# Patient Record
Sex: FEMALE | Race: WHITE | NOT HISPANIC OR LATINO | Employment: OTHER | URBAN - METROPOLITAN AREA
[De-identification: names, ages, dates, MRNs, and addresses within clinical notes are randomized per-mention and may not be internally consistent; named-entity substitution may affect disease eponyms.]

---

## 2017-02-22 DIAGNOSIS — G25.81 RLS (RESTLESS LEGS SYNDROME): Chronic | ICD-10-CM

## 2017-02-22 RX ORDER — CLONAZEPAM 0.5 MG/1
TABLET ORAL
Qty: 30 TAB | Refills: 3 | Status: SHIPPED
Start: 2017-02-22 | End: 2017-04-14 | Stop reason: SDUPTHER

## 2017-03-01 DIAGNOSIS — G25.81 RLS (RESTLESS LEGS SYNDROME): Chronic | ICD-10-CM

## 2017-03-01 RX ORDER — PRAMIPEXOLE DIHYDROCHLORIDE 1 MG/1
1 TABLET ORAL 2 TIMES DAILY
Qty: 60 TAB | Refills: 6 | Status: SHIPPED | OUTPATIENT
Start: 2017-03-01 | End: 2017-04-14 | Stop reason: SDUPTHER

## 2017-03-20 DIAGNOSIS — N32.81 OVERACTIVE BLADDER: ICD-10-CM

## 2017-03-20 RX ORDER — OXYBUTYNIN CHLORIDE 5 MG/1
5 TABLET ORAL 2 TIMES DAILY
Qty: 60 TAB | Refills: 6 | Status: SHIPPED | OUTPATIENT
Start: 2017-03-20 | End: 2017-04-14 | Stop reason: SDUPTHER

## 2017-04-14 ENCOUNTER — OFFICE VISIT (OUTPATIENT)
Dept: MEDICAL GROUP | Facility: MEDICAL CENTER | Age: 67
End: 2017-04-14
Payer: COMMERCIAL

## 2017-04-14 VITALS
HEIGHT: 62 IN | OXYGEN SATURATION: 97 % | SYSTOLIC BLOOD PRESSURE: 124 MMHG | WEIGHT: 228 LBS | HEART RATE: 66 BPM | DIASTOLIC BLOOD PRESSURE: 78 MMHG | RESPIRATION RATE: 16 BRPM | TEMPERATURE: 97.8 F | BODY MASS INDEX: 41.96 KG/M2

## 2017-04-14 DIAGNOSIS — E66.01 OBESITY, CLASS III, BMI 40-49.9 (MORBID OBESITY) (HCC): ICD-10-CM

## 2017-04-14 DIAGNOSIS — N32.81 OVERACTIVE BLADDER: ICD-10-CM

## 2017-04-14 DIAGNOSIS — G25.81 RLS (RESTLESS LEGS SYNDROME): Chronic | ICD-10-CM

## 2017-04-14 PROCEDURE — 99214 OFFICE O/P EST MOD 30 MIN: CPT | Performed by: INTERNAL MEDICINE

## 2017-04-14 RX ORDER — OXYBUTYNIN CHLORIDE 5 MG/1
5 TABLET ORAL 3 TIMES DAILY
Qty: 90 TAB | Refills: 6 | Status: SHIPPED | OUTPATIENT
Start: 2017-04-14 | End: 2017-11-02 | Stop reason: SDUPTHER

## 2017-04-14 RX ORDER — CLONAZEPAM 0.5 MG/1
TABLET ORAL
Qty: 30 TAB | Refills: 0 | Status: SHIPPED | OUTPATIENT
Start: 2017-04-14 | End: 2017-05-30 | Stop reason: SDUPTHER

## 2017-04-14 RX ORDER — PRAMIPEXOLE DIHYDROCHLORIDE 1 MG/1
1 TABLET ORAL 2 TIMES DAILY
Qty: 60 TAB | Refills: 6 | Status: SHIPPED | OUTPATIENT
Start: 2017-04-14 | End: 2017-09-08 | Stop reason: SDUPTHER
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
## 2017-04-14 NOTE — MR AVS SNAPSHOT
"        Ariadna Navarroromaine   2017 3:00 PM   Office Visit   MRN: [id number]    Department:  [address] [place] [address]   Dept Phone:  [phone]    Description:  Female : 1950   Provider:  Martin Lindsey M.D.           Reason for Visit     Medication Problem stollen      Allergies as of 2017     No Known Allergies      You were diagnosed with     Obesity, Class III, BMI 40-49.9 (morbid obesity) (CMS-[facility])   [933426]       Overactive bladder   [263473]       RLS (restless legs syndrome)   [481575]         Vital Signs     Blood Pressure Pulse Temperature Respirations Height Weight    124/78 mmHg 66 36.6 °C (97.8 °F) 16 1.562 m (5' 1.5\") 103.42 kg (228 lb)    Body Mass Index Oxygen Saturation Smoking Status             42.39 kg/m2 97% Never Smoker          Basic Information     Date Of Birth Sex Race Ethnicity Preferred Language    1950 Female White Unknown English      Problem List              ICD-10-CM Priority Class Noted - Resolved    RLS (restless legs syndrome) (Chronic) G25.81   10/5/2011 - Present    Overactive bladder N32.81   2013 - Present    Obesity, Class III, BMI 40-49.9 (morbid obesity) (CMS-HCC) E66.01   2015 - Present      Health Maintenance        Date Due Completion Dates    IMM DTaP/Tdap/Td Vaccine (1 - Tdap) 1971 ---    IMM ZOSTER VACCINE 2012 ---    MAMMOGRAM 2016, 10/18/2011    COLONOSCOPY 2016 (Done), 2011 (Done)    Override on 2011: Done    Override on 2011: Done    PAP SMEAR 2016            Current Immunizations     No immunizations on file.      Below and/or attached are the medications your provider expects you to take. Review all of your home medications and newly ordered medications with your provider and/or pharmacist. Follow medication instructions as directed by your provider and/or pharmacist. Please keep your medication list with you and share with your provider. Update the information when " medications are discontinued, doses are changed, or new medications (including over-the-counter products) are added; and carry medication information at all times in the event of emergency situations     Allergies:  No Known Allergies          Medications  Valid as of: April 14, 2017 -  3:27 PM    Generic Name Brand Name Tablet Size Instructions for use    ClonazePAM (Tab) KLONOPIN 0.5 MG TAKE ONE TABLET BY MOUTH AT BEDTIME AS NEEDED        Ferrous Sulfate (Tab) ferrous sulfate 325 (65 FE) MG Take 1 Tab by mouth every day.        Oxybutynin Chloride (Tab) DITROPAN 5 MG Take 1 Tab by mouth 3 times a day. TAKE 1 TABLET BY MOUTH TWICE A DAY        Pramipexole Dihydrochloride (Tab) MIRAPEX 1 MG Take 1 Tab by mouth 2 Times a Day. TAKE 1 TABLET BY MOUTH TWICE A DAY        .                 Medicines prescribed today were sent to:     [id number] - [place], [place] - [address]    [address] [place] [address]    Phone: [phone] Fax: [phone]    Open 24 Hours?: No      Medication refill instructions:       If your prescription bottle indicates you have medication refills left, it is not necessary to call your provider’s office. Please contact your pharmacy and they will refill your medication.    If your prescription bottle indicates you do not have any refills left, you may request refills at any time through one of the following ways: The online [employer] system (except Urgent Care), by calling your provider’s office, or by asking your pharmacy to contact your provider’s office with a refill request. Medication refills are processed only during regular business hours and may not be available until the next business day. Your provider may request additional information or to have a follow-up visit with you prior to refilling your medication.   *Please Note: Medication refills are assigned a new Rx number when refilled electronically. Your pharmacy may indicate that no refills were authorized even  though a new prescription for the same medication is available at the pharmacy. Please request the medicine by name with the pharmacy before contacting your provider for a refill.        Your To Do List     Future Labs/Procedures Complete By [email or web address] [facility]IN [facility]NAG[facility] SCREEN  As directed 4/14/2018    Comments:    Current Meds (name, sig, last dose):   Current outpatient prescriptions:   •  oxybutynin, 5 mg, Oral, TID  •  pramipexole, 1 mg, Oral, BID  •  clonazepam, TAKE ONE TABLET BY MOUTH AT BEDTIME AS NEEDED  •  ferrous sulfate, 325 mg, Oral, DAILY         MyChart Access Code: Activation code not generated  Current [facility] Status: Active

## 2017-04-19 ENCOUNTER — APPOINTMENT (OUTPATIENT)
Dept: MEDICAL GROUP | Facility: MEDICAL CENTER | Age: 67
End: 2017-04-19
Payer: COMMERCIAL

## 2017-05-30 DIAGNOSIS — G25.81 RLS (RESTLESS LEGS SYNDROME): Chronic | ICD-10-CM

## 2017-05-30 RX ORDER — CLONAZEPAM 0.5 MG/1
TABLET ORAL
Qty: 30 TAB | Refills: 3 | Status: SHIPPED
Start: 2017-05-30 | End: 2017-09-19 | Stop reason: SDUPTHER

## 2017-09-08 DIAGNOSIS — G25.81 RLS (RESTLESS LEGS SYNDROME): Chronic | ICD-10-CM

## 2017-09-08 RX ORDER — PRAMIPEXOLE DIHYDROCHLORIDE 1 MG/1
TABLET ORAL
Qty: 60 TAB | Refills: 5 | Status: SHIPPED | OUTPATIENT
Start: 2017-09-08 | End: 2017-09-29 | Stop reason: SDUPTHER

## 2017-09-19 DIAGNOSIS — G25.81 RLS (RESTLESS LEGS SYNDROME): Chronic | ICD-10-CM

## 2017-09-19 RX ORDER — CLONAZEPAM 0.5 MG/1
TABLET ORAL
Qty: 30 TAB | Refills: 1 | Status: SHIPPED
Start: 2017-09-19 | End: 2017-11-09 | Stop reason: SDUPTHER

## 2017-09-29 DIAGNOSIS — G25.81 RLS (RESTLESS LEGS SYNDROME): Chronic | ICD-10-CM

## 2017-09-29 RX ORDER — PRAMIPEXOLE DIHYDROCHLORIDE 1 MG/1
1 TABLET ORAL 2 TIMES DAILY
Qty: 180 TAB | Refills: 3 | Status: SHIPPED | OUTPATIENT
Start: 2017-09-29 | End: 2018-01-18 | Stop reason: SDUPTHER

## 2017-09-29 NOTE — TELEPHONE ENCOUNTER
Was the patient seen in the last year in this department? Yes  4/14/17  Request is for a 90 day supply quantity 180    Does patient have an active prescription for medications requested? No     Received Request Via: Pharmacy

## 2017-11-02 DIAGNOSIS — N32.81 OVERACTIVE BLADDER: ICD-10-CM

## 2017-11-02 RX ORDER — OXYBUTYNIN CHLORIDE 5 MG/1
5 TABLET ORAL 3 TIMES DAILY
Qty: 90 TAB | Refills: 6 | Status: SHIPPED | OUTPATIENT
Start: 2017-11-02 | End: 2018-05-29 | Stop reason: SDUPTHER

## 2017-11-09 DIAGNOSIS — G25.81 RLS (RESTLESS LEGS SYNDROME): Chronic | ICD-10-CM

## 2017-11-10 RX ORDER — CLONAZEPAM 0.5 MG/1
TABLET ORAL
Qty: 30 TAB | Refills: 0 | Status: SHIPPED
Start: 2017-11-10 | End: 2017-12-13 | Stop reason: SDUPTHER

## 2017-12-13 DIAGNOSIS — G25.81 RLS (RESTLESS LEGS SYNDROME): Chronic | ICD-10-CM

## 2017-12-13 RX ORDER — CLONAZEPAM 0.5 MG/1
0.5 TABLET ORAL
Qty: 30 TAB | Refills: 0 | Status: SHIPPED
Start: 2017-12-13 | End: 2018-01-12

## 2018-01-18 ENCOUNTER — OFFICE VISIT (OUTPATIENT)
Dept: MEDICAL GROUP | Facility: MEDICAL CENTER | Age: 68
End: 2018-01-18
Payer: COMMERCIAL

## 2018-01-18 VITALS
DIASTOLIC BLOOD PRESSURE: 66 MMHG | WEIGHT: 225.6 LBS | HEIGHT: 62 IN | BODY MASS INDEX: 41.51 KG/M2 | RESPIRATION RATE: 16 BRPM | HEART RATE: 64 BPM | TEMPERATURE: 97.9 F | OXYGEN SATURATION: 98 % | SYSTOLIC BLOOD PRESSURE: 118 MMHG

## 2018-01-18 DIAGNOSIS — E66.01 MORBID OBESITY (HCC): ICD-10-CM

## 2018-01-18 DIAGNOSIS — R73.02 IGT (IMPAIRED GLUCOSE TOLERANCE): ICD-10-CM

## 2018-01-18 DIAGNOSIS — Z12.31 VISIT FOR SCREENING MAMMOGRAM: ICD-10-CM

## 2018-01-18 DIAGNOSIS — Z12.11 SCREEN FOR COLON CANCER: ICD-10-CM

## 2018-01-18 DIAGNOSIS — G25.81 RLS (RESTLESS LEGS SYNDROME): Chronic | ICD-10-CM

## 2018-01-18 DIAGNOSIS — Z91.81 AT RISK FOR FALLS: ICD-10-CM

## 2018-01-18 DIAGNOSIS — Z13.6 SCREENING FOR CARDIOVASCULAR CONDITION: ICD-10-CM

## 2018-01-18 PROCEDURE — 99214 OFFICE O/P EST MOD 30 MIN: CPT | Performed by: INTERNAL MEDICINE

## 2018-01-18 RX ORDER — CLONAZEPAM 0.5 MG/1
0.5 TABLET ORAL NIGHTLY PRN
Qty: 30 TAB | Refills: 2 | Status: SHIPPED | OUTPATIENT
Start: 2018-01-18 | End: 2018-02-17

## 2018-01-18 RX ORDER — PRAMIPEXOLE DIHYDROCHLORIDE 1 MG/1
1 TABLET ORAL 2 TIMES DAILY
Qty: 180 TAB | Refills: 3 | Status: SHIPPED | OUTPATIENT
Start: 2018-01-18 | End: 2018-04-12 | Stop reason: SDUPTHER

## 2018-01-18 RX ORDER — CLONAZEPAM 0.5 MG/1
0.25 TABLET ORAL NIGHTLY PRN
COMMUNITY
End: 2018-01-18 | Stop reason: SDUPTHER

## 2018-01-18 ASSESSMENT — PATIENT HEALTH QUESTIONNAIRE - PHQ9: CLINICAL INTERPRETATION OF PHQ2 SCORE: 0

## 2018-01-18 NOTE — PROGRESS NOTES
"CC: Restless leg syndrome.    HPI:   Ariadna presents today with the following.    1. Morbid obesity (CMS-HCC)  Weight persistently elevated has gone down slightly in the last 6 months. She denies any specific focus to diet.    2. RLS (restless legs syndrome)  Maintained on dual therapy including clonazepam. She reports she takes one pill at night. Denies any major anxiety symptoms and is taking Mirapex as well. She reports would be difficult for her to be seen every 3 months she would like one more prescription and is willing to wean off the medication.    3. IGT (impaired glucose tolerance)  Well overdue for recheck of blood work blood sugars have been elevated in the past but nondiabetic range.    4. At risk for falls  She reports a fall without significant injury refuses to use walking device.    ROS: Denies Chest pain, SOB, LE edema.    Patient Active Problem List    Diagnosis Date Noted   • Morbid obesity (CMS-[facility]) 06/01/2015   • Overactive bladder 11/25/2013   • RLS (restless legs syndrome) 10/05/2011       Current Outpatient Prescriptions   Medication Sig Dispense Refill   • clonazepam (KLONOPIN) 0.5 MG Tab Take 1 Tab by mouth at bedtime as needed for up to 30 days. 30 Tab 2   • pramipexole (MIRAPEX) 1 MG Tab Take 1 Tab by mouth 2 Times a Day. TAKE 1 TABLET BY MOUTH TWICE A  Tab 3   • oxybutynin (DITROPAN) 5 MG Tab Take 1 Tab by mouth 3 times a day. TAKE 1 TABLET BY MOUTH TWICE A DAY 90 Tab 6   • ferrous sulfate 325 (65 FE) MG tablet Take 1 Tab by mouth every day. (Patient not taking: Reported on 1/18/2018) 30 Each 6     No current facility-administered medications for this visit.          Allergies as of 01/18/2018   • (No Known Allergies)        ROS: As per HPI.    /66   Pulse 64   Temp 36.6 °C (97.9 °F)   Resp 16   Ht 1.562 m (5' 1.5\")   Wt 102.3 kg (225 lb 9.6 oz)   SpO2 98%   BMI 41.94 kg/m²      Physical Exam:  Gen:         Alert and oriented, No apparent distress.  Neck:        No " Lymphadenopathy or Bruits.  Lungs:     Clear to auscultation bilaterally  CV:          Regular rate and rhythm. No murmurs, rubs or gallops.               Ext:          No clubbing, cyanosis, edema.      Assessment and Plan.   67 y.o. female with the following issues.    1. Morbid obesity (CMS-[facility])  Patient's body mass index is 41.94 kg/m². Exercise and nutrition counseling were performed at this visit.  Set goal of 15lbs in next 3 months.    - Patient identified as having weight management issue.  Appropriate orders and counseling given.    2. RLS (restless legs syndrome)  Discussion to stand Klonopin will need to be seen every 3 months she agrees his last prescription and will trying to stick to Mirapex. She will continue iron as well.  - clonazepam (KLONOPIN) 0.5 MG Tab; Take 1 Tab by mouth at bedtime as needed for up to 30 days.  Dispense: 30 Tab; Refill: 2  - pramipexole (MIRAPEX) 1 MG Tab; Take 1 Tab by mouth 2 Times a Day. TAKE 1 TABLET BY MOUTH TWICE A DAY  Dispense: 180 Tab; Refill: 3    3. IGT (impaired glucose tolerance)  Discussion about diet recheck blood work  - HEMOGLOBIN A1C; Future    4. At risk for falls  Precautions given  - Patient identified as fall risk.  Appropriate orders and counseling given.    5. Screen for colon cancer    - REFERRAL TO GASTROENTEROLOGY

## 2018-04-12 DIAGNOSIS — G25.81 RLS (RESTLESS LEGS SYNDROME): Chronic | ICD-10-CM

## 2018-04-12 RX ORDER — PRAMIPEXOLE DIHYDROCHLORIDE 1 MG/1
1 TABLET ORAL 2 TIMES DAILY
Qty: 180 TAB | Refills: 3 | Status: SHIPPED | OUTPATIENT
Start: 2018-04-12 | End: 2018-04-16

## 2018-04-12 NOTE — TELEPHONE ENCOUNTER
Pt has run out of Medication. Pharmacy will not refill due to requesting them too early. Pt is stating that the Pharmacy did not give her enough to last.     Was the patient seen in the last year in this department? Yes     Does patient have an active prescription for medications requested? No     Received Request Via: Pharmacy

## 2018-04-16 ENCOUNTER — TELEPHONE (OUTPATIENT)
Dept: MEDICAL GROUP | Facility: MEDICAL CENTER | Age: 68
End: 2018-04-16

## 2018-04-16 RX ORDER — PRAMIPEXOLE DIHYDROCHLORIDE 1 MG/1
1 TABLET ORAL 3 TIMES DAILY
Qty: 90 TAB | Refills: 6 | Status: SHIPPED | OUTPATIENT
Start: 2018-04-16 | End: 2018-10-13 | Stop reason: SDUPTHER

## 2018-05-29 DIAGNOSIS — N32.81 OVERACTIVE BLADDER: ICD-10-CM

## 2018-05-29 RX ORDER — OXYBUTYNIN CHLORIDE 5 MG/1
5 TABLET ORAL 3 TIMES DAILY
Qty: 90 TAB | Refills: 6 | Status: SHIPPED | OUTPATIENT
Start: 2018-05-29 | End: 2019-01-11 | Stop reason: SDUPTHER

## 2018-10-08 ENCOUNTER — APPOINTMENT (OUTPATIENT)
Dept: RADIOLOGY | Facility: MEDICAL CENTER | Age: 68
End: 2018-10-08
Attending: EMERGENCY MEDICINE
Payer: COMMERCIAL

## 2018-10-08 ENCOUNTER — HOSPITAL ENCOUNTER (EMERGENCY)
Facility: MEDICAL CENTER | Age: 68
End: 2018-10-08
Attending: EMERGENCY MEDICINE
Payer: COMMERCIAL

## 2018-10-08 VITALS
TEMPERATURE: 98 F | SYSTOLIC BLOOD PRESSURE: 172 MMHG | HEART RATE: 55 BPM | DIASTOLIC BLOOD PRESSURE: 72 MMHG | BODY MASS INDEX: 44.63 KG/M2 | RESPIRATION RATE: 18 BRPM | WEIGHT: 242.51 LBS | HEIGHT: 62 IN

## 2018-10-08 DIAGNOSIS — S43.401A SPRAIN OF RIGHT SHOULDER, UNSPECIFIED SHOULDER SPRAIN TYPE, INITIAL ENCOUNTER: ICD-10-CM

## 2018-10-08 PROCEDURE — 99284 EMERGENCY DEPT VISIT MOD MDM: CPT

## 2018-10-08 PROCEDURE — 73030 X-RAY EXAM OF SHOULDER: CPT | Mod: RT

## 2018-10-08 RX ORDER — MELOXICAM 7.5 MG/1
7.5 TABLET ORAL DAILY
Qty: 30 TAB | Refills: 0 | Status: SHIPPED | OUTPATIENT
Start: 2018-10-08 | End: 2019-08-29

## 2018-10-08 ASSESSMENT — PAIN SCALES - GENERAL: PAINLEVEL_OUTOF10: 7
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
## 2018-10-08 NOTE — ED NOTES
Security dispatch called to get a shirt from [facility]'s closet for patient to go home in. States they will bring a shirt down.

## 2018-10-14 RX ORDER — PRAMIPEXOLE DIHYDROCHLORIDE 1 MG/1
TABLET ORAL
Qty: 90 TAB | Refills: 6 | Status: SHIPPED | OUTPATIENT
Start: 2018-10-14 | End: 2019-05-21 | Stop reason: SDUPTHER

## 2019-01-11 DIAGNOSIS — N32.81 OVERACTIVE BLADDER: ICD-10-CM

## 2019-01-11 RX ORDER — OXYBUTYNIN CHLORIDE 5 MG/1
5 TABLET ORAL 3 TIMES DAILY
Qty: 90 TAB | Refills: 0 | Status: SHIPPED | OUTPATIENT
Start: 2019-01-11 | End: 2019-02-07 | Stop reason: SDUPTHER

## 2019-05-28 DIAGNOSIS — N32.81 OVERACTIVE BLADDER: ICD-10-CM

## 2019-05-28 RX ORDER — OXYBUTYNIN CHLORIDE 5 MG/1
5 TABLET ORAL 3 TIMES DAILY
Qty: 90 TAB | Refills: 0 | Status: SHIPPED | OUTPATIENT
Start: 2019-05-28 | End: 2019-07-10 | Stop reason: SDUPTHER

## 2019-06-10 ENCOUNTER — PATIENT MESSAGE (OUTPATIENT)
Dept: MEDICAL GROUP | Facility: MEDICAL CENTER | Age: 69
End: 2019-06-10

## 2019-06-29 ENCOUNTER — TELEPHONE (OUTPATIENT)
Dept: MEDICAL GROUP | Facility: MEDICAL CENTER | Age: 69
End: 2019-06-29

## 2019-07-01 ENCOUNTER — TELEPHONE (OUTPATIENT)
Dept: RADIOLOGY | Facility: MEDICAL CENTER | Age: 69
End: 2019-07-01

## 2019-07-01 ENCOUNTER — TELEPHONE (OUTPATIENT)
Dept: MEDICAL GROUP | Facility: MEDICAL CENTER | Age: 69
End: 2019-07-01

## 2019-07-01 DIAGNOSIS — N95.9 POST MENOPAUSAL PROBLEMS: ICD-10-CM

## 2019-07-01 NOTE — TELEPHONE ENCOUNTER
VOICEMAIL  1. Caller Name: [facility]                      Call Back Number: [phone]    2. Message: Patient has an appt. 07/11 needs an order for a bone density scan.     3. Patient approves office to leave a detailed voicemail/MyChart message: N\A

## 2019-07-01 NOTE — TELEPHONE ENCOUNTER
LM REQUESTING DEXA ORDER BE PLACED AS PT WAS SCHEDULED FOR A DEXA W/O ORDER IN [facility]

## 2019-07-10 DIAGNOSIS — N32.81 OVERACTIVE BLADDER: ICD-10-CM

## 2019-07-10 RX ORDER — OXYBUTYNIN CHLORIDE 5 MG/1
5 TABLET ORAL 3 TIMES DAILY
Qty: 90 TAB | Refills: 0 | Status: SHIPPED | OUTPATIENT
Start: 2019-07-10 | End: 2019-12-10 | Stop reason: SDUPTHER

## 2019-07-11 ENCOUNTER — HOSPITAL ENCOUNTER (OUTPATIENT)
Dept: RADIOLOGY | Facility: MEDICAL CENTER | Age: 69
End: 2019-07-11
Attending: INTERNAL MEDICINE
Payer: COMMERCIAL

## 2019-07-11 DIAGNOSIS — N95.9 POST MENOPAUSAL PROBLEMS: ICD-10-CM

## 2019-07-11 DIAGNOSIS — N32.81 OVERACTIVE BLADDER: ICD-10-CM

## 2019-07-11 DIAGNOSIS — Z12.31 SCREENING MAMMOGRAM, ENCOUNTER FOR: ICD-10-CM

## 2019-07-11 PROCEDURE — 77080 DXA BONE DENSITY AXIAL: CPT

## 2019-07-11 PROCEDURE — 77067 SCR MAMMO BI INCL CAD: CPT

## 2019-07-30 ENCOUNTER — APPOINTMENT (OUTPATIENT)
Dept: MEDICAL GROUP | Facility: MEDICAL CENTER | Age: 69
End: 2019-07-30
Payer: COMMERCIAL

## 2019-10-15 ENCOUNTER — APPOINTMENT (OUTPATIENT)
Dept: RADIOLOGY | Facility: MEDICAL CENTER | Age: 69
End: 2019-10-15
Attending: EMERGENCY MEDICINE
Payer: COMMERCIAL

## 2019-10-15 ENCOUNTER — HOSPITAL ENCOUNTER (EMERGENCY)
Facility: MEDICAL CENTER | Age: 69
End: 2019-10-15
Attending: EMERGENCY MEDICINE
Payer: COMMERCIAL

## 2019-10-15 VITALS
HEART RATE: 74 BPM | SYSTOLIC BLOOD PRESSURE: 115 MMHG | HEIGHT: 62 IN | TEMPERATURE: 98.6 F | BODY MASS INDEX: 46.17 KG/M2 | DIASTOLIC BLOOD PRESSURE: 75 MMHG | WEIGHT: 250.88 LBS | OXYGEN SATURATION: 92 % | RESPIRATION RATE: 18 BRPM

## 2019-10-15 DIAGNOSIS — L03.115 BILATERAL CELLULITIS OF LOWER LEG: ICD-10-CM

## 2019-10-15 DIAGNOSIS — R60.9 PERIPHERAL EDEMA: ICD-10-CM

## 2019-10-15 DIAGNOSIS — L03.116 BILATERAL CELLULITIS OF LOWER LEG: ICD-10-CM

## 2019-10-15 LAB
ALBUMIN SERPL BCP-MCNC: 3.9 G/DL (ref 3.2–4.9)
ALBUMIN/GLOB SERPL: 1.3 G/DL
ALP SERPL-CCNC: 71 U/L (ref 30–99)
ALT SERPL-CCNC: 17 U/L (ref 2–50)
ANION GAP SERPL CALC-SCNC: 9 MMOL/L (ref 0–11.9)
AST SERPL-CCNC: 22 U/L (ref 12–45)
BASOPHILS # BLD AUTO: 0.3 % (ref 0–1.8)
BASOPHILS # BLD: 0.03 K/UL (ref 0–0.12)
BILIRUB SERPL-MCNC: 0.5 MG/DL (ref 0.1–1.5)
BUN SERPL-MCNC: 16 MG/DL (ref 8–22)
CALCIUM SERPL-MCNC: 8.7 MG/DL (ref 8.5–10.5)
CHLORIDE SERPL-SCNC: 107 MMOL/L (ref 96–112)
CO2 SERPL-SCNC: 23 MMOL/L (ref 20–33)
CREAT SERPL-MCNC: 0.64 MG/DL (ref 0.5–1.4)
EKG IMPRESSION: NORMAL
EOSINOPHIL # BLD AUTO: 0.19 K/UL (ref 0–0.51)
EOSINOPHIL NFR BLD: 2.1 % (ref 0–6.9)
ERYTHROCYTE [DISTWIDTH] IN BLOOD BY AUTOMATED COUNT: 44.9 FL (ref 35.9–50)
GLOBULIN SER CALC-MCNC: 3 G/DL (ref 1.9–3.5)
GLUCOSE SERPL-MCNC: 100 MG/DL (ref 65–99)
HCT VFR BLD AUTO: 37.5 % (ref 37–47)
HGB BLD-MCNC: 12.2 G/DL (ref 12–16)
IMM GRANULOCYTES # BLD AUTO: 0.03 K/UL (ref 0–0.11)
IMM GRANULOCYTES NFR BLD AUTO: 0.3 % (ref 0–0.9)
LYMPHOCYTES # BLD AUTO: 1.21 K/UL (ref 1–4.8)
LYMPHOCYTES NFR BLD: 13.5 % (ref 22–41)
MCH RBC QN AUTO: 28.6 PG (ref 27–33)
MCHC RBC AUTO-ENTMCNC: 32.5 G/DL (ref 33.6–35)
MCV RBC AUTO: 87.8 FL (ref 81.4–97.8)
MONOCYTES # BLD AUTO: 0.6 K/UL (ref 0–0.85)
MONOCYTES NFR BLD AUTO: 6.7 % (ref 0–13.4)
NEUTROPHILS # BLD AUTO: 6.93 K/UL (ref 2–7.15)
NEUTROPHILS NFR BLD: 77.1 % (ref 44–72)
NRBC # BLD AUTO: 0 K/UL
NRBC BLD-RTO: 0 /100 WBC
NT-PROBNP SERPL IA-MCNC: 99 PG/ML (ref 0–125)
PLATELET # BLD AUTO: 276 K/UL (ref 164–446)
PMV BLD AUTO: 9.8 FL (ref 9–12.9)
POTASSIUM SERPL-SCNC: 3.4 MMOL/L (ref 3.6–5.5)
PROT SERPL-MCNC: 6.9 G/DL (ref 6–8.2)
RBC # BLD AUTO: 4.27 M/UL (ref 4.2–5.4)
SODIUM SERPL-SCNC: 139 MMOL/L (ref 135–145)
WBC # BLD AUTO: 9 K/UL (ref 4.8–10.8)

## 2019-10-15 PROCEDURE — 83880 ASSAY OF NATRIURETIC PEPTIDE: CPT

## 2019-10-15 PROCEDURE — 71045 X-RAY EXAM CHEST 1 VIEW: CPT

## 2019-10-15 PROCEDURE — 80053 COMPREHEN METABOLIC PANEL: CPT

## 2019-10-15 PROCEDURE — 85025 COMPLETE CBC W/AUTO DIFF WBC: CPT

## 2019-10-15 PROCEDURE — 700102 HCHG RX REV CODE 250 W/ 637 OVERRIDE(OP): Performed by: EMERGENCY MEDICINE

## 2019-10-15 PROCEDURE — 93005 ELECTROCARDIOGRAM TRACING: CPT | Performed by: EMERGENCY MEDICINE

## 2019-10-15 PROCEDURE — 99285 EMERGENCY DEPT VISIT HI MDM: CPT

## 2019-10-15 PROCEDURE — 93005 ELECTROCARDIOGRAM TRACING: CPT

## 2019-10-15 PROCEDURE — 93970 EXTREMITY STUDY: CPT

## 2019-10-15 PROCEDURE — A9270 NON-COVERED ITEM OR SERVICE: HCPCS | Performed by: EMERGENCY MEDICINE

## 2019-10-15 RX ORDER — CEPHALEXIN 250 MG/1
250 CAPSULE ORAL 4 TIMES DAILY
Qty: 28 CAP | Refills: 0 | Status: SHIPPED | OUTPATIENT
Start: 2019-10-15 | End: 2019-10-22

## 2019-10-15 RX ORDER — PRAMIPEXOLE DIHYDROCHLORIDE 0.12 MG/1
0.12 TABLET ORAL 3 TIMES DAILY PRN
COMMUNITY
End: 2020-04-21

## 2019-10-15 RX ORDER — CYCLOBENZAPRINE HCL 10 MG
10 TABLET ORAL ONCE
Status: COMPLETED | OUTPATIENT
Start: 2019-10-15 | End: 2019-10-15

## 2019-10-15 RX ADMIN — CYCLOBENZAPRINE HYDROCHLORIDE 10 MG: 10 TABLET, FILM COATED ORAL at 17:15

## 2019-10-15 SDOH — HEALTH STABILITY: MENTAL HEALTH: HOW MANY STANDARD DRINKS CONTAINING ALCOHOL DO YOU HAVE ON A TYPICAL DAY?: 3 OR 4

## 2019-10-15 NOTE — ED TRIAGE NOTES
Ambulatory to triage from EKG with   Chief Complaint   Patient presents with   • Peripheral Edema   • Shortness of Breath   Above symptoms worsening of 2 months. Bilateral LE edema 3+. RR 22. SOB protocol ordered. Denies fever, chills, or chest pain. Educated regarding triage process. Will notify staff of worsening symptoms.

## 2019-10-15 NOTE — ED NOTES
Patient in room with monitors attached and functioning. No acute distress noted. Patient states no needs. Will continue to monitor patient.

## 2019-10-16 NOTE — ED NOTES
Patient received discharged instructions. Given instructions to get eprescriptions. Verbalizes understanding of discharge instructions. Vitals stable. Steady gait to lobby.

## 2019-10-16 NOTE — ED PROVIDER NOTES
"ED Provider Note    CHIEF COMPLAINT  Chief Complaint   Patient presents with   • Peripheral Edema   • Shortness of Breath       HPI  Ariadna Sepulveda is a 68 y.o. female who presents to the emergency department chief complaint of bilateral lower extremity pain and peripheral edema.  Patient is an obese female with a history of restless leg syndrome.  She states she has had off-and-on lower extremity swelling over the last several years.  She states over the last few days however she is had worsening bilateral lower extremity swelling with pain and redness and warmth to the touch.  She endorses chills but no fevers.  She denies chest pain or nausea or vomiting.  She states she gets short of breath but that is always been going on for the last several years because \"I am overweight\".  She currently denies any active chest pain has not had any chest pain with exertion.  She denies any unilateral leg swelling tobacco use other drug use recent surgeries or estrogen use.    REVIEW OF SYSTEMS  Positives as above. Pertinent negatives include nausea vomiting fevers trauma chest pain unilateral leg swelling weight loss night sweats                                                  All other review of systems are negative    PAST MEDICAL HISTORY   has a past medical history of RLS (restless legs syndrome) (10/5/2011).    SOCIAL HISTORY  Social History     Tobacco Use   • Smoking status: Never Smoker   • Smokeless tobacco: Never Used   Substance and Sexual Activity   • Alcohol use: Yes     Drinks per session: 3 or 4     Comment: 2 bottles of wine a week   • Drug use: No   • Sexual activity: Not Currently       SURGICAL HISTORY   has a past surgical history that includes tonsillectomy.    CURRENT MEDICATIONS  Home Medications     Reviewed by Lesia Navarro (Pharmacy Tech) on 10/15/19 at 1509  Med List Status: Complete   Medication Last Dose Status   oxybutynin (DITROPAN) 5 MG Tab 10/15/2019 Active   pramipexole (MIRAPEX) " "0.125 MG Tab 10/15/2019 Active                ALLERGIES  No Known Allergies    PHYSICAL EXAM  VITAL SIGNS: /61   Pulse 67   Temp 37 °C (98.6 °F) (Temporal)   Resp 18   Ht 1.575 m (5' 2\")   Wt 113.8 kg (250 lb 14.1 oz)   SpO2 95%   BMI 45.89 kg/m²    Pulse ox interpretation: I interpret this pulse ox as normal.  Constitutional: Alert in no apparent distress.  Obese female  HENT: Normocephalic atraumatic, MMM  Eyes: PER, Conjunctiva normal, Non-icteric.   Neck: Normal range of motion, No tenderness, Supple, No stridor.   Cardiovascular: Regular rate and rhythm, no murmurs.   Thorax & Lungs: Normal breath sounds, No respiratory distress, No wheezing, No chest tenderness.   Abdomen: Bowel sounds normal, Soft, No tenderness, No pulsatile masses. No peritoneal signs.  Skin: Warm, Dry  Extremities: Intact distal pulses, bilateral lower extremities with 2+ pitting edema up to the knees, bilateral lower extremities with erythema warmth and induration extending up the anterior shins and into the medial thighs, no blistering no crepitus no bullae no fluctuance  Neurologic: Alert and oriented x3, No focal deficits noted.       DIFFERENTIAL DIAGNOSIS AND WORK UP PLAN    This is a 68 y.o. female who presents with signs and symptoms likely consistent with bilateral cellulitis of lower extremities, will evaluate laboratory analysis for signs and symptoms of renal failure liver failure or heart failure although the patient does not get chest pain with exertion she does get short of breath with all activity which is going on for quite some time.  She denies any chest pain with exertion.  She is low risk beyond her age.  She states she has had lower extremity swelling on and off for a long time.  We will also evaluate for bilateral DVTs though I doubt it at this time as she is also low risk for that beyond her age.    DIAGNOSTIC STUDIES / PROCEDURES    EKG  Results for orders placed or performed during the hospital " encounter of 10/15/19   EKG (NOW)   Result Value Ref Range    Report       [facility] Emergency Dept.    Test Date:  2019-10-15  Pt Name:    KARON STEVENS             Department: ER  MRN:        [id number]                      Room:  Gender:     Female                       Technician: [id number]  :        1950                   Requested By:ER TRIAGE PROTOCOL  Order #:    [id number]                    Reading MD: Jane Neal MD    Measurements  Intervals                                Axis  Rate:       80                           P:          5  IN:         164                          QRS:        -2  QRSD:       104                          T:          21  QT:         404  QTc:        466    Interpretive Statements  Sinus rhythm at a rate 80 no ST elevations or ST depressions no abnormal T  wave  inversions normal intervals normal axis no pathognomonic Q waves nonspecific  normal  No previous ECG available for comparison    Electronically Signed On 10- 18:19:41 PDT by Jane Neal MD         LABS  Pertinent Lab Findings  CBC within normal limits panel left shift, CMP within normal limits normal proBNP      RADIOLOGY  US-EXTREMITY VENOUS LOWER BILAT   Final Result      DX-CHEST-PORTABLE (1 VIEW)   Final Result      No acute cardiopulmonary abnormality.        DVT US   FINDINGS:   Right lower extremity -.    Common femoral, profunda, popliteal, and proximal femoral vein are    compressible.    Patient could not tolerate compressions at femoral vein mid-dist, but color    filling was demonstrated.   Posterior tibial and peroneal veins not visualized.      Left lower extremity -.    Patient refused compressions.    Complete color filling and spontaneous flow seen in common femoral,    profunda, and prox-mid femoral vein.    Distal femoral, popliteal, posterior tibial, and peroneal veins not seen    due to patient refused rest of exam.    The radiologist's interpretation of all  "radiological studies have been reviewed by me.      COURSE & MEDICAL DECISION MAKING  Pertinent Labs & Imaging studies reviewed. (See chart for details)    4:30 PM  Ultrasound has been completed but has not been radical radiology they are going to get it read by the vascular radiologist    5:37 PM  Got in touch with the vascular radiologist he has not seen the images cannot find them they are working on locating the machine they were taken on    6:00 PM  Discussed case with radiology although the patient did not allow compressibility in the distal aspects there was flow in all vessels and no evidence of DVT    6:03 PM  I reassessed patient at the bedside she is resting comfortably, and was complaining of some leg discomfort earlier so she was given some Flexeril which helps.  At this time she will be treated for cellulitis of the lower extremities without evidence of DVT although the exam was not 100% conclusive as she did not allow some compressibility of the distal aspects.  Low concern for ACS and she does not have any chest pain or proBNP was done due to the shortness of breath and lower extremity swelling which is within normal limits no sign of fluid overload in the chest no evidence of renal failure liver failure with a normal albumin.  The swelling is likely secondary to her obesity as well as worsening in the setting of cellulitis.  She will be started on antibiotics and given strict return precautions in the next few days for any new or worsening issues despite elevation of her lower extremities and antibiotics at home.  She understands feels comfortable with the plan    /75   Pulse 74   Temp 37 °C (98.6 °F) (Temporal)   Resp 18   Ht 1.575 m (5' 2\")   Wt 113.8 kg (250 lb 14.1 oz)   SpO2 92%   BMI 45.89 kg/m²     The patient will return for new or worsening symptoms and is stable at the time of discharge.    The patient is referred to a primary physician for blood pressure management, diabetic " screening, and for all other preventative health concerns.    DISPOSITION:  Patient will be discharged home in stable condition.    FOLLOW UP:  Martin Lindsey M.D.  [address]  [place] [address]  [phone]    Schedule an appointment as soon as possible for a visit       [facility], Emergency Dept  [address]  [place] [id number] [phone]  In 3 days  If symptoms worsen      OUTPATIENT MEDICATIONS:  Discharge Medication List as of 10/15/2019  6:39 PM      START taking these medications    Details   cephALEXin (KEFLEX) 250 MG Cap Take 1 Cap by mouth 4 times a day for 7 days., Disp-28 Cap, R-0, Normal               FINAL IMPRESSION  1. Peripheral edema     2. Bilateral cellulitis of lower leg             Electronically signed by: Jane Neal, 10/15/2019 5:37 PM    This dictation has been created using voice recognition software and/or scribes. The accuracy of the dictation is limited by the abilities of the software and the expertise of the scribes. I expect there may be some errors of grammar and possibly content. I made every attempt to manually correct the errors within my dictation. However, errors related to voice recognition software and/or scribes may still exist and should be interpreted within the appropriate context.

## 2019-11-14 RX ORDER — PRAMIPEXOLE DIHYDROCHLORIDE 1 MG/1
TABLET ORAL
Qty: 90 TAB | Refills: 3 | Status: SHIPPED | OUTPATIENT
Start: 2019-11-14 | End: 2020-03-11

## 2019-12-10 DIAGNOSIS — N32.81 OVERACTIVE BLADDER: ICD-10-CM

## 2019-12-10 RX ORDER — OXYBUTYNIN CHLORIDE 5 MG/1
TABLET ORAL
Qty: 90 TAB | Refills: 6 | Status: SHIPPED | OUTPATIENT
Start: 2019-12-10 | End: 2020-04-30

## 2020-01-13 ENCOUNTER — HOSPITAL ENCOUNTER (EMERGENCY)
Facility: MEDICAL CENTER | Age: 70
End: 2020-01-13
Attending: EMERGENCY MEDICINE
Payer: COMMERCIAL

## 2020-01-13 ENCOUNTER — APPOINTMENT (OUTPATIENT)
Dept: RADIOLOGY | Facility: MEDICAL CENTER | Age: 70
End: 2020-01-13
Attending: EMERGENCY MEDICINE
Payer: COMMERCIAL

## 2020-01-13 VITALS
RESPIRATION RATE: 16 BRPM | HEIGHT: 62 IN | SYSTOLIC BLOOD PRESSURE: 166 MMHG | BODY MASS INDEX: 45.76 KG/M2 | DIASTOLIC BLOOD PRESSURE: 84 MMHG | TEMPERATURE: 99 F | WEIGHT: 248.68 LBS | OXYGEN SATURATION: 95 % | HEART RATE: 76 BPM

## 2020-01-13 DIAGNOSIS — L03.119 CELLULITIS OF LOWER EXTREMITY, UNSPECIFIED LATERALITY: ICD-10-CM

## 2020-01-13 LAB
ALBUMIN SERPL BCP-MCNC: 4 G/DL (ref 3.2–4.9)
ALBUMIN/GLOB SERPL: 1.3 G/DL
ALP SERPL-CCNC: 71 U/L (ref 30–99)
ALT SERPL-CCNC: 13 U/L (ref 2–50)
ANION GAP SERPL CALC-SCNC: 11 MMOL/L (ref 0–11.9)
AST SERPL-CCNC: 14 U/L (ref 12–45)
BASOPHILS # BLD AUTO: 0.5 % (ref 0–1.8)
BASOPHILS # BLD: 0.03 K/UL (ref 0–0.12)
BILIRUB SERPL-MCNC: 0.5 MG/DL (ref 0.1–1.5)
BUN SERPL-MCNC: 15 MG/DL (ref 8–22)
CALCIUM SERPL-MCNC: 9 MG/DL (ref 8.5–10.5)
CHLORIDE SERPL-SCNC: 106 MMOL/L (ref 96–112)
CO2 SERPL-SCNC: 22 MMOL/L (ref 20–33)
CREAT SERPL-MCNC: 0.65 MG/DL (ref 0.5–1.4)
EKG IMPRESSION: NORMAL
EOSINOPHIL # BLD AUTO: 0.17 K/UL (ref 0–0.51)
EOSINOPHIL NFR BLD: 2.6 % (ref 0–6.9)
ERYTHROCYTE [DISTWIDTH] IN BLOOD BY AUTOMATED COUNT: 47.2 FL (ref 35.9–50)
GLOBULIN SER CALC-MCNC: 3.2 G/DL (ref 1.9–3.5)
GLUCOSE SERPL-MCNC: 92 MG/DL (ref 65–99)
HCT VFR BLD AUTO: 40.3 % (ref 37–47)
HGB BLD-MCNC: 12.8 G/DL (ref 12–16)
IMM GRANULOCYTES # BLD AUTO: 0.02 K/UL (ref 0–0.11)
IMM GRANULOCYTES NFR BLD AUTO: 0.3 % (ref 0–0.9)
LYMPHOCYTES # BLD AUTO: 1.15 K/UL (ref 1–4.8)
LYMPHOCYTES NFR BLD: 17.7 % (ref 22–41)
MCH RBC QN AUTO: 28.4 PG (ref 27–33)
MCHC RBC AUTO-ENTMCNC: 31.8 G/DL (ref 33.6–35)
MCV RBC AUTO: 89.6 FL (ref 81.4–97.8)
MONOCYTES # BLD AUTO: 0.45 K/UL (ref 0–0.85)
MONOCYTES NFR BLD AUTO: 6.9 % (ref 0–13.4)
NEUTROPHILS # BLD AUTO: 4.69 K/UL (ref 2–7.15)
NEUTROPHILS NFR BLD: 72 % (ref 44–72)
NRBC # BLD AUTO: 0 K/UL
NRBC BLD-RTO: 0 /100 WBC
NT-PROBNP SERPL IA-MCNC: 100 PG/ML (ref 0–125)
PLATELET # BLD AUTO: 321 K/UL (ref 164–446)
PMV BLD AUTO: 9.6 FL (ref 9–12.9)
POTASSIUM SERPL-SCNC: 3.6 MMOL/L (ref 3.6–5.5)
PROT SERPL-MCNC: 7.2 G/DL (ref 6–8.2)
RBC # BLD AUTO: 4.5 M/UL (ref 4.2–5.4)
SODIUM SERPL-SCNC: 139 MMOL/L (ref 135–145)
WBC # BLD AUTO: 6.5 K/UL (ref 4.8–10.8)

## 2020-01-13 PROCEDURE — 36415 COLL VENOUS BLD VENIPUNCTURE: CPT

## 2020-01-13 PROCEDURE — 85025 COMPLETE CBC W/AUTO DIFF WBC: CPT

## 2020-01-13 PROCEDURE — 700105 HCHG RX REV CODE 258: Performed by: EMERGENCY MEDICINE

## 2020-01-13 PROCEDURE — 99284 EMERGENCY DEPT VISIT MOD MDM: CPT

## 2020-01-13 PROCEDURE — 96365 THER/PROPH/DIAG IV INF INIT: CPT

## 2020-01-13 PROCEDURE — 83880 ASSAY OF NATRIURETIC PEPTIDE: CPT

## 2020-01-13 PROCEDURE — 80053 COMPREHEN METABOLIC PANEL: CPT

## 2020-01-13 PROCEDURE — 93005 ELECTROCARDIOGRAM TRACING: CPT | Performed by: EMERGENCY MEDICINE

## 2020-01-13 PROCEDURE — 71045 X-RAY EXAM CHEST 1 VIEW: CPT

## 2020-01-13 PROCEDURE — 700111 HCHG RX REV CODE 636 W/ 250 OVERRIDE (IP): Performed by: EMERGENCY MEDICINE

## 2020-01-13 RX ORDER — CEPHALEXIN 500 MG/1
500 CAPSULE ORAL 4 TIMES DAILY
Qty: 20 CAP | Refills: 0 | Status: SHIPPED | OUTPATIENT
Start: 2020-01-13 | End: 2020-01-18

## 2020-01-13 RX ADMIN — AMPICILLIN SODIUM AND SULBACTAM SODIUM 3 G: 2; 1 INJECTION, POWDER, FOR SOLUTION INTRAMUSCULAR; INTRAVENOUS at 15:21

## 2020-01-13 SDOH — HEALTH STABILITY: MENTAL HEALTH: HOW OFTEN DO YOU HAVE A DRINK CONTAINING ALCOHOL?: 4 OR MORE TIMES A WEEK

## 2020-01-13 SDOH — HEALTH STABILITY: MENTAL HEALTH: HOW OFTEN DO YOU HAVE 6 OR MORE DRINKS ON ONE OCCASION?: NEVER

## 2020-01-13 ASSESSMENT — PAIN DESCRIPTION - DESCRIPTORS: DESCRIPTORS: ACHING

## 2020-01-13 NOTE — ED PROVIDER NOTES
ED Provider Note    Scribed for Buzz Pickard M.D. by Ben Prieto. 1/13/2020  2:35 PM    Primary care provider: Martin Lindsey M.D.  Means of arrival: Walk-in  History obtained from: Patient  History limited by: None    CHIEF COMPLAINT  Chief Complaint   Patient presents with   • Leg Swelling   • Leg Pain       HPI  Ariadna Sepulveda is a 69 y.o. obese female who presents to the Emergency Department complaining of bilateral lower extremity pain, redness, and edema from the knees distally. Patient was seen here for similar symptoms in October and had a negative DVT study. Her symptoms improved after finishing a course of Keflex. She was not able to follow up with her primary care following treatment. She denies fever or chills. She is incontinent of urine at baseline and wears Depends undergarments. She denies a history of CHF, DVT or PE.    REVIEW OF SYSTEMS  Pertinent positives include bilateral lower extremity pain, redness, and edema from the knees distally. Pertinent negatives include no fever or chills.  All other systems reviewed and negative.    PAST MEDICAL HISTORY   has a past medical history of Hypertension and RLS (restless legs syndrome) (10/5/2011).    SURGICAL HISTORY   has a past surgical history that includes tonsillectomy and knee replacement, total (left).    SOCIAL HISTORY  Social History     Tobacco Use   • Smoking status: Never Smoker   • Smokeless tobacco: Never Used   Substance Use Topics   • Alcohol use: Yes     Frequency: 4 or more times a week     Drinks per session: 3 or 4     Binge frequency: Never     Comment: 2 bottles of wine a week   • Drug use: No      Social History     Substance and Sexual Activity   Drug Use No       FAMILY HISTORY  Family History   Problem Relation Age of Onset   • Stroke Mother    • Heart Disease Neg Hx    • Cancer Neg Hx    • Lung Disease Neg Hx    • Diabetes Neg Hx    • Hypertension Neg Hx        CURRENT MEDICATIONS  No current  "facility-administered medications on file prior to encounter.      Current Outpatient Medications on File Prior to Encounter   Medication Sig Dispense Refill   • oxybutynin (DITROPAN) 5 MG Tab TAKE 1 TABLET BY MOUTH THREE TIMES A DAY 90 Tab 6   • pramipexole (MIRAPEX) 1 MG Tab TAKE 1 TABLET BY MOUTH THREE TIMES A DAY 90 Tab 3   • pramipexole (MIRAPEX) 0.125 MG Tab Take 0.125 mg by mouth 3 times a day as needed. Indications: Restless Leg Syndrome         ALLERGIES  No Known Allergies    PHYSICAL EXAM  VITAL SIGNS: BP (!) 94/73   Pulse 74   Temp 37.2 °C (99 °F) (Temporal)   Resp 16   Ht 1.575 m (5' 2\")   Wt 112.8 kg (248 lb 10.9 oz)   SpO2 95%   BMI 45.48 kg/m²     Constitutional: Well developed, Obese, Mild distress, Non-toxic appearance.   HENT: Normocephalic, Atraumatic, Bilateral external ears normal, Oropharynx moist, No oral exudates.   Eyes: PERRLA, EOMI, Conjunctiva normal, No discharge.   Neck: No tenderness, Supple, No stridor.   Lymphatic: No lymphadenopathy noted.   Cardiovascular: Normal heart rate, Normal rhythm.   Thorax & Lungs: No respiratory distress, No wheezing, Crackles bilateral bases  Skin: Warm, Dry, Erythematous excoriation on the medial aspects of her thighs extending into the groin  Extremities: Circumferential edema and erythema from the knees distallyNo cyanosis.   Musculoskeletal: No tenderness to palpation or major deformities noted.  Intact distal pulses  Neurologic: Awake, alert. Moves all extremities spontaneously.  Psychiatric: Affect normal, Judgment normal, Mood normal.    LABS  Results for orders placed or performed during the hospital encounter of 01/13/20   CBC w/ Differential   Result Value Ref Range    WBC 6.5 4.8 - 10.8 K/uL    RBC 4.50 4.20 - 5.40 M/uL    Hemoglobin 12.8 12.0 - 16.0 g/dL    Hematocrit 40.3 37.0 - 47.0 %    MCV 89.6 81.4 - 97.8 fL    MCH 28.4 27.0 - 33.0 pg    MCHC 31.8 (L) 33.6 - 35.0 g/dL    RDW 47.2 35.9 - 50.0 fL    Platelet Count 321 164 - 446 " K/uL    MPV 9.6 9.0 - 12.9 fL    Neutrophils-Polys 72.00 44.00 - 72.00 %    Lymphocytes 17.70 (L) 22.00 - 41.00 %    Monocytes 6.90 0.00 - 13.40 %    Eosinophils 2.60 0.00 - 6.90 %    Basophils 0.50 0.00 - 1.80 %    Immature Granulocytes 0.30 0.00 - 0.90 %    Nucleated RBC 0.00 /100 WBC    Neutrophils (Absolute) 4.69 2.00 - 7.15 K/uL    Lymphs (Absolute) 1.15 1.00 - 4.80 K/uL    Monos (Absolute) 0.45 0.00 - 0.85 K/uL    Eos (Absolute) 0.17 0.00 - 0.51 K/uL    Baso (Absolute) 0.03 0.00 - 0.12 K/uL    Immature Granulocytes (abs) 0.02 0.00 - 0.11 K/uL    NRBC (Absolute) 0.00 K/uL   Complete Metabolic Panel (CMP)   Result Value Ref Range    Sodium 139 135 - 145 mmol/L    Potassium 3.6 3.6 - 5.5 mmol/L    Chloride 106 96 - 112 mmol/L    Co2 22 20 - 33 mmol/L    Anion Gap 11.0 0.0 - 11.9    Glucose 92 65 - 99 mg/dL    Bun 15 8 - 22 mg/dL    Creatinine 0.65 0.50 - 1.40 mg/dL    Calcium 9.0 8.5 - 10.5 mg/dL    AST(SGOT) 14 12 - 45 U/L    ALT(SGPT) 13 2 - 50 U/L    Alkaline Phosphatase 71 30 - 99 U/L    Total Bilirubin 0.5 0.1 - 1.5 mg/dL    Albumin 4.0 3.2 - 4.9 g/dL    Total Protein 7.2 6.0 - 8.2 g/dL    Globulin 3.2 1.9 - 3.5 g/dL    A-G Ratio 1.3 g/dL   proBrain Natriuretic Peptide, NT   Result Value Ref Range    NT-proBNP 100 0 - 125 pg/mL   ESTIMATED GFR   Result Value Ref Range    GFR If African American >60 >60 mL/min/1.73 m 2    GFR If Non African American >60 >60 mL/min/1.73 m 2   EKG   Result Value Ref Range    Report       [facility] Emergency Dept.    Test Date:  2020  Pt Name:    KARON STEVENS             Department:   MRN:        [id number]                      Room:       [facility]  Gender:     Female                       Technician: [id number]  :        1950                   Requested By:DEI MAYFIELD  Order #:    [id number]                    Reading MD: EDI MAYFIEDL MD    Measurements  Intervals                                Axis  Rate:       61                            P:          43  UT:         192                          QRS:        -8  QRSD:       104                          T:          18  QT:         460  QTc:        464    Interpretive Statements  SINUS RHYTHM  PROBABLE LEFT VENTRICULAR HYPERTROPHY  Compared to ECG 10/15/2019 12:46:04  ST (T wave) deviation no longer present  T-wave abnormality no longer present  Q waves no longer present  Electronically Signed On 1- 16:48:51 PST by EDI MAYFIELD MD          RADIOLOGY  DX-CHEST-PORTABLE (1 VIEW)   Final Result      1.  Mildly enlarged cardiac silhouette without evidence of acute cardiopulmonary process.        The radiologist's interpretation of all radiological studies have been reviewed by me.      COURSE & MEDICAL DECISION MAKING  Pertinent Labs & Imaging studies reviewed. (See chart for details)    I reviewed the patient's medical records which showed she was seen here for similar symptoms in October and had a negative DVT study. Patient was treated with Keflex.    2:35 PM - Patient seen and examined at bedside. Patient will be treated with Unasyn 3 g in  ml IVPB. Ordered DX chest portable 1 view, CBC with differential, CMP, proBNP, and EKG to evaluate her symptoms. The differential diagnoses include but are not limited to: cellulitis, pneumonia, CHF, pulmonary edema    5:02 PM - Re-examined; The patient is resting in bed comfortably. I discussed her above findings and plans for discharge with a prescription for Keflex. She was given a referral to Dr. Lindsey, Orthopedics, and instructed to return to the ED if her symptoms worsen. Patient understands and agrees.    Decision Making:  Patient with lower extremity swelling and cellulitis, work-up here was unremarkable, no evidence of sepsis.  Give the patient Unasyn, will put the patient on Keflex, have the patient follow-up with Dr. Lindsey as an outpatient, have the patient return with worsening symptoms.     The patient will return for new or  worsening symptoms and is stable at the time of discharge.    The patient is referred to a primary physician for blood pressure management, diabetic screening, and for all other preventative health concerns.    DISPOSITION:  Patient will be discharged home in stable condition.    FOLLOW UP:  [facility], Emergency Dept  [address]  [place] [id number] [phone]    If symptoms worsen    Martin Lindsey M.D.  [address]  [facility] [address]  [facility] [id number] [phone]            OUTPATIENT MEDICATIONS:  Discharge Medication List as of 1/13/2020  4:56 PM      START taking these medications    Details   cephALEXin (KEFLEX) 500 MG Cap Take 1 Cap by mouth 4 times a day for 5 days., Disp-20 Cap, R-0, Normal             FINAL IMPRESSION  1. Cellulitis of lower extremity, unspecified laterality          Ben LEE (Scribe), am scribing for, and in the presence of, Buzz Pickard M.D..    Electronically signed by: Ben Prieto (Scribe), 1/13/2020    IBuzz M.D. personally performed the services described in this documentation, as scribed by Ben Prieto in my presence, and it is both accurate and complete.    C    The note accurately reflects work and decisions made by me.  Buzz Pickard M.D.  1/13/2020  6:23 PM

## 2020-01-13 NOTE — ED TRIAGE NOTES
Patient present to ED with complaints of bilateral LE swelling and redness. Denies any open wounds. She does have hx of cellulitis - treated by home antibiotics. No fevers or chills.     Pt educated on ED process and asked to wait in lobby. Patient educated on importance of alerting staff to new or worsening symptoms or concerns.

## 2020-04-17 ENCOUNTER — TELEPHONE (OUTPATIENT)
Dept: MEDICAL GROUP | Facility: MEDICAL CENTER | Age: 70
End: 2020-04-17

## 2020-04-17 NOTE — TELEPHONE ENCOUNTER
VOICEMAIL  1. Caller Name: Ariadna                      Call Back Number: [phone]    2. Message: Patient is requesting an rx for depends for #90 for 30 days.     3. Patient approves office to leave a detailed voicemail/JustSpottedhart message: N\A

## 2020-04-21 ENCOUNTER — OFFICE VISIT (OUTPATIENT)
Dept: MEDICAL GROUP | Facility: MEDICAL CENTER | Age: 70
End: 2020-04-21
Payer: COMMERCIAL

## 2020-04-21 DIAGNOSIS — Z12.11 SCREEN FOR COLON CANCER: ICD-10-CM

## 2020-04-21 DIAGNOSIS — N39.46 MIXED STRESS AND URGE URINARY INCONTINENCE: ICD-10-CM

## 2020-04-21 PROCEDURE — 99442 PR PHYSICIAN TELEPHONE EVALUATION 11-20 MIN: CPT | Mod: CR | Performed by: INTERNAL MEDICINE

## 2020-04-21 RX ORDER — PRAMIPEXOLE DIHYDROCHLORIDE 1 MG/1
1 TABLET ORAL 3 TIMES DAILY
Qty: 90 TAB | Refills: 11 | Status: SHIPPED | OUTPATIENT
Start: 2020-04-21 | End: 2021-03-31 | Stop reason: SDUPTHER

## 2020-04-21 NOTE — PROGRESS NOTES
As a means of avoiding spread of COVID-19, this visit is being conducted by telephone. This telephone visit was initiated by the patient and they verbally consented.    Time at start of call: 11:58    Reason for Call: Incontinence.    1. Mixed stress and urge urinary incontinence  Resents complaining of persistent both urge and stress incontinence.  She is on medications but reporting less effect.  She is asking for depends undergarments.  She is also looking for more definitive treatments if available    2. Screen for colon cancer         Assessment and plan.     1. Mixed stress and urge urinary incontinence  Refilled medication getting depends undergarments placing referral to urology.  - REFERRAL TO UROLOGY    2. Screen for colon cancer    - COLOGUARD COLON CANCER SCREENING             Time at end of call: 12:11  Total Time Spent: 11-20 minutes    Martin Lindsey M.D.

## 2020-04-24 ENCOUNTER — TELEPHONE (OUTPATIENT)
Dept: MEDICAL GROUP | Facility: MEDICAL CENTER | Age: 70
End: 2020-04-24

## 2020-04-24 DIAGNOSIS — N39.46 MIXED URINARY INCONTINENCE DUE TO FEMALE GENITAL PROLAPSE: ICD-10-CM

## 2020-04-24 DIAGNOSIS — N32.81 DETRUSOR INSTABILITY: ICD-10-CM

## 2020-04-24 DIAGNOSIS — N81.9 MIXED URINARY INCONTINENCE DUE TO FEMALE GENITAL PROLAPSE: ICD-10-CM

## 2020-04-24 NOTE — TELEPHONE ENCOUNTER
Received a fax from the pharmacy that the rx for undergarments must have 2-3 ICD 10 codes and the size per insurance.

## 2020-04-29 DIAGNOSIS — N32.81 OVERACTIVE BLADDER: ICD-10-CM

## 2020-04-30 RX ORDER — OXYBUTYNIN CHLORIDE 5 MG/1
TABLET ORAL
Qty: 270 TAB | Refills: 1 | Status: SHIPPED | OUTPATIENT
Start: 2020-04-30 | End: 2020-10-20

## 2020-06-16 ENCOUNTER — HOSPITAL ENCOUNTER (EMERGENCY)
Facility: MEDICAL CENTER | Age: 70
End: 2020-06-17
Attending: EMERGENCY MEDICINE
Payer: COMMERCIAL

## 2020-06-16 ENCOUNTER — APPOINTMENT (OUTPATIENT)
Dept: RADIOLOGY | Facility: MEDICAL CENTER | Age: 70
End: 2020-06-16
Attending: EMERGENCY MEDICINE
Payer: COMMERCIAL

## 2020-06-16 DIAGNOSIS — R60.0 BILATERAL LOWER EXTREMITY EDEMA: ICD-10-CM

## 2020-06-16 LAB
ALBUMIN SERPL BCP-MCNC: 3.9 G/DL (ref 3.2–4.9)
ALBUMIN/GLOB SERPL: 1.2 G/DL
ALP SERPL-CCNC: 81 U/L (ref 30–99)
ALT SERPL-CCNC: 14 U/L (ref 2–50)
ANION GAP SERPL CALC-SCNC: 17 MMOL/L (ref 7–16)
APPEARANCE UR: CLEAR
AST SERPL-CCNC: 20 U/L (ref 12–45)
BASOPHILS # BLD AUTO: 0.3 % (ref 0–1.8)
BASOPHILS # BLD: 0.02 K/UL (ref 0–0.12)
BILIRUB SERPL-MCNC: 0.4 MG/DL (ref 0.1–1.5)
BILIRUB UR QL STRIP.AUTO: NEGATIVE
BUN SERPL-MCNC: 20 MG/DL (ref 8–22)
CALCIUM SERPL-MCNC: 8.8 MG/DL (ref 8.5–10.5)
CHLORIDE SERPL-SCNC: 105 MMOL/L (ref 96–112)
CO2 SERPL-SCNC: 18 MMOL/L (ref 20–33)
COLOR UR: YELLOW
CREAT SERPL-MCNC: 0.62 MG/DL (ref 0.5–1.4)
CRP SERPL HS-MCNC: 1.01 MG/DL (ref 0–0.75)
EOSINOPHIL # BLD AUTO: 0.15 K/UL (ref 0–0.51)
EOSINOPHIL NFR BLD: 2 % (ref 0–6.9)
ERYTHROCYTE [DISTWIDTH] IN BLOOD BY AUTOMATED COUNT: 46.6 FL (ref 35.9–50)
ERYTHROCYTE [SEDIMENTATION RATE] IN BLOOD BY WESTERGREN METHOD: 25 MM/HOUR (ref 0–30)
GLOBULIN SER CALC-MCNC: 3.2 G/DL (ref 1.9–3.5)
GLUCOSE SERPL-MCNC: 120 MG/DL (ref 65–99)
GLUCOSE UR STRIP.AUTO-MCNC: NEGATIVE MG/DL
HCT VFR BLD AUTO: 40.5 % (ref 37–47)
HGB BLD-MCNC: 13 G/DL (ref 12–16)
IMM GRANULOCYTES # BLD AUTO: 0.02 K/UL (ref 0–0.11)
IMM GRANULOCYTES NFR BLD AUTO: 0.3 % (ref 0–0.9)
KETONES UR STRIP.AUTO-MCNC: NEGATIVE MG/DL
LACTATE BLD-SCNC: 0.9 MMOL/L (ref 0.5–2)
LEUKOCYTE ESTERASE UR QL STRIP.AUTO: NEGATIVE
LYMPHOCYTES # BLD AUTO: 1.17 K/UL (ref 1–4.8)
LYMPHOCYTES NFR BLD: 15.4 % (ref 22–41)
MCH RBC QN AUTO: 28.8 PG (ref 27–33)
MCHC RBC AUTO-ENTMCNC: 32.1 G/DL (ref 33.6–35)
MCV RBC AUTO: 89.6 FL (ref 81.4–97.8)
MICRO URNS: NORMAL
MONOCYTES # BLD AUTO: 0.64 K/UL (ref 0–0.85)
MONOCYTES NFR BLD AUTO: 8.4 % (ref 0–13.4)
NEUTROPHILS # BLD AUTO: 5.62 K/UL (ref 2–7.15)
NEUTROPHILS NFR BLD: 73.6 % (ref 44–72)
NITRITE UR QL STRIP.AUTO: NEGATIVE
NRBC # BLD AUTO: 0 K/UL
NRBC BLD-RTO: 0 /100 WBC
NT-PROBNP SERPL IA-MCNC: 83 PG/ML (ref 0–125)
PH UR STRIP.AUTO: 6 [PH] (ref 5–8)
PLATELET # BLD AUTO: 290 K/UL (ref 164–446)
PMV BLD AUTO: 9.6 FL (ref 9–12.9)
POTASSIUM SERPL-SCNC: 3.7 MMOL/L (ref 3.6–5.5)
PROCALCITONIN SERPL-MCNC: <0.05 NG/ML
PROT SERPL-MCNC: 7.1 G/DL (ref 6–8.2)
PROT UR QL STRIP: NEGATIVE MG/DL
RBC # BLD AUTO: 4.52 M/UL (ref 4.2–5.4)
RBC UR QL AUTO: NEGATIVE
SODIUM SERPL-SCNC: 140 MMOL/L (ref 135–145)
SP GR UR REFRACTOMETRY: 1.02
UROBILINOGEN UR STRIP.AUTO-MCNC: 0.2 MG/DL
WBC # BLD AUTO: 7.6 K/UL (ref 4.8–10.8)

## 2020-06-16 PROCEDURE — 87040 BLOOD CULTURE FOR BACTERIA: CPT

## 2020-06-16 PROCEDURE — 83880 ASSAY OF NATRIURETIC PEPTIDE: CPT

## 2020-06-16 PROCEDURE — 81003 URINALYSIS AUTO W/O SCOPE: CPT

## 2020-06-16 PROCEDURE — 80053 COMPREHEN METABOLIC PANEL: CPT

## 2020-06-16 PROCEDURE — 83605 ASSAY OF LACTIC ACID: CPT

## 2020-06-16 PROCEDURE — 85652 RBC SED RATE AUTOMATED: CPT

## 2020-06-16 PROCEDURE — 93970 EXTREMITY STUDY: CPT

## 2020-06-16 PROCEDURE — 85025 COMPLETE CBC W/AUTO DIFF WBC: CPT

## 2020-06-16 PROCEDURE — 84145 PROCALCITONIN (PCT): CPT

## 2020-06-16 PROCEDURE — 700102 HCHG RX REV CODE 250 W/ 637 OVERRIDE(OP): Performed by: EMERGENCY MEDICINE

## 2020-06-16 PROCEDURE — 86140 C-REACTIVE PROTEIN: CPT

## 2020-06-16 PROCEDURE — A9270 NON-COVERED ITEM OR SERVICE: HCPCS | Performed by: EMERGENCY MEDICINE

## 2020-06-16 RX ORDER — CEPHALEXIN 500 MG/1
500 CAPSULE ORAL ONCE
Status: COMPLETED | OUTPATIENT
Start: 2020-06-17 | End: 2020-06-16

## 2020-06-16 RX ORDER — CEPHALEXIN 500 MG/1
500 CAPSULE ORAL 4 TIMES DAILY
Qty: 40 CAP | Refills: 0 | Status: SHIPPED | OUTPATIENT
Start: 2020-06-16 | End: 2020-06-26

## 2020-06-16 RX ADMIN — CEPHALEXIN 500 MG: 500 CAPSULE ORAL at 23:58

## 2020-06-16 ASSESSMENT — FIBROSIS 4 INDEX: FIB4 SCORE: 0.81

## 2020-06-17 VITALS
HEART RATE: 70 BPM | RESPIRATION RATE: 13 BRPM | OXYGEN SATURATION: 94 % | WEIGHT: 266.76 LBS | TEMPERATURE: 97.4 F | SYSTOLIC BLOOD PRESSURE: 147 MMHG | HEIGHT: 62 IN | DIASTOLIC BLOOD PRESSURE: 69 MMHG | BODY MASS INDEX: 49.09 KG/M2

## 2020-06-17 PROCEDURE — 99284 EMERGENCY DEPT VISIT MOD MDM: CPT

## 2020-06-17 PROCEDURE — 36415 COLL VENOUS BLD VENIPUNCTURE: CPT

## 2020-06-17 NOTE — ED NOTES
"Assist Rn    Pt ambulatory with steady gait. Pt denies pain, but reports her legs feel tight and uncomfortable. She reports feeling short of breath \"because I have gained too much weight\". Agree w/ rest of triage note. Erp to see.  "

## 2020-06-17 NOTE — ED PROVIDER NOTES
ED Provider Note    CHIEF COMPLAINT  Chief Complaint   Patient presents with   • Leg Swelling     Bilateral, for two weeks, occured before, denies pain, history of left knee replacement 15 years ago        HPI    Primary care provider: Martin Lindsey M.D.   History obtained from: Patient  History limited by: None     Ariadna Sepulveda is a 69 y.o. female who presents to the ED complaining of bilateral lower leg swelling for about 2 weeks.  Patient reports 2 previous episodes due to infection.  She denies injury/trauma or any particular inciting event.  She denies any pain but reports that her legs feel tight from the swelling.  She reports history of left knee replacement 15 years ago but no recent surgeries.  She denies any past history of blood clots.  She denies fever/nausea/vomiting or change with bowel or urinary symptoms.  She does suffer from chronic urinary incontinence.  She denies any known COVID exposures or COVID symptoms.  She denies any significant shortness of breath or difficulty breathing.    REVIEW OF SYSTEMS  Please see HPI for pertinent positives/negatives.  All other systems reviewed and are negative.     PAST MEDICAL HISTORY  Past Medical History:   Diagnosis Date   • RLS (restless legs syndrome) 10/5/2011   • Hypertension         SURGICAL HISTORY  Past Surgical History:   Procedure Laterality Date   • KNEE REPLACEMENT, TOTAL  left   • TONSILLECTOMY          SOCIAL HISTORY  Social History     Tobacco Use   • Smoking status: Never Smoker   • Smokeless tobacco: Never Used   Substance and Sexual Activity   • Alcohol use: Yes     Frequency: 4 or more times a week     Drinks per session: 3 or 4     Binge frequency: Never     Comment: 2 bottles of wine a week   • Drug use: No   • Sexual activity: Not Currently        FAMILY HISTORY  Family History   Problem Relation Age of Onset   • Stroke Mother    • Heart Disease Neg Hx    • Cancer Neg Hx    • Lung Disease Neg Hx    • Diabetes Neg Hx    •  "Hypertension Neg Hx         CURRENT MEDICATIONS  Home Medications     Reviewed by Ian Johnson R.N. (Registered Nurse) on 06/16/20 at 1951  Med List Status: Partial   Medication Last Dose Status   NON SPECIFIED  Active   oxybutynin (DITROPAN) 5 MG Tab  Active   pramipexole (MIRAPEX) 1 MG Tab  Active                 ALLERGIES  No Known Allergies     PHYSICAL EXAM  VITAL SIGNS: /69   Pulse 70   Temp 36.3 °C (97.4 °F) (Oral)   Resp 13   Ht 1.575 m (5' 2\")   Wt 121 kg (266 lb 12.1 oz)   SpO2 94%   BMI 48.79 kg/m²  @NEWTON[[id number]::@     Pulse ox interpretation: 95% I interpret this pulse ox as normal     Constitutional: Well developed, well nourished, alert in no apparent distress, nontoxic appearance    HENT: No external signs of trauma, normocephalic  Eyes: PERRL, conjunctiva without erythema, no discharge, no icterus    Neck: Soft and supple, trachea midline, no stridor, no tenderness, no LAD, no JVD, good ROM    Cardiovascular: Regular rate and rhythm, no murmurs/rubs/gallops, strong distal pulses and good perfusion    Thorax & Lungs: No respiratory distress, CTAB   Abdomen: Soft, nontender, nondistended, no guarding, no rebound, normal BS    Extremities: No cyanosis, mild bilateral lower extremity edema with trace non-discrete erythema of bilateral lower legs without apparent tenderness to palpation/warmth/crepitus/fluctuance/tenseness/palpable cords, no gross deformity, good ROM, no tenderness, intact distal pulses with brisk cap refill    Skin: Warm, dry, no pallor/cyanosis, no rash noted except as above  Lymphatic: No lymphadenopathy noted    Neuro: A/O times 3, no focal deficits noted    Psychiatric: Cooperative, normal mood and affect, normal judgement, appropriate for clinical situation        DIAGNOSTIC STUDIES / PROCEDURES        LABS  All labs reviewed by me.     Results for orders placed or performed during the hospital encounter of 06/16/20   CBC WITH DIFFERENTIAL   Result Value Ref Range    " WBC 7.6 4.8 - 10.8 K/uL    RBC 4.52 4.20 - 5.40 M/uL    Hemoglobin 13.0 12.0 - 16.0 g/dL    Hematocrit 40.5 37.0 - 47.0 %    MCV 89.6 81.4 - 97.8 fL    MCH 28.8 27.0 - 33.0 pg    MCHC 32.1 (L) 33.6 - 35.0 g/dL    RDW 46.6 35.9 - 50.0 fL    Platelet Count 290 164 - 446 K/uL    MPV 9.6 9.0 - 12.9 fL    Neutrophils-Polys 73.60 (H) 44.00 - 72.00 %    Lymphocytes 15.40 (L) 22.00 - 41.00 %    Monocytes 8.40 0.00 - 13.40 %    Eosinophils 2.00 0.00 - 6.90 %    Basophils 0.30 0.00 - 1.80 %    Immature Granulocytes 0.30 0.00 - 0.90 %    Nucleated RBC 0.00 /100 WBC    Neutrophils (Absolute) 5.62 2.00 - 7.15 K/uL    Lymphs (Absolute) 1.17 1.00 - 4.80 K/uL    Monos (Absolute) 0.64 0.00 - 0.85 K/uL    Eos (Absolute) 0.15 0.00 - 0.51 K/uL    Baso (Absolute) 0.02 0.00 - 0.12 K/uL    Immature Granulocytes (abs) 0.02 0.00 - 0.11 K/uL    NRBC (Absolute) 0.00 K/uL   COMP METABOLIC PANEL   Result Value Ref Range    Sodium 140 135 - 145 mmol/L    Potassium 3.7 3.6 - 5.5 mmol/L    Chloride 105 96 - 112 mmol/L    Co2 18 (L) 20 - 33 mmol/L    Anion Gap 17.0 (H) 7.0 - 16.0    Glucose 120 (H) 65 - 99 mg/dL    Bun 20 8 - 22 mg/dL    Creatinine 0.62 0.50 - 1.40 mg/dL    Calcium 8.8 8.5 - 10.5 mg/dL    AST(SGOT) 20 12 - 45 U/L    ALT(SGPT) 14 2 - 50 U/L    Alkaline Phosphatase 81 30 - 99 U/L    Total Bilirubin 0.4 0.1 - 1.5 mg/dL    Albumin 3.9 3.2 - 4.9 g/dL    Total Protein 7.1 6.0 - 8.2 g/dL    Globulin 3.2 1.9 - 3.5 g/dL    A-G Ratio 1.2 g/dL   proBrain Natriuretic Peptide, NT   Result Value Ref Range    NT-proBNP 83 0 - 125 pg/mL   LACTIC ACID   Result Value Ref Range    Lactic Acid 0.9 0.5 - 2.0 mmol/L   PROCALCITONIN   Result Value Ref Range    Procalcitonin <0.05 <0.25 ng/mL   CRP QUANTITIVE (NON-CARDIAC)   Result Value Ref Range    Stat C-Reactive Protein 1.01 (H) 0.00 - 0.75 mg/dL   Sed Rate   Result Value Ref Range    Sed Rate Westergren 25 0 - 30 mm/hour   URINALYSIS CULTURE, IF INDICATED    Specimen: Urine, Clean Catch   Result  Value Ref Range    Color Yellow     Character Clear     Ph 6.0 5.0 - 8.0    Glucose Negative Negative mg/dL    Ketones Negative Negative mg/dL    Protein Negative Negative mg/dL    Bilirubin Negative Negative    Urobilinogen, Urine 0.2 Negative    Nitrite Negative Negative    Leukocyte Esterase Negative Negative    Occult Blood Negative Negative    Micro Urine Req see below    ESTIMATED GFR   Result Value Ref Range    GFR If African American >60 >60 mL/min/1.73 m 2    GFR If Non African American >60 >60 mL/min/1.73 m 2   REFRACTOMETER SG   Result Value Ref Range    Specific Gravity 1.023         RADIOLOGY  The radiologist's interpretation of all radiological studies have been reviewed by me.     US-EXTREMITY VENOUS LOWER BILAT                COURSE & MEDICAL DECISION MAKING  Nursing notes, VS, PMSFHx reviewed in chart.     Review of past medical records shows the patient was last seen in this ED January 13, 2020 bilateral lower extremity pain and swelling and discharged with Keflex.      Differential diagnoses considered include but are not limited to: Arthritis, cellulitis, tendonitis, DVT/vascular occlusion       History and physical exam as above.  Bilateral lower extremity ultrasound without evidence for DVT.  Labs are fairly unremarkable except for slight anion gap acidosis and mildly elevated CRP.  No evidence for decompensated CHF, DVT/vascular occlusion and low clinical suspicion at this time for sepsis, compartment syndrome, necrotizing fasciitis, abscess, osteomyelitis given the history/exam/findings.  I discussed the findings with the patient.  This is a very pleasant well-appearing patient in no acute distress and nontoxic in appearance.  She has been hemodynamically stable except for elevated blood pressure without evidence for hypertensive emergency for which she will need outpatient follow-up for further management.  Patient is concerned about infection although given the bilateral nature and her test  results, I believe this is less likely.  She was given Keflex with her last episode and patient would like to be started on Keflex again.  She will be given prescription for Keflex.  She was advised on elevation and compression stockings to help minimize lower extremity swelling.  I discussed with her worrisome signs and symptoms and return to ED precautions and she was advised on outpatient follow-up.  Patient also noted to have slightly elevated blood pressure and will need outpatient follow-up for further management.  Patient verbalized understanding and agreed with plan of care with no further questions or concerns.      The patient is referred to a primary physician for blood pressure management, diabetic screening, and for all other preventative health concerns.       FINAL IMPRESSION  1. Bilateral lower extremity edema Active          DISPOSITION  Patient will be discharged home in stable condition.       FOLLOW UP  Martin Lindsey M.D.  [address] [place] [id number] [phone]    Call in 1 day      [facility], Emergency Dept  [address] [facility] [address] [phone]    If symptoms worsen         OUTPATIENT MEDICATIONS  Discharge Medication List as of 6/17/2020 12:05 AM      START taking these medications    Details   cephALEXin (KEFLEX) 500 MG Cap Take 1 Cap by mouth 4 times a day for 10 days., Disp-40 Cap,R-0, Print Rx Paper                Electronically signed by: Barry Harvey D.O., 6/16/2020 8:32 PM      Portions of this record were made with voice recognition software.  Despite my review, spelling/grammar/context errors may still remain.  Interpretation of this chart should be taken in this context.

## 2020-06-17 NOTE — ED TRIAGE NOTES
Chief Complaint   Patient presents with   • Leg Swelling     Bilateral, for two weeks, occured before, denies pain, history of left knee replacement 15 years ago

## 2020-06-17 NOTE — ED NOTES
Discharge instructions and follow up care discussed with patient. Patient given time to ask questions and verbalized understanding. Patient given 1 new prescription. Patient AOx4 at discharge and ambulatory to lobby with steady gait.

## 2020-06-22 LAB
BACTERIA BLD CULT: NORMAL
BACTERIA BLD CULT: NORMAL
SIGNIFICANT IND 70042: NORMAL
SIGNIFICANT IND 70042: NORMAL
SITE SITE: NORMAL
SITE SITE: NORMAL
SOURCE SOURCE: NORMAL
SOURCE SOURCE: NORMAL

## 2020-10-20 DIAGNOSIS — N32.81 OVERACTIVE BLADDER: ICD-10-CM

## 2020-10-20 RX ORDER — OXYBUTYNIN CHLORIDE 5 MG/1
TABLET ORAL
Qty: 270 TAB | Refills: 1 | Status: SHIPPED | OUTPATIENT
Start: 2020-10-20 | End: 2021-03-31 | Stop reason: SDUPTHER

## 2020-10-23 ENCOUNTER — APPOINTMENT (OUTPATIENT)
Dept: RADIOLOGY | Facility: MEDICAL CENTER | Age: 70
DRG: 445 | End: 2020-10-23
Attending: EMERGENCY MEDICINE
Payer: MEDICARE

## 2020-10-23 ENCOUNTER — APPOINTMENT (OUTPATIENT)
Dept: RADIOLOGY | Facility: MEDICAL CENTER | Age: 70
DRG: 445 | End: 2020-10-23
Attending: STUDENT IN AN ORGANIZED HEALTH CARE EDUCATION/TRAINING PROGRAM
Payer: MEDICARE

## 2020-10-23 ENCOUNTER — APPOINTMENT (OUTPATIENT)
Dept: RADIOLOGY | Facility: MEDICAL CENTER | Age: 70
DRG: 445 | End: 2020-10-23
Payer: MEDICARE

## 2020-10-23 ENCOUNTER — HOSPITAL ENCOUNTER (INPATIENT)
Facility: MEDICAL CENTER | Age: 70
LOS: 1 days | DRG: 445 | End: 2020-10-26
Attending: EMERGENCY MEDICINE | Admitting: STUDENT IN AN ORGANIZED HEALTH CARE EDUCATION/TRAINING PROGRAM
Payer: MEDICARE

## 2020-10-23 DIAGNOSIS — R51.9 ACUTE NONINTRACTABLE HEADACHE, UNSPECIFIED HEADACHE TYPE: ICD-10-CM

## 2020-10-23 DIAGNOSIS — R10.84 GENERALIZED ABDOMINAL PAIN: ICD-10-CM

## 2020-10-23 DIAGNOSIS — I16.0 HYPERTENSIVE URGENCY: ICD-10-CM

## 2020-10-23 DIAGNOSIS — K80.20 CALCULUS OF GALLBLADDER WITHOUT CHOLECYSTITIS WITHOUT OBSTRUCTION: ICD-10-CM

## 2020-10-23 DIAGNOSIS — R79.89 ABNORMAL LIVER FUNCTION TESTS: ICD-10-CM

## 2020-10-23 PROBLEM — R60.0 BILATERAL LOWER EXTREMITY EDEMA: Status: ACTIVE | Noted: 2020-10-23

## 2020-10-23 PROBLEM — I16.1 HYPERTENSIVE EMERGENCY: Status: ACTIVE | Noted: 2020-10-23

## 2020-10-23 LAB
ALBUMIN SERPL BCP-MCNC: 4.5 G/DL (ref 3.2–4.9)
ALBUMIN/GLOB SERPL: 1.3 G/DL
ALP SERPL-CCNC: 199 U/L (ref 30–99)
ALT SERPL-CCNC: 126 U/L (ref 2–50)
ANION GAP SERPL CALC-SCNC: 14 MMOL/L (ref 7–16)
APPEARANCE UR: CLEAR
AST SERPL-CCNC: 216 U/L (ref 12–45)
BASOPHILS # BLD AUTO: 0.2 % (ref 0–1.8)
BASOPHILS # BLD: 0.02 K/UL (ref 0–0.12)
BILIRUB SERPL-MCNC: 1.7 MG/DL (ref 0.1–1.5)
BILIRUB UR QL STRIP.AUTO: NEGATIVE
BUN SERPL-MCNC: 12 MG/DL (ref 8–22)
CALCIUM SERPL-MCNC: 9.5 MG/DL (ref 8.5–10.5)
CHLORIDE SERPL-SCNC: 102 MMOL/L (ref 96–112)
CO2 SERPL-SCNC: 23 MMOL/L (ref 20–33)
COLOR UR: YELLOW
COVID ORDER STATUS COVID19: NORMAL
CREAT SERPL-MCNC: 0.53 MG/DL (ref 0.5–1.4)
EKG IMPRESSION: NORMAL
EOSINOPHIL # BLD AUTO: 0.05 K/UL (ref 0–0.51)
EOSINOPHIL NFR BLD: 0.4 % (ref 0–6.9)
ERYTHROCYTE [DISTWIDTH] IN BLOOD BY AUTOMATED COUNT: 46 FL (ref 35.9–50)
GLOBULIN SER CALC-MCNC: 3.6 G/DL (ref 1.9–3.5)
GLUCOSE SERPL-MCNC: 132 MG/DL (ref 65–99)
GLUCOSE UR STRIP.AUTO-MCNC: NEGATIVE MG/DL
HCT VFR BLD AUTO: 42.6 % (ref 37–47)
HGB BLD-MCNC: 14 G/DL (ref 12–16)
IMM GRANULOCYTES # BLD AUTO: 0.05 K/UL (ref 0–0.11)
IMM GRANULOCYTES NFR BLD AUTO: 0.4 % (ref 0–0.9)
KETONES UR STRIP.AUTO-MCNC: NEGATIVE MG/DL
LEUKOCYTE ESTERASE UR QL STRIP.AUTO: NEGATIVE
LIPASE SERPL-CCNC: 46 U/L (ref 11–82)
LYMPHOCYTES # BLD AUTO: 0.77 K/UL (ref 1–4.8)
LYMPHOCYTES NFR BLD: 6 % (ref 22–41)
MCH RBC QN AUTO: 28.7 PG (ref 27–33)
MCHC RBC AUTO-ENTMCNC: 32.9 G/DL (ref 33.6–35)
MCV RBC AUTO: 87.3 FL (ref 81.4–97.8)
MICRO URNS: NORMAL
MONOCYTES # BLD AUTO: 0.7 K/UL (ref 0–0.85)
MONOCYTES NFR BLD AUTO: 5.4 % (ref 0–13.4)
NEUTROPHILS # BLD AUTO: 11.31 K/UL (ref 2–7.15)
NEUTROPHILS NFR BLD: 87.6 % (ref 44–72)
NITRITE UR QL STRIP.AUTO: NEGATIVE
NRBC # BLD AUTO: 0 K/UL
NRBC BLD-RTO: 0 /100 WBC
PH UR STRIP.AUTO: 7 [PH] (ref 5–8)
PLATELET # BLD AUTO: 269 K/UL (ref 164–446)
PMV BLD AUTO: 10.1 FL (ref 9–12.9)
POTASSIUM SERPL-SCNC: 3.6 MMOL/L (ref 3.6–5.5)
PROT SERPL-MCNC: 8.1 G/DL (ref 6–8.2)
PROT UR QL STRIP: NEGATIVE MG/DL
RBC # BLD AUTO: 4.88 M/UL (ref 4.2–5.4)
RBC UR QL AUTO: NEGATIVE
SODIUM SERPL-SCNC: 139 MMOL/L (ref 135–145)
SP GR UR STRIP.AUTO: 1.04
TROPONIN T SERPL-MCNC: 15 NG/L (ref 6–19)
UROBILINOGEN UR STRIP.AUTO-MCNC: 1 MG/DL
WBC # BLD AUTO: 12.9 K/UL (ref 4.8–10.8)

## 2020-10-23 PROCEDURE — A9270 NON-COVERED ITEM OR SERVICE: HCPCS | Performed by: STUDENT IN AN ORGANIZED HEALTH CARE EDUCATION/TRAINING PROGRAM

## 2020-10-23 PROCEDURE — 93005 ELECTROCARDIOGRAM TRACING: CPT

## 2020-10-23 PROCEDURE — 700102 HCHG RX REV CODE 250 W/ 637 OVERRIDE(OP): Performed by: STUDENT IN AN ORGANIZED HEALTH CARE EDUCATION/TRAINING PROGRAM

## 2020-10-23 PROCEDURE — 96376 TX/PRO/DX INJ SAME DRUG ADON: CPT

## 2020-10-23 PROCEDURE — 93005 ELECTROCARDIOGRAM TRACING: CPT | Performed by: EMERGENCY MEDICINE

## 2020-10-23 PROCEDURE — G0378 HOSPITAL OBSERVATION PER HR: HCPCS

## 2020-10-23 PROCEDURE — A9270 NON-COVERED ITEM OR SERVICE: HCPCS | Performed by: EMERGENCY MEDICINE

## 2020-10-23 PROCEDURE — 96375 TX/PRO/DX INJ NEW DRUG ADDON: CPT

## 2020-10-23 PROCEDURE — 99285 EMERGENCY DEPT VISIT HI MDM: CPT

## 2020-10-23 PROCEDURE — 700117 HCHG RX CONTRAST REV CODE 255: Performed by: EMERGENCY MEDICINE

## 2020-10-23 PROCEDURE — 96374 THER/PROPH/DIAG INJ IV PUSH: CPT

## 2020-10-23 PROCEDURE — 80074 ACUTE HEPATITIS PANEL: CPT

## 2020-10-23 PROCEDURE — A9270 NON-COVERED ITEM OR SERVICE: HCPCS

## 2020-10-23 PROCEDURE — 700111 HCHG RX REV CODE 636 W/ 250 OVERRIDE (IP): Performed by: EMERGENCY MEDICINE

## 2020-10-23 PROCEDURE — 84484 ASSAY OF TROPONIN QUANT: CPT

## 2020-10-23 PROCEDURE — 81003 URINALYSIS AUTO W/O SCOPE: CPT

## 2020-10-23 PROCEDURE — 85025 COMPLETE CBC W/AUTO DIFF WBC: CPT

## 2020-10-23 PROCEDURE — 80053 COMPREHEN METABOLIC PANEL: CPT

## 2020-10-23 PROCEDURE — 99219 PR INITIAL OBSERVATION CARE,LEVL II: CPT | Performed by: STUDENT IN AN ORGANIZED HEALTH CARE EDUCATION/TRAINING PROGRAM

## 2020-10-23 PROCEDURE — 700102 HCHG RX REV CODE 250 W/ 637 OVERRIDE(OP): Performed by: EMERGENCY MEDICINE

## 2020-10-23 PROCEDURE — C9803 HOPD COVID-19 SPEC COLLECT: HCPCS | Performed by: INTERNAL MEDICINE

## 2020-10-23 PROCEDURE — 71045 X-RAY EXAM CHEST 1 VIEW: CPT

## 2020-10-23 PROCEDURE — 74177 CT ABD & PELVIS W/CONTRAST: CPT

## 2020-10-23 PROCEDURE — 700101 HCHG RX REV CODE 250: Performed by: EMERGENCY MEDICINE

## 2020-10-23 PROCEDURE — U0003 INFECTIOUS AGENT DETECTION BY NUCLEIC ACID (DNA OR RNA); SEVERE ACUTE RESPIRATORY SYNDROME CORONAVIRUS 2 (SARS-COV-2) (CORONAVIRUS DISEASE [COVID-19]), AMPLIFIED PROBE TECHNIQUE, MAKING USE OF HIGH THROUGHPUT TECHNOLOGIES AS DESCRIBED BY CMS-2020-01-R: HCPCS

## 2020-10-23 PROCEDURE — 83690 ASSAY OF LIPASE: CPT

## 2020-10-23 PROCEDURE — 700102 HCHG RX REV CODE 250 W/ 637 OVERRIDE(OP)

## 2020-10-23 PROCEDURE — 76705 ECHO EXAM OF ABDOMEN: CPT

## 2020-10-23 PROCEDURE — 36415 COLL VENOUS BLD VENIPUNCTURE: CPT

## 2020-10-23 PROCEDURE — 70450 CT HEAD/BRAIN W/O DYE: CPT

## 2020-10-23 RX ORDER — BISACODYL 10 MG
10 SUPPOSITORY, RECTAL RECTAL
Status: DISCONTINUED | OUTPATIENT
Start: 2020-10-23 | End: 2020-10-26 | Stop reason: HOSPADM

## 2020-10-23 RX ORDER — OXYCODONE HYDROCHLORIDE 5 MG/1
10 TABLET ORAL EVERY 6 HOURS PRN
Status: DISCONTINUED | OUTPATIENT
Start: 2020-10-23 | End: 2020-10-23

## 2020-10-23 RX ORDER — LABETALOL HYDROCHLORIDE 5 MG/ML
20 INJECTION, SOLUTION INTRAVENOUS ONCE
Status: COMPLETED | OUTPATIENT
Start: 2020-10-23 | End: 2020-10-23

## 2020-10-23 RX ORDER — MORPHINE SULFATE 4 MG/ML
2 INJECTION, SOLUTION INTRAMUSCULAR; INTRAVENOUS EVERY 4 HOURS PRN
Status: DISCONTINUED | OUTPATIENT
Start: 2020-10-23 | End: 2020-10-24

## 2020-10-23 RX ORDER — PRAMIPEXOLE DIHYDROCHLORIDE 0.5 MG/1
1 TABLET ORAL 3 TIMES DAILY
Status: DISCONTINUED | OUTPATIENT
Start: 2020-10-24 | End: 2020-10-26 | Stop reason: HOSPADM

## 2020-10-23 RX ORDER — CLONIDINE HYDROCHLORIDE 0.1 MG/1
0.1 TABLET ORAL ONCE
Status: COMPLETED | OUTPATIENT
Start: 2020-10-23 | End: 2020-10-23

## 2020-10-23 RX ORDER — MORPHINE SULFATE 4 MG/ML
4 INJECTION, SOLUTION INTRAMUSCULAR; INTRAVENOUS ONCE
Status: COMPLETED | OUTPATIENT
Start: 2020-10-23 | End: 2020-10-23

## 2020-10-23 RX ORDER — OXYBUTYNIN CHLORIDE 5 MG/1
5 TABLET ORAL 3 TIMES DAILY
Status: DISCONTINUED | OUTPATIENT
Start: 2020-10-24 | End: 2020-10-26 | Stop reason: HOSPADM

## 2020-10-23 RX ORDER — AMOXICILLIN 250 MG
2 CAPSULE ORAL 2 TIMES DAILY
Status: DISCONTINUED | OUTPATIENT
Start: 2020-10-23 | End: 2020-10-26 | Stop reason: HOSPADM

## 2020-10-23 RX ORDER — PRAMIPEXOLE DIHYDROCHLORIDE 0.5 MG/1
1 TABLET ORAL ONCE
Status: COMPLETED | OUTPATIENT
Start: 2020-10-23 | End: 2020-10-23

## 2020-10-23 RX ORDER — ONDANSETRON 2 MG/ML
4 INJECTION INTRAMUSCULAR; INTRAVENOUS ONCE
Status: COMPLETED | OUTPATIENT
Start: 2020-10-23 | End: 2020-10-23

## 2020-10-23 RX ORDER — ENALAPRILAT 1.25 MG/ML
1.25 INJECTION INTRAVENOUS EVERY 6 HOURS PRN
Status: DISCONTINUED | OUTPATIENT
Start: 2020-10-23 | End: 2020-10-26 | Stop reason: HOSPADM

## 2020-10-23 RX ORDER — LABETALOL HYDROCHLORIDE 5 MG/ML
10 INJECTION, SOLUTION INTRAVENOUS EVERY 4 HOURS PRN
Status: DISCONTINUED | OUTPATIENT
Start: 2020-10-23 | End: 2020-10-26 | Stop reason: HOSPADM

## 2020-10-23 RX ORDER — OXYCODONE HYDROCHLORIDE 5 MG/1
5 TABLET ORAL EVERY 4 HOURS PRN
Status: DISCONTINUED | OUTPATIENT
Start: 2020-10-23 | End: 2020-10-24

## 2020-10-23 RX ORDER — POLYETHYLENE GLYCOL 3350 17 G/17G
1 POWDER, FOR SOLUTION ORAL
Status: DISCONTINUED | OUTPATIENT
Start: 2020-10-23 | End: 2020-10-26 | Stop reason: HOSPADM

## 2020-10-23 RX ORDER — FUROSEMIDE 40 MG/1
40 TABLET ORAL
Status: DISCONTINUED | OUTPATIENT
Start: 2020-10-23 | End: 2020-10-26 | Stop reason: HOSPADM

## 2020-10-23 RX ADMIN — CLONIDINE HYDROCHLORIDE 0.1 MG: 0.1 TABLET ORAL at 20:02

## 2020-10-23 RX ADMIN — ONDANSETRON 4 MG: 2 INJECTION INTRAMUSCULAR; INTRAVENOUS at 17:42

## 2020-10-23 RX ADMIN — MORPHINE SULFATE 4 MG: 4 INJECTION INTRAVENOUS at 17:43

## 2020-10-23 RX ADMIN — PRAMIPEXOLE DIHYDROCHLORIDE 1 MG: 0.5 TABLET ORAL at 20:18

## 2020-10-23 RX ADMIN — LABETALOL HYDROCHLORIDE 20 MG: 5 INJECTION, SOLUTION INTRAVENOUS at 18:12

## 2020-10-23 RX ADMIN — IOHEXOL 100 ML: 350 INJECTION, SOLUTION INTRAVENOUS at 18:11

## 2020-10-23 RX ADMIN — OXYCODONE 5 MG: 5 TABLET ORAL at 22:27

## 2020-10-23 RX ADMIN — PRAMIPEXOLE DIHYDROCHLORIDE 1 MG: 0.5 TABLET ORAL at 22:27

## 2020-10-23 RX ADMIN — LABETALOL HYDROCHLORIDE 20 MG: 5 INJECTION, SOLUTION INTRAVENOUS at 21:40

## 2020-10-23 ASSESSMENT — ENCOUNTER SYMPTOMS
WEAKNESS: 0
BRUISES/BLEEDS EASILY: 0
DIZZINESS: 0
EYE PAIN: 0
FEVER: 0
PALPITATIONS: 0
SHORTNESS OF BREATH: 0
WHEEZING: 0
COUGH: 0
ABDOMINAL PAIN: 1
SORE THROAT: 0
BACK PAIN: 0
BLOOD IN STOOL: 0
VOMITING: 0
TINGLING: 0
HEADACHES: 1
POLYDIPSIA: 0
CHILLS: 0
DIARRHEA: 0
BLURRED VISION: 0
WEIGHT LOSS: 0
NAUSEA: 1
DEPRESSION: 0
NERVOUS/ANXIOUS: 0
MYALGIAS: 0

## 2020-10-23 ASSESSMENT — FIBROSIS 4 INDEX: FIB4 SCORE: 1.23

## 2020-10-23 NOTE — ED TRIAGE NOTES
"Chief Complaint   Patient presents with   • Abdominal Pain     x 2 hours described as \"stabbing\". Had a BM before coming in and +nausea with no emesis.    • Headache     EKG performed prior to triage. Pt is hypertensive in triage and in obvious discomfort. Protocol initiated and sent to draw room.  BP (!) 221/115   Pulse 81   Temp 36.8 °C (98.2 °F) (Temporal)   Resp (!) 21   Ht 1.575 m (5' 2\")   Wt 123 kg (271 lb 2.7 oz)   SpO2 96%   BMI 49.60 kg/m²        "

## 2020-10-24 ENCOUNTER — APPOINTMENT (OUTPATIENT)
Dept: RADIOLOGY | Facility: MEDICAL CENTER | Age: 70
DRG: 445 | End: 2020-10-24
Attending: STUDENT IN AN ORGANIZED HEALTH CARE EDUCATION/TRAINING PROGRAM
Payer: MEDICARE

## 2020-10-24 ENCOUNTER — APPOINTMENT (OUTPATIENT)
Dept: CARDIOLOGY | Facility: MEDICAL CENTER | Age: 70
DRG: 445 | End: 2020-10-24
Attending: STUDENT IN AN ORGANIZED HEALTH CARE EDUCATION/TRAINING PROGRAM
Payer: MEDICARE

## 2020-10-24 PROBLEM — R10.10 PAIN OF UPPER ABDOMEN: Status: ACTIVE | Noted: 2020-10-23

## 2020-10-24 LAB
ALBUMIN SERPL BCP-MCNC: 3.5 G/DL (ref 3.2–4.9)
ALBUMIN/GLOB SERPL: 1.3 G/DL
ALP SERPL-CCNC: 210 U/L (ref 30–99)
ALT SERPL-CCNC: 361 U/L (ref 2–50)
ANION GAP SERPL CALC-SCNC: 7 MMOL/L (ref 7–16)
AST SERPL-CCNC: 403 U/L (ref 12–45)
BASOPHILS # BLD AUTO: 0.2 % (ref 0–1.8)
BASOPHILS # BLD: 0.01 K/UL (ref 0–0.12)
BILIRUB SERPL-MCNC: 3.7 MG/DL (ref 0.1–1.5)
BUN SERPL-MCNC: 8 MG/DL (ref 8–22)
CALCIUM SERPL-MCNC: 8.1 MG/DL (ref 8.5–10.5)
CHLORIDE SERPL-SCNC: 103 MMOL/L (ref 96–112)
CO2 SERPL-SCNC: 26 MMOL/L (ref 20–33)
CREAT SERPL-MCNC: 0.42 MG/DL (ref 0.5–1.4)
EOSINOPHIL # BLD AUTO: 0.09 K/UL (ref 0–0.51)
EOSINOPHIL NFR BLD: 1.4 % (ref 0–6.9)
ERYTHROCYTE [DISTWIDTH] IN BLOOD BY AUTOMATED COUNT: 47.2 FL (ref 35.9–50)
GLOBULIN SER CALC-MCNC: 2.7 G/DL (ref 1.9–3.5)
GLUCOSE SERPL-MCNC: 115 MG/DL (ref 65–99)
HCT VFR BLD AUTO: 36.7 % (ref 37–47)
HGB BLD-MCNC: 11.9 G/DL (ref 12–16)
IMM GRANULOCYTES # BLD AUTO: 0.02 K/UL (ref 0–0.11)
IMM GRANULOCYTES NFR BLD AUTO: 0.3 % (ref 0–0.9)
LV EJECT FRACT  99904: 60
LV EJECT FRACT MOD 2C 99903: 63.93
LV EJECT FRACT MOD 4C 99902: 57.15
LV EJECT FRACT MOD BP 99901: 60.2
LYMPHOCYTES # BLD AUTO: 1.03 K/UL (ref 1–4.8)
LYMPHOCYTES NFR BLD: 16 % (ref 22–41)
MCH RBC QN AUTO: 29 PG (ref 27–33)
MCHC RBC AUTO-ENTMCNC: 32.4 G/DL (ref 33.6–35)
MCV RBC AUTO: 89.3 FL (ref 81.4–97.8)
MONOCYTES # BLD AUTO: 0.5 K/UL (ref 0–0.85)
MONOCYTES NFR BLD AUTO: 7.8 % (ref 0–13.4)
NEUTROPHILS # BLD AUTO: 4.78 K/UL (ref 2–7.15)
NEUTROPHILS NFR BLD: 74.3 % (ref 44–72)
NRBC # BLD AUTO: 0 K/UL
NRBC BLD-RTO: 0 /100 WBC
PLATELET # BLD AUTO: 201 K/UL (ref 164–446)
PMV BLD AUTO: 10.3 FL (ref 9–12.9)
POTASSIUM SERPL-SCNC: 3.2 MMOL/L (ref 3.6–5.5)
PROT SERPL-MCNC: 6.2 G/DL (ref 6–8.2)
RBC # BLD AUTO: 4.11 M/UL (ref 4.2–5.4)
SARS-COV-2 RNA RESP QL NAA+PROBE: NOTDETECTED
SODIUM SERPL-SCNC: 136 MMOL/L (ref 135–145)
SPECIMEN SOURCE: NORMAL
WBC # BLD AUTO: 6.4 K/UL (ref 4.8–10.8)

## 2020-10-24 PROCEDURE — 93306 TTE W/DOPPLER COMPLETE: CPT

## 2020-10-24 PROCEDURE — G0378 HOSPITAL OBSERVATION PER HR: HCPCS

## 2020-10-24 PROCEDURE — 80053 COMPREHEN METABOLIC PANEL: CPT

## 2020-10-24 PROCEDURE — A9270 NON-COVERED ITEM OR SERVICE: HCPCS | Performed by: STUDENT IN AN ORGANIZED HEALTH CARE EDUCATION/TRAINING PROGRAM

## 2020-10-24 PROCEDURE — 700102 HCHG RX REV CODE 250 W/ 637 OVERRIDE(OP): Performed by: STUDENT IN AN ORGANIZED HEALTH CARE EDUCATION/TRAINING PROGRAM

## 2020-10-24 PROCEDURE — 96367 TX/PROPH/DG ADDL SEQ IV INF: CPT

## 2020-10-24 PROCEDURE — 96365 THER/PROPH/DIAG IV INF INIT: CPT

## 2020-10-24 PROCEDURE — 93306 TTE W/DOPPLER COMPLETE: CPT | Mod: 26 | Performed by: INTERNAL MEDICINE

## 2020-10-24 PROCEDURE — 96366 THER/PROPH/DIAG IV INF ADDON: CPT

## 2020-10-24 PROCEDURE — 700101 HCHG RX REV CODE 250: Performed by: STUDENT IN AN ORGANIZED HEALTH CARE EDUCATION/TRAINING PROGRAM

## 2020-10-24 PROCEDURE — 99225 PR SUBSEQUENT OBSERVATION CARE,LEVEL II: CPT | Performed by: STUDENT IN AN ORGANIZED HEALTH CARE EDUCATION/TRAINING PROGRAM

## 2020-10-24 PROCEDURE — 700111 HCHG RX REV CODE 636 W/ 250 OVERRIDE (IP): Performed by: STUDENT IN AN ORGANIZED HEALTH CARE EDUCATION/TRAINING PROGRAM

## 2020-10-24 PROCEDURE — 700105 HCHG RX REV CODE 258

## 2020-10-24 PROCEDURE — 700105 HCHG RX REV CODE 258: Performed by: STUDENT IN AN ORGANIZED HEALTH CARE EDUCATION/TRAINING PROGRAM

## 2020-10-24 PROCEDURE — 36415 COLL VENOUS BLD VENIPUNCTURE: CPT

## 2020-10-24 PROCEDURE — 85025 COMPLETE CBC W/AUTO DIFF WBC: CPT

## 2020-10-24 PROCEDURE — A9537 TC99M MEBROFENIN: HCPCS

## 2020-10-24 RX ORDER — ONDANSETRON 2 MG/ML
4 INJECTION INTRAMUSCULAR; INTRAVENOUS EVERY 4 HOURS PRN
Status: DISCONTINUED | OUTPATIENT
Start: 2020-10-24 | End: 2020-10-26 | Stop reason: HOSPADM

## 2020-10-24 RX ORDER — SODIUM CHLORIDE 9 MG/ML
INJECTION, SOLUTION INTRAVENOUS
Status: COMPLETED
Start: 2020-10-24 | End: 2020-10-24

## 2020-10-24 RX ORDER — POTASSIUM CHLORIDE 20 MEQ/1
40 TABLET, EXTENDED RELEASE ORAL ONCE
Status: COMPLETED | OUTPATIENT
Start: 2020-10-24 | End: 2020-10-24

## 2020-10-24 RX ORDER — ONDANSETRON 2 MG/ML
4 INJECTION INTRAMUSCULAR; INTRAVENOUS ONCE
Status: COMPLETED | OUTPATIENT
Start: 2020-10-25 | End: 2020-10-25

## 2020-10-24 RX ADMIN — CEFTRIAXONE SODIUM 2 G: 2 INJECTION, POWDER, FOR SOLUTION INTRAMUSCULAR; INTRAVENOUS at 00:13

## 2020-10-24 RX ADMIN — PRAMIPEXOLE DIHYDROCHLORIDE 1 MG: 0.5 TABLET ORAL at 13:01

## 2020-10-24 RX ADMIN — SODIUM CHLORIDE 500 ML: 9 INJECTION, SOLUTION INTRAVENOUS at 00:45

## 2020-10-24 RX ADMIN — CEFTRIAXONE SODIUM 2 G: 2 INJECTION, POWDER, FOR SOLUTION INTRAMUSCULAR; INTRAVENOUS at 23:52

## 2020-10-24 RX ADMIN — METRONIDAZOLE 500 MG: 500 INJECTION, SOLUTION INTRAVENOUS at 09:00

## 2020-10-24 RX ADMIN — FUROSEMIDE 40 MG: 20 TABLET ORAL at 00:15

## 2020-10-24 RX ADMIN — FUROSEMIDE 40 MG: 20 TABLET ORAL at 06:26

## 2020-10-24 RX ADMIN — OXYBUTYNIN CHLORIDE 5 MG: 5 TABLET ORAL at 13:01

## 2020-10-24 RX ADMIN — OXYBUTYNIN CHLORIDE 5 MG: 5 TABLET ORAL at 17:45

## 2020-10-24 RX ADMIN — METRONIDAZOLE 500 MG: 500 INJECTION, SOLUTION INTRAVENOUS at 17:45

## 2020-10-24 RX ADMIN — POTASSIUM CHLORIDE 40 MEQ: 1500 TABLET, EXTENDED RELEASE ORAL at 21:24

## 2020-10-24 RX ADMIN — METRONIDAZOLE 500 MG: 500 INJECTION, SOLUTION INTRAVENOUS at 01:20

## 2020-10-24 RX ADMIN — PRAMIPEXOLE DIHYDROCHLORIDE 1 MG: 0.5 TABLET ORAL at 17:45

## 2020-10-24 RX ADMIN — PRAMIPEXOLE DIHYDROCHLORIDE 1 MG: 0.5 TABLET ORAL at 06:27

## 2020-10-24 RX ADMIN — FUROSEMIDE 40 MG: 20 TABLET ORAL at 17:45

## 2020-10-24 RX ADMIN — OXYBUTYNIN CHLORIDE 5 MG: 5 TABLET ORAL at 06:27

## 2020-10-24 SDOH — HEALTH STABILITY: MENTAL HEALTH: HOW OFTEN DO YOU HAVE 6 OR MORE DRINKS ON ONE OCCASION?: MONTHLY

## 2020-10-24 SDOH — ECONOMIC STABILITY: FOOD INSECURITY: WITHIN THE PAST 12 MONTHS, YOU WORRIED THAT YOUR FOOD WOULD RUN OUT BEFORE YOU GOT MONEY TO BUY MORE.: NEVER TRUE

## 2020-10-24 SDOH — ECONOMIC STABILITY: FOOD INSECURITY: WITHIN THE PAST 12 MONTHS, THE FOOD YOU BOUGHT JUST DIDN'T LAST AND YOU DIDN'T HAVE MONEY TO GET MORE.: NEVER TRUE

## 2020-10-24 SDOH — HEALTH STABILITY: MENTAL HEALTH: HOW MANY STANDARD DRINKS CONTAINING ALCOHOL DO YOU HAVE ON A TYPICAL DAY?: 1 OR 2

## 2020-10-24 SDOH — ECONOMIC STABILITY: TRANSPORTATION INSECURITY
IN THE PAST 12 MONTHS, HAS LACK OF TRANSPORTATION KEPT YOU FROM MEETINGS, WORK, OR FROM GETTING THINGS NEEDED FOR DAILY LIVING?: NO

## 2020-10-24 SDOH — HEALTH STABILITY: MENTAL HEALTH: HOW OFTEN DO YOU HAVE A DRINK CONTAINING ALCOHOL?: 2-3 TIMES A WEEK

## 2020-10-24 SDOH — ECONOMIC STABILITY: TRANSPORTATION INSECURITY
IN THE PAST 12 MONTHS, HAS THE LACK OF TRANSPORTATION KEPT YOU FROM MEDICAL APPOINTMENTS OR FROM GETTING MEDICATIONS?: NO

## 2020-10-24 ASSESSMENT — ENCOUNTER SYMPTOMS
PALPITATIONS: 0
HEADACHES: 0
EYE REDNESS: 0
CHILLS: 0
DEPRESSION: 0
SINUS PAIN: 0
NAUSEA: 0
ABDOMINAL PAIN: 0
FEVER: 0
VOMITING: 0
WHEEZING: 0
SORE THROAT: 0
HEARTBURN: 0
MYALGIAS: 0
SPUTUM PRODUCTION: 0
NERVOUS/ANXIOUS: 0
DIZZINESS: 0
DIAPHORESIS: 0
COUGH: 0
ORTHOPNEA: 0
SHORTNESS OF BREATH: 0
EYE DISCHARGE: 0
NECK PAIN: 0
TINGLING: 0

## 2020-10-24 ASSESSMENT — LIFESTYLE VARIABLES
ON A TYPICAL DAY WHEN YOU DRINK ALCOHOL HOW MANY DRINKS DO YOU HAVE: 2
ALCOHOL_USE: YES
TOTAL SCORE: 0
TOTAL SCORE: 0
CONSUMPTION TOTAL: NEGATIVE
HAVE YOU EVER FELT YOU SHOULD CUT DOWN ON YOUR DRINKING: NO
TOTAL SCORE: 0
HAVE PEOPLE ANNOYED YOU BY CRITICIZING YOUR DRINKING: NO
EVER FELT BAD OR GUILTY ABOUT YOUR DRINKING: NO
EVER HAD A DRINK FIRST THING IN THE MORNING TO STEADY YOUR NERVES TO GET RID OF A HANGOVER: NO
AVERAGE NUMBER OF DAYS PER WEEK YOU HAVE A DRINK CONTAINING ALCOHOL: 2
HOW MANY TIMES IN THE PAST YEAR HAVE YOU HAD 5 OR MORE DRINKS IN A DAY: 0
DOES PATIENT WANT TO STOP DRINKING: NO

## 2020-10-24 ASSESSMENT — PAIN DESCRIPTION - PAIN TYPE
TYPE: ACUTE PAIN

## 2020-10-24 ASSESSMENT — COGNITIVE AND FUNCTIONAL STATUS - GENERAL
MOVING TO AND FROM BED TO CHAIR: A LITTLE
MOBILITY SCORE: 22
MOVING FROM LYING ON BACK TO SITTING ON SIDE OF FLAT BED: A LITTLE
SUGGESTED CMS G CODE MODIFIER MOBILITY: CJ
SUGGESTED CMS G CODE MODIFIER DAILY ACTIVITY: CH
DAILY ACTIVITIY SCORE: 24

## 2020-10-24 ASSESSMENT — PATIENT HEALTH QUESTIONNAIRE - PHQ9
2. FEELING DOWN, DEPRESSED, IRRITABLE, OR HOPELESS: NOT AT ALL
1. LITTLE INTEREST OR PLEASURE IN DOING THINGS: NOT AT ALL
SUM OF ALL RESPONSES TO PHQ9 QUESTIONS 1 AND 2: 0

## 2020-10-24 ASSESSMENT — FIBROSIS 4 INDEX: FIB4 SCORE: 4.79

## 2020-10-24 NOTE — ASSESSMENT & PLAN NOTE
- Initially with headache.  CT head without acute findings.  HA has completely resolved.    - BP has stabilized.    - PRN labetalol.    - Monitor BP.   - Resolved.

## 2020-10-24 NOTE — ED NOTES
Telephone report given to receiving RNJennifer. Patient to floor via transport. Patient in stable condition, all belongings with patient.

## 2020-10-24 NOTE — ED NOTES
Patient to and from CT with RN    Patient back on monitor, medicated per MAR    In NAD, pain is gone, speaking in full sentences.

## 2020-10-24 NOTE — CARE PLAN
Problem: Safety  Goal: Will remain free from injury  Outcome: PROGRESSING AS EXPECTED  Goal: Will remain free from falls  Outcome: PROGRESSING AS EXPECTED  Fall precautions in place. Educated pt to call for assistance.      Problem: Infection  Goal: Will remain free from infection  Outcome: PROGRESSING AS EXPECTED  IV abx ordered

## 2020-10-24 NOTE — ED NOTES
Med rec complete via interview with pt at bedside. Allergies reviewed. Pt denies antibiotic use in past 14 days.

## 2020-10-24 NOTE — H&P
"Hospital Medicine History & Physical Note    Date of Service  10/23/2020    Primary Care Physician  Martin Lindsey M.D.    Consultants  None    Code Status  Full Code    Chief Complaint  Chief Complaint   Patient presents with   • Abdominal Pain     x 2 hours described as \"stabbing\". Had a BM before coming in and +nausea with no emesis.    • Headache       History of Presenting Illness  69 y.o. female who presented 10/23/2020 with history of hypertension, restless leg syndrome and morbid obesity who was admitted for abdominal pain and hypertensive emergency.  Patient reports that she had a sudden onset of abdominal pain this morning.  She says that it is located on the right upper quadrant and left upper quadrant.  This is her first episode.  The pain is nonradiating it is dull achy and colicky.  Patient has elevated LFTs a CT of the abdomen was performed which showed no acute processes.  A right upper quadrant ultrasound was ordered which showed questionable cholecystitis.  Patient is pending a HIDA scan.  Patient is also endorsing a headache that is associated with nausea.  She has never had a headache like this before.  She denies any history of brain bleed.  A CT of the brain is pending.  Patient is also endorsing lower extremity edema, which she says has been ongoing for quite some time.  She denies any history of heart failure.    Review of Systems  Review of Systems   Constitutional: Negative for chills, fever and weight loss.   HENT: Negative for hearing loss, sore throat and tinnitus.    Eyes: Negative for blurred vision and pain.   Respiratory: Negative for cough, shortness of breath and wheezing.    Cardiovascular: Positive for leg swelling. Negative for chest pain and palpitations.   Gastrointestinal: Positive for abdominal pain and nausea. Negative for blood in stool, diarrhea and vomiting.   Genitourinary: Negative for dysuria, frequency and hematuria.   Musculoskeletal: Negative for back pain, joint " pain and myalgias.   Skin: Negative for itching and rash.   Neurological: Positive for headaches. Negative for dizziness, tingling and weakness.   Endo/Heme/Allergies: Negative for polydipsia. Does not bruise/bleed easily.   Psychiatric/Behavioral: Negative for depression. The patient is not nervous/anxious.    All other systems reviewed and are negative.      Past Medical History  History of hypertension, restless leg syndrome, obesity.      Surgical History   has a past surgical history that includes tonsillectomy and knee replacement, total (left).     Family History  Mother has a history of a stroke.  Father had oral cancer.  Sister has cancer that she thinks was either colon cancer or uterine cancer.     Social History  Patient denies tobacco or drug use.  She reports occasional alcohol use.    Allergies  No Known Allergies    Medications  Prior to Admission Medications   Prescriptions Last Dose Informant Patient Reported? Taking?   NON SPECIFIED Not Taking at Unknown time Patient No No   Sig: Adult undergarments 1 tid prn Size:X-Large   Patient not taking: Reported on 10/23/2020   oxybutynin (DITROPAN) 5 MG Tab 10/23/2020 at 1200 Patient No No   Sig: TAKE 1 TABLET BY MOUTH THREE TIMES A DAY   pramipexole (MIRAPEX) 1 MG Tab 10/22/2020 at PM Patient No No   Sig: Take 1 Tab by mouth 3 times a day.   Patient taking differently: Take 3 mg by mouth every bedtime.      Facility-Administered Medications: None       Physical Exam  Temp:  [36.8 °C (98.2 °F)] 36.8 °C (98.2 °F)  Pulse:  [69-89] 87  Resp:  [16-25] 16  BP: (169-263)/() 203/85  SpO2:  [94 %-99 %] 95 %    Physical Exam  Vitals signs and nursing note reviewed.   Constitutional:       General: She is not in acute distress.     Appearance: Normal appearance. She is obese.   HENT:      Head: Normocephalic and atraumatic.      Nose: Nose normal.      Mouth/Throat:      Mouth: Mucous membranes are dry.      Pharynx: Oropharynx is clear.   Eyes:       Extraocular Movements: Extraocular movements intact.      Conjunctiva/sclera: Conjunctivae normal.      Pupils: Pupils are equal, round, and reactive to light.   Neck:      Musculoskeletal: Normal range of motion and neck supple.   Cardiovascular:      Rate and Rhythm: Normal rate and regular rhythm.      Heart sounds: No murmur.   Pulmonary:      Effort: Pulmonary effort is normal.      Breath sounds: Normal breath sounds.   Abdominal:      General: Abdomen is flat. Bowel sounds are normal.      Palpations: Abdomen is soft.      Tenderness: There is no abdominal tenderness. There is no guarding or rebound.   Musculoskeletal: Normal range of motion.         General: Swelling present. No tenderness or deformity.      Right lower leg: Edema (2+) present.      Left lower leg: Edema (2+) present.   Skin:     General: Skin is warm and dry.   Neurological:      General: No focal deficit present.      Mental Status: She is alert and oriented to person, place, and time.   Psychiatric:         Mood and Affect: Mood normal.         Thought Content: Thought content normal.         Judgment: Judgment normal.         Laboratory:  Recent Labs     10/23/20  1733   WBC 12.9*   RBC 4.88   HEMOGLOBIN 14.0   HEMATOCRIT 42.6   MCV 87.3   MCH 28.7   MCHC 32.9*   RDW 46.0   PLATELETCT 269   MPV 10.1     Recent Labs     10/23/20  1733   SODIUM 139   POTASSIUM 3.6   CHLORIDE 102   CO2 23   GLUCOSE 132*   BUN 12   CREATININE 0.53   CALCIUM 9.5     Recent Labs     10/23/20  1733   ALTSGPT 126*   ASTSGOT 216*   ALKPHOSPHAT 199*   TBILIRUBIN 1.7*   LIPASE 46   GLUCOSE 132*         No results for input(s): NTPROBNP in the last 72 hours.      Recent Labs     10/23/20  1733   TROPONINT 15       Imaging:  US-RUQ   Final Result         1.  Echogenic shadowing structure of the gallbladder neck, compatible with calcified stone. There is borderline gallbladder wall thickening, findings concerning for cholecystitis.   2.  Hepatomegaly       CT-ABDOMEN-PELVIS WITH   Final Result      1.  No acute intra-abdominal findings.      2.  Atherosclerotic disease.      3.  Calcified uterine leiomyoma.      DX-CHEST-PORTABLE (1 VIEW)   Final Result      Enlarged cardiac silhouette and interstitial prominence could indicate pulmonary edema or hypoventilatory change.      CT-HEAD W/O    (Results Pending)   EC-ECHOCARDIOGRAM COMPLETE W/O CONT    (Results Pending)   NM-BILIARY (HIDA) SCAN WITH CCK    (Results Pending)         Assessment/Plan:  I anticipate this patient is appropriate for observation status at this time.    * Headache  Assessment & Plan  Headache with nausea reported.  Patient blood pressure in the 200s.  CT head without contrast ordered to evaluate for bleed.    Elevated LFTs  Assessment & Plan  , .  Alk phos 190s.  WBC 12.9. concerning for cholecystitis ultrasound shows questionable cholecystitis.  Will order HIDA scan.  Rocephin and Flagyl ordered.    Hypertensive emergency  Assessment & Plan  Patient with underlying hypertension.  Patient also having intense pain likely from cholecystitis.  Pain meds ordered.  As needed labetalol ordered.  Patient complaining of headaches with nausea CT has been ordered to rule out bleed.  Elevated LFTs may be due to the hypertension if it is not cholecystitis.    Bilateral lower extremity edema  Assessment & Plan  Patient noted to have 2+ lower extremity bilateral lower extremity edema.  With her history of hypertension suspect diastolic dysfunction will order an echocardiogram.    Morbid obesity (HCC)- (present on admission)  Assessment & Plan  Diet exercise and weight loss.    Overactive bladder- (present on admission)  Assessment & Plan  Continue home patient med oxybutynin    RLS (restless legs syndrome)- (present on admission)  Assessment & Plan  Continue patient medication pramipexole    DVT prophylaxis: SCDs

## 2020-10-24 NOTE — ED PROVIDER NOTES
"ED Provider Note    CHIEF COMPLAINT  Chief Complaint   Patient presents with   • Abdominal Pain     x 2 hours described as \"stabbing\". Had a BM before coming in and +nausea with no emesis.    • Headache       HPI  Ariadna Sepulveda is a 69 y.o. female who presents with stabbing abdominal pain described as \"all over my abdomen\".  She has associated nausea without emesis.  She had normal bowel movement today prior to arrival.  Patient also complains of frontal headache onset approximately the same time.  She presents hypertensive.  No chest pain, she denies shortness of breath.  Patient does have increased abdominal discomfort with deep breath.  No leg numbness or weakness.  She has history of restless leg syndrome and is requesting her usual medication Mirapex.  No associated flank pain.  Symptoms have been bilateral.  No acute numbness or weakness.  No vision change.  Patient has history of hypertension, states she has been taking her medication as prescribed    REVIEW OF SYSTEMS    Constitutional: No dizziness, no fever  Respiratory: Increased abdominal pain with deep breath.  No pleuritic chest pain, mild shortness of breath  Cardiac: No chest pain, no syncope  Gastrointestinal: Abdominal pain, nausea  Musculoskeletal: No back pain, no acute leg pain  Neurologic: Headache.  No acute numbness or weakness.  Restless leg syndrome       All other systems are negative.       PAST MEDICAL HISTORY  Past Medical History:   Diagnosis Date   • Hypertension    • RLS (restless legs syndrome) 10/5/2011       FAMILY HISTORY  Family History   Problem Relation Age of Onset   • Stroke Mother    • Heart Disease Neg Hx    • Cancer Neg Hx    • Lung Disease Neg Hx    • Diabetes Neg Hx    • Hypertension Neg Hx        SOCIAL HISTORY  Social History     Socioeconomic History   • Marital status: Single     Spouse name: Not on file   • Number of children: Not on file   • Years of education: Not on file   • Highest education level: Not on " "file   Occupational History   • Not on file   Social Needs   • Financial resource strain: Not on file   • Food insecurity     Worry: Not on file     Inability: Not on file   • Transportation needs     Medical: Not on file     Non-medical: Not on file   Tobacco Use   • Smoking status: Never Smoker   • Smokeless tobacco: Never Used   Substance and Sexual Activity   • Alcohol use: Yes     Frequency: 4 or more times a week     Drinks per session: 3 or 4     Binge frequency: Never     Comment: 2 bottles of wine a week   • Drug use: No   • Sexual activity: Not Currently   Lifestyle   • Physical activity     Days per week: Not on file     Minutes per session: Not on file   • Stress: Not on file   Relationships   • Social connections     Talks on phone: Not on file     Gets together: Not on file     Attends Jain service: Not on file     Active member of club or organization: Not on file     Attends meetings of clubs or organizations: Not on file     Relationship status: Not on file   • Intimate partner violence     Fear of current or ex partner: Not on file     Emotionally abused: Not on file     Physically abused: Not on file     Forced sexual activity: Not on file   Other Topics Concern   • Not on file   Social History Narrative   • Not on file       SURGICAL HISTORY  Past Surgical History:   Procedure Laterality Date   • KNEE REPLACEMENT, TOTAL  left   • TONSILLECTOMY         CURRENT MEDICATIONS  Home Medications     Reviewed by Lexy Carolina R.N. (Registered Nurse) on 10/23/20 at 1600  Med List Status: Complete   Medication Last Dose Status   NON SPECIFIED  Active   oxybutynin (DITROPAN) 5 MG Tab  Active   pramipexole (MIRAPEX) 1 MG Tab  Active                ALLERGIES  No Known Allergies    PHYSICAL EXAM  VITAL SIGNS: BP (!) 221/115   Pulse 81   Temp 36.8 °C (98.2 °F) (Temporal)   Resp (!) 21   Ht 1.575 m (5' 2\")   Wt 123 kg (271 lb 2.7 oz)   SpO2 96%   BMI 49.60 kg/m²   Constitutional:  Non-toxic " appearance.   HENT: No facial swelling  Eyes: Anicteric, no conjunctivitis.     Cardiovascular: Normal heart rate, Normal rhythm  Pulmonary:  No wheezing, No rales.   Gastrointestinal: Soft, mild distention no tenderness, No pulsatile mass  Skin: Warm, Dry, No cyanosis.  No asymmetric edema  Neurologic: Speech is clear, follows commands, facial expression is symmetrical.  Strength is intact.  Psychiatric: Mildly anxious, cooperative   musculoskeletal: No flank tenderness    EKG/Labs  Results for orders placed or performed during the hospital encounter of 10/23/20   Troponin   Result Value Ref Range    Troponin T 15 6 - 19 ng/L   LIPASE   Result Value Ref Range    Lipase 46 11 - 82 U/L   URINALYSIS,CULTURE IF INDICATED    Specimen: Urine   Result Value Ref Range    Color Yellow     Character Clear     Specific Gravity 1.037 <1.035    Ph 7.0 5.0 - 8.0    Glucose Negative Negative mg/dL    Ketones Negative Negative mg/dL    Protein Negative Negative mg/dL    Bilirubin Negative Negative    Urobilinogen, Urine 1.0 Negative    Nitrite Negative Negative    Leukocyte Esterase Negative Negative    Occult Blood Negative Negative    Micro Urine Req see below    CBC with Differential   Result Value Ref Range    WBC 12.9 (H) 4.8 - 10.8 K/uL    RBC 4.88 4.20 - 5.40 M/uL    Hemoglobin 14.0 12.0 - 16.0 g/dL    Hematocrit 42.6 37.0 - 47.0 %    MCV 87.3 81.4 - 97.8 fL    MCH 28.7 27.0 - 33.0 pg    MCHC 32.9 (L) 33.6 - 35.0 g/dL    RDW 46.0 35.9 - 50.0 fL    Platelet Count 269 164 - 446 K/uL    MPV 10.1 9.0 - 12.9 fL    Neutrophils-Polys 87.60 (H) 44.00 - 72.00 %    Lymphocytes 6.00 (L) 22.00 - 41.00 %    Monocytes 5.40 0.00 - 13.40 %    Eosinophils 0.40 0.00 - 6.90 %    Basophils 0.20 0.00 - 1.80 %    Immature Granulocytes 0.40 0.00 - 0.90 %    Nucleated RBC 0.00 /100 WBC    Neutrophils (Absolute) 11.31 (H) 2.00 - 7.15 K/uL    Lymphs (Absolute) 0.77 (L) 1.00 - 4.80 K/uL    Monos (Absolute) 0.70 0.00 - 0.85 K/uL    Eos (Absolute) 0.05  0.00 - 0.51 K/uL    Baso (Absolute) 0.02 0.00 - 0.12 K/uL    Immature Granulocytes (abs) 0.05 0.00 - 0.11 K/uL    NRBC (Absolute) 0.00 K/uL   Complete Metabolic Panel (CMP)   Result Value Ref Range    Sodium 139 135 - 145 mmol/L    Potassium 3.6 3.6 - 5.5 mmol/L    Chloride 102 96 - 112 mmol/L    Co2 23 20 - 33 mmol/L    Anion Gap 14.0 7.0 - 16.0    Glucose 132 (H) 65 - 99 mg/dL    Bun 12 8 - 22 mg/dL    Creatinine 0.53 0.50 - 1.40 mg/dL    Calcium 9.5 8.5 - 10.5 mg/dL    AST(SGOT) 216 (H) 12 - 45 U/L    ALT(SGPT) 126 (H) 2 - 50 U/L    Alkaline Phosphatase 199 (H) 30 - 99 U/L    Total Bilirubin 1.7 (H) 0.1 - 1.5 mg/dL    Albumin 4.5 3.2 - 4.9 g/dL    Total Protein 8.1 6.0 - 8.2 g/dL    Globulin 3.6 (H) 1.9 - 3.5 g/dL    A-G Ratio 1.3 g/dL   ESTIMATED GFR   Result Value Ref Range    GFR If African American >60 >60 mL/min/1.73 m 2    GFR If Non African American >60 >60 mL/min/1.73 m 2   EKG (NOW)   Result Value Ref Range    Report       [facility] Emergency Dept.    Test Date:  2020-10-23  Pt Name:    KARON STEVENS             Department: ER  MRN:        [id number]                      Room:  Gender:     Female                       Technician: [id number]  :        [id number]                   Requested By:ER TRIAGE PROTOCOL  Order #:    [id number]                    Reading MD: MICHAEL ANTONIO MD    Measurements  Intervals                                Axis  Rate:       87                           P:          57  MN:         184                          QRS:        33  QRSD:       96                           T:          31  QT:         412  QTc:        496    Interpretive Statements  SINUS RHYTHM  BORDERLINE PROLONGED QT INTERVAL  ARTIFACT IN LEAD(S) V6  Compared to ECG 2020 14:50:32  No significant changes  Electronically Signed On 10- 21:26:04 PDT by MICHAEL ANTONIO MD           RADIOLOGY/PROCEDURES  US-RUQ   Final Result         1.  Echogenic shadowing structure of the  gallbladder neck, compatible with calcified stone. There is borderline gallbladder wall thickening, findings concerning for cholecystitis.   2.  Hepatomegaly      CT-ABDOMEN-PELVIS WITH   Final Result      1.  No acute intra-abdominal findings.      2.  Atherosclerotic disease.      3.  Calcified uterine leiomyoma.      DX-CHEST-PORTABLE (1 VIEW)   Final Result      Enlarged cardiac silhouette and interstitial prominence could indicate pulmonary edema or hypoventilatory change.            COURSE & MEDICAL DECISION MAKING  Pertinent Labs & Imaging studies reviewed. (See chart for details)  Patient with a headache in setting of hypertension.  She states in the past she has been told she has borderline hypertensive.  Initially treated with labetalol, blood pressure improved.  As it started to rise again, she was given a dose of clonidine in an attempt to switch her to oral medication for potential discharge home.  While sleeping, her blood pressure tashi to 220/90.  She is again given a dose of labetalol for control blood pressure.  A dose of morphine has resolved her abdominal pain and headache.  Currently no headache.  With pain out of proportion to the initial exam, hypertension, CT scan was obtained rule out bowel ischemia or aneurysm this was negative.  He she has no abnormal liver function test therefore ultrasound obtained showing a calcified stone in the gallbladder neck.  There is mention of borderline thickened gallbladder wall however no pericholecystic fluid, no fever and currently no tenderness over her gallbladder, cholecystitis is unlikely.  Patient is not an alcoholic she states, having a 1 year every other day she states.  Plan for hospitalization, blood pressure control, further work-up of gallbladder as needed or outpatient follow-up with surgery.    FINAL IMPRESSION     1. Acute nonintractable headache, unspecified headache type     2. Generalized abdominal pain     3. Calculus of gallbladder without  cholecystitis without obstruction     4. Abnormal liver function tests     5. Hypertensive urgency                     Electronically signed by: Doni Reno M.D., 10/23/2020 5:20 PM

## 2020-10-24 NOTE — ASSESSMENT & PLAN NOTE
- 2+ lower extremity bilateral lower extremity edema.    - TTE showed normal LV systolic function, LVEF 60%, no significant valve abnormalities, abnormal diastolic function but could not be graded.  - Will recommend outpatient follow up with PCP for sleep study/concern for DANIEL.

## 2020-10-24 NOTE — ASSESSMENT & PLAN NOTE
Headache with nausea reported.  Patient blood pressure in the 200s.  CT head without contrast ordered to evaluate for bleed.

## 2020-10-24 NOTE — PROGRESS NOTES
"[facility] TRI[facility]ICER ER REPORT    Consult/Admission requested by: Dr Reno    Chief complaint: Headache, abdominal pain x2 weeks  Pertinent history/ER Course: 69-year-old female with \"borderline hypertension\", not on any antihypertensives, who presented with headache, along with 2 weeks of abdominal pain.  She was significantly hypertensive on ED evaluation.  Lab work-up showed WBC of 12,900, with AST of 216, ALT of 126, and alkaline phosphatase of 199, with total bilirubin of 1.7, with previous LFTs normal in June.  CT of the abdomen showed no acute intra-abdominal findings.  Right upper quadrant ultrasound was obtained which showed echogenic shadowing structure of the gallbladder neck, compatible with calcified stone, with borderline gallbladder wall thickening, with findings concerning for cholecystitis.  Patient was given several doses of labetalol, and clonidine, but despite of that her blood pressure keeps going up.  She will need optimal antihypertensive regimen.  She will also need work-up for her transaminitis, and may need MRCP and subsequently surgical evaluation.  Hospitalist service is being asked to admit for further work-up and management.    Patient meets admission criterion: Yes..  Recommendations given or work up & consultations requested per triage officer: None  Consultants involved and pertinent input from consultants: None    Admission status: Observation.   Admission order placed: Yes.   Floor requested: Medical  Assigned hospitalist: Dr. Villegas           "

## 2020-10-24 NOTE — ED NOTES
Medicated per MAR for persistent HTN. Requests additional medication for RLS. ERP updated, new order received.

## 2020-10-24 NOTE — PROGRESS NOTES
Back to floor from HIDA scan   Per U/S tech, they were unable to visualize gallbladder.   Pt to have delayed scan at 1330. Will remain npo

## 2020-10-24 NOTE — ASSESSMENT & PLAN NOTE
- Counseled re: exercise and weight loss.    - Possibility of bariatric surgery should be discussed with PCP.

## 2020-10-24 NOTE — PROGRESS NOTES
2 RN skin check complete.   Devices in place: None.  Skin assessed under devices: None.   Confirmed pressure ulcers found on: NA.     Obese, round abdomen. Old surgical scar to left knee. BLE edema. Hyperkeratinized toenails. Dry heels. Sacrum pink, blanching. No other skin demarcations/issues noted/reported.     New potential pressure ulcers noted on: NA.     The following interventions in place Pillows for limb elevation.      Wound consult placed: NA.

## 2020-10-24 NOTE — ASSESSMENT & PLAN NOTE
"- LFTs remain elevated, but are downtrending.  Tbili peaked at 3.7, now 2.8 (10/25).    - HIDA scan (10/24) showed delayed excretion of activity from liver suggesting hepatocellular dysfunction, \"common bile duct obstruction not excluded,\" possible chronic cholecystitis.    - MRCP pending.  Must also consider cholangitis, though patient not ill-appearing.   - Continue empiric ceftriaxone and metronidazole.    - Can continue full liquid diet for now.  If abd pain worsens, will make NPO pending imaging and further evaluation.  May need GI consult for CBD obstruction.   "

## 2020-10-24 NOTE — ED NOTES
BP now improved. Medicated per MAR for returning abd pain and RLS. COVID swab collected and sent. Pt to CT.

## 2020-10-24 NOTE — CARE PLAN
Problem: Communication  Goal: The ability to communicate needs accurately and effectively will improve  Outcome: PROGRESSING AS EXPECTED  Patient able to articulate needs. Utilizes call light appropriately.     Problem: Safety  Goal: Will remain free from injury  Outcome: PROGRESSING AS EXPECTED  Educated about fall risk and not getting OOB unassisted. Verbalized understanding.     Problem: Bowel/Gastric:  Goal: Normal bowel function is maintained or improved  Outcome: PROGRESSING AS EXPECTED  Hypoactive BS x 4 noted. Patient has BM every other day regularly. Patient eats raisin bran when constipated.     Problem: Knowledge Deficit  Goal: Knowledge of disease process/condition, treatment plan, diagnostic tests, and medications will improve  Outcome: PROGRESSING AS EXPECTED  HIDA scan discussed. Questions answered.

## 2020-10-24 NOTE — PROGRESS NOTES
Received report from ED RN; assumed care once patient arrived to the floor via gurney with transport. Patient ambulated to/from the bathroom; steady, staggering, wide based gait noted. Hand held to/from bathroom d/t left knee replacement. Patient requesting cane, keeps loosing hers at home. A&O x 4. Elevated BP. 94% on RA. Patient denied pain, SOB, numbness, tingling, nausea, vomiting at time of arrival. 2 RN skin check performed; see previous PN. Admission profile completed. Patient oriented to room, call light, routine, and diet. Snack provided, prior to NPO status for HIDA scan in AM. Education provided. No swallowing issues noted.  + void, franki urine noted. + flatus. LBM 10/23/2020. Patient states has BM every other day. Education provided regarding IV antibiotics. Verbalized understanding. POC/HIDA scan discussed. Questions answered. Bed locked/lowest position. Call light/personal belongings within reach. All needs met at present time.

## 2020-10-24 NOTE — PROGRESS NOTES
[facility] Medicine Daily Progress Note    Date of Service  10/24/2020    Chief Complaint  69 y.o. female admitted 10/23/2020 admitted with abdominal pain with concern for cholecystitis.    Interval Problem Update  No acute events overnight.  Abdominal pain has completely resolved.  Denies nausea/vomiting.  Initial attempt at HIDA scan was unsuccessful as gallbladder could not be visualized.  Patient to have repeat HIDA scan later this afternoon/evening.  She remains n.p.o. for that.  Remains afebrile.      Consultants/Specialty  None.    Code Status   Full Code    Disposition  Awaiting HIDA scan; likely discharge tomorrow, 10/25.    Review of Systems  Review of Systems   Constitutional: Negative for chills, diaphoresis and fever.   HENT: Negative for congestion, sinus pain and sore throat.    Eyes: Negative for discharge and redness.   Respiratory: Negative for cough, sputum production, shortness of breath and wheezing.    Cardiovascular: Positive for leg swelling (b/l LE). Negative for chest pain, palpitations and orthopnea.   Gastrointestinal: Negative for abdominal pain (resolved shortly after admission), heartburn, nausea and vomiting.   Genitourinary: Negative for dysuria, frequency and urgency.   Musculoskeletal: Negative for joint pain, myalgias and neck pain.   Skin: Negative for itching and rash.   Neurological: Negative for dizziness, tingling and headaches.   Endo/Heme/Allergies: Negative for environmental allergies.   Psychiatric/Behavioral: Negative for depression. The patient is not nervous/anxious.         Physical Exam  Temp:  [36.5 °C (97.7 °F)-37.5 °C (99.5 °F)] 36.5 °C (97.7 °F)  Pulse:  [64-89] 64  Resp:  [16-25] 18  BP: (117-263)/() 123/56  SpO2:  [85 %-99 %] 95 %    Physical Exam  Constitutional:       General: She is not in acute distress.     Appearance: She is obese. She is not ill-appearing or diaphoretic.   HENT:      Head: Normocephalic and atraumatic.      Nose: No congestion.       Mouth/Throat:      Mouth: Mucous membranes are moist.      Pharynx: Oropharynx is clear.   Eyes:      General: No scleral icterus.     Pupils: Pupils are equal, round, and reactive to light.   Cardiovascular:      Rate and Rhythm: Normal rate and regular rhythm.      Pulses: Normal pulses.      Heart sounds: Normal heart sounds. No murmur.   Pulmonary:      Effort: Pulmonary effort is normal. No respiratory distress.      Breath sounds: Normal breath sounds. No stridor. No wheezing.   Abdominal:      General: Bowel sounds are normal. There is no distension.      Palpations: Abdomen is soft.      Tenderness: There is no abdominal tenderness.      Comments: Obese.  No tenderness to light or deep palpation.    Musculoskeletal:         General: No tenderness.      Right lower leg: Edema (2+ pitting edema) present.      Left lower leg: Edema (2+ pitting edema) present.   Skin:     Capillary Refill: Capillary refill takes less than 2 seconds.      Coloration: Skin is not jaundiced.   Neurological:      Mental Status: She is alert and oriented to person, place, and time. Mental status is at baseline.   Psychiatric:         Mood and Affect: Mood normal.         Behavior: Behavior normal.         Fluids    Intake/Output Summary (Last 24 hours) at 10/24/2020 1452  Last data filed at 10/24/2020 1400  Gross per 24 hour   Intake 200 ml   Output 3100 ml   Net -2900 ml       Laboratory  Recent Labs     10/23/20  1733 10/24/20  0739   WBC 12.9* 6.4   RBC 4.88 4.11*   HEMOGLOBIN 14.0 11.9*   HEMATOCRIT 42.6 36.7*   MCV 87.3 89.3   MCH 28.7 29.0   MCHC 32.9* 32.4*   RDW 46.0 47.2   PLATELETCT 269 201   MPV 10.1 10.3     Recent Labs     10/23/20  1733 10/24/20  0739   SODIUM 139 136   POTASSIUM 3.6 3.2*   CHLORIDE 102 103   CO2 23 26   GLUCOSE 132* 115*   BUN 12 8   CREATININE 0.53 0.42*   CALCIUM 9.5 8.1*                   Imaging  EC-ECHOCARDIOGRAM COMPLETE W/O CONT   Final Result      CT-HEAD W/O   Final Result         1.  No acute  intracranial abnormality.   2.  Atherosclerosis.      US-RUQ   Final Result         1.  Echogenic shadowing structure of the gallbladder neck, compatible with calcified stone. There is borderline gallbladder wall thickening, findings concerning for cholecystitis.   2.  Hepatomegaly      CT-ABDOMEN-PELVIS WITH   Final Result      1.  No acute intra-abdominal findings.      2.  Atherosclerotic disease.      3.  Calcified uterine leiomyoma.      DX-CHEST-PORTABLE (1 VIEW)   Final Result      Enlarged cardiac silhouette and interstitial prominence could indicate pulmonary edema or hypoventilatory change.      NM-HEPATOBILIARY SCAN    (Results Pending)        Assessment/Plan  * Pain of upper abdomen with concern for acute cholecystitis- (present on admission)  Assessment & Plan  - Admission: , .  Alk phos 190s.  WBC 12.9.  Ultrasound shows questionable cholecystitis.    - Abdominal pain completely resolved shortly after admission.    - Initial attempt at HIDA scan today, 10/24, was unsuccessful as gallbladder could not be visualized.  Repeat HIDA scan planned for later this afternoon/evening.  Remain NPO pending repeat HIDA.   - Continue empiric ceftriaxone and metronidazole.      Hypertensive emergency- (present on admission)  Assessment & Plan  - Initially with headache.  CT head without acute findings.  HA has completely resolved.    - BP has stabilized.    - PRN labetalol.    - Monitor BP.   - Resolved.     Bilateral lower extremity edema- (present on admission)  Assessment & Plan  - 2+ lower extremity bilateral lower extremity edema.    - TTE showed normal LV systolic function, LVEF 60%, no significant valve abnormalities, abnormal diastolic function but could not be graded.  - Will recommend outpatient follow up with PCP for sleep study/concern for DANIEL.      Morbid obesity (HCC)- (present on admission)  Assessment & Plan  - Counseled re: exercise and weight loss.    - Possibility of bariatric surgery  should be discussed with PCP.     Overactive bladder- (present on admission)  Assessment & Plan  - Continue home oxybutynin.     RLS (restless legs syndrome)- (present on admission)  Assessment & Plan  - Continue home pramipexole.        VTE prophylaxis: DVT ppx held for possible procedure; risks/benefits discussed with patient, who agrees.  SCDs are ordered.     Lesley Stahl M.D.

## 2020-10-24 NOTE — ED NOTES
Assumed pt care. Report received. UA collected via straight cath and sent to lab. Medicated per MAR for HTN. Pt denies HA, abd pain at this time. Awaiting US.

## 2020-10-25 LAB
ALBUMIN SERPL BCP-MCNC: 3.7 G/DL (ref 3.2–4.9)
ALBUMIN/GLOB SERPL: 1.2 G/DL
ALP SERPL-CCNC: 219 U/L (ref 30–99)
ALT SERPL-CCNC: 287 U/L (ref 2–50)
ANION GAP SERPL CALC-SCNC: 10 MMOL/L (ref 7–16)
ANION GAP SERPL CALC-SCNC: 13 MMOL/L (ref 7–16)
AST SERPL-CCNC: 176 U/L (ref 12–45)
BILIRUB SERPL-MCNC: 2.8 MG/DL (ref 0.1–1.5)
BUN SERPL-MCNC: 8 MG/DL (ref 8–22)
BUN SERPL-MCNC: 8 MG/DL (ref 8–22)
CALCIUM SERPL-MCNC: 8.5 MG/DL (ref 8.5–10.5)
CALCIUM SERPL-MCNC: 9 MG/DL (ref 8.5–10.5)
CHLORIDE SERPL-SCNC: 95 MMOL/L (ref 96–112)
CHLORIDE SERPL-SCNC: 96 MMOL/L (ref 96–112)
CO2 SERPL-SCNC: 25 MMOL/L (ref 20–33)
CO2 SERPL-SCNC: 28 MMOL/L (ref 20–33)
CREAT SERPL-MCNC: 0.49 MG/DL (ref 0.5–1.4)
CREAT SERPL-MCNC: 0.5 MG/DL (ref 0.5–1.4)
ERYTHROCYTE [DISTWIDTH] IN BLOOD BY AUTOMATED COUNT: 46.4 FL (ref 35.9–50)
GLOBULIN SER CALC-MCNC: 3.2 G/DL (ref 1.9–3.5)
GLUCOSE SERPL-MCNC: 114 MG/DL (ref 65–99)
GLUCOSE SERPL-MCNC: 116 MG/DL (ref 65–99)
HAV IGM SERPL QL IA: NORMAL
HBV CORE IGM SER QL: NORMAL
HBV SURFACE AG SER QL: NORMAL
HCT VFR BLD AUTO: 39.4 % (ref 37–47)
HCV AB SER QL: NORMAL
HGB BLD-MCNC: 12.9 G/DL (ref 12–16)
MAGNESIUM SERPL-MCNC: 1.7 MG/DL (ref 1.5–2.5)
MCH RBC QN AUTO: 29 PG (ref 27–33)
MCHC RBC AUTO-ENTMCNC: 32.7 G/DL (ref 33.6–35)
MCV RBC AUTO: 88.5 FL (ref 81.4–97.8)
PLATELET # BLD AUTO: 219 K/UL (ref 164–446)
PMV BLD AUTO: 10.1 FL (ref 9–12.9)
POTASSIUM SERPL-SCNC: 3 MMOL/L (ref 3.6–5.5)
POTASSIUM SERPL-SCNC: 3.1 MMOL/L (ref 3.6–5.5)
PROT SERPL-MCNC: 6.9 G/DL (ref 6–8.2)
RBC # BLD AUTO: 4.45 M/UL (ref 4.2–5.4)
SODIUM SERPL-SCNC: 133 MMOL/L (ref 135–145)
SODIUM SERPL-SCNC: 134 MMOL/L (ref 135–145)
WBC # BLD AUTO: 5.7 K/UL (ref 4.8–10.8)

## 2020-10-25 PROCEDURE — G0378 HOSPITAL OBSERVATION PER HR: HCPCS

## 2020-10-25 PROCEDURE — 700111 HCHG RX REV CODE 636 W/ 250 OVERRIDE (IP): Performed by: STUDENT IN AN ORGANIZED HEALTH CARE EDUCATION/TRAINING PROGRAM

## 2020-10-25 PROCEDURE — 700105 HCHG RX REV CODE 258: Performed by: STUDENT IN AN ORGANIZED HEALTH CARE EDUCATION/TRAINING PROGRAM

## 2020-10-25 PROCEDURE — 96367 TX/PROPH/DG ADDL SEQ IV INF: CPT

## 2020-10-25 PROCEDURE — 96366 THER/PROPH/DIAG IV INF ADDON: CPT

## 2020-10-25 PROCEDURE — 770006 HCHG ROOM/CARE - MED/SURG/GYN SEMI*

## 2020-10-25 PROCEDURE — 700102 HCHG RX REV CODE 250 W/ 637 OVERRIDE(OP): Performed by: STUDENT IN AN ORGANIZED HEALTH CARE EDUCATION/TRAINING PROGRAM

## 2020-10-25 PROCEDURE — 96375 TX/PRO/DX INJ NEW DRUG ADDON: CPT

## 2020-10-25 PROCEDURE — 80053 COMPREHEN METABOLIC PANEL: CPT

## 2020-10-25 PROCEDURE — 83735 ASSAY OF MAGNESIUM: CPT

## 2020-10-25 PROCEDURE — 99232 SBSQ HOSP IP/OBS MODERATE 35: CPT | Performed by: STUDENT IN AN ORGANIZED HEALTH CARE EDUCATION/TRAINING PROGRAM

## 2020-10-25 PROCEDURE — 700101 HCHG RX REV CODE 250: Performed by: STUDENT IN AN ORGANIZED HEALTH CARE EDUCATION/TRAINING PROGRAM

## 2020-10-25 PROCEDURE — 700111 HCHG RX REV CODE 636 W/ 250 OVERRIDE (IP): Performed by: INTERNAL MEDICINE

## 2020-10-25 PROCEDURE — A9270 NON-COVERED ITEM OR SERVICE: HCPCS | Performed by: STUDENT IN AN ORGANIZED HEALTH CARE EDUCATION/TRAINING PROGRAM

## 2020-10-25 PROCEDURE — 85027 COMPLETE CBC AUTOMATED: CPT

## 2020-10-25 PROCEDURE — 80048 BASIC METABOLIC PNL TOTAL CA: CPT

## 2020-10-25 RX ORDER — POTASSIUM CHLORIDE 20 MEQ/1
40 TABLET, EXTENDED RELEASE ORAL ONCE
Status: COMPLETED | OUTPATIENT
Start: 2020-10-25 | End: 2020-10-25

## 2020-10-25 RX ORDER — MAGNESIUM SULFATE HEPTAHYDRATE 40 MG/ML
2 INJECTION, SOLUTION INTRAVENOUS ONCE
Status: COMPLETED | OUTPATIENT
Start: 2020-10-25 | End: 2020-10-25

## 2020-10-25 RX ORDER — SIMETHICONE 80 MG
40 TABLET,CHEWABLE ORAL 3 TIMES DAILY PRN
Status: DISCONTINUED | OUTPATIENT
Start: 2020-10-25 | End: 2020-10-26 | Stop reason: HOSPADM

## 2020-10-25 RX ORDER — ACETAMINOPHEN 325 MG/1
650 TABLET ORAL EVERY 8 HOURS PRN
Status: DISCONTINUED | OUTPATIENT
Start: 2020-10-25 | End: 2020-10-26 | Stop reason: HOSPADM

## 2020-10-25 RX ORDER — ACETAMINOPHEN 325 MG/1
650 TABLET ORAL EVERY 6 HOURS PRN
Status: DISCONTINUED | OUTPATIENT
Start: 2020-10-25 | End: 2020-10-25

## 2020-10-25 RX ADMIN — FUROSEMIDE 40 MG: 20 TABLET ORAL at 15:45

## 2020-10-25 RX ADMIN — OXYBUTYNIN CHLORIDE 5 MG: 5 TABLET ORAL at 17:59

## 2020-10-25 RX ADMIN — MAGNESIUM SULFATE 2 G: 2 INJECTION INTRAVENOUS at 09:27

## 2020-10-25 RX ADMIN — POTASSIUM CHLORIDE 40 MEQ: 1500 TABLET, EXTENDED RELEASE ORAL at 09:27

## 2020-10-25 RX ADMIN — FUROSEMIDE 40 MG: 20 TABLET ORAL at 04:44

## 2020-10-25 RX ADMIN — PRAMIPEXOLE DIHYDROCHLORIDE 1 MG: 0.5 TABLET ORAL at 11:12

## 2020-10-25 RX ADMIN — PRAMIPEXOLE DIHYDROCHLORIDE 1 MG: 0.5 TABLET ORAL at 04:44

## 2020-10-25 RX ADMIN — OXYBUTYNIN CHLORIDE 5 MG: 5 TABLET ORAL at 04:45

## 2020-10-25 RX ADMIN — ONDANSETRON 4 MG: 2 INJECTION INTRAMUSCULAR; INTRAVENOUS at 00:03

## 2020-10-25 RX ADMIN — DOCUSATE SODIUM 50 MG AND SENNOSIDES 8.6 MG 2 TABLET: 8.6; 5 TABLET, FILM COATED ORAL at 17:59

## 2020-10-25 RX ADMIN — POTASSIUM CHLORIDE 40 MEQ: 1500 TABLET, EXTENDED RELEASE ORAL at 15:45

## 2020-10-25 RX ADMIN — METRONIDAZOLE 500 MG: 500 INJECTION, SOLUTION INTRAVENOUS at 15:47

## 2020-10-25 RX ADMIN — ONDANSETRON 4 MG: 2 INJECTION INTRAMUSCULAR; INTRAVENOUS at 11:12

## 2020-10-25 RX ADMIN — METRONIDAZOLE 500 MG: 500 INJECTION, SOLUTION INTRAVENOUS at 00:58

## 2020-10-25 RX ADMIN — OXYBUTYNIN CHLORIDE 5 MG: 5 TABLET ORAL at 11:12

## 2020-10-25 RX ADMIN — PRAMIPEXOLE DIHYDROCHLORIDE 1 MG: 0.5 TABLET ORAL at 17:59

## 2020-10-25 RX ADMIN — DOCUSATE SODIUM 50 MG AND SENNOSIDES 8.6 MG 2 TABLET: 8.6; 5 TABLET, FILM COATED ORAL at 04:44

## 2020-10-25 RX ADMIN — CEFTRIAXONE SODIUM 2 G: 2 INJECTION, POWDER, FOR SOLUTION INTRAMUSCULAR; INTRAVENOUS at 23:38

## 2020-10-25 RX ADMIN — SIMETHICONE CHEW TAB 80 MG 40 MG: 80 TABLET ORAL at 15:46

## 2020-10-25 RX ADMIN — METRONIDAZOLE 500 MG: 500 INJECTION, SOLUTION INTRAVENOUS at 09:27

## 2020-10-25 ASSESSMENT — ENCOUNTER SYMPTOMS
SORE THROAT: 0
SPUTUM PRODUCTION: 0
NERVOUS/ANXIOUS: 0
HEADACHES: 0
DIZZINESS: 0
SINUS PAIN: 0
NAUSEA: 0
SHORTNESS OF BREATH: 0
DIAPHORESIS: 0
EYE DISCHARGE: 0
HEARTBURN: 0
WHEEZING: 0
CHILLS: 0
MYALGIAS: 0
FEVER: 0
EYE REDNESS: 0
DEPRESSION: 0
NECK PAIN: 0
ORTHOPNEA: 0
VOMITING: 0
TINGLING: 0
PALPITATIONS: 0
COUGH: 0
ABDOMINAL PAIN: 1

## 2020-10-25 ASSESSMENT — PAIN DESCRIPTION - PAIN TYPE
TYPE: ACUTE PAIN

## 2020-10-25 ASSESSMENT — FIBROSIS 4 INDEX: FIB4 SCORE: 3.18

## 2020-10-25 NOTE — PROGRESS NOTES
"[facility] Medicine Daily Progress Note    Date of Service  10/25/2020    Chief Complaint  69 y.o. female admitted 10/23/2020 admitted with abdominal pain with concern for cholecystitis.    Interval Problem Update  HIDA scan yesterday showed delayed excretion of activity from liver suggesting hepatocellular dysfunction, \"common bile duct obstruction not excluded,\" possible chronic cholecystitis.  Patient's abdominal pain was  not present overnight/this morning, but she experienced mild pain this afternoon.  Remains afebrile.  Liver enzymes are downtrending.      1534 update: patient's abdominal pain was \"gas pain\" per patient.     Consultants/Specialty  None.    Code Status   Full Code    Disposition  Inpatient pending MRCP.     Review of Systems  Review of Systems   Constitutional: Negative for chills, diaphoresis and fever.   HENT: Negative for congestion, sinus pain and sore throat.    Eyes: Negative for discharge and redness.   Respiratory: Negative for cough, sputum production, shortness of breath and wheezing.    Cardiovascular: Positive for leg swelling (b/l LE). Negative for chest pain, palpitations and orthopnea.   Gastrointestinal: Positive for abdominal pain (mild achy pain in the afternoon). Negative for heartburn, nausea and vomiting.   Genitourinary: Negative for dysuria, frequency and urgency.   Musculoskeletal: Negative for joint pain, myalgias and neck pain.   Skin: Negative for itching and rash.   Neurological: Negative for dizziness, tingling and headaches.   Endo/Heme/Allergies: Negative for environmental allergies.   Psychiatric/Behavioral: Negative for depression. The patient is not nervous/anxious.         Physical Exam  Temp:  [36.1 °C (96.9 °F)-37 °C (98.6 °F)] 36.1 °C (96.9 °F)  Pulse:  [56-74] 61  Resp:  [17-18] 17  BP: (126-152)/(41-72) 127/72  SpO2:  [92 %-96 %] 96 %    Physical Exam  Constitutional:       General: She is not in acute distress.     Appearance: She is obese. She is not " ill-appearing or diaphoretic.   HENT:      Head: Normocephalic and atraumatic.      Nose: No congestion.      Mouth/Throat:      Mouth: Mucous membranes are moist.      Pharynx: Oropharynx is clear.   Eyes:      General: No scleral icterus.     Pupils: Pupils are equal, round, and reactive to light.   Cardiovascular:      Rate and Rhythm: Normal rate and regular rhythm.      Pulses: Normal pulses.      Heart sounds: Normal heart sounds. No murmur.   Pulmonary:      Effort: Pulmonary effort is normal. No respiratory distress.      Breath sounds: Normal breath sounds. No stridor. No wheezing.   Abdominal:      General: Bowel sounds are normal. There is no distension.      Palpations: Abdomen is soft.      Tenderness: There is no abdominal tenderness.      Comments: Obese.  No tenderness to light or deep palpation this afternoon despite patient's subjective pain.    Musculoskeletal:         General: No tenderness.      Right lower leg: Edema (2+ pitting edema) present.      Left lower leg: Edema (2+ pitting edema) present.   Skin:     Capillary Refill: Capillary refill takes less than 2 seconds.      Coloration: Skin is not jaundiced.   Neurological:      Mental Status: She is alert and oriented to person, place, and time. Mental status is at baseline.   Psychiatric:         Mood and Affect: Mood normal.         Behavior: Behavior normal.         Fluids    Intake/Output Summary (Last 24 hours) at 10/25/2020 1505  Last data filed at 10/25/2020 0943  Gross per 24 hour   Intake 1020 ml   Output 1350 ml   Net -330 ml       Laboratory  Recent Labs     10/23/20  1733 10/24/20  0739 10/25/20  0536   WBC 12.9* 6.4 5.7   RBC 4.88 4.11* 4.45   HEMOGLOBIN 14.0 11.9* 12.9   HEMATOCRIT 42.6 36.7* 39.4   MCV 87.3 89.3 88.5   MCH 28.7 29.0 29.0   MCHC 32.9* 32.4* 32.7*   RDW 46.0 47.2 46.4   PLATELETCT 269 201 219   MPV 10.1 10.3 10.1     Recent Labs     10/23/20  1733 10/24/20  0739 10/25/20  0536   SODIUM 139 136 134*  133*  "  POTASSIUM 3.6 3.2* 3.1*  3.0*   CHLORIDE 102 103 96  95*   CO2 23 26 25  28   GLUCOSE 132* 115* 116*  114*   BUN 12 8 8  8   CREATININE 0.53 0.42* 0.50  0.49*   CALCIUM 9.5 8.1* 8.5  9.0                   Imaging  NM-HEPATOBILIARY SCAN   Final Result      1.  Delayed excretion of activity from the liver suggesting hepatocellular dysfunction.  Common bile duct obstruction is not excluded.   2.  Apparent faint activity in the gallbladder at 4 hours suggest delayed filling, possibly indicating chronic cholecystitis.      EC-ECHOCARDIOGRAM COMPLETE W/O CONT   Final Result      CT-HEAD W/O   Final Result         1.  No acute intracranial abnormality.   2.  Atherosclerosis.      US-RUQ   Final Result         1.  Echogenic shadowing structure of the gallbladder neck, compatible with calcified stone. There is borderline gallbladder wall thickening, findings concerning for cholecystitis.   2.  Hepatomegaly      CT-ABDOMEN-PELVIS WITH   Final Result      1.  No acute intra-abdominal findings.      2.  Atherosclerotic disease.      3.  Calcified uterine leiomyoma.      DX-CHEST-PORTABLE (1 VIEW)   Final Result      Enlarged cardiac silhouette and interstitial prominence could indicate pulmonary edema or hypoventilatory change.      CK-CXDVRBO-F/O    (Results Pending)        Assessment/Plan  * Pain of upper abdomen with concern for acute cholecystitis vs. choledocholithiasis- (present on admission)  Assessment & Plan  - LFTs remain elevated, but are downtrending.  Tbili peaked at 3.7, now 2.8 (10/25).    - HIDA scan (10/24) showed delayed excretion of activity from liver suggesting hepatocellular dysfunction, \"common bile duct obstruction not excluded,\" possible chronic cholecystitis.    - MRCP pending.  Must also consider cholangitis, though patient not ill-appearing.   - Continue empiric ceftriaxone and metronidazole.    - Can continue full liquid diet for now.  If abd pain worsens, will make NPO pending imaging and " further evaluation.  May need GI consult for CBD obstruction.     Hypertensive emergency- (present on admission)  Assessment & Plan  - Initially with headache.  CT head without acute findings.  HA has completely resolved.    - BP has stabilized.    - PRN labetalol.    - Monitor BP.   - Resolved.     Bilateral lower extremity edema- (present on admission)  Assessment & Plan  - 2+ lower extremity bilateral lower extremity edema.    - TTE showed normal LV systolic function, LVEF 60%, no significant valve abnormalities, abnormal diastolic function but could not be graded.  - Will recommend outpatient follow up with PCP for sleep study/concern for DANIEL.      Morbid obesity (HCC)- (present on admission)  Assessment & Plan  - Counseled re: exercise and weight loss.    - Possibility of bariatric surgery should be discussed with PCP.     Overactive bladder- (present on admission)  Assessment & Plan  - Continue home oxybutynin.     RLS (restless legs syndrome)- (present on admission)  Assessment & Plan  - Continue home pramipexole.        VTE prophylaxis: DVT ppx held for possible procedure; risks/benefits discussed with patient, who agrees.  SCDs are ordered.     Lesley Stahl M.D.

## 2020-10-25 NOTE — CARE PLAN
Problem: Infection  Goal: Will remain free from infection  Outcome: PROGRESSING AS EXPECTED  Monitoring labs. Patient tolerating IV antibiotics.     Problem: Bowel/Gastric:  Goal: Normal bowel function is maintained or improved  Outcome: PROGRESSING AS EXPECTED  - flatus reported this shift. Patient stated eats raisin bran if constipated.     Problem: Venous Thromboembolism (VTW)/Deep Vein Thrombosis (DVT) Prevention:  Goal: Patient will participate in Venous Thrombosis (VTE)/Deep Vein Thrombosis (DVT)Prevention Measures  Outcome: PROGRESSING AS EXPECTED  SCDs on/in place.

## 2020-10-25 NOTE — PROGRESS NOTES
Report received from AM RN; assumed care. A&O x 4. VSS, elevated BP at beginning of shift d/t new PIV placement. 93% on RA, dips to 83% while sleeping Oxygen applied. Patient has CPAP at home. Noted dyspnea with exertion. 1 episode of nausea, on call hospitalist contacted for Zofran order; administered; effective. Patient denies numbness/tingling/emesis. Patient denied pain. Reduction noted BLE edema. Abdomen obese, round, mildly tender. Hypoactive BS x 4 noted. + void, franki urine noted; voiding frequently d/t diuretic. .  - flatus this shift. Patient denying constipation, states needs raisin bran. Patient up SBA/1 person to C, steady/unsteady/wide based gait noted, utilizing rails.  Patient tolerating IV antibiotics. Patient tolerating clear liquid diet. Nauseous after KDur administration. POC discussed. Questions answered. Bed locked/lowest position. Call light/personal belongings within reach. All need met/patient sleeping at present time.

## 2020-10-25 NOTE — CARE PLAN
Problem: Safety  Goal: Will remain free from injury  Outcome: PROGRESSING AS EXPECTED  Goal: Will remain free from falls  Outcome: PROGRESSING AS EXPECTED     Problem: Infection  Goal: Will remain free from infection  Outcome: PROGRESSING AS EXPECTED  IV abx in place      Problem: Venous Thromboembolism (VTW)/Deep Vein Thrombosis (DVT) Prevention:  Goal: Patient will participate in Venous Thrombosis (VTE)/Deep Vein Thrombosis (DVT)Prevention Measures  Outcome: PROGRESSING AS EXPECTED  SCDs in place

## 2020-10-25 NOTE — PROGRESS NOTES
Chart reviewed. Assessment completed.   Pt is A&Ox4. Neuro intact.   Denies pain.   HERNANDEZ, CMS intact, denies numbness and tingling.   Mobilizes out of bed with x 1 assist. Pt calls appropriately.   On RA. Pt wears CPAP at night. Denies SOB or chest pain; achieves 1000 on IS.   Hypoactive BS x 4. Tolerating clear liquids. Denies N&V.   + flatus, - BM.   + void. Reports urinary urgency.   Skin intact   PIV running KVO for IV abx   Updated on POC. Belongings and call light within reach. All needs met at this time.         Plan for MRCP today. MRI screening completed.

## 2020-10-26 ENCOUNTER — PHARMACY VISIT (OUTPATIENT)
Dept: PHARMACY | Facility: MEDICAL CENTER | Age: 70
End: 2020-10-26
Payer: COMMERCIAL

## 2020-10-26 VITALS
HEART RATE: 61 BPM | DIASTOLIC BLOOD PRESSURE: 54 MMHG | RESPIRATION RATE: 16 BRPM | OXYGEN SATURATION: 93 % | BODY MASS INDEX: 47.79 KG/M2 | WEIGHT: 259.7 LBS | HEIGHT: 62 IN | TEMPERATURE: 97.9 F | SYSTOLIC BLOOD PRESSURE: 107 MMHG

## 2020-10-26 LAB
ALBUMIN SERPL BCP-MCNC: 3.6 G/DL (ref 3.2–4.9)
ALBUMIN/GLOB SERPL: 1.1 G/DL
ALP SERPL-CCNC: 195 U/L (ref 30–99)
ALT SERPL-CCNC: 190 U/L (ref 2–50)
ANION GAP SERPL CALC-SCNC: 10 MMOL/L (ref 7–16)
AST SERPL-CCNC: 64 U/L (ref 12–45)
BASOPHILS # BLD AUTO: 0.6 % (ref 0–1.8)
BASOPHILS # BLD: 0.04 K/UL (ref 0–0.12)
BILIRUB SERPL-MCNC: 1 MG/DL (ref 0.1–1.5)
BUN SERPL-MCNC: 11 MG/DL (ref 8–22)
CALCIUM SERPL-MCNC: 8.9 MG/DL (ref 8.5–10.5)
CHLORIDE SERPL-SCNC: 96 MMOL/L (ref 96–112)
CO2 SERPL-SCNC: 25 MMOL/L (ref 20–33)
CREAT SERPL-MCNC: 0.67 MG/DL (ref 0.5–1.4)
EOSINOPHIL # BLD AUTO: 0.28 K/UL (ref 0–0.51)
EOSINOPHIL NFR BLD: 4 % (ref 0–6.9)
ERYTHROCYTE [DISTWIDTH] IN BLOOD BY AUTOMATED COUNT: 47.7 FL (ref 35.9–50)
GLOBULIN SER CALC-MCNC: 3.2 G/DL (ref 1.9–3.5)
GLUCOSE SERPL-MCNC: 111 MG/DL (ref 65–99)
HCT VFR BLD AUTO: 42.5 % (ref 37–47)
HGB BLD-MCNC: 13.5 G/DL (ref 12–16)
IMM GRANULOCYTES # BLD AUTO: 0.03 K/UL (ref 0–0.11)
IMM GRANULOCYTES NFR BLD AUTO: 0.4 % (ref 0–0.9)
LYMPHOCYTES # BLD AUTO: 1.23 K/UL (ref 1–4.8)
LYMPHOCYTES NFR BLD: 17.6 % (ref 22–41)
MAGNESIUM SERPL-MCNC: 1.9 MG/DL (ref 1.5–2.5)
MCH RBC QN AUTO: 28.5 PG (ref 27–33)
MCHC RBC AUTO-ENTMCNC: 31.8 G/DL (ref 33.6–35)
MCV RBC AUTO: 89.9 FL (ref 81.4–97.8)
MONOCYTES # BLD AUTO: 0.68 K/UL (ref 0–0.85)
MONOCYTES NFR BLD AUTO: 9.7 % (ref 0–13.4)
NEUTROPHILS # BLD AUTO: 4.74 K/UL (ref 2–7.15)
NEUTROPHILS NFR BLD: 67.7 % (ref 44–72)
NRBC # BLD AUTO: 0 K/UL
NRBC BLD-RTO: 0 /100 WBC
PLATELET # BLD AUTO: 242 K/UL (ref 164–446)
PMV BLD AUTO: 10 FL (ref 9–12.9)
POTASSIUM SERPL-SCNC: 3.4 MMOL/L (ref 3.6–5.5)
PROT SERPL-MCNC: 6.8 G/DL (ref 6–8.2)
RBC # BLD AUTO: 4.73 M/UL (ref 4.2–5.4)
SODIUM SERPL-SCNC: 131 MMOL/L (ref 135–145)
WBC # BLD AUTO: 7 K/UL (ref 4.8–10.8)

## 2020-10-26 PROCEDURE — 700111 HCHG RX REV CODE 636 W/ 250 OVERRIDE (IP): Performed by: INTERNAL MEDICINE

## 2020-10-26 PROCEDURE — 700102 HCHG RX REV CODE 250 W/ 637 OVERRIDE(OP): Performed by: STUDENT IN AN ORGANIZED HEALTH CARE EDUCATION/TRAINING PROGRAM

## 2020-10-26 PROCEDURE — A9270 NON-COVERED ITEM OR SERVICE: HCPCS | Performed by: STUDENT IN AN ORGANIZED HEALTH CARE EDUCATION/TRAINING PROGRAM

## 2020-10-26 PROCEDURE — 85025 COMPLETE CBC W/AUTO DIFF WBC: CPT

## 2020-10-26 PROCEDURE — 99239 HOSP IP/OBS DSCHRG MGMT >30: CPT | Performed by: STUDENT IN AN ORGANIZED HEALTH CARE EDUCATION/TRAINING PROGRAM

## 2020-10-26 PROCEDURE — RXMED WILLOW AMBULATORY MEDICATION CHARGE: Performed by: STUDENT IN AN ORGANIZED HEALTH CARE EDUCATION/TRAINING PROGRAM

## 2020-10-26 PROCEDURE — A9270 NON-COVERED ITEM OR SERVICE: HCPCS | Performed by: INTERNAL MEDICINE

## 2020-10-26 PROCEDURE — 700102 HCHG RX REV CODE 250 W/ 637 OVERRIDE(OP): Performed by: INTERNAL MEDICINE

## 2020-10-26 PROCEDURE — 80053 COMPREHEN METABOLIC PANEL: CPT

## 2020-10-26 PROCEDURE — 83735 ASSAY OF MAGNESIUM: CPT

## 2020-10-26 PROCEDURE — 700101 HCHG RX REV CODE 250: Performed by: STUDENT IN AN ORGANIZED HEALTH CARE EDUCATION/TRAINING PROGRAM

## 2020-10-26 RX ORDER — AMOXICILLIN AND CLAVULANATE POTASSIUM 875; 125 MG/1; MG/1
1 TABLET, FILM COATED ORAL 2 TIMES DAILY
Qty: 8 TAB | Refills: 0 | Status: SHIPPED | OUTPATIENT
Start: 2020-10-26 | End: 2020-10-30

## 2020-10-26 RX ORDER — AMOXICILLIN AND CLAVULANATE POTASSIUM 875; 125 MG/1; MG/1
1 TABLET, FILM COATED ORAL 2 TIMES DAILY
Qty: 8 TAB | Refills: 0 | Status: SHIPPED | OUTPATIENT
Start: 2020-10-26 | End: 2020-10-26 | Stop reason: SDUPTHER

## 2020-10-26 RX ORDER — METRONIDAZOLE 500 MG/1
500 TABLET ORAL EVERY 8 HOURS
Qty: 12 TAB | Refills: 0 | Status: SHIPPED | OUTPATIENT
Start: 2020-10-26 | End: 2020-10-26 | Stop reason: SDUPTHER

## 2020-10-26 RX ORDER — POTASSIUM CHLORIDE 20 MEQ/1
40 TABLET, EXTENDED RELEASE ORAL ONCE
Status: COMPLETED | OUTPATIENT
Start: 2020-10-26 | End: 2020-10-26

## 2020-10-26 RX ORDER — METRONIDAZOLE 500 MG/1
500 TABLET ORAL EVERY 8 HOURS
Qty: 12 TAB | Refills: 0 | Status: SHIPPED | OUTPATIENT
Start: 2020-10-26 | End: 2020-10-30

## 2020-10-26 RX ADMIN — DOCUSATE SODIUM 50 MG AND SENNOSIDES 8.6 MG 2 TABLET: 8.6; 5 TABLET, FILM COATED ORAL at 06:07

## 2020-10-26 RX ADMIN — PRAMIPEXOLE DIHYDROCHLORIDE 1 MG: 0.5 TABLET ORAL at 06:07

## 2020-10-26 RX ADMIN — FUROSEMIDE 40 MG: 20 TABLET ORAL at 06:06

## 2020-10-26 RX ADMIN — ONDANSETRON 4 MG: 2 INJECTION INTRAMUSCULAR; INTRAVENOUS at 08:32

## 2020-10-26 RX ADMIN — METRONIDAZOLE 500 MG: 500 INJECTION, SOLUTION INTRAVENOUS at 00:19

## 2020-10-26 RX ADMIN — OXYBUTYNIN CHLORIDE 5 MG: 5 TABLET ORAL at 06:07

## 2020-10-26 RX ADMIN — POTASSIUM CHLORIDE 40 MEQ: 1500 TABLET, EXTENDED RELEASE ORAL at 08:30

## 2020-10-26 RX ADMIN — METRONIDAZOLE 500 MG: 500 INJECTION, SOLUTION INTRAVENOUS at 08:32

## 2020-10-26 ASSESSMENT — PAIN DESCRIPTION - PAIN TYPE
TYPE: CHRONIC PAIN
TYPE: ACUTE PAIN
TYPE: ACUTE PAIN

## 2020-10-26 NOTE — PROGRESS NOTES
Discharge instructions reviewed and questions answered. PIV removed by bedside RN prior to transport to discharge lounge. Meds to beds delivered to patient. Pt feeling nauseated and given crackers, which helped with nausea. Pt does not want any nausea medication ordered for home; she was advised to have meal or snack with antibiotics. Pt awaiting ride.

## 2020-10-26 NOTE — CARE PLAN
Problem: Pain Management  Goal: Pain level will decrease to patient's comfort goal  Outcome: PROGRESSING AS EXPECTED  Patient refused. Restless leg reported after ambulation. Heat packs provided. Patient requesting more medication to assist with sleeping. Will address with AM hospitalist.     Problem: Knowledge Deficit  Goal: Knowledge of disease process/condition, treatment plan, diagnostic tests, and medications will improve  Outcome: PROGRESSING AS EXPECTED  Discussed MRCP. Verbalized understanding.     Problem: Respiratory:  Goal: Respiratory status will improve  Outcome: PROGRESSING AS EXPECTED  IS encouraged/demonstrated. Coughing noted with use.

## 2020-10-26 NOTE — DIETARY
NUTRITION SERVICES:   BMI - Pt with BMI >40 (=Body mass index is 47.5 kg/m².), morbid obesity. Pt is -3.5 L per I/O and lasix continues to be on MAR. Weight loss expected. However, weight loss counseling not appropriate in acute care setting.  RECOMMEND - Referral to outpatient nutrition services for weight management after D/C if applicable.

## 2020-10-26 NOTE — DISCHARGE INSTRUCTIONS
Discharge Instructions per Lesley Stahl M.D.    Return to ER if new or worsening symptoms.    Discharge Instructions    Discharged to home by car with relative. Discharged via wheelchair, hospital escort: Yes.  Special equipment needed: Not Applicable    Be sure to schedule a follow-up appointment with your primary care doctor or any specialists as instructed.     Discharge Plan:   Diet Plan: Discussed  Activity Level: Discussed  Confirmed Follow up Appointment: Patient to Call and Schedule Appointment  Confirmed Symptoms Management: Discussed  Medication Reconciliation Updated: Yes  Influenza Vaccine Indication: Patient Refuses    I understand that a diet low in cholesterol, fat, and sodium is recommended for good health. Unless I have been given specific instructions below for another diet, I accept this instruction as my diet prescription.   Other diet: low fat diet    Special Instructions: None    · Is patient discharged on Warfarin / Coumadin?   No     Depression / Suicide Risk    As you are discharged from this [facility] facility, it is important to learn how to keep safe from harming yourself.    Recognize the warning signs:  · Abrupt changes in personality, positive or negative- including increase in energy   · Giving away possessions  · Change in eating patterns- significant weight changes-  positive or negative  · Change in sleeping patterns- unable to sleep or sleeping all the time   · Unwillingness or inability to communicate  · Depression  · Unusual sadness, discouragement and loneliness  · Talk of wanting to die  · Neglect of personal appearance   · Rebelliousness- reckless behavior  · Withdrawal from people/activities they love  · Confusion- inability to concentrate     If you or a loved one observes any of these behaviors or has concerns about self-harm, here's what you can do:  · Talk about it- your feelings and reasons for harming yourself  · Remove any means that you might use to hurt  yourself (examples: pills, rope, extension cords, firearm)  · Get professional help from the community (Mental Health, Substance Abuse, psychological counseling)  · Do not be alone:Call your Safe Contact- someone whom you trust who will be there for you.  · Call your local CRISIS HOTLINE 069-4465 or 133-930-2485  · Call your local Children's Mobile Crisis Response Team [place] (422) 902-3568 or www.[employer]  · Call the toll free National Suicide Prevention Hotlines   · National Suicide Prevention Lifeline 806-991-JLHJ (2476)  · National Hope Line Network 800-SUICIDE (713-1319)

## 2020-10-26 NOTE — DISCHARGE PLANNING
Meds-to-Beds: Discharge prescription orders listed below delivered to patient in discharge lounge. RN notified. Patient counseled. Patient elected to have co-payment billed to patient account.       Ariadna Sepulveda   Home Medication Instructions GUALBERTO:[id number]    Printed on:10/26/20 1030   Medication Information                      amoxicillin-clavulanate (AUGMENTIN) 875-125 MG Tab  Take 1 Tablet by mouth 2 times a day for 4 days.             metroNIDAZOLE (FLAGYL) 500 MG Tab  Take 1 Tablet by mouth every 8 hours for 4 days.               Mague Iraheta, PharmD

## 2020-10-26 NOTE — PROGRESS NOTES
PIV discontinued. Scripts with patient. Pt transferred to discharge [facility]. Report given to YOANA Lopes.

## 2020-10-26 NOTE — PROGRESS NOTES
Received report from AM RN: assumed care. A&O x 4. VSS. 91-94% on RA/1L NC. Patient denied SOB, pain, numbness, tingling, nausea, vomiting during assessment. Restless BLE this evening after ambulation. Patient requested more Mirapex or something for pain/sleep. On call hospitalist contacted. Order for Tylenol provided avoiding sedative medications d/t sleep apnea. Patient refused stating that it wasn't going to do anything. Heat packs provided for legs/elevated upon pillows. Patient utilizing distraction, playing games on her cell phone. Abdomen round, non-tender. + void, BSC being utilized d/t urgency. +eructation. + flatus. LBM PTA. Patient tolerated full liquid diet until midnight, fluids removed. Patient verbalized understanding. Patient up SBA with FWW for long distances. Mild dizziness reported upon sitting/standing after sitting EOB. Resolved shortly after starting to ambulate. X 1 lap around unit performed; steady, wide based gait noted. POC/impending MRCP discussed. Questions answered. HFR; charge notified. Bed alarm on/engaged. Call light/personal belongings within reach. All needs met/patient playing games on phone at present time.

## 2020-10-26 NOTE — PROGRESS NOTES
Chart reviewed. Assessment completed.   Pt is A&Ox4. Neuro intact - denies headache.   Denies pain.   HERNANDEZ, CMS intact, denies numbness and tingling.   Mobilizes out of bed with x 1 assist. Pt calls appropriately.   On RA. Denies SOB or chest pain; achieves 1000 on IS.   Normoactive BS x 4. NPO, advanced to full liquids by Dr. Stahl this AM. Pt does endorse nausea this morning, medicated per MAR.   + flatus, - BM.   + void. Reports urinary urgency. Pt on lasix.   Skin intact. LLE swelling > RLE.   PIV running KVO for IV abx   Updated on POC. Belongings and call light within reach. All needs met at this time.

## 2020-10-27 NOTE — DISCHARGE SUMMARY
"Discharge Summary    CHIEF COMPLAINT ON ADMISSION  Chief Complaint   Patient presents with   • Abdominal Pain     x 2 hours described as \"stabbing\". Had a BM before coming in and +nausea with no emesis.    • Headache       Reason for Admission  Abdominal Pain     Admission Date  10/23/2020    CODE STATUS  Prior    HPI & HOSPITAL COURSE  This is a 69 y.o. female presented with abdominal pain.  Patient was admitted for further evaluation/observation.  CT abdomen showed no acute process.  Right upper quadrant ultrasound showed questionable cholecystitis.  HIDA showed \"Delayed excretion of activity from the liver suggesting hepatocellular dysfunction.  Common bile duct obstruction is not excluded.  Apparent faint activity in the gallbladder at 4 hours suggest delayed filling, possibly indicating chronic cholecystitis.\"    Given the HIDA and the patient's increasing LFTS, including Tbili, there was concern for possible common bile duct obstruction.  Therefore, an MRCP was ordered.  However, there was a delay in the patient getting the MRI because of the MRI queue.  By the following day the patient's Tbili had normalized and her LFTs were significantly downtrending.  Given this and that her pain had completely resolved, patient was discharged home with close PCP follow-up to monitor for normalization of LFTs and as well discussion re: her possible chronic cholecystitis.    Patient was treated with IV ceftriaxone and metronidazole during admission, which was transitioned to PO Augmentin and metronidazole on discharge.      Therefore, she is discharged in good and stable condition to home with close outpatient follow-up.    The patient met 2-midnight criteria for an inpatient stay at the time of discharge.    Discharge Date  10/26/2020    FOLLOW UP ITEMS POST DISCHARGE  Follow-up with PCP within 1 week of discharge for follow-up CMP, follow-up with patient's symptoms.    DISCHARGE DIAGNOSES  Principal Problem:    Pain of " "upper abdomen with concern for acute cholecystitis vs. choledocholithiasis POA: Yes  Active Problems:    Hypertensive emergency POA: Yes    Bilateral lower extremity edema POA: Yes    RLS (restless legs syndrome) (Chronic) POA: Yes    Overactive bladder POA: Yes    Morbid obesity (HCC) POA: Yes  Resolved Problems:    * No resolved hospital problems. *      FOLLOW UP  Martin Lindsey M.D.  [address] [facility] [address]  [facility] [id number] [phone]    In 1 week        MEDICATIONS ON DISCHARGE     Medication List      START taking these medications      Instructions   amoxicillin-clavulanate 875-125 MG Tabs  Commonly known as: AUGMENTIN   Take 1 Tablet by mouth 2 times a day for 4 days.  Dose: 1 Tab     metroNIDAZOLE 500 MG Tabs  Commonly known as: FLAGYL   Take 1 Tablet by mouth every 8 hours for 4 days.  Dose: 500 mg        CHANGE how you take these medications      Instructions   pramipexole 1 MG Tabs  What changed:   · how much to take  · when to take this  Commonly known as: MIRAPEX   Take 1 Tab by mouth 3 times a day.  Dose: 1 mg        CONTINUE taking these medications      Instructions   NON SPECIFIED   Adult undergarments 1 tid prn Size:X-Large     oxybutynin 5 MG Tabs  Commonly known as: DITROPAN   TAKE 1 TABLET BY MOUTH THREE TIMES A DAY            Allergies  No Known Allergies    CONSULTATIONS  None.     PROCEDURES/IMAGING  10/23/20 - CT abd/pelvis  \"IMPRESSION:  1.  No acute intra-abdominal findings.  2.  Atherosclerotic disease.  3.  Calcified uterine leiomyoma.\"    10/23/20 - RUQ Ultrasound  \"IMPRESSION:  1.  Echogenic shadowing structure of the gallbladder neck, compatible with calcified stone. There is borderline gallbladder wall thickening, findings concerning for cholecystitis.  2.  Hepatomegaly\"    10/24/20 - NM hepatobiliary scan  \"IMPRESSION:  1.  Delayed excretion of activity from the liver suggesting hepatocellular dysfunction.  Common bile duct obstruction is not excluded.  2.  Apparent faint " "activity in the gallbladder at 4 hours suggest delayed filling, possibly indicating chronic cholecystitis.\"    10/24/20 - Transthoracic echocardiogram  \"CONCLUSIONS  Normal left ventricular systolic function.  Left ventricular ejection fraction is visually estimated to be 60%.  Normal regional wall motion.  No significant valve abnormalities.   Unable to estimate pulmonary artery pressure due to an inadequate   tricuspid regurgitant jet.\"      LABORATORY  Lab Results   Component Value Date    SODIUM 131 (L) 10/26/2020    POTASSIUM 3.4 (L) 10/26/2020    CHLORIDE 96 10/26/2020    CO2 25 10/26/2020    GLUCOSE 111 (H) 10/26/2020    BUN 11 10/26/2020    CREATININE 0.67 10/26/2020        Lab Results   Component Value Date    WBC 7.0 10/26/2020    HEMOGLOBIN 13.5 10/26/2020    HEMATOCRIT 42.5 10/26/2020    PLATELETCT 242 10/26/2020        Total time of the discharge process: 41 minutes.   "

## 2021-02-16 ENCOUNTER — APPOINTMENT (OUTPATIENT)
Dept: MEDICAL GROUP | Facility: MEDICAL CENTER | Age: 71
End: 2021-02-16
Payer: COMMERCIAL

## 2021-02-27 ENCOUNTER — HOSPITAL ENCOUNTER (EMERGENCY)
Facility: MEDICAL CENTER | Age: 71
End: 2021-02-28
Attending: EMERGENCY MEDICINE
Payer: COMMERCIAL

## 2021-02-27 DIAGNOSIS — R60.0 BILATERAL LEG EDEMA: Primary | ICD-10-CM

## 2021-02-27 PROCEDURE — 93005 ELECTROCARDIOGRAM TRACING: CPT

## 2021-02-27 PROCEDURE — 99283 EMERGENCY DEPT VISIT LOW MDM: CPT

## 2021-02-27 PROCEDURE — 93005 ELECTROCARDIOGRAM TRACING: CPT | Performed by: EMERGENCY MEDICINE

## 2021-02-27 ASSESSMENT — FIBROSIS 4 INDEX: FIB4 SCORE: 1.3

## 2021-02-28 VITALS
DIASTOLIC BLOOD PRESSURE: 60 MMHG | HEART RATE: 69 BPM | RESPIRATION RATE: 10 BRPM | HEIGHT: 62 IN | OXYGEN SATURATION: 97 % | WEIGHT: 267.2 LBS | BODY MASS INDEX: 49.17 KG/M2 | TEMPERATURE: 97.8 F | SYSTOLIC BLOOD PRESSURE: 126 MMHG

## 2021-02-28 LAB
ALBUMIN SERPL BCP-MCNC: 4 G/DL (ref 3.2–4.9)
ALBUMIN/GLOB SERPL: 1.1 G/DL
ALP SERPL-CCNC: 103 U/L (ref 30–99)
ALT SERPL-CCNC: 10 U/L (ref 2–50)
ANION GAP SERPL CALC-SCNC: 11 MMOL/L (ref 7–16)
AST SERPL-CCNC: 13 U/L (ref 12–45)
BASOPHILS # BLD AUTO: 0.4 % (ref 0–1.8)
BASOPHILS # BLD: 0.04 K/UL (ref 0–0.12)
BILIRUB SERPL-MCNC: 0.3 MG/DL (ref 0.1–1.5)
BUN SERPL-MCNC: 18 MG/DL (ref 8–22)
CALCIUM SERPL-MCNC: 9 MG/DL (ref 8.5–10.5)
CHLORIDE SERPL-SCNC: 100 MMOL/L (ref 96–112)
CO2 SERPL-SCNC: 24 MMOL/L (ref 20–33)
CREAT SERPL-MCNC: 0.7 MG/DL (ref 0.5–1.4)
EKG IMPRESSION: NORMAL
EOSINOPHIL # BLD AUTO: 0.35 K/UL (ref 0–0.51)
EOSINOPHIL NFR BLD: 3.7 % (ref 0–6.9)
ERYTHROCYTE [DISTWIDTH] IN BLOOD BY AUTOMATED COUNT: 47.7 FL (ref 35.9–50)
GLOBULIN SER CALC-MCNC: 3.5 G/DL (ref 1.9–3.5)
GLUCOSE SERPL-MCNC: 105 MG/DL (ref 65–99)
HCT VFR BLD AUTO: 40.1 % (ref 37–47)
HGB BLD-MCNC: 12.8 G/DL (ref 12–16)
IMM GRANULOCYTES # BLD AUTO: 0.03 K/UL (ref 0–0.11)
IMM GRANULOCYTES NFR BLD AUTO: 0.3 % (ref 0–0.9)
LYMPHOCYTES # BLD AUTO: 1.74 K/UL (ref 1–4.8)
LYMPHOCYTES NFR BLD: 18.4 % (ref 22–41)
MCH RBC QN AUTO: 28.8 PG (ref 27–33)
MCHC RBC AUTO-ENTMCNC: 31.9 G/DL (ref 33.6–35)
MCV RBC AUTO: 90.1 FL (ref 81.4–97.8)
MONOCYTES # BLD AUTO: 0.55 K/UL (ref 0–0.85)
MONOCYTES NFR BLD AUTO: 5.8 % (ref 0–13.4)
NEUTROPHILS # BLD AUTO: 6.75 K/UL (ref 2–7.15)
NEUTROPHILS NFR BLD: 71.4 % (ref 44–72)
NRBC # BLD AUTO: 0 K/UL
NRBC BLD-RTO: 0 /100 WBC
PLATELET # BLD AUTO: 280 K/UL (ref 164–446)
PMV BLD AUTO: 10.4 FL (ref 9–12.9)
POTASSIUM SERPL-SCNC: 3.6 MMOL/L (ref 3.6–5.5)
PROT SERPL-MCNC: 7.5 G/DL (ref 6–8.2)
RBC # BLD AUTO: 4.45 M/UL (ref 4.2–5.4)
SODIUM SERPL-SCNC: 135 MMOL/L (ref 135–145)
WBC # BLD AUTO: 9.5 K/UL (ref 4.8–10.8)

## 2021-02-28 PROCEDURE — 36415 COLL VENOUS BLD VENIPUNCTURE: CPT

## 2021-02-28 PROCEDURE — 80053 COMPREHEN METABOLIC PANEL: CPT

## 2021-02-28 PROCEDURE — 85025 COMPLETE CBC W/AUTO DIFF WBC: CPT

## 2021-02-28 ASSESSMENT — ENCOUNTER SYMPTOMS
LOSS OF CONSCIOUSNESS: 0
HEADACHES: 0
NAUSEA: 0
VOMITING: 0
ABDOMINAL PAIN: 0
DIZZINESS: 0
COUGH: 0
SORE THROAT: 0
FEVER: 0
BACK PAIN: 0
FLANK PAIN: 0
CHILLS: 0

## 2021-02-28 ASSESSMENT — LIFESTYLE VARIABLES: SUBSTANCE_ABUSE: 0

## 2021-02-28 NOTE — ED NOTES
"Pt discharged home. IV discontinued and gauze placed, pt in possession of belongings. Pt provided discharge education and information pertaining to medications and follow up appointments. Pt received copy of discharge instructions and verbalized understanding. /60   Pulse 69   Temp 36.6 °C (97.8 °F) (Temporal)   Resp (!) 10   Ht 1.575 m (5' 2\")   Wt 121 kg (267 lb 3.2 oz)   LMP  (LMP Unknown)   SpO2 97%   BMI 48.87 kg/m²   "

## 2021-02-28 NOTE — ED PROVIDER NOTES
ED Provider Note     2/28/2021  12:47 AM    Means of Arrival: Walk In  History obtained by: patient  Limitations:none  PCP: Mickey Lindsey  CODE STATUS: Full    CHIEF COMPLAINT  Chief Complaint   Patient presents with   • Leg Swelling     pt states BLE edemax2 days. pt denies any hx of CHF. pt states SOB with ambulation but that is baseline for her       HPI  Ariadna Sepulveda is a 70 y.o. female with history of obesity, hypertension, bilateral lower extremity edema who presents with concerns of increased lower leg edema.  She has been hospitalized for this in the past.  Most recently June 2020.  At that time and echocardiogram was done, serum studies, DVT studies.  No solid explanation for leg swelling, thought to be more benign cause such as vascular insufficiency or lymphedema.  She says her leg swelling did improve after that hospital stay but over time has gradually worsened.  Swelling well be worse some days over others. She tries to elevate her extremities.  She has not been using compression stockings. [place]  At the end of the event she noticed that her legs were much more swollen and decided to come to the hospital to have them evaluated.  There is not significant pain.  She says she does have some shortness of breath when she ambulates but this is not new.  No weakness.     Last hospital stay was for abdominal pain.  She demonstrated elevated LFTs.  These down trended during her hospital stay.  Concerns for possible common bile duct obstruction.  Never had MRI because symptoms and lab work spontaneously improved.      REVIEW OF SYSTEMS  Review of Systems   Constitutional: Negative for chills and fever.   HENT: Negative for congestion and sore throat.    Respiratory: Negative for cough.    Cardiovascular: Positive for leg swelling. Negative for chest pain.   Gastrointestinal: Negative for abdominal pain, nausea and vomiting.   Genitourinary: Negative for  "flank pain.   Musculoskeletal: Negative for back pain.   Neurological: Negative for dizziness, loss of consciousness and headaches.   Psychiatric/Behavioral: Negative for substance abuse.   All other systems reviewed and are negative.    See HPI for further details.    PAST MEDICAL HISTORY   has a past medical history of Arthritis, At risk for sleep apnea, Hypertension, and RLS (restless legs syndrome) (10/5/2011).    SOCIAL HISTORY  Social History     Tobacco Use   • Smoking status: Never Smoker   • Smokeless tobacco: Never Used   Substance and Sexual Activity   • Alcohol use: Yes     Alcohol/week: 1.2 oz     Types: 2 Cans of beer per week     Comment: 2 beers at a tme.    • Drug use: No   • Sexual activity: Not Currently       SURGICAL HISTORY   has a past surgical history that includes tonsillectomy; knee replacement, total (left); and other orthopedic surgery.    CURRENT MEDICATIONS  Home Medications     Reviewed by Kaylah Barnard R.N. (Registered Nurse) on 02/27/21 at 2322  Med List Status: <None>   Medication Last Dose Status   NON SPECIFIED  Active   oxybutynin (DITROPAN) 5 MG Tab  Active   pramipexole (MIRAPEX) 1 MG Tab  Active                ALLERGIES  No Known Allergies    PHYSICAL EXAM  VITAL SIGNS: /60   Pulse 79   Temp 36.6 °C (97.8 °F) (Temporal)   Resp 17   Ht 1.575 m (5' 2\")   Wt 121 kg (267 lb 3.2 oz)   LMP  (LMP Unknown)   SpO2 97%   BMI 48.87 kg/m²     Pulse ox interpretation: I interpret this pulse ox as normal.  Constitutional: Alert in no apparent distress.  HENT: No signs of trauma, Bilateral external ears normal, Nose normal.   Eyes: Pupils are equal, Conjunctiva normal, Non-icteric.   Neck: Normal range of motion, No tenderness, Supple, No stridor.   Cardiovascular: Regular rate and rhythm, no murmurs. Symmetric distal pulses. No cyanosis of extremities. No peripheral edema of extremities.  Thorax & Lungs: Normal breath sounds, No respiratory distress, No wheezing, No chest " tenderness.   Abdomen:Soft, No tenderness, No masses, No pulsatile masses. No peritoneal signs.  Skin: Warm, Dry, No erythema, No rash.   Back: No midline bony tenderness, No CVA tenderness.   Musculoskeletal: Nonpitting edema bilaterally.  Some chronic skin changes.  No erythema.  No blistering.  Good range of motion in all major joints. No tenderness to palpation or major deformities noted.   Neurologic: Alert , Normal motor function, Normal sensory function, No focal deficits noted.   Psychiatric: Affect normal, Judgment normal, Mood normal.   Physical Exam      DIAGNOSTIC STUDIES / PROCEDURES    EKG  Results for orders placed or performed during the hospital encounter of 21   EKG   Result Value Ref Range    Report       [facility]    Test Date:  2021  Pt Name:    KARON STEVENS             Department: ER  MRN:        [id number]                      Room:  Gender:     Female                       Technician: [id number]  :        1950                   Requested By:ER TRIAGE PROTOCOL  Order #:    [id number]                    Reading MD: Javon Nogueira II, MD    Measurements  Intervals                                Axis  Rate:       62                           P:          25  SD:         184                          QRS:        -6  QRSD:       100                          T:          23  QT:         444  QTc:        451    Interpretive Statements  SINUS RHYTHM  RATE 62  NORMAL INTERVALS  NO ST ELEVATION OR DEPRESSION. NORMAL TWAVES.  IMPRESSION: NORMAL SINUS RHYTHM EKG  Compared to ECG 10/23/2020 15:56:22  No significant changes  Electronically Signed On 2021 0:53:39 PST by Javon Nogueira II, MD           LABS  Pertinent Labs & Imaging studies reviewed. (See chart for details)    RADIOLOGY  Pertinent Labs & Imaging studies reviewed. (See chart for details)    COURSE & MEDICAL DECISION MAKING  Pertinent Labs & Imaging studies reviewed. (See chart for  details)    12:47 AM This is an emergent evaluation of a  70 y.o. female who presents with recurrent bilateral lower extremity edema.  There is not significant tenderness at her legs.  There is not good evidence of infection on exam.  Previous work-up for CHF, renal failure, liver failure all negative.  Also no findings on previous work-up of DVT.  I suspect this is dependent edema, venous insufficiency or lymphedema.  Because she had abnormal metabolic panel during previous stay I would like to repeat this.  We will also elevate her legs and place Ace wraps to see if we can obtain some reduction in swelling in the ER tonight.    2:29 AM  CBC CMP within acceptable limits.  Metabolic panel dramatically improved from labs in October.  I believe because of edema is more benign in nature.  He is not showing any pulmonary signs of heart failure and her last echo was normal.  No elevation inflammatory markers or vital sign changes to suggest infection.  She would be best served to follow-up with her primary care to have further work-up and treatment options of edema.  Edema did improve with elevation and compression here.      The patient will return for worsening symptoms and is stable at the time of discharge. The patient verbalizes understanding. Guidance was provided on appropriate use of medications including driving under the influence, overdose, and side effects.         FINAL IMPRESSION    ICD-10-CM   1. Bilateral leg edema Active R60.0            This dictation was created using voice recognition software. The accuracy of the dictation is limited to the abilities of the software. I expect there may be some errors of grammar and possibly content. The nursing notes were reviewed and certain aspects of this information were incorporated into this note.    Electronically signed by: Javon Nogueira II, M.D., 2/28/2021 12:47 AM

## 2021-02-28 NOTE — ED TRIAGE NOTES
"Chief Complaint   Patient presents with   • Leg Swelling     pt states BLE edemax2 days. pt denies any hx of CHF. pt states SOB with ambulation but that is baseline for her         Pt walk In tonight for above complaint. Pt aox4, no signs of distress. Pt ambulated into triage room with steady gait. Pt speaking full sentences. Per last hospital not, pt had BLE edema. Pt states however this is not her baseline. pts legs are 2+ pitting edema and are red. Pt denies any chest pain, pt denies difficulty laying flat. Educated pt on triage process and to notify if there is any change      /60   Pulse 79   Temp 36.6 °C (97.8 °F) (Temporal)   Resp 17   Ht 1.575 m (5' 2\")   Wt 121 kg (267 lb 3.2 oz)   LMP  (LMP Unknown)   SpO2 97%   BMI 48.87 kg/m²     "

## 2021-03-03 DIAGNOSIS — Z23 NEED FOR VACCINATION: ICD-10-CM

## 2021-03-05 ENCOUNTER — HOSPITAL ENCOUNTER (EMERGENCY)
Facility: MEDICAL CENTER | Age: 71
End: 2021-03-05
Payer: COMMERCIAL

## 2021-03-05 VITALS
SYSTOLIC BLOOD PRESSURE: 143 MMHG | TEMPERATURE: 98.6 F | OXYGEN SATURATION: 96 % | HEIGHT: 62 IN | HEART RATE: 82 BPM | DIASTOLIC BLOOD PRESSURE: 75 MMHG | BODY MASS INDEX: 48.2 KG/M2 | WEIGHT: 261.91 LBS | RESPIRATION RATE: 16 BRPM

## 2021-03-05 PROCEDURE — 302449 STATCHG TRIAGE ONLY (STATISTIC)

## 2021-03-05 ASSESSMENT — FIBROSIS 4 INDEX: FIB4 SCORE: 1

## 2021-03-06 NOTE — ED TRIAGE NOTES
"Chief Complaint   Patient presents with   • Leg Swelling     71 yo female ambulatory to triage for above complaint. Pt reports slowly increasing bilateral LE swelling, was seen 1 wk ago for same complaint. Denies chest pain or SOB. AOx4. Moderate amount of swelling noted to bilateral LE's.    Educated on triage process, encourage to inform staff of any changes.     BP (!) 186/78   Pulse 77   Temp 37 °C (98.6 °F) (Temporal)   Resp 20   Ht 1.575 m (5' 2\")   Wt 119 kg (261 lb 14.5 oz)   LMP  (LMP Unknown)   SpO2 95%   BMI 47.90 kg/m²   "

## 2021-03-31 DIAGNOSIS — N32.81 OVERACTIVE BLADDER: ICD-10-CM

## 2021-03-31 RX ORDER — PRAMIPEXOLE DIHYDROCHLORIDE 1 MG/1
3 TABLET ORAL
Qty: 270 TABLET | Refills: 3 | Status: SHIPPED | OUTPATIENT
Start: 2021-03-31 | End: 2021-06-29 | Stop reason: SDUPTHER

## 2021-03-31 RX ORDER — OXYBUTYNIN CHLORIDE 5 MG/1
TABLET ORAL
Qty: 270 TABLET | Refills: 1 | Status: SHIPPED | OUTPATIENT
Start: 2021-03-31 | End: 2021-06-29

## 2021-03-31 NOTE — TELEPHONE ENCOUNTER
Pt called and stated she has upcoming appt with  on 04/09/2021 at 0730. Pt is requesting medication refill for Pramipexole and oxybutynin until appt.

## 2021-04-15 ENCOUNTER — HOSPITAL ENCOUNTER (EMERGENCY)
Facility: MEDICAL CENTER | Age: 71
End: 2021-04-16
Attending: EMERGENCY MEDICINE | Admitting: EMERGENCY MEDICINE
Payer: COMMERCIAL

## 2021-04-15 DIAGNOSIS — L03.119 CELLULITIS OF LOWER EXTREMITY, UNSPECIFIED LATERALITY: ICD-10-CM

## 2021-04-15 DIAGNOSIS — R60.9 PERIPHERAL EDEMA: ICD-10-CM

## 2021-04-15 LAB
ANION GAP SERPL CALC-SCNC: 9 MMOL/L (ref 7–16)
BASOPHILS # BLD AUTO: 0.3 % (ref 0–1.8)
BASOPHILS # BLD: 0.02 K/UL (ref 0–0.12)
BUN SERPL-MCNC: 12 MG/DL (ref 8–22)
CALCIUM SERPL-MCNC: 8.7 MG/DL (ref 8.5–10.5)
CHLORIDE SERPL-SCNC: 107 MMOL/L (ref 96–112)
CO2 SERPL-SCNC: 24 MMOL/L (ref 20–33)
CREAT SERPL-MCNC: 0.57 MG/DL (ref 0.5–1.4)
EOSINOPHIL # BLD AUTO: 0.19 K/UL (ref 0–0.51)
EOSINOPHIL NFR BLD: 2.7 % (ref 0–6.9)
ERYTHROCYTE [DISTWIDTH] IN BLOOD BY AUTOMATED COUNT: 46.6 FL (ref 35.9–50)
GLUCOSE SERPL-MCNC: 104 MG/DL (ref 65–99)
HCT VFR BLD AUTO: 36.2 % (ref 37–47)
HGB BLD-MCNC: 11.7 G/DL (ref 12–16)
IMM GRANULOCYTES # BLD AUTO: 0.01 K/UL (ref 0–0.11)
IMM GRANULOCYTES NFR BLD AUTO: 0.1 % (ref 0–0.9)
LYMPHOCYTES # BLD AUTO: 1.32 K/UL (ref 1–4.8)
LYMPHOCYTES NFR BLD: 18.6 % (ref 22–41)
MCH RBC QN AUTO: 28.9 PG (ref 27–33)
MCHC RBC AUTO-ENTMCNC: 32.3 G/DL (ref 33.6–35)
MCV RBC AUTO: 89.4 FL (ref 81.4–97.8)
MONOCYTES # BLD AUTO: 0.58 K/UL (ref 0–0.85)
MONOCYTES NFR BLD AUTO: 8.2 % (ref 0–13.4)
NEUTROPHILS # BLD AUTO: 4.96 K/UL (ref 2–7.15)
NEUTROPHILS NFR BLD: 70.1 % (ref 44–72)
NRBC # BLD AUTO: 0 K/UL
NRBC BLD-RTO: 0 /100 WBC
PLATELET # BLD AUTO: 277 K/UL (ref 164–446)
PMV BLD AUTO: 9.7 FL (ref 9–12.9)
POTASSIUM SERPL-SCNC: 3.6 MMOL/L (ref 3.6–5.5)
RBC # BLD AUTO: 4.05 M/UL (ref 4.2–5.4)
SODIUM SERPL-SCNC: 140 MMOL/L (ref 135–145)
WBC # BLD AUTO: 7.1 K/UL (ref 4.8–10.8)

## 2021-04-15 PROCEDURE — A9270 NON-COVERED ITEM OR SERVICE: HCPCS | Performed by: EMERGENCY MEDICINE

## 2021-04-15 PROCEDURE — 700102 HCHG RX REV CODE 250 W/ 637 OVERRIDE(OP): Performed by: EMERGENCY MEDICINE

## 2021-04-15 PROCEDURE — 99283 EMERGENCY DEPT VISIT LOW MDM: CPT

## 2021-04-15 PROCEDURE — 80048 BASIC METABOLIC PNL TOTAL CA: CPT

## 2021-04-15 PROCEDURE — 36415 COLL VENOUS BLD VENIPUNCTURE: CPT

## 2021-04-15 PROCEDURE — 85025 COMPLETE CBC W/AUTO DIFF WBC: CPT

## 2021-04-15 RX ORDER — AMOXICILLIN AND CLAVULANATE POTASSIUM 875; 125 MG/1; MG/1
1 TABLET, FILM COATED ORAL ONCE
Status: COMPLETED | OUTPATIENT
Start: 2021-04-15 | End: 2021-04-15

## 2021-04-15 RX ADMIN — AMOXICILLIN AND CLAVULANATE POTASSIUM 1 TABLET: 875; 125 TABLET, FILM COATED ORAL at 23:08

## 2021-04-15 ASSESSMENT — FIBROSIS 4 INDEX: FIB4 SCORE: 1

## 2021-04-16 VITALS
SYSTOLIC BLOOD PRESSURE: 125 MMHG | OXYGEN SATURATION: 97 % | RESPIRATION RATE: 15 BRPM | WEIGHT: 261.47 LBS | DIASTOLIC BLOOD PRESSURE: 69 MMHG | TEMPERATURE: 97.3 F | HEIGHT: 62 IN | BODY MASS INDEX: 48.12 KG/M2 | HEART RATE: 65 BPM

## 2021-04-16 RX ORDER — AMOXICILLIN AND CLAVULANATE POTASSIUM 875; 125 MG/1; MG/1
1 TABLET, FILM COATED ORAL 2 TIMES DAILY
Qty: 14 TABLET | Refills: 0 | Status: SHIPPED | OUTPATIENT
Start: 2021-04-16 | End: 2021-04-23

## 2021-04-16 NOTE — ED PROVIDER NOTES
"ED Provider Note    CHIEF COMPLAINT  Chief Complaint   Patient presents with    Leg Swelling     Bilateral lower extremity swelling.       HPI  Ariadna Sepulveda is a 70 y.o. female who presents to the emergency department complaining of bilateral lower extremity swelling which is chronic but today noted some redness. She has that she has previously been on antibiotics for lower extremity cellulitis. She has that she has had extensive workup for causation of her lower extremity swelling which is yet to be determined. No significant changes from her baseline other than the above. No other recent illness. No cough cold congestion. No shortness of breath or dyspnea. No orthopnea PND. No history of heart failure. No fever chills. No abdominal pain.    REVIEW OF SYSTEMS  See HPI for further details. All other systems are negative.     PAST MEDICAL HISTORY   has a past medical history of Arthritis, At risk for sleep apnea, Hypertension, and RLS (restless legs syndrome) (10/5/2011).    SOCIAL HISTORY  Social History     Tobacco Use    Smoking status: Never Smoker    Smokeless tobacco: Never Used   Substance and Sexual Activity    Alcohol use: Yes     Alcohol/week: 1.2 oz     Types: 2 Cans of beer per week     Comment: 2 beers at a tme.     Drug use: No    Sexual activity: Not Currently       SURGICAL HISTORY   has a past surgical history that includes tonsillectomy; knee replacement, total (left); and other orthopedic surgery.    CURRENT MEDICATIONS  Home Medications       Reviewed by Sergio Haider R.N. (Registered Nurse) on 04/15/21 at 7447  Med List Status: Not Addressed     Medication Last Dose Status   NON SPECIFIED  Active   oxybutynin (DITROPAN) 5 MG Tab  Active   pramipexole (MIRAPEX) 1 MG Tab  Active                    ALLERGIES  No Known Allergies    PHYSICAL EXAM  VITAL SIGNS: /69   Pulse 65   Temp 36.3 °C (97.3 °F) (Tympanic)   Resp 15   Ht 1.575 m (5' 2\")   Wt 119 kg (261 lb 7.5 oz)   LMP  (LMP " Unknown)   SpO2 97%   BMI 47.82 kg/m²  @NEWTON[[id number]::@   Pulse ox interpretation: I interpret this pulse ox as normal.  Constitutional: Alert in no apparent distress.  HENT: No signs of trauma, Bilateral external ears normal, Nose normal.   Eyes: Pupils are equal and reactive  Neck: Normal range of motion, No tenderness, Supple  Cardiovascular: Regular rate and rhythm, no murmurs.   Thorax & Lungs: Normal breath sounds, No respiratory distress, No wheezing, No chest tenderness.   Abdomen: Bowel sounds normal, Soft, No tenderness  Skin: Warm, Dry, No erythema, No rash.    Extremities: Intact distal pulses, 3+ edema bilateral lower extremities with cellular changes to anterior shin's and medial thighs.  Musculoskeletal: Good range of motion in all major joints. No tenderness to palpation or major deformities noted.   Neurologic: Alert , Normal motor function, Normal sensory function, No focal deficits noted.   Psychiatric: Affect normal, Judgment normal, Mood normal.       DIAGNOSTIC STUDIES / PROCEDURES      LABS  Results for orders placed or performed during the hospital encounter of 04/15/21   CBC WITH DIFFERENTIAL   Result Value Ref Range    WBC 7.1 4.8 - 10.8 K/uL    RBC 4.05 (L) 4.20 - 5.40 M/uL    Hemoglobin 11.7 (L) 12.0 - 16.0 g/dL    Hematocrit 36.2 (L) 37.0 - 47.0 %    MCV 89.4 81.4 - 97.8 fL    MCH 28.9 27.0 - 33.0 pg    MCHC 32.3 (L) 33.6 - 35.0 g/dL    RDW 46.6 35.9 - 50.0 fL    Platelet Count 277 164 - 446 K/uL    MPV 9.7 9.0 - 12.9 fL    Neutrophils-Polys 70.10 44.00 - 72.00 %    Lymphocytes 18.60 (L) 22.00 - 41.00 %    Monocytes 8.20 0.00 - 13.40 %    Eosinophils 2.70 0.00 - 6.90 %    Basophils 0.30 0.00 - 1.80 %    Immature Granulocytes 0.10 0.00 - 0.90 %    Nucleated RBC 0.00 /100 WBC    Neutrophils (Absolute) 4.96 2.00 - 7.15 K/uL    Lymphs (Absolute) 1.32 1.00 - 4.80 K/uL    Monos (Absolute) 0.58 0.00 - 0.85 K/uL    Eos (Absolute) 0.19 0.00 - 0.51 K/uL    Baso (Absolute) 0.02 0.00 - 0.12 K/uL     Immature Granulocytes (abs) 0.01 0.00 - 0.11 K/uL    NRBC (Absolute) 0.00 K/uL   BASIC METABOLIC PANEL   Result Value Ref Range    Sodium 140 135 - 145 mmol/L    Potassium 3.6 3.6 - 5.5 mmol/L    Chloride 107 96 - 112 mmol/L    Co2 24 20 - 33 mmol/L    Glucose 104 (H) 65 - 99 mg/dL    Bun 12 8 - 22 mg/dL    Creatinine 0.57 0.50 - 1.40 mg/dL    Calcium 8.7 8.5 - 10.5 mg/dL    Anion Gap 9.0 7.0 - 16.0   ESTIMATED GFR   Result Value Ref Range    GFR If African American >60 >60 mL/min/1.73 m 2    GFR If Non African American >60 >60 mL/min/1.73 m 2         RADIOLOGY  No orders to display           COURSE & MEDICAL DECISION MAKING  Pertinent Labs & Imaging studies reviewed. (See chart for details)  patient presented emergency department with chronic peripheral edema with new cellular changes. History as above. Laboratory valuation unremarkable. I will start the patient back on antibiotics with the choice of Augmentin today. She is to follow-up with her primary care physician. I've given her instructions for edema care including elevation and compression. A strategy been provided for compression currently. She is understand return precautions outpatient follow-up with new primary care physician as Dr. Lindsey her former primary care physician has since retired.     The patient will return for worsening symptoms and is stable at the time of discharge. The patient verbalizes understanding and will comply.    FINAL IMPRESSION  1. Peripheral edema    2. Cellulitis of lower extremity, unspecified laterality            Electronically signed by: Tirso Doherty M.D., 4/15/2021 11:09 PM

## 2021-04-16 NOTE — ED TRIAGE NOTES
Ariadna Sepulveda    Chief Complaint   Patient presents with   • Leg Swelling     Bilateral lower extremity swelling.     PT reports she has been seen for this before and given antibiotics which helped alleviate the symptoms but it is reoccurring.   Pt is ambulatory to triage. No SOB or chest pain.     PT educated on triage process and placed back in the lobby. PT told to inform staff of any changes.     Vitals:    04/15/21 2129   BP: (!) 176/77   Pulse: 65   Resp: 16   Temp: 36.1 °C (97 °F)   SpO2: 96%

## 2021-04-23 ENCOUNTER — HOSPITAL ENCOUNTER (EMERGENCY)
Facility: MEDICAL CENTER | Age: 71
End: 2021-04-24
Attending: EMERGENCY MEDICINE
Payer: COMMERCIAL

## 2021-04-23 DIAGNOSIS — R11.2 NAUSEA AND VOMITING, INTRACTABILITY OF VOMITING NOT SPECIFIED, UNSPECIFIED VOMITING TYPE: ICD-10-CM

## 2021-04-23 LAB
ALBUMIN SERPL BCP-MCNC: 4 G/DL (ref 3.2–4.9)
ALBUMIN/GLOB SERPL: 1.3 G/DL
ALP SERPL-CCNC: 85 U/L (ref 30–99)
ALT SERPL-CCNC: 15 U/L (ref 2–50)
ANION GAP SERPL CALC-SCNC: 12 MMOL/L (ref 7–16)
AST SERPL-CCNC: 16 U/L (ref 12–45)
BASOPHILS # BLD AUTO: 0.2 % (ref 0–1.8)
BASOPHILS # BLD: 0.02 K/UL (ref 0–0.12)
BILIRUB SERPL-MCNC: 0.5 MG/DL (ref 0.1–1.5)
BUN SERPL-MCNC: 15 MG/DL (ref 8–22)
CALCIUM SERPL-MCNC: 8.8 MG/DL (ref 8.5–10.5)
CHLORIDE SERPL-SCNC: 105 MMOL/L (ref 96–112)
CO2 SERPL-SCNC: 23 MMOL/L (ref 20–33)
CREAT SERPL-MCNC: 0.57 MG/DL (ref 0.5–1.4)
EOSINOPHIL # BLD AUTO: 0.15 K/UL (ref 0–0.51)
EOSINOPHIL NFR BLD: 1.4 % (ref 0–6.9)
ERYTHROCYTE [DISTWIDTH] IN BLOOD BY AUTOMATED COUNT: 46.8 FL (ref 35.9–50)
GLOBULIN SER CALC-MCNC: 3.1 G/DL (ref 1.9–3.5)
GLUCOSE SERPL-MCNC: 111 MG/DL (ref 65–99)
HCT VFR BLD AUTO: 39 % (ref 37–47)
HGB BLD-MCNC: 12.4 G/DL (ref 12–16)
IMM GRANULOCYTES # BLD AUTO: 0.07 K/UL (ref 0–0.11)
IMM GRANULOCYTES NFR BLD AUTO: 0.6 % (ref 0–0.9)
LIPASE SERPL-CCNC: 15 U/L (ref 11–82)
LYMPHOCYTES # BLD AUTO: 0.72 K/UL (ref 1–4.8)
LYMPHOCYTES NFR BLD: 6.6 % (ref 22–41)
MCH RBC QN AUTO: 28.4 PG (ref 27–33)
MCHC RBC AUTO-ENTMCNC: 31.8 G/DL (ref 33.6–35)
MCV RBC AUTO: 89.4 FL (ref 81.4–97.8)
MONOCYTES # BLD AUTO: 0.6 K/UL (ref 0–0.85)
MONOCYTES NFR BLD AUTO: 5.5 % (ref 0–13.4)
NEUTROPHILS # BLD AUTO: 9.38 K/UL (ref 2–7.15)
NEUTROPHILS NFR BLD: 85.7 % (ref 44–72)
NRBC # BLD AUTO: 0 K/UL
NRBC BLD-RTO: 0 /100 WBC
PLATELET # BLD AUTO: 283 K/UL (ref 164–446)
PMV BLD AUTO: 10.3 FL (ref 9–12.9)
POTASSIUM SERPL-SCNC: 3.2 MMOL/L (ref 3.6–5.5)
PROT SERPL-MCNC: 7.1 G/DL (ref 6–8.2)
RBC # BLD AUTO: 4.36 M/UL (ref 4.2–5.4)
SODIUM SERPL-SCNC: 140 MMOL/L (ref 135–145)
WBC # BLD AUTO: 10.9 K/UL (ref 4.8–10.8)

## 2021-04-23 PROCEDURE — 80053 COMPREHEN METABOLIC PANEL: CPT

## 2021-04-23 PROCEDURE — 93005 ELECTROCARDIOGRAM TRACING: CPT | Performed by: EMERGENCY MEDICINE

## 2021-04-23 PROCEDURE — 85025 COMPLETE CBC W/AUTO DIFF WBC: CPT

## 2021-04-23 PROCEDURE — 700105 HCHG RX REV CODE 258: Performed by: EMERGENCY MEDICINE

## 2021-04-23 PROCEDURE — 99284 EMERGENCY DEPT VISIT MOD MDM: CPT

## 2021-04-23 PROCEDURE — 83690 ASSAY OF LIPASE: CPT

## 2021-04-23 RX ORDER — ONDANSETRON 4 MG/1
4 TABLET, ORALLY DISINTEGRATING ORAL EVERY 6 HOURS PRN
Qty: 10 TABLET | Refills: 0 | Status: SHIPPED | OUTPATIENT
Start: 2021-04-23 | End: 2021-11-07

## 2021-04-23 RX ORDER — SODIUM CHLORIDE 9 MG/ML
1000 INJECTION, SOLUTION INTRAVENOUS ONCE
Status: COMPLETED | OUTPATIENT
Start: 2021-04-23 | End: 2021-04-23

## 2021-04-23 RX ADMIN — SODIUM CHLORIDE 1000 ML: 9 INJECTION, SOLUTION INTRAVENOUS at 23:01

## 2021-04-23 ASSESSMENT — FIBROSIS 4 INDEX: FIB4 SCORE: 1.01

## 2021-04-24 VITALS
TEMPERATURE: 97.7 F | BODY MASS INDEX: 46.01 KG/M2 | SYSTOLIC BLOOD PRESSURE: 122 MMHG | WEIGHT: 250 LBS | HEART RATE: 73 BPM | DIASTOLIC BLOOD PRESSURE: 70 MMHG | HEIGHT: 62 IN | RESPIRATION RATE: 18 BRPM | OXYGEN SATURATION: 97 %

## 2021-04-24 LAB — EKG IMPRESSION: NORMAL

## 2021-04-24 NOTE — ED TRIAGE NOTES
Ariadna Sepulveda  70 y.o.  Chief Complaint   Patient presents with   • N/V     pt BIBA w c/o N/V x 1 hr; pt had episode of stress incontinence w this but has hx of stress incontinence; pt received 4 mg zofran ODT PTA, reports this helped

## 2021-04-24 NOTE — ED PROVIDER NOTES
ER Provider Note     Scribed for Javon Cano M.D. by Jean-Pierre Marion. 4/23/2021, 10:12 PM.    Primary Care Provider: Martin Lindsey M.D.  Means of Arrival: EMS  History obtained from: Patient  History limited by: None     CHIEF COMPLAINT  Chief Complaint   Patient presents with    N/V     pt BIBA w c/o N/V x 1 hr; pt had episode of stress incontinence w this but has hx of stress incontinence; pt received 4 mg zofran ODT PTA, reports this helped       HPI  Ariadna Sepulveda is a 70 y.o. female who presents to the Emergency Department for evaluation of nausea and vomiting. Patient states that earlier this evening she was at [facility] when she saw that they were selling pre-cooked popcorn chicken that was being kept warm under a heat lamp. She purchased these and ate them, and shortly after developed severe nausea and vomiting. She was unable to tolerate or control the nausea and vomiting, and thus EMS was called who brought the patient here to [facility] for further evaluation. No factors are identified which exacerbates or alleviates her vomiting. Patient was treated with Zofran 4 mg ODT en route which has greatly improved her nausea and vomiting, and at this time she feels that it is nearly if not completely resolved. Patient otherwise denies having any fevers, chills, chest pain, shortness of breath, cough, or abdominal pain.    REVIEW OF SYSTEMS  See HPI for further details. All other systems are negative.     PAST MEDICAL HISTORY   has a past medical history of Arthritis, At risk for sleep apnea, Hypertension, and RLS (restless legs syndrome) (10/5/2011).    SURGICAL HISTORY   has a past surgical history that includes tonsillectomy; knee replacement, total (left); and other orthopedic surgery.    SOCIAL HISTORY  Social History     Tobacco Use    Smoking status: Never Smoker    Smokeless tobacco: Never Used   Substance Use Topics    Alcohol use: Yes     Alcohol/week: 1.2 oz     Types: 2 Cans of beer per week      "Comment: 2 beers at a tme.     Drug use: No      Social History     Substance and Sexual Activity   Drug Use No       FAMILY HISTORY  Family History   Problem Relation Age of Onset    Cancer Mother     Arthritis Father     Diabetes Father     Heart Disease Father     Hypertension Father     Hyperlipidemia Father     Psychiatric Illness Brother     Drug abuse Brother     Alcohol abuse Brother     Lung Disease Neg Hx        CURRENT MEDICATIONS  No current facility-administered medications on file prior to encounter.     Current Outpatient Medications on File Prior to Encounter   Medication Sig Dispense Refill    oxybutynin (DITROPAN) 5 MG Tab TAKE 1 TABLET BY MOUTH THREE TIMES A  tablet 1    pramipexole (MIRAPEX) 1 MG Tab Take 3 Tablets by mouth every bedtime. 270 tablet 3    NON SPECIFIED Adult undergarments 1 tid prn Size:X-Large (Patient not taking: Reported on 10/23/2020) 100 Each 11       ALLERGIES  No Known Allergies    PHYSICAL EXAM  VITAL SIGNS: BP (!) 186/77   Pulse 66   Resp 16   Ht 1.575 m (5' 2\")   Wt 113 kg (250 lb)   LMP  (LMP Unknown)   SpO2 97%   BMI 45.73 kg/m²      Constitutional: Alert in no apparent distress.  HENT: No signs of trauma, Bilateral external ears normal, Nose normal.  Dry mucous membranes  Eyes: Pupils are equal and reactive, Conjunctiva normal, Non-icteric.   Neck: Normal range of motion, No tenderness, Supple, No stridor.   Lymphatic: No lymphadenopathy noted.   Cardiovascular: Regular rate and rhythm, no palpable thrill  Thorax & Lungs: No respiratory distress,  No chest tenderness.   Abdomen: Bowel sounds normal, Soft, No tenderness, No masses, No pulsatile masses. No peritoneal signs.  Skin: Warm, Dry, No erythema, No rash.   Back: No bony tenderness, No CVA tenderness.   Extremities: Intact distal pulses, No edema, No tenderness, No cyanosis.  Musculoskeletal: Good range of motion in all major joints. No tenderness to palpation or major deformities noted. "   Neurologic: Alert , Normal motor function, Normal sensory function, No focal deficits noted.   Psychiatric: Affect normal, Judgment normal, Mood normal.       DIAGNOSTIC STUDIES / PROCEDURES    EKG Interpretation:  Interpreted by me  12 Lead EKG interpreted by me to show:  Normal sinus rhythm  Rate 67  Axis: Normal  Intervals: Normal  Normal T waves  Normal ST segments  My impression of this EKG: Does not indicate ischemia or arrhythmia at this time.      LABS  Labs Reviewed   CBC WITH DIFFERENTIAL - Abnormal; Notable for the following components:       Result Value    WBC 10.9 (*)     MCHC 31.8 (*)     Neutrophils-Polys 85.70 (*)     Lymphocytes 6.60 (*)     Neutrophils (Absolute) 9.38 (*)     Lymphs (Absolute) 0.72 (*)     All other components within normal limits   COMP METABOLIC PANEL - Abnormal; Notable for the following components:    Potassium 3.2 (*)     Glucose 111 (*)     All other components within normal limits   LIPASE   ESTIMATED GFR     All labs reviewed by me.    COURSE & MEDICAL DECISION MAKING  Pertinent Labs & Imaging studies reviewed. (See chart for details)    This is a 70 y.o. female that presents with nausea and vomiting.  There is no tenderness on the abdominal exam.  I will sure there is no pancreatitis versus hepatitis.  This could be gastritis.  This could be food poisoning.  I will treat the patient's symptoms and give her fluid and then reassess..     10:12 PM - Patient seen and examined at bedside. Ordered CBC with differential, CMP, Lipase, EKG.  Patient will be medicated with NS 1 Liter for her symptoms. Discussed with the patient that I am ordering for labs and an EKG to further evaluate, and they will be informed of the results when they return. They understand and agree to the plan of care.    11:50 PM - Upon reassessment, patient is resting comfortably with normal vital signs. No new complaints at this time. Discussed results with patient and/or family as well as the importance  of primary care follow up. Patient understands plan of care, strict return precautions for new or changing symptoms and agrees to discharge.    HYDRATION: Based on the patient's presentation of Acute Vomiting and Dehydration the patient was given IV fluids. IV Hydration was used because oral hydration was not adequate alone. Upon recheck following hydration, the patient was showing signs of improved hydration.    The patient will return for new or worsening symptoms and is stable at the time of discharge.    Patient has no significant electrolyte derangements other than a mildly low potassium at 3.2.  She has a mild leukocytosis and no anemia.  Leukocytosis likely stress related.  The screening EKG is negative.  The patient is felt much improved after fluids and Zofran.  I will discharge her home with strict return precautions and follow-up.    DISPOSITION:  Patient will be discharged home in stable condition.    FOLLOW UP:  Martin Lindsey M.D.  [address]  [facility] [place] [facility] [address]  [phone]    In 2 days        OUTPATIENT MEDICATIONS:  Discharge Medication List as of 4/24/2021 12:20 AM        START taking these medications    Details   ondansetron (ZOFRAN ODT) 4 MG TABLET DISPERSIBLE Take 1 tablet by mouth every 6 hours as needed., Disp-10 tablet, R-0, Normal               FINAL IMPRESSION  1. Nausea and vomiting, intractability of vomiting not specified, unspecified vomiting type          I, Jean-Pierre Marion ([place]), am scribing for, and in the presence of, Javon Cano M.D..    Electronically signed by: Jean-Pierre Marion ([place]), 4/23/2021    IJavon M.D. personally performed the services described in this documentation, as scribed by Jean-Pierre Marion in my presence, and it is both accurate and complete. C.    The note accurately reflects work and decisions made by me.  Javon Cano M.D.  4/24/2021  4:17 AM

## 2021-06-22 PROCEDURE — 99282 EMERGENCY DEPT VISIT SF MDM: CPT

## 2021-06-22 ASSESSMENT — FIBROSIS 4 INDEX: FIB4 SCORE: 0.99

## 2021-06-23 ENCOUNTER — HOSPITAL ENCOUNTER (EMERGENCY)
Facility: MEDICAL CENTER | Age: 71
End: 2021-06-23
Attending: EMERGENCY MEDICINE | Admitting: EMERGENCY MEDICINE
Payer: COMMERCIAL

## 2021-06-23 VITALS
HEIGHT: 62 IN | HEART RATE: 62 BPM | OXYGEN SATURATION: 95 % | WEIGHT: 234.35 LBS | SYSTOLIC BLOOD PRESSURE: 148 MMHG | BODY MASS INDEX: 43.13 KG/M2 | DIASTOLIC BLOOD PRESSURE: 75 MMHG | TEMPERATURE: 97.7 F | RESPIRATION RATE: 14 BRPM

## 2021-06-23 DIAGNOSIS — L03.90 CELLULITIS, UNSPECIFIED CELLULITIS SITE: ICD-10-CM

## 2021-06-23 RX ORDER — CLINDAMYCIN HYDROCHLORIDE 150 MG/1
150 CAPSULE ORAL ONCE
Status: DISCONTINUED | OUTPATIENT
Start: 2021-06-23 | End: 2021-06-23 | Stop reason: HOSPADM

## 2021-06-23 RX ORDER — CLINDAMYCIN HYDROCHLORIDE 150 MG/1
150 CAPSULE ORAL 3 TIMES DAILY
Qty: 30 CAPSULE | Refills: 0 | Status: SHIPPED | OUTPATIENT
Start: 2021-06-23 | End: 2021-11-07

## 2021-06-23 NOTE — ED PROVIDER NOTES
ED Provider Note    CHIEF COMPLAINT  Chief Complaint   Patient presents with   • Leg Swelling     x 5 days, patient has a history of lower leg swelling and was treated with antibiotics for cellulitis 2 months ago.    • Wound Check     Draining wound to the LLE, patient has been applying neosporin        HPI  Ariadna Sepulveda is a 70 y.o. female here for evaluation of chronic bilateral leg swelling, and left lower leg redness. The pt has two small superficial ulcerations to the anterior leg. She states she noticed them becoming red and intermittently draining. She has not been on any recent abx.  She has no cp, no sob, no vomiting, no fever/chills. The pt says she has been here for the same, multiple times in the past, with the same complaint. Usually she will get 'antibiotics' and then she follow ups.  She has no other medical concerns at this time.     ROS;  Please see HPI  O/W negative     PAST MEDICAL HISTORY   has a past medical history of Arthritis, At risk for sleep apnea, Hypertension, and RLS (restless legs syndrome) (10/5/2011).    SOCIAL HISTORY  Social History     Tobacco Use   • Smoking status: Never Smoker   • Smokeless tobacco: Never Used   Vaping Use   • Vaping Use: Never used   Substance and Sexual Activity   • Alcohol use: Yes     Alcohol/week: 1.2 oz     Types: 2 Cans of beer per week     Comment: 2 beers at a tme.    • Drug use: No   • Sexual activity: Not Currently       SURGICAL HISTORY   has a past surgical history that includes tonsillectomy; knee replacement, total (left); and other orthopedic surgery.    CURRENT MEDICATIONS  Home Medications     Reviewed by Cleo Blackburn R.N. (Registered Nurse) on 06/22/21 at 2249  Med List Status: Not Addressed   Medication Last Dose Status   NON SPECIFIED  Active   ondansetron (ZOFRAN ODT) 4 MG TABLET DISPERSIBLE  Active   oxybutynin (DITROPAN) 5 MG Tab  Active   pramipexole (MIRAPEX) 1 MG Tab  Active                ALLERGIES  No Known  "Allergies    REVIEW OF SYSTEMS  See HPI for further details. Review of systems as above, otherwise all other systems are negative.     PHYSICAL EXAM  VITAL SIGNS: /60   Pulse 65   Temp 36.5 °C (97.7 °F) (Temporal)   Resp 18   Ht 1.575 m (5' 2\")   Wt 106 kg (234 lb 5.6 oz)   LMP  (LMP Unknown)   SpO2 96%   BMI 42.86 kg/m²     Constitutional: Well developed, well nourished. No acute distress.  HEENT: Normocephalic, atraumatic. MMM  Neck: Supple, Full range of motion   Chest/Pulmonary:  No respiratory distress.  Equal expansion   Musculoskeletal: No deformity, plus one edema, neurovascular intact.  Left lower ext with anterior, distal erythema, mild. Two, 2x2 cm area of superficial ulcerations.  No drainage. Not circumferential   Neuro: Clear speech, appropriate, cooperative, cranial nerves II-XII grossly intact.  Psych: Normal mood and affect      PROCEDURES     MEDICAL RECORD  I have reviewed patient's medical record and pertinent results are listed.    COURSE & MEDICAL DECISION MAKING  I have reviewed any medical record information, laboratory studies and radiographic results as noted above.    2:34 AM  The pt will be started on clinda here, and given mupirocin ointment as well.  She will follow up, or return here for any other issues or concerns.     I you have had any blood pressure issues while here in the emergency department, please see your doctor for a further evaluation or work up.    Differential diagnoses include but not limited to: cellulitis, abscess, sepsis.     This patient presents with cellulitis  .  At this time, I have counseled the patient/family regarding their medications, pain control, and follow up.  They will continue their medications, if any, as prescribed.  They will return immediately for any worsening symptoms and/or any other medical concerns.  They will see their doctor, or contact the doctor provided, in 1-2 days for follow up.       FINAL IMPRESSION  1. Cellulitis, " unspecified cellulitis site             Electronically signed by: Josue Nowak D.O., 6/23/2021 2:31 AM

## 2021-06-23 NOTE — ED TRIAGE NOTES
Ariadna Sepulveda  70 y.o. F  Chief Complaint   Patient presents with   • Leg Swelling     x 5 days, patient has a history of lower leg swelling and was treated with antibiotics for cellulitis 2 months ago.    • Wound Check     Draining wound to the LLE, patient has been applying neosporin      Vitals:    06/22/21 2231   BP: 114/54   Pulse: 76   Resp: 19   Temp: 36.5 °C (97.7 °F)   SpO2: 97%     Triage process explained to patient, apologized for wait time, and returned to lobby.  Pt informed to notify staff of any change in condition.

## 2021-06-29 ENCOUNTER — OFFICE VISIT (OUTPATIENT)
Dept: MEDICAL GROUP | Facility: PHYSICIAN GROUP | Age: 71
End: 2021-06-29
Payer: COMMERCIAL

## 2021-06-29 VITALS
HEIGHT: 62 IN | SYSTOLIC BLOOD PRESSURE: 138 MMHG | BODY MASS INDEX: 44.16 KG/M2 | RESPIRATION RATE: 18 BRPM | TEMPERATURE: 97.6 F | WEIGHT: 240 LBS | HEART RATE: 71 BPM | OXYGEN SATURATION: 97 % | DIASTOLIC BLOOD PRESSURE: 68 MMHG

## 2021-06-29 DIAGNOSIS — E66.01 MORBID OBESITY (HCC): ICD-10-CM

## 2021-06-29 DIAGNOSIS — Z12.11 SPECIAL SCREENING FOR MALIGNANT NEOPLASM OF COLON: ICD-10-CM

## 2021-06-29 DIAGNOSIS — D64.9 ANEMIA, UNSPECIFIED TYPE: ICD-10-CM

## 2021-06-29 DIAGNOSIS — N32.81 OVERACTIVE BLADDER: Primary | ICD-10-CM

## 2021-06-29 DIAGNOSIS — G25.81 RLS (RESTLESS LEGS SYNDROME): ICD-10-CM

## 2021-06-29 PROCEDURE — 99214 OFFICE O/P EST MOD 30 MIN: CPT | Performed by: NURSE PRACTITIONER

## 2021-06-29 RX ORDER — OXYBUTYNIN CHLORIDE 15 MG/1
15 TABLET, EXTENDED RELEASE ORAL DAILY
Qty: 30 TABLET | Refills: 3 | Status: SHIPPED | OUTPATIENT
Start: 2021-06-29 | End: 2021-10-05 | Stop reason: SDUPTHER

## 2021-06-29 RX ORDER — PRAMIPEXOLE DIHYDROCHLORIDE 1 MG/1
3 TABLET ORAL
Qty: 270 TABLET | Refills: 3 | Status: SHIPPED | OUTPATIENT
Start: 2021-06-29 | End: 2021-10-05 | Stop reason: SDUPTHER

## 2021-06-29 ASSESSMENT — PATIENT HEALTH QUESTIONNAIRE - PHQ9: CLINICAL INTERPRETATION OF PHQ2 SCORE: 0

## 2021-06-29 ASSESSMENT — FIBROSIS 4 INDEX: FIB4 SCORE: 0.99

## 2021-06-29 NOTE — ASSESSMENT & PLAN NOTE
Patient noted to be slightly anemia on labs drawn in April during an ER visit. She has no prior history of anemia that she is aware of.  Denies blood in stool.   Will update labs today and see if this has resolved.

## 2021-06-29 NOTE — ASSESSMENT & PLAN NOTE
Chronic condition, stable.  Patient reports that she has been taking oxybutynin 5 mg up to five times daily as she cannot hold her bladder.  She does have incontinent episodes.  Discussed trial of extended release oxybutynin today versus rotation to another agent.  Patient would like to try the long-acting oxybutynin. She is also interested in pursing surgical options if available to her for her quality of life. Discussed pelvic floor strengthening exercises as well.  She will follow up in one month to see how she is doing.

## 2021-06-29 NOTE — ASSESSMENT & PLAN NOTE
Chronic condition, stable.  Patient diagnosed in her 20s.  She is stable on pramipexole 3 mg at bedtime.  She will continue this current regimen.     What Type Of Note Output Would You Prefer (Optional)?: Standard Output Hpi Title: Evaluation of Skin Lesions How Severe Are Your Spot(S)?: moderate Have Your Spot(S) Been Treated In The Past?: has not been treated

## 2021-06-29 NOTE — ASSESSMENT & PLAN NOTE
Chronic condition. Patient recently gained weight during the pandemic and has difficulty with exercise, even walking, due to her overactive bladder and incontinence.  Discussed diet and exercise.  Patient will follow up in one month, at which time we will discuss further depending upon how the new medication is working for her bladder.

## 2021-07-16 ENCOUNTER — TELEPHONE (OUTPATIENT)
Dept: MEDICAL GROUP | Facility: PHYSICIAN GROUP | Age: 71
End: 2021-07-16

## 2021-07-16 NOTE — TELEPHONE ENCOUNTER
Patient called reported that the dosage for the Oxybutynin is not working for her. She would also like some cough medicine she has had the cough for more then a week and a half. An appointment was offer and she decline. Advise if symptoms got worse to go to the nearest Urgent Care or Emergency Room.

## 2021-07-19 DIAGNOSIS — R05.9 COUGH: ICD-10-CM

## 2021-07-19 DIAGNOSIS — N32.81 OVERACTIVE BLADDER: ICD-10-CM

## 2021-07-19 RX ORDER — BENZONATATE 100 MG/1
100 CAPSULE ORAL 3 TIMES DAILY PRN
Qty: 60 CAPSULE | Refills: 0 | Status: SHIPPED | OUTPATIENT
Start: 2021-07-19 | End: 2021-11-07

## 2021-07-20 ENCOUNTER — TELEPHONE (OUTPATIENT)
Dept: MEDICAL GROUP | Facility: PHYSICIAN GROUP | Age: 71
End: 2021-07-20

## 2021-07-20 NOTE — LETTER
Ariadna Sepulveda  [address] [facility] [address]            7/26/2021        Dear Ariadna,      After several attempts, [facility] Toscano's office has been unable to reach you by phone regarding a new referral request requested by you.    We ask that you contact us at [phone] at your earliest convenience. Our office hours are from 8:00a - 5:00p Monday thru Friday.    Kind regards,   Anna Harrington, Steve Ass't    If you have any questions or concerns, please don't hesitate to call.        Sincerely,        CECY Toscano    Electronically Signed

## 2021-07-20 NOTE — TELEPHONE ENCOUNTER
1. Caller Name: Ariadna                          Call Back Number: [phone] (home)         Tried contacting pt to get more information, Left voicemail message for patient to call the office back at [phone]

## 2021-07-20 NOTE — TELEPHONE ENCOUNTER
VOICEMAIL  1. Caller Name: Ariadna                        Call Back Number: [phone] (home)       2. Message: Pt called asking if [facility] Toscano can put in a referral for a foot doctor.  Pt did specify a reason.    3. Patient approves office to leave a detailed voicemail/MyChart message: N\A

## 2021-10-05 DIAGNOSIS — G25.81 RLS (RESTLESS LEGS SYNDROME): ICD-10-CM

## 2021-10-05 DIAGNOSIS — N32.81 OVERACTIVE BLADDER: ICD-10-CM

## 2021-10-05 RX ORDER — PRAMIPEXOLE DIHYDROCHLORIDE 1 MG/1
3 TABLET ORAL
Qty: 270 TABLET | Refills: 3 | Status: SHIPPED | OUTPATIENT
Start: 2021-10-05 | End: 2021-12-04 | Stop reason: SDUPTHER

## 2021-10-05 RX ORDER — OXYBUTYNIN CHLORIDE 15 MG/1
15 TABLET, EXTENDED RELEASE ORAL DAILY
Qty: 30 TABLET | Refills: 3 | Status: SHIPPED | OUTPATIENT
Start: 2021-10-05 | End: 2021-11-29 | Stop reason: SDUPTHER

## 2021-10-06 NOTE — TELEPHONE ENCOUNTER
VOICEMAIL  1. Caller Name: Ariadna                        Call Back Number: [phone] (home)       2. Message: Pt called requesting refills for her oxybutynin (DITROPAN XL) 15 MG CR tablet, pramipexole (MIRAPEX) 1 MG Tab Rx's and she stated to me that she takes the oxybutynin (DITROPAN XL) 15 MG CR tablet, differently than prescribed. Pt states that she takes 1 tablet of oxybutynin (DITROPAN XL) 15 MG CR tablet every 12 hours ( 2 x's daily) Pt states that she is completely out of the oxybutynin (DITROPAN XL) 15 MG CR tablet and she states that she is running out of the pramipexole (MIRAPEX) 1 MG Tab    3. Patient approves office to leave a detailed voicemail/Novastt message: N\A

## 2021-10-07 ENCOUNTER — HOSPITAL ENCOUNTER (OUTPATIENT)
Dept: LAB | Facility: MEDICAL CENTER | Age: 71
End: 2021-10-07
Attending: NURSE PRACTITIONER
Payer: COMMERCIAL

## 2021-10-07 DIAGNOSIS — D64.9 ANEMIA, UNSPECIFIED TYPE: ICD-10-CM

## 2021-10-07 DIAGNOSIS — E66.01 MORBID OBESITY (HCC): ICD-10-CM

## 2021-10-07 LAB
ALBUMIN SERPL BCP-MCNC: 4.2 G/DL (ref 3.2–4.9)
ALBUMIN/GLOB SERPL: 1.3 G/DL
ALP SERPL-CCNC: 115 U/L (ref 30–99)
ALT SERPL-CCNC: 42 U/L (ref 2–50)
ANION GAP SERPL CALC-SCNC: 13 MMOL/L (ref 7–16)
AST SERPL-CCNC: 33 U/L (ref 12–45)
BILIRUB SERPL-MCNC: 0.5 MG/DL (ref 0.1–1.5)
BUN SERPL-MCNC: 19 MG/DL (ref 8–22)
CALCIUM SERPL-MCNC: 9.1 MG/DL (ref 8.5–10.5)
CHLORIDE SERPL-SCNC: 103 MMOL/L (ref 96–112)
CHOLEST SERPL-MCNC: 187 MG/DL (ref 100–199)
CO2 SERPL-SCNC: 22 MMOL/L (ref 20–33)
CREAT SERPL-MCNC: 0.45 MG/DL (ref 0.5–1.4)
ERYTHROCYTE [DISTWIDTH] IN BLOOD BY AUTOMATED COUNT: 45.5 FL (ref 35.9–50)
GLOBULIN SER CALC-MCNC: 3.3 G/DL (ref 1.9–3.5)
GLUCOSE SERPL-MCNC: 111 MG/DL (ref 65–99)
HCT VFR BLD AUTO: 41.1 % (ref 37–47)
HDLC SERPL-MCNC: 89 MG/DL
HGB BLD-MCNC: 13.7 G/DL (ref 12–16)
IRON SATN MFR SERPL: 17 % (ref 15–55)
IRON SERPL-MCNC: 48 UG/DL (ref 40–170)
LDLC SERPL CALC-MCNC: 86 MG/DL
MCH RBC QN AUTO: 29.6 PG (ref 27–33)
MCHC RBC AUTO-ENTMCNC: 33.3 G/DL (ref 33.6–35)
MCV RBC AUTO: 88.8 FL (ref 81.4–97.8)
PLATELET # BLD AUTO: 335 K/UL (ref 164–446)
PMV BLD AUTO: 10.4 FL (ref 9–12.9)
POTASSIUM SERPL-SCNC: 3.6 MMOL/L (ref 3.6–5.5)
PROT SERPL-MCNC: 7.5 G/DL (ref 6–8.2)
RBC # BLD AUTO: 4.63 M/UL (ref 4.2–5.4)
SODIUM SERPL-SCNC: 138 MMOL/L (ref 135–145)
TIBC SERPL-MCNC: 289 UG/DL (ref 250–450)
TRIGL SERPL-MCNC: 58 MG/DL (ref 0–149)
UIBC SERPL-MCNC: 241 UG/DL (ref 110–370)
VIT B12 SERPL-MCNC: 180 PG/ML (ref 211–911)
WBC # BLD AUTO: 6.1 K/UL (ref 4.8–10.8)

## 2021-10-07 PROCEDURE — 36415 COLL VENOUS BLD VENIPUNCTURE: CPT

## 2021-10-07 PROCEDURE — 80053 COMPREHEN METABOLIC PANEL: CPT

## 2021-10-07 PROCEDURE — 85027 COMPLETE CBC AUTOMATED: CPT

## 2021-10-07 PROCEDURE — 82607 VITAMIN B-12: CPT

## 2021-10-07 PROCEDURE — 83550 IRON BINDING TEST: CPT

## 2021-10-07 PROCEDURE — 83540 ASSAY OF IRON: CPT

## 2021-10-07 PROCEDURE — 80061 LIPID PANEL: CPT

## 2021-10-11 ENCOUNTER — APPOINTMENT (OUTPATIENT)
Dept: MEDICAL GROUP | Facility: PHYSICIAN GROUP | Age: 71
End: 2021-10-11
Payer: COMMERCIAL

## 2021-10-13 ENCOUNTER — HOSPITAL ENCOUNTER (EMERGENCY)
Facility: MEDICAL CENTER | Age: 71
End: 2021-10-13
Attending: EMERGENCY MEDICINE
Payer: COMMERCIAL

## 2021-10-13 ENCOUNTER — TELEPHONE (OUTPATIENT)
Dept: MEDICAL GROUP | Facility: PHYSICIAN GROUP | Age: 71
End: 2021-10-13

## 2021-10-13 VITALS
SYSTOLIC BLOOD PRESSURE: 117 MMHG | HEART RATE: 76 BPM | RESPIRATION RATE: 18 BRPM | HEIGHT: 62 IN | BODY MASS INDEX: 42.68 KG/M2 | OXYGEN SATURATION: 96 % | DIASTOLIC BLOOD PRESSURE: 61 MMHG | WEIGHT: 231.92 LBS | TEMPERATURE: 98.4 F

## 2021-10-13 DIAGNOSIS — G25.81 RESTLESS LEG: ICD-10-CM

## 2021-10-13 DIAGNOSIS — L08.9 WOUND INFECTION: ICD-10-CM

## 2021-10-13 DIAGNOSIS — T14.8XXA WOUND INFECTION: ICD-10-CM

## 2021-10-13 DIAGNOSIS — L03.116 CELLULITIS OF LEFT LOWER EXTREMITY: ICD-10-CM

## 2021-10-13 DIAGNOSIS — E66.01 MORBID OBESITY (HCC): ICD-10-CM

## 2021-10-13 PROCEDURE — A9270 NON-COVERED ITEM OR SERVICE: HCPCS | Performed by: EMERGENCY MEDICINE

## 2021-10-13 PROCEDURE — 700102 HCHG RX REV CODE 250 W/ 637 OVERRIDE(OP): Performed by: EMERGENCY MEDICINE

## 2021-10-13 PROCEDURE — 99283 EMERGENCY DEPT VISIT LOW MDM: CPT

## 2021-10-13 RX ORDER — CEPHALEXIN 500 MG/1
500 CAPSULE ORAL 4 TIMES DAILY
Qty: 40 CAPSULE | Refills: 0 | Status: SHIPPED | OUTPATIENT
Start: 2021-10-13 | End: 2021-10-23

## 2021-10-13 RX ORDER — SULFAMETHOXAZOLE AND TRIMETHOPRIM 800; 160 MG/1; MG/1
1 TABLET ORAL 2 TIMES DAILY
Qty: 20 TABLET | Refills: 0 | Status: SHIPPED | OUTPATIENT
Start: 2021-10-13 | End: 2021-10-23

## 2021-10-13 RX ORDER — CEPHALEXIN 500 MG/1
500 CAPSULE ORAL ONCE
Status: COMPLETED | OUTPATIENT
Start: 2021-10-13 | End: 2021-10-13

## 2021-10-13 RX ORDER — SULFAMETHOXAZOLE AND TRIMETHOPRIM 800; 160 MG/1; MG/1
1 TABLET ORAL ONCE
Status: COMPLETED | OUTPATIENT
Start: 2021-10-13 | End: 2021-10-13

## 2021-10-13 RX ORDER — PRAMIPEXOLE DIHYDROCHLORIDE 0.5 MG/1
0.5 TABLET ORAL ONCE
Status: COMPLETED | OUTPATIENT
Start: 2021-10-13 | End: 2021-10-13

## 2021-10-13 RX ADMIN — SULFAMETHOXAZOLE AND TRIMETHOPRIM 1 TABLET: 800; 160 TABLET ORAL at 05:06

## 2021-10-13 RX ADMIN — CEPHALEXIN 500 MG: 500 CAPSULE ORAL at 05:06

## 2021-10-13 RX ADMIN — PRAMIPEXOLE DIHYDROCHLORIDE 0.5 MG: 0.5 TABLET ORAL at 05:06

## 2021-10-13 ASSESSMENT — FIBROSIS 4 INDEX: FIB4 SCORE: 1.03

## 2021-10-13 NOTE — TELEPHONE ENCOUNTER
1. Caller Name: Ariadna                          Call Back Number: [phone] (home)         How would the patient prefer to be contacted with a response: Phone call do NOT leave a detailed message    Pt has an appt scheduled for Monday 10/18/21

## 2021-10-13 NOTE — ED NOTES
Pt ambulated to room w/ slow steady gait, agree w/ triage note. Pt changing into gown, chart up for ERP.

## 2021-10-13 NOTE — ED TRIAGE NOTES
"Chief Complaint   Patient presents with   • Hallucinations     states she has been seeing things that are not there. States her last dose of mirapex 5 days ago   • Peripheral Edema     bilat legs hx of edema     Pt BIB EMS for above complaint. Pt states she hasn't been able to afford her medications. Pt denies SI/HI. Pt placed back in the lobby and informed to alert staff of any changes or concerns.    BP (!) 161/72   Pulse 84   Temp 36.1 °C (97 °F) (Temporal)   Resp 18   Ht 1.575 m (5' 2\")   Wt 105 kg (231 lb 14.8 oz)   LMP  (LMP Unknown)   SpO2 97%   BMI 42.42 kg/m²     "

## 2021-10-13 NOTE — ED PROVIDER NOTES
ED Provider Note    Scribed for Jose Alfredo Barclay M.D. by Nile Coleman. 10/13/2021  4:09 AM    Primary care provider: CECY Toscano  Means of arrival: EMS  History obtained from: Patient  History limited by: None    CHIEF COMPLAINT  Chief Complaint   Patient presents with    Hallucinations     states she has been seeing things that are not there. States her last dose of mirapex 5 days ago    Peripheral Edema     bilat legs hx of edema       HPI  Ariadna Sepulveda is a 70 y.o. female with a history of edema who presents to the Emergency Department via EMS for bilateral leg swelling onset last week. Per patient, her leg swelling has been bothering her. She has been off of antibiotics since June or July. Per triage, she has additionally been experiencing hallucinations since her last dose of Mirapex 5 days ago. Denies fever. No alleviating or exacerbating factors reported. Denies history of congestive heart failure. Denies allergies to antibiotics.    REVIEW OF SYSTEMS  Pertinent positives include bilateral leg swelling and hallucinations.   Pertinent negatives include no fever.    All other systems reviewed and negative. See HPI for further details.     PAST MEDICAL HISTORY   has a past medical history of Arthritis, At risk for sleep apnea, Hypertension, and RLS (restless legs syndrome) (10/5/2011).    SURGICAL HISTORY   has a past surgical history that includes tonsillectomy; knee replacement, total (left); and other orthopedic surgery.    SOCIAL HISTORY  Social History     Tobacco Use    Smoking status: Never Smoker    Smokeless tobacco: Never Used   Vaping Use    Vaping Use: Never used   Substance Use Topics    Alcohol use: Yes     Alcohol/week: 1.2 oz     Types: 2 Cans of beer per week     Comment: 2 beers at a tme.     Drug use: No      Social History     Substance and Sexual Activity   Drug Use No       FAMILY HISTORY  Family History   Problem Relation Age of Onset    Cancer Mother     Arthritis Father   "   Diabetes Father     Heart Disease Father     Hypertension Father     Hyperlipidemia Father     Psychiatric Illness Brother     Drug abuse Brother     Alcohol abuse Brother     Lung Disease Neg Hx        CURRENT MEDICATIONS  Current Outpatient Medications   Medication Instructions    benzonatate (TESSALON) 100 mg, Oral, 3 TIMES DAILY PRN    clindamycin (CLEOCIN) 150 mg, Oral, 3 TIMES DAILY    mupirocin (BACTROBAN) 2 % Ointment 1 Application, Topical, 2 TIMES DAILY    ondansetron (ZOFRAN ODT) 4 mg, Oral, EVERY 6 HOURS PRN    oxybutynin (DITROPAN XL) 15 mg, Oral, DAILY    pramipexole (MIRAPEX) 3 mg, Oral, EVERY BEDTIME     ALLERGIES  No Known Allergies    PHYSICAL EXAM  VITAL SIGNS: BP (!) 161/72   Pulse 84   Temp 36.1 °C (97 °F) (Temporal)   Resp 18   Ht 1.575 m (5' 2\")   Wt 105 kg (231 lb 14.8 oz)   LMP  (LMP Unknown)   SpO2 97%   BMI 42.42 kg/m²     Nursing note and vitals reviewed.  Constitutional: Well-developed and well-nourished. No distress. Obese.  HENT: Head is normocephalic and atraumatic. Oropharynx is clear and moist without exudate or erythema.   Eyes: Pupils are equal, round, and reactive to light. Conjunctiva are normal.   Cardiovascular: Normal rate and regular rhythm. No murmur heard. Normal radial pulses.  Pulmonary/Chest: Breath sounds normal. No wheezes or rales.   Abdominal: Soft and non-tender. No distention    Musculoskeletal: Extremities exhibit normal range of motion without edema or tenderness.   Neurological: Awake, alert and oriented to person, place, and time. No focal deficits noted.  Skin: Skin is warm and dry. Two quarter sized eschars to the midpoint of left lower leg with 8 cm of surrounding erythema, No abscess.  Psychiatric: Normal mood and affect. Appropriate for clinical situation.    COURSE & MEDICAL DECISION MAKING  Nursing notes, VS, PMSFHx reviewed in chart.     Review of past medical records shows the patient was seen here in June 2021 for leg swelling and was " diagnosed with cellulitis.    4:09 AM - Patient seen and examined at bedside. The patient returns today with cellulitis. She will prescribed antibiotics for her symptoms. Discussed plans and and precautions for discharge. Patient understands and verbalizes agreement to the plan of discharge.    Physical exam is consistent with cellulitis to left lower extremity.  No evidence of abscess.  Should be treated with oral antibiotics.    The patient will return for new or worsening symptoms and is stable at the time of discharge.    DISPOSITION:  Patient will be discharged home in stable condition.    FOLLOW UP:  CECY Toscano  [address]  [facility] [id number] [phone]    Schedule an appointment as soon as possible for a visit       [facility], Emergency Dept  [facility] [facility] [address] [phone]    If symptoms worsen      OUTPATIENT MEDICATIONS:  New Prescriptions    CEPHALEXIN (KEFLEX) 500 MG CAP    Take 1 Capsule by mouth 4 times a day for 10 days.    SULFAMETHOXAZOLE-TRIMETHOPRIM (BACTRIM DS) 800-160 MG TABLET    Take 1 Tablet by mouth 2 times a day for 10 days.       FINAL IMPRESSION  1. Cellulitis of left lower extremity    2. Wound infection    3. Morbid obesity (HCC)    4. Restless leg       Nile LEE (Enioiblinda), am scribing for, and in the presence of, Jose Alfredo Barclay M.D..    Electronically signed by: Nile Coleman (Enioiblinda), 10/13/2021    Jose Alfredo LEE M.D. personally performed the services described in this documentation, as scribed by Nile Coleman in my presence, and it is both accurate and complete.    The note accurately reflects work and decisions made by me.  Jose Alfredo Barclay M.D.  10/13/2021  11:19 AM

## 2021-10-18 ENCOUNTER — APPOINTMENT (OUTPATIENT)
Dept: MEDICAL GROUP | Facility: PHYSICIAN GROUP | Age: 71
End: 2021-10-18
Payer: COMMERCIAL

## 2021-11-04 ENCOUNTER — APPOINTMENT (OUTPATIENT)
Dept: RADIOLOGY | Facility: MEDICAL CENTER | Age: 71
End: 2021-11-04
Attending: EMERGENCY MEDICINE
Payer: COMMERCIAL

## 2021-11-04 ENCOUNTER — HOSPITAL ENCOUNTER (EMERGENCY)
Facility: MEDICAL CENTER | Age: 71
End: 2021-11-04
Attending: EMERGENCY MEDICINE
Payer: COMMERCIAL

## 2021-11-04 VITALS
HEART RATE: 81 BPM | OXYGEN SATURATION: 92 % | TEMPERATURE: 97.8 F | HEIGHT: 62 IN | BODY MASS INDEX: 44.63 KG/M2 | WEIGHT: 242.51 LBS | DIASTOLIC BLOOD PRESSURE: 65 MMHG | SYSTOLIC BLOOD PRESSURE: 142 MMHG | RESPIRATION RATE: 16 BRPM

## 2021-11-04 DIAGNOSIS — R60.9 PERIPHERAL EDEMA: ICD-10-CM

## 2021-11-04 LAB
ANION GAP SERPL CALC-SCNC: 10 MMOL/L (ref 7–16)
BASOPHILS # BLD AUTO: 0.5 % (ref 0–1.8)
BASOPHILS # BLD: 0.03 K/UL (ref 0–0.12)
BUN SERPL-MCNC: 21 MG/DL (ref 8–22)
CALCIUM SERPL-MCNC: 9.1 MG/DL (ref 8.5–10.5)
CHLORIDE SERPL-SCNC: 106 MMOL/L (ref 96–112)
CO2 SERPL-SCNC: 25 MMOL/L (ref 20–33)
CREAT SERPL-MCNC: 0.48 MG/DL (ref 0.5–1.4)
EOSINOPHIL # BLD AUTO: 0.21 K/UL (ref 0–0.51)
EOSINOPHIL NFR BLD: 3.2 % (ref 0–6.9)
ERYTHROCYTE [DISTWIDTH] IN BLOOD BY AUTOMATED COUNT: 46.4 FL (ref 35.9–50)
GLUCOSE SERPL-MCNC: 100 MG/DL (ref 65–99)
HCT VFR BLD AUTO: 35.7 % (ref 37–47)
HGB BLD-MCNC: 11.8 G/DL (ref 12–16)
IMM GRANULOCYTES # BLD AUTO: 0.03 K/UL (ref 0–0.11)
IMM GRANULOCYTES NFR BLD AUTO: 0.5 % (ref 0–0.9)
LYMPHOCYTES # BLD AUTO: 1.27 K/UL (ref 1–4.8)
LYMPHOCYTES NFR BLD: 19.2 % (ref 22–41)
MCH RBC QN AUTO: 30.2 PG (ref 27–33)
MCHC RBC AUTO-ENTMCNC: 33.1 G/DL (ref 33.6–35)
MCV RBC AUTO: 91.3 FL (ref 81.4–97.8)
MONOCYTES # BLD AUTO: 0.48 K/UL (ref 0–0.85)
MONOCYTES NFR BLD AUTO: 7.3 % (ref 0–13.4)
NEUTROPHILS # BLD AUTO: 4.6 K/UL (ref 2–7.15)
NEUTROPHILS NFR BLD: 69.3 % (ref 44–72)
NRBC # BLD AUTO: 0 K/UL
NRBC BLD-RTO: 0 /100 WBC
NT-PROBNP SERPL IA-MCNC: 116 PG/ML (ref 0–125)
PLATELET # BLD AUTO: 289 K/UL (ref 164–446)
PMV BLD AUTO: 9.9 FL (ref 9–12.9)
POTASSIUM SERPL-SCNC: 3.8 MMOL/L (ref 3.6–5.5)
RBC # BLD AUTO: 3.91 M/UL (ref 4.2–5.4)
SODIUM SERPL-SCNC: 141 MMOL/L (ref 135–145)
WBC # BLD AUTO: 6.6 K/UL (ref 4.8–10.8)

## 2021-11-04 PROCEDURE — 302875 HCHG BANDAGE ACE 4 OR 6""

## 2021-11-04 PROCEDURE — 83880 ASSAY OF NATRIURETIC PEPTIDE: CPT

## 2021-11-04 PROCEDURE — 71045 X-RAY EXAM CHEST 1 VIEW: CPT

## 2021-11-04 PROCEDURE — 85025 COMPLETE CBC W/AUTO DIFF WBC: CPT

## 2021-11-04 PROCEDURE — 80048 BASIC METABOLIC PNL TOTAL CA: CPT

## 2021-11-04 PROCEDURE — 99284 EMERGENCY DEPT VISIT MOD MDM: CPT

## 2021-11-04 ASSESSMENT — FIBROSIS 4 INDEX: FIB4 SCORE: 1.03

## 2021-11-04 NOTE — ED NOTES
Pt from the lobby to red 11 via wheelchair. Pt changed into gown and placed on continuous cardiac, BP and O2 monitors. Initial assessment complete. ERP to see.

## 2021-11-04 NOTE — ED NOTES
Pt placed on 2L O2 via nasal cannula for RA saturation of 83% while sleeping. Oxygen saturation normalized with O2.

## 2021-11-04 NOTE — ED TRIAGE NOTES
"Ariadna Sepulveda  70 y.o.  Chief Complaint   Patient presents with   • Peripheral Edema     Patient to triage for worsening bilateral lower leg edema x2-3 weeks. L leg worse than R. Reports blisters and \"leaking\" to L lower leg. Denies chest pain or SOB.     Pt ambulatory, speaking in full sentences,  A&Ox4.    Triage process explained to patient, apologized for wait time, and returned to [facility].  Pt informed to notify staff of any change in condition. NAD at this time.  "

## 2021-11-04 NOTE — ED NOTES
Pressure ace wraps applied to bilateral lower extremities per ERP. Pt resting in bed, NADN, call light in reach.

## 2021-11-04 NOTE — ED NOTES
Discharge teaching with paperwork regarding peripheral edema and after care instructions provided to pt. Pt verbalized understanding of teaching and all questions answered. Pt is A&Ox4 with stable vital signs, stable physical assessment, and PIV removed upon discharge. Pt ambulatory out of ED with steady gait/via wheelchair with all personal belongings.

## 2021-11-04 NOTE — ED PROVIDER NOTES
ED Provider Note    Scribed for Tan King M.D. by Miguel Davenport. 11/4/2021,  3:29 AM.    Means of Arrival: Walk in   History obtained from: Patient   History limited by: None     CHIEF COMPLAINT  Chief Complaint   Patient presents with   • Peripheral Edema       HPI  Ariadna Sepulveda is a 70 y.o. female who presents to the Emergency Department for evaluation of worsening bilateral lower leg edema onset approximately 3 weeks ago. Patient reports his left leg edema is worse than his right leg because of left knee replacement. She adds that she has blisters forming on her legs Presents associated symptoms of shortness of breath secondary to obesity. Denies fevers, cough, or chest pain. Patient reports that she has followed up with her PCP for the same complaint and has been prescribed antibiotics with no alleviation. She also has been using diabetic socks and Neosporin patches with no alleviation. She does not use more than one pillow during sleep. History of bilateral leg ulcers.       REVIEW OF SYSTEMS  CONSTITUTIONAL:  No fever.  CARDIOVASCULAR:  No chest discomfort or chest pain  RESPIRATORY:  No pleuritic chest pain or cough. Shortness of breath secondary to obesity  GASTROINTESTINAL:  No abdominal pain.  MUSCULOSKELETAL:  No arthralgia.  EXTREMITIES: bilateral lower leg edema, left worse than right.  SKIN: Blisters on bilateral lower legs    See HPI for further details.   All other systems are negative.     PAST MEDICAL HISTORY  Past Medical History:   Diagnosis Date   • Arthritis    • At risk for sleep apnea     CPAP   • Hypertension    • RLS (restless legs syndrome) 10/5/2011       FAMILY HISTORY  Family History   Problem Relation Age of Onset   • Cancer Mother    • Arthritis Father    • Diabetes Father    • Heart Disease Father    • Hypertension Father    • Hyperlipidemia Father    • Psychiatric Illness Brother    • Drug abuse Brother    • Alcohol abuse Brother    • Lung Disease Neg Hx        SOCIAL  "HISTORY   reports that she has never smoked. She has never used smokeless tobacco. She reports current alcohol use of about 1.2 oz of alcohol per week. She reports that she does not use drugs.    SURGICAL HISTORY  Past Surgical History:   Procedure Laterality Date   • KNEE REPLACEMENT, TOTAL  left   • OTHER ORTHOPEDIC SURGERY     • TONSILLECTOMY         CURRENT MEDICATIONS  Current Outpatient Medications   Medication Instructions   • benzonatate (TESSALON) 100 mg, Oral, 3 TIMES DAILY PRN   • clindamycin (CLEOCIN) 150 mg, Oral, 3 TIMES DAILY   • mupirocin (BACTROBAN) 2 % Ointment 1 Application, Topical, 2 TIMES DAILY   • ondansetron (ZOFRAN ODT) 4 mg, Oral, EVERY 6 HOURS PRN   • oxybutynin (DITROPAN XL) 15 mg, Oral, DAILY   • pramipexole (MIRAPEX) 3 mg, Oral, EVERY BEDTIME        ALLERGIES  No Known Allergies    PHYSICAL EXAM  VITAL SIGNS: /62   Pulse 74   Temp 35.8 °C (96.5 °F) (Temporal)   Resp 19   Ht 1.575 m (5' 2\")   Wt 110 kg (242 lb 8.1 oz)   LMP  (LMP Unknown)   SpO2 98%   BMI 44.35 kg/m²    Gen: Alert, no acute distress  HEENT: ATNC  Eyes: PERRL, EOMI, normal conjunctiva.   Neck: trachea midline  Resp: no respiratory distress. Faint crackles in right lung base, clear on the left.   CV: No JVD. No murmurs.   Abd: non-distended. Soft and obese  Ext: 2+ pitting edema in bilateral lower extremities. Warm and well perfused.   Psych: normal mood  Neuro: speech fluent     DIAGNOSTIC STUDIES / PROCEDURES     LABS  Labs Reviewed   CBC WITH DIFFERENTIAL - Abnormal; Notable for the following components:       Result Value    RBC 3.91 (*)     Hemoglobin 11.8 (*)     Hematocrit 35.7 (*)     MCHC 33.1 (*)     Lymphocytes 19.20 (*)     All other components within normal limits   BASIC METABOLIC PANEL - Abnormal; Notable for the following components:    Glucose 100 (*)     Creatinine 0.48 (*)     All other components within normal limits   PROBRAIN NATRIURETIC PEPTIDE, NT   ESTIMATED GFR     All labs reviewed " by me.    RADIOLOGY  DX-CHEST-PORTABLE (1 VIEW)   Final Result      No acute cardiopulmonary abnormality.        The radiologist’s interpretation of all radiology studies have been reviewed by me.    COURSE & MEDICAL DECISION MAKING  Pertinent Labs & Imaging studies reviewed. (See chart for details)    3:29 AM Patient seen and examined at bedside. Discussed with patient about administering basic labs and radiology. Disussed with patient about the buildup of lymph in her extremities and how to better manage it for alleviation.. Patient verbalizes understanding and agreement to this plan of care.     4:33 AM - Patient was reevaluated at bedside. Informed patient that we will ace wrap her legs to help with edema. Patient verbalizes understanding and agreement to this plan of care.      Medical Decision Making:  Patient presents with ongoing swelling of her legs.  She has been seen for this multiple times in the past.  She denies having a prior history of heart failure work-up.  She does have significant pitting edema bilateral lower extremities.  This is symmetrical, low suspicion for DVT/PE.  Chest x-ray demonstrates no pulmonary edema.  No elevation of proBNP to suggest new heart failure.  Patient likely has valvular insufficiency in the veins.  She was provided with Ace wraps, counseled on compression stockings and advised to follow-up with her doctor.  No evidence of cellulitis.  Labs are otherwise reassuring.    The patient was given return precautions, anticipatory guidance, and the opportunity to ask questions prior to discharge.       DISPOSITION:  Patient will be discharged home in stable condition.    FOLLOW UP:  Nehal Palacio A.P.R.NStefanie  [address]  [facility] [id number] [phone]    Schedule an appointment as soon as possible for a visit       [facility], Emergency Dept  [address] [place] [id number] [phone]    If symptoms worsen        FINAL IMPRESSION  1.  Peripheral edema            Miguel LEE (Scribe), am scribing for, and in the presence of, Tan King M.D..    Electronically signed by: Miguel Davenport (Ashanti), 11/4/2021    ITan M.D. personally performed the services described in this documentation, as scribed by Miguel Davenport in my presence, and it is both accurate and complete.    The note accurately reflects work and decisions made by me.  Tan King M.D.  11/4/2021  5:51 AM     C      This dictation was created using voice recognition software. The accuracy of the dictation is limited to the abilities of the software. I expect there may be some errors of grammar and possibly content. The nursing notes were reviewed and certain aspects of this information were incorporated into this note.

## 2021-11-04 NOTE — DISCHARGE INSTRUCTIONS
You were seen in the emergency department for leg swelling.  You appear to have fluid buildup in your legs.  Your work-up for heart failure was reassuring.  At this time, your symptoms do not appear to be dangerous, but this does increase your risk of skin infections.  It is important that you obtain compression stockings.  These are available at pharmacies.  They should be very tight.  Put them on in the morning to help prevent swelling of your legs throughout the day.    Please follow-up with your regular doctor.    Please return to the emergency department or seek medical attention if you develop:  Fevers, chest pain, shortness of breath, any other new or concerning findings

## 2021-11-07 ENCOUNTER — HOSPITAL ENCOUNTER (OUTPATIENT)
Facility: MEDICAL CENTER | Age: 71
End: 2021-11-08
Attending: EMERGENCY MEDICINE | Admitting: STUDENT IN AN ORGANIZED HEALTH CARE EDUCATION/TRAINING PROGRAM
Payer: COMMERCIAL

## 2021-11-07 ENCOUNTER — APPOINTMENT (OUTPATIENT)
Dept: RADIOLOGY | Facility: MEDICAL CENTER | Age: 71
End: 2021-11-07
Attending: EMERGENCY MEDICINE
Payer: COMMERCIAL

## 2021-11-07 DIAGNOSIS — M79.89 LEG SWELLING: ICD-10-CM

## 2021-11-07 DIAGNOSIS — L03.116 CELLULITIS OF LEFT LOWER EXTREMITY: ICD-10-CM

## 2021-11-07 LAB
ALBUMIN SERPL BCP-MCNC: 4.1 G/DL (ref 3.2–4.9)
ALBUMIN/GLOB SERPL: 1.2 G/DL
ALP SERPL-CCNC: 118 U/L (ref 30–99)
ALT SERPL-CCNC: 16 U/L (ref 2–50)
ANION GAP SERPL CALC-SCNC: 12 MMOL/L (ref 7–16)
AST SERPL-CCNC: 15 U/L (ref 12–45)
BASOPHILS # BLD AUTO: 0.3 % (ref 0–1.8)
BASOPHILS # BLD: 0.02 K/UL (ref 0–0.12)
BILIRUB SERPL-MCNC: 0.2 MG/DL (ref 0.1–1.5)
BUN SERPL-MCNC: 17 MG/DL (ref 8–22)
CALCIUM SERPL-MCNC: 8.8 MG/DL (ref 8.5–10.5)
CHLORIDE SERPL-SCNC: 104 MMOL/L (ref 96–112)
CO2 SERPL-SCNC: 24 MMOL/L (ref 20–33)
CREAT SERPL-MCNC: 0.59 MG/DL (ref 0.5–1.4)
EOSINOPHIL # BLD AUTO: 0.23 K/UL (ref 0–0.51)
EOSINOPHIL NFR BLD: 2.9 % (ref 0–6.9)
ERYTHROCYTE [DISTWIDTH] IN BLOOD BY AUTOMATED COUNT: 46.8 FL (ref 35.9–50)
GLOBULIN SER CALC-MCNC: 3.3 G/DL (ref 1.9–3.5)
GLUCOSE SERPL-MCNC: 112 MG/DL (ref 65–99)
HCT VFR BLD AUTO: 36.2 % (ref 37–47)
HGB BLD-MCNC: 11.8 G/DL (ref 12–16)
IMM GRANULOCYTES # BLD AUTO: 0.02 K/UL (ref 0–0.11)
IMM GRANULOCYTES NFR BLD AUTO: 0.3 % (ref 0–0.9)
LYMPHOCYTES # BLD AUTO: 1.3 K/UL (ref 1–4.8)
LYMPHOCYTES NFR BLD: 16.6 % (ref 22–41)
MCH RBC QN AUTO: 29.6 PG (ref 27–33)
MCHC RBC AUTO-ENTMCNC: 32.6 G/DL (ref 33.6–35)
MCV RBC AUTO: 91 FL (ref 81.4–97.8)
MONOCYTES # BLD AUTO: 0.59 K/UL (ref 0–0.85)
MONOCYTES NFR BLD AUTO: 7.5 % (ref 0–13.4)
NEUTROPHILS # BLD AUTO: 5.68 K/UL (ref 2–7.15)
NEUTROPHILS NFR BLD: 72.4 % (ref 44–72)
NRBC # BLD AUTO: 0 K/UL
NRBC BLD-RTO: 0 /100 WBC
NT-PROBNP SERPL IA-MCNC: 108 PG/ML (ref 0–125)
PLATELET # BLD AUTO: 305 K/UL (ref 164–446)
PMV BLD AUTO: 9.7 FL (ref 9–12.9)
POTASSIUM SERPL-SCNC: 3.6 MMOL/L (ref 3.6–5.5)
PROT SERPL-MCNC: 7.4 G/DL (ref 6–8.2)
RBC # BLD AUTO: 3.98 M/UL (ref 4.2–5.4)
SODIUM SERPL-SCNC: 140 MMOL/L (ref 135–145)
WBC # BLD AUTO: 7.8 K/UL (ref 4.8–10.8)

## 2021-11-07 PROCEDURE — 83880 ASSAY OF NATRIURETIC PEPTIDE: CPT

## 2021-11-07 PROCEDURE — 93971 EXTREMITY STUDY: CPT | Mod: 26,LT | Performed by: INTERNAL MEDICINE

## 2021-11-07 PROCEDURE — 80053 COMPREHEN METABOLIC PANEL: CPT

## 2021-11-07 PROCEDURE — 99285 EMERGENCY DEPT VISIT HI MDM: CPT

## 2021-11-07 PROCEDURE — 85025 COMPLETE CBC W/AUTO DIFF WBC: CPT

## 2021-11-07 PROCEDURE — 93971 EXTREMITY STUDY: CPT | Mod: LT

## 2021-11-07 RX ORDER — CLINDAMYCIN HYDROCHLORIDE 150 MG/1
300 CAPSULE ORAL ONCE
Status: DISCONTINUED | OUTPATIENT
Start: 2021-11-08 | End: 2021-11-07

## 2021-11-07 RX ORDER — CEFAZOLIN SODIUM 2 G/100ML
2 INJECTION, SOLUTION INTRAVENOUS ONCE
Status: COMPLETED | OUTPATIENT
Start: 2021-11-08 | End: 2021-11-08

## 2021-11-07 ASSESSMENT — FIBROSIS 4 INDEX: FIB4 SCORE: 1.2

## 2021-11-08 ENCOUNTER — PATIENT OUTREACH (OUTPATIENT)
Dept: HEALTH INFORMATION MANAGEMENT | Facility: OTHER | Age: 71
End: 2021-11-08

## 2021-11-08 ENCOUNTER — PHARMACY VISIT (OUTPATIENT)
Dept: PHARMACY | Facility: MEDICAL CENTER | Age: 71
End: 2021-11-08
Payer: COMMERCIAL

## 2021-11-08 VITALS
RESPIRATION RATE: 20 BRPM | WEIGHT: 235.45 LBS | HEIGHT: 62 IN | OXYGEN SATURATION: 94 % | HEART RATE: 86 BPM | DIASTOLIC BLOOD PRESSURE: 61 MMHG | SYSTOLIC BLOOD PRESSURE: 126 MMHG | BODY MASS INDEX: 43.33 KG/M2 | TEMPERATURE: 98.6 F

## 2021-11-08 PROBLEM — R09.02 HYPOXIA: Status: ACTIVE | Noted: 2021-11-08

## 2021-11-08 PROBLEM — L03.90 CELLULITIS: Status: ACTIVE | Noted: 2021-11-08

## 2021-11-08 PROCEDURE — G0378 HOSPITAL OBSERVATION PER HR: HCPCS

## 2021-11-08 PROCEDURE — 97602 WOUND(S) CARE NON-SELECTIVE: CPT

## 2021-11-08 PROCEDURE — A9270 NON-COVERED ITEM OR SERVICE: HCPCS | Performed by: STUDENT IN AN ORGANIZED HEALTH CARE EDUCATION/TRAINING PROGRAM

## 2021-11-08 PROCEDURE — 99235 HOSP IP/OBS SAME DATE MOD 70: CPT | Performed by: STUDENT IN AN ORGANIZED HEALTH CARE EDUCATION/TRAINING PROGRAM

## 2021-11-08 PROCEDURE — RXMED WILLOW AMBULATORY MEDICATION CHARGE: Performed by: STUDENT IN AN ORGANIZED HEALTH CARE EDUCATION/TRAINING PROGRAM

## 2021-11-08 PROCEDURE — 700111 HCHG RX REV CODE 636 W/ 250 OVERRIDE (IP)

## 2021-11-08 PROCEDURE — 700102 HCHG RX REV CODE 250 W/ 637 OVERRIDE(OP): Performed by: STUDENT IN AN ORGANIZED HEALTH CARE EDUCATION/TRAINING PROGRAM

## 2021-11-08 PROCEDURE — 96365 THER/PROPH/DIAG IV INF INIT: CPT

## 2021-11-08 PROCEDURE — 96372 THER/PROPH/DIAG INJ SC/IM: CPT

## 2021-11-08 PROCEDURE — 11055 PARING/CUTG B9 HYPRKER LES 1: CPT

## 2021-11-08 PROCEDURE — 700111 HCHG RX REV CODE 636 W/ 250 OVERRIDE (IP): Performed by: STUDENT IN AN ORGANIZED HEALTH CARE EDUCATION/TRAINING PROGRAM

## 2021-11-08 RX ORDER — DOXYCYCLINE 100 MG/1
100 CAPSULE ORAL 2 TIMES DAILY
Qty: 20 CAPSULE | Refills: 0 | Status: SHIPPED | OUTPATIENT
Start: 2021-11-08 | End: 2021-11-18

## 2021-11-08 RX ORDER — OXYBUTYNIN CHLORIDE 5 MG/1
15 TABLET, EXTENDED RELEASE ORAL DAILY
Status: DISCONTINUED | OUTPATIENT
Start: 2021-11-08 | End: 2021-11-08 | Stop reason: HOSPADM

## 2021-11-08 RX ORDER — ACETAMINOPHEN 325 MG/1
650 TABLET ORAL EVERY 6 HOURS PRN
Status: DISCONTINUED | OUTPATIENT
Start: 2021-11-08 | End: 2021-11-08 | Stop reason: HOSPADM

## 2021-11-08 RX ORDER — PRAMIPEXOLE DIHYDROCHLORIDE 0.5 MG/1
3 TABLET ORAL
Status: DISCONTINUED | OUTPATIENT
Start: 2021-11-08 | End: 2021-11-08 | Stop reason: HOSPADM

## 2021-11-08 RX ORDER — CEFAZOLIN SODIUM 2 G/100ML
INJECTION, SOLUTION INTRAVENOUS
Status: COMPLETED
Start: 2021-11-08 | End: 2021-11-08

## 2021-11-08 RX ORDER — HEPARIN SODIUM 5000 [USP'U]/ML
5000 INJECTION, SOLUTION INTRAVENOUS; SUBCUTANEOUS EVERY 8 HOURS
Status: DISCONTINUED | OUTPATIENT
Start: 2021-11-08 | End: 2021-11-08 | Stop reason: HOSPADM

## 2021-11-08 RX ORDER — CEFAZOLIN SODIUM 2 G/100ML
2 INJECTION, SOLUTION INTRAVENOUS EVERY 12 HOURS
Status: DISCONTINUED | OUTPATIENT
Start: 2021-11-08 | End: 2021-11-08 | Stop reason: HOSPADM

## 2021-11-08 RX ADMIN — CEFAZOLIN SODIUM 2 G: 2 INJECTION, SOLUTION INTRAVENOUS at 00:03

## 2021-11-08 RX ADMIN — OXYBUTYNIN CHLORIDE 15 MG: 5 TABLET, EXTENDED RELEASE ORAL at 08:28

## 2021-11-08 RX ADMIN — HEPARIN SODIUM 5000 UNITS: 5000 INJECTION, SOLUTION INTRAVENOUS; SUBCUTANEOUS at 04:12

## 2021-11-08 RX ADMIN — CEFAZOLIN SODIUM 2 G: 2 INJECTION, SOLUTION INTRAVENOUS at 12:14

## 2021-11-08 ASSESSMENT — PAIN DESCRIPTION - PAIN TYPE
TYPE: ACUTE PAIN
TYPE: ACUTE PAIN

## 2021-11-08 ASSESSMENT — ENCOUNTER SYMPTOMS
EYES NEGATIVE: 1
NEUROLOGICAL NEGATIVE: 1
PSYCHIATRIC NEGATIVE: 1
MUSCULOSKELETAL NEGATIVE: 1
GASTROINTESTINAL NEGATIVE: 1
RESPIRATORY NEGATIVE: 1

## 2021-11-08 ASSESSMENT — FIBROSIS 4 INDEX: FIB4 SCORE: 0.84

## 2021-11-08 NOTE — FACE TO FACE
Face to Face Supporting Documentation - Home Health    The encounter with this patient was in whole or in part the primary reason for home health admission.    Date of encounter:   Patient:                    MRN:                       YOB: 2019  Ariadna Sepulveda  [id number]  1950     Home health to see patient for:  Skilled Nursing care for assessment, interventions & education, Wound Care, Home health aide, Physical Therapy evaluation and treatment and Occupational therapy evaluation and treatment    Skilled need for:  Exacerbation of Chronic Disease State Worsening left lower extremity cellulitis     Skilled nursing interventions to include:  Comment: Wound care for left lower extremity cellulitis and PT/OT    Homebound status evidenced by:  Need the aid of supportive devices such as crutches, canes, wheelchairs or walkers or Needs the assistance of another person in order to leave the home. Leaving home requires a considerable and taxing effort. There is a normal inability to leave the home.    Community Physician to provide follow up care: CECY Toscano     Optional Interventions? No      I certify the face to face encounter for this home health care referral meets the CMS requirements and the encounter/clinical assessment with the patient was, in whole, or in part, for the medical condition(s) listed above, which is the primary reason for home health care. Based on my clinical findings: the service(s) are medically necessary, support the need for home health care, and the homebound criteria are met.  I certify that this patient has had a face to face encounter by myself.  Ramos Guillaume M.D. - NPI: [id number]

## 2021-11-08 NOTE — CARE PLAN
Problem: Knowledge Deficit - Standard  Goal: Patient and family/care givers will demonstrate understanding of plan of care, disease process/condition, diagnostic tests and medications  Outcome: Progressing     Problem: Pain - Standard  Goal: Alleviation of pain or a reduction in pain to the patient’s comfort goal  Outcome: Progressing   The patient is Watcher - Medium risk of patient condition declining or worsening    Shift Goals  Clinical Goals: Safety  Patient Goals: Adequate pain control, rest    Progress made toward(s) clinical / shift goals:  Pt's wounds have been cleaned and dressed.      Patient is not progressing towards the following goals: N/A

## 2021-11-08 NOTE — ED PROVIDER NOTES
ED Provider Note    CHIEF COMPLAINT  Chief Complaint   Patient presents with   • Leg Swelling     left leg, Pt has ulcers on left leg with incresed swelling       HPI  Ariadna Sepulveda is a 70 y.o. female who presents with left leg swelling.  The patient states she said swelling for quite some time.  She states she was seen here 3 days ago and has progressively gotten worse.  Now she has a couple open wounds 1 anteriorly and one posteriorly with some mild discomfort.  She does have some slight erythema.  The left leg is more swollen than the right.  She does not have any difficulty with breathing.  She does not have any chest pain.  She does not have any known history of heart failure.  She has not had any associated fevers.    REVIEW OF SYSTEMS  See HPI for further details. All other systems are negative.     PAST MEDICAL HISTORY  Past Medical History:   Diagnosis Date   • RLS (restless legs syndrome) 10/5/2011   • Arthritis    • At risk for sleep apnea     CPAP   • Hypertension        FAMILY HISTORY  [unfilled]    SOCIAL HISTORY  Social History     Socioeconomic History   • Marital status: Single     Spouse name: Not on file   • Number of children: Not on file   • Years of education: Not on file   • Highest education level: Not on file   Occupational History   • Not on file   Tobacco Use   • Smoking status: Never Smoker   • Smokeless tobacco: Never Used   Vaping Use   • Vaping Use: Never used   Substance and Sexual Activity   • Alcohol use: Yes     Alcohol/week: 1.2 oz     Types: 2 Cans of beer per week     Comment: 2 beers at a tme.    • Drug use: No   • Sexual activity: Not Currently   Other Topics Concern   • Not on file   Social History Narrative   • Not on file     Social Determinants of Health     Financial Resource Strain:    • Difficulty of Paying Living Expenses: Not on file   Food Insecurity:    • Worried About Running Out of Food in the Last Year: Not on file   • Ran Out of Food in the Last Year: Not on  "file   Transportation Needs:    • Lack of Transportation (Medical): Not on file   • Lack of Transportation (Non-Medical): Not on file   Physical Activity:    • Days of Exercise per Week: Not on file   • Minutes of Exercise per Session: Not on file   Stress:    • Feeling of Stress : Not on file   Social Connections:    • Frequency of Communication with Friends and Family: Not on file   • Frequency of Social Gatherings with Friends and Family: Not on file   • Attends Bahai Services: Not on file   • Active Member of Clubs or Organizations: Not on file   • Attends Club or Organization Meetings: Not on file   • Marital Status: Not on file   Intimate Partner Violence:    • Fear of Current or Ex-Partner: Not on file   • Emotionally Abused: Not on file   • Physically Abused: Not on file   • Sexually Abused: Not on file   Housing Stability:    • Unable to Pay for Housing in the Last Year: Not on file   • Number of Places Lived in the Last Year: Not on file   • Unstable Housing in the Last Year: Not on file       SURGICAL HISTORY  Past Surgical History:   Procedure Laterality Date   • KNEE REPLACEMENT, TOTAL  left   • OTHER ORTHOPEDIC SURGERY     • TONSILLECTOMY         CURRENT MEDICATIONS  Home Medications    **Home medications have not yet been reviewed for this encounter**         ALLERGIES  No Known Allergies    PHYSICAL EXAM  VITAL SIGNS: BP (!) 165/71   Pulse 74   Temp 36.9 °C (98.5 °F) (Temporal)   Resp 16   Ht 1.575 m (5' 2\")   Wt 108 kg (237 lb 7 oz)   LMP  (LMP Unknown)   SpO2 99%   BMI 43.43 kg/m²       Constitutional: Obese and chronically ill in appearance  HENT: Normocephalic, Atraumatic, Bilateral external ears normal, Oropharynx moist, No oral exudates, Nose normal.   Eyes: PERRLA, EOMI, Conjunctiva normal, No discharge.   Neck: Normal range of motion, No tenderness, Supple, No stridor.   Lymphatic: No lymphadenopathy noted.   Cardiovascular: Normal heart rate, Normal rhythm, No murmurs, No rubs, " No gallops.   Thorax & Lungs: Normal breath sounds, No respiratory distress, No wheezing, No chest tenderness.   Abdomen: Bowel sounds normal, Soft, No tenderness, No masses, No pulsatile masses.   Skin: Clear bullae to the anterior aspect of the left lower leg in an open bullae posteriorly with some mild surrounding erythema.   Back: No tenderness, No CVA tenderness.    Extremities: Intact distal pulses, bilateral lower extremity edema with a leg on the left significantly worse than the right, No tenderness, No cyanosis, No clubbing.   Neurologic: Alert & oriented x 3, Normal motor function, Normal sensory function, No focal deficits noted.   Psychiatric: Affect normal, Judgment normal, Mood normal.     Results for orders placed or performed during the hospital encounter of 11/07/21   CBC WITH DIFFERENTIAL   Result Value Ref Range    WBC 7.8 4.8 - 10.8 K/uL    RBC 3.98 (L) 4.20 - 5.40 M/uL    Hemoglobin 11.8 (L) 12.0 - 16.0 g/dL    Hematocrit 36.2 (L) 37.0 - 47.0 %    MCV 91.0 81.4 - 97.8 fL    MCH 29.6 27.0 - 33.0 pg    MCHC 32.6 (L) 33.6 - 35.0 g/dL    RDW 46.8 35.9 - 50.0 fL    Platelet Count 305 164 - 446 K/uL    MPV 9.7 9.0 - 12.9 fL    Neutrophils-Polys 72.40 (H) 44.00 - 72.00 %    Lymphocytes 16.60 (L) 22.00 - 41.00 %    Monocytes 7.50 0.00 - 13.40 %    Eosinophils 2.90 0.00 - 6.90 %    Basophils 0.30 0.00 - 1.80 %    Immature Granulocytes 0.30 0.00 - 0.90 %    Nucleated RBC 0.00 /100 WBC    Neutrophils (Absolute) 5.68 2.00 - 7.15 K/uL    Lymphs (Absolute) 1.30 1.00 - 4.80 K/uL    Monos (Absolute) 0.59 0.00 - 0.85 K/uL    Eos (Absolute) 0.23 0.00 - 0.51 K/uL    Baso (Absolute) 0.02 0.00 - 0.12 K/uL    Immature Granulocytes (abs) 0.02 0.00 - 0.11 K/uL    NRBC (Absolute) 0.00 K/uL   COMP METABOLIC PANEL   Result Value Ref Range    Sodium 140 135 - 145 mmol/L    Potassium 3.6 3.6 - 5.5 mmol/L    Chloride 104 96 - 112 mmol/L    Co2 24 20 - 33 mmol/L    Anion Gap 12.0 7.0 - 16.0    Glucose 112 (H) 65 - 99 mg/dL     Bun 17 8 - 22 mg/dL    Creatinine 0.59 0.50 - 1.40 mg/dL    Calcium 8.8 8.5 - 10.5 mg/dL    AST(SGOT) 15 12 - 45 U/L    ALT(SGPT) 16 2 - 50 U/L    Alkaline Phosphatase 118 (H) 30 - 99 U/L    Total Bilirubin 0.2 0.1 - 1.5 mg/dL    Albumin 4.1 3.2 - 4.9 g/dL    Total Protein 7.4 6.0 - 8.2 g/dL    Globulin 3.3 1.9 - 3.5 g/dL    A-G Ratio 1.2 g/dL   proBrain Natriuretic Peptide, NT   Result Value Ref Range    NT-proBNP 108 0 - 125 pg/mL   ESTIMATED GFR   Result Value Ref Range    GFR If African American >60 >60 mL/min/1.73 m 2    GFR If Non African American >60 >60 mL/min/1.73 m 2       COURSE & MEDICAL DECISION MAKING  Pertinent Labs & Imaging studies reviewed. (See chart for details)  This a 70-year-old female who presents to the emergency department with lower extremity swelling.  The left leg is significantly more swollen than the right.  She also has significant erythema on the left leg with some open bullae from stasis.  I suspect she does have a secondary cellulitis.  The patient will receive clindamycin orally for the cellulitis.  Ultrasound does not show any evidence of a DVT.  Labs continue to be negative and do not support a cardiac, renal, nor hepatic source.  I suspect this is all from lymphedema and venous stasis.  The patient did become hypoxic on room air while sleeping I suspect he may have sleep apnea.  Chest x-ray from 3 days ago did not show any infiltrates and she is vaccinated.  She has not had a cough nor difficulty with breathing.  We will bit the patient to the hospital for antibiotics and further wound care.    FINAL IMPRESSION  1.  Left lower extremity swelling  2.  Cellulitis  3.  Open wounds to the left lower extremity  4.  Hypoxemia suspect secondary to sleep apnea    Disposition  The patient will be admitted in stable condition         Electronically signed by: Dillon Conroy M.D., 11/7/2021 9:05 PM

## 2021-11-08 NOTE — DISCHARGE SUMMARY
"Discharge Summary    CHIEF COMPLAINT ON ADMISSION  Chief Complaint   Patient presents with   • Leg Swelling     left leg, Pt has ulcers on left leg with incresed swelling       Reason for Admission  Left lower extremity cellulitis     Admission Date  11/7/2021    CODE STATUS  Full Code    HPI & HOSPITAL COURSE  71 y/o F with RLS and HTN (not on meds) presented to [facility] ED on 11/7/2021 with left lower extremity swelling (acute on chronic) and wound. She has presented to the ED several times in the last few months for similar complaints. However, at this time, she reports her left lower extremity has been burning and been feeling more tender and swollen than usual, more than her right lower extremity. She denied fever, chills, shortness of breath, recent travels, recent infections.     Of note, patient presented to ER in June 2021. He was diagnosed with cellulitis, and given a trial of clindamycin.     In the ED, patient found to have normal vital signs. Labs done grossly unremarkable.  DVT studies negative. BNP negative. She was given a dose of cefazolin and endorsed to medicine.    On medicine unit, vitals wnl. Wound care consulted - partial thickness wound LLE; wound care instruction appreciated. With stable vitals and clinical exam, plan to DC home with oral Abx discussed (meds to bed; she claimed not receiving Abx on prior admissions). Home health set up requested but Pt declined. Hence, outpatient wound clinical referral placed.      Therefore, she is discharged in fair and stable condition to home with close outpatient follow-up.    Wound care instructions:  \"Viscopatch zinc impregnated gauze to encourage re-epithelialization of superficial 100% viable wound bed, and to provide a non-stick wound contact layer. Tubigrip G for mild compression.\"      The patient recovered much more quickly than anticipated on admission.    Discharge Date  11/08/21    FOLLOW UP ITEMS POST DISCHARGE  Wound care clinic     DISCHARGE " DIAGNOSES  Principal Problem:    Cellulitis POA: Yes  Active Problems:    RLS (restless legs syndrome) (Chronic) POA: Yes    Overactive bladder POA: Yes    Morbid obesity (HCC) POA: Yes    Hypoxia POA: Yes  Resolved Problems:    * No resolved hospital problems. *      FOLLOW UP  Future Appointments   Date Time Provider Department Center   11/15/2021  2:00 PM CECY Toscano [facility]     MEDICATIONS ON DISCHARGE     Medication List      START taking these medications      Instructions   doxycycline 100 MG capsule  Commonly known as: MONODOX   Take 1 Capsule by mouth 2 times a day for 10 days.  Dose: 100 mg        CONTINUE taking these medications      Instructions   oxybutynin 15 MG CR tablet  Commonly known as: DITROPAN XL   Take 1 Tablet by mouth every day.  Dose: 15 mg     pramipexole 1 MG Tabs  Commonly known as: MIRAPEX   Take 3 Tablets by mouth at bedtime.  Dose: 3 mg            Allergies  No Known Allergies    DIET  Orders Placed This Encounter   Procedures   • Diet Order Diet: Cardiac     Standing Status:   Standing     Number of Occurrences:   1     Order Specific Question:   Diet:     Answer:   Cardiac [6]       ACTIVITY  As tolerated.  Weight bearing as tolerated    CONSULTATIONS  Wound care    PROCEDURES  N/A    LABORATORY  Lab Results   Component Value Date    SODIUM 140 11/07/2021    POTASSIUM 3.6 11/07/2021    CHLORIDE 104 11/07/2021    CO2 24 11/07/2021    GLUCOSE 112 (H) 11/07/2021    BUN 17 11/07/2021    CREATININE 0.59 11/07/2021        Lab Results   Component Value Date    WBC 7.8 11/07/2021    HEMOGLOBIN 11.8 (L) 11/07/2021    HEMATOCRIT 36.2 (L) 11/07/2021    PLATELETCT 305 11/07/2021        Total time of the discharge process exceeds 36 minutes.

## 2021-11-08 NOTE — H&P
Hospital Medicine History & Physical Note    Date of Service  11/8/2021    Primary Care Physician  CECY Toscano    Consultants  None    Code Status  Full Code    Chief Complaint  Chief Complaint   Patient presents with   • Leg Swelling     left leg, Pt has ulcers on left leg with incresed swelling       History of Presenting Illness  Ariadna Sepulveda is a 70 y.o. female who presented 11/7/2021 with left lower extremity swelling. Patient is noted to have bilateral lower extremity swelling chronically. She has presented to the ED several times in the last few months for similar complaints. However at this time, she reports her left lower extremity has been burning and been feeling more tender and swollen than usual, more than her right lower extremity. She denies fever chills shortness of breath, recent travels, recent infections.    Of note, patient presented to ER in June 2021. He was diagnosed with cellulitis, and given a trial of clindamycin.    In the ED, patient found to have normal vital signs. However is noted that, when she was sleeping, her saturation dropped below 90%. She denies history of sleep apnea. Upon my interview with her, she was sleeping, and difficult to arouse, and noted to have fell asleep several times during interview. DVT studies negative. BNP negative. She was given a dose of cefazolin and endorsed to medicine.        I discussed the plan of care with patient.    Review of Systems  Review of Systems   Constitutional: Positive for malaise/fatigue.   HENT: Negative.    Eyes: Negative.    Respiratory: Negative.    Cardiovascular: Positive for leg swelling.   Gastrointestinal: Negative.    Genitourinary: Negative.    Musculoskeletal: Negative.    Skin: Negative.    Neurological: Negative.    Endo/Heme/Allergies: Negative.    Psychiatric/Behavioral: Negative.        Past Medical History   has a past medical history of Arthritis, At risk for sleep apnea, Hypertension, and RLS  (restless legs syndrome) (10/5/2011).    Surgical History   has a past surgical history that includes tonsillectomy; knee replacement, total (left); and other orthopedic surgery.     Family History  family history includes Alcohol abuse in her brother; Arthritis in her father; Cancer in her mother; Diabetes in her father; Drug abuse in her brother; Heart Disease in her father; Hyperlipidemia in her father; Hypertension in her father; Psychiatric Illness in her brother.   Family history reviewed with patient. There is no family history that is pertinent to the chief complaint.     Social History   reports that she has never smoked. She has never used smokeless tobacco. She reports current alcohol use of about 1.2 oz of alcohol per week. She reports that she does not use drugs.    Allergies  No Known Allergies    Medications  Prior to Admission Medications   Prescriptions Last Dose Informant Patient Reported? Taking?   oxybutynin (DITROPAN XL) 15 MG CR tablet 11/7/2021 at 0800 Patient No No   Sig: Take 1 Tablet by mouth every day.   pramipexole (MIRAPEX) 1 MG Tab 11/6/2021 at 2200 Patient No No   Sig: Take 3 Tablets by mouth at bedtime.      Facility-Administered Medications: None       Physical Exam  Temp:  [36.9 °C (98.5 °F)] 36.9 °C (98.5 °F)  Pulse:  [59-74] 69  Resp:  [16-17] 16  BP: (119-167)/(57-76) 119/58  SpO2:  [80 %-100 %] 100 %  Blood Pressure : 119/58   Temperature: 36.9 °C (98.5 °F)   Pulse: 69   Respiration: 16   Pulse Oximetry: 100 %       Physical Exam  Constitutional:       Appearance: Normal appearance. She is obese.      Comments: Sleepy upon interview, slightly difficult to arouse, fell asleep several times throughout interview   HENT:      Head: Normocephalic.      Nose: Nose normal.      Comments: Nasal cannula in place     Mouth/Throat:      Mouth: Mucous membranes are moist.   Eyes:      Extraocular Movements: Extraocular movements intact.      Conjunctiva/sclera: Conjunctivae normal.       Pupils: Pupils are equal, round, and reactive to light.   Cardiovascular:      Rate and Rhythm: Normal rate and regular rhythm.      Pulses: Normal pulses.   Pulmonary:      Effort: Pulmonary effort is normal.      Breath sounds: Normal breath sounds.   Abdominal:      General: Abdomen is flat. Bowel sounds are normal.      Palpations: Abdomen is soft.   Musculoskeletal:         General: Normal range of motion.      Cervical back: Normal range of motion.   Skin:     Comments: Lower extremities bilaterally warm to touch, left lower extremity more swollen and slightly warmer than right lower extremity  Superficial ulcers noted on anterior shin, area of bullous noted. Erythematous  No foul odor nor oozing is noted  Dorsalis pedis 2+ bilaterally   Neurological:      General: No focal deficit present.      Mental Status: She is alert and oriented to person, place, and time. Mental status is at baseline.   Psychiatric:         Mood and Affect: Mood normal.         Behavior: Behavior normal.         Thought Content: Thought content normal.         Judgment: Judgment normal.         Laboratory:  Recent Labs     11/07/21  2216   WBC 7.8   RBC 3.98*   HEMOGLOBIN 11.8*   HEMATOCRIT 36.2*   MCV 91.0   MCH 29.6   MCHC 32.6*   RDW 46.8   PLATELETCT 305   MPV 9.7     Recent Labs     11/07/21  2216   SODIUM 140   POTASSIUM 3.6   CHLORIDE 104   CO2 24   GLUCOSE 112*   BUN 17   CREATININE 0.59   CALCIUM 8.8     Recent Labs     11/07/21  2216   ALTSGPT 16   ASTSGOT 15   ALKPHOSPHAT 118*   TBILIRUBIN 0.2   GLUCOSE 112*         Recent Labs     11/07/21  2216   NTPROBNP 108         No results for input(s): TROPONINT in the last 72 hours.    Imaging:  US-EXTREMITY VENOUS LOWER UNILAT LEFT   Final Result          no X-Ray or EKG requiring interpretation    Assessment/Plan:  I anticipate this patient will require at least two midnights for appropriate medical management, necessitating inpatient admission.    * Cellulitis  Assessment &  Plan  Admit patient to medical floor  DVT studies negative, BNP negative  Cefazolin given in ED, continue  Wound care in AM     Hypoxia  Assessment & Plan  Noted to be hypoxic when sleeping, saturating 90 nasal cannula  Suspect undiagnosed sleep apnea  Consider pulmonary consult in a.m.    Morbid obesity (HCC)- (present on admission)  Assessment & Plan  15 minutes spent counseling patient on weight loss, nutrition, and healthy lifestyle      RLS (restless legs syndrome)- (present on admission)  Assessment & Plan  Continue pramipexole       VTE prophylaxis: heparin ppx

## 2021-11-08 NOTE — PROGRESS NOTES
4 Eyes Skin Assessment Completed by YOANA Terrell and YOANA Reddy.    Head WDL  Ears WDL  Nose WDL  Mouth WDL  Neck WDL  Breast/Chest WDL  Shoulder Blades WDL  Spine WDL  (R) Arm/Elbow/Hand WDL  (L) Arm/Elbow/Hand WDL  Abdomen: redness/ moisture under pannus  Groin: redness/ moisture   Scrotum/Coccyx/Buttocks WDL  (R) Leg Redness, Swelling and Edema  (L) Leg Redness, Swelling, Weeping and Edema, wounds   (R) Heel/Foot/Toe WDL  (L) Heel/Foot/Toe WDL          Devices In Places Pulse Ox, PIV      Interventions In Place Gray Ear Foams, Pillows and Pressure Redistribution Mattress    Possible Skin Injury Yes    Pictures Uploaded Into Epic Yes  Wound Consult Placed Yes  RN Wound Prevention Protocol Ordered Yes

## 2021-11-08 NOTE — DISCHARGE INSTRUCTIONS
Discharge Instructions    Discharged to home by car with relative. Discharged via wheelchair, hospital escort: Yes.  Special equipment needed: Not Applicable    Be sure to schedule a follow-up appointment with your primary care doctor or any specialists as instructed.     Discharge Plan:   Diet Plan: Discussed  Activity Level: Discussed  Confirmed Follow up Appointment: Patient to Call and Schedule Appointment  Confirmed Symptoms Management: Discussed  Medication Reconciliation Updated: Yes  Influenza Vaccine Indication: Patient Refuses    I understand that a diet low in cholesterol, fat, and sodium is recommended for good health. Unless I have been given specific instructions below for another diet, I accept this instruction as my diet prescription.   Other diet: Heart Healthy    Special Instructions: None    · Is patient discharged on Warfarin / Coumadin?   No     Depression / Suicide Risk    As you are discharged from this [facility] facility, it is important to learn how to keep safe from harming yourself.    Recognize the warning signs:  · Abrupt changes in personality, positive or negative- including increase in energy   · Giving away possessions  · Change in eating patterns- significant weight changes-  positive or negative  · Change in sleeping patterns- unable to sleep or sleeping all the time   · Unwillingness or inability to communicate  · Depression  · Unusual sadness, discouragement and loneliness  · Talk of wanting to die  · Neglect of personal appearance   · Rebelliousness- reckless behavior  · Withdrawal from people/activities they love  · Confusion- inability to concentrate     If you or a loved one observes any of these behaviors or has concerns about self-harm, here's what you can do:  · Talk about it- your feelings and reasons for harming yourself  · Remove any means that you might use to hurt yourself (examples: pills, rope, extension cords, firearm)  · Get professional help from the  community (Mental Health, Substance Abuse, psychological counseling)  · Do not be alone:Call your Safe Contact- someone whom you trust who will be there for you.  · Call your local CRISIS HOTLINE 939-7835 or 961-969-9712  · Call your local Children's Mobile Crisis Response Team [place] (851) 255-0858 or www.Promimic  · Call the toll free National Suicide Prevention Hotlines   · National Suicide Prevention Lifeline 032-079-XGKL (1446)  · National Hope Line Network 800-SUICIDE (579-3486)

## 2021-11-08 NOTE — DISCHARGE PLANNING
Meds-to-Beds: Discharge prescription orders listed below delivered to patient's bedside. YOANA Shannon notified. Patient counseled. Patient elected to have co-payment billed to patient account.      Current Outpatient Medications   Medication Sig Dispense Refill   • doxycycline (MONODOX) 100 MG capsule Take 1 Capsule by mouth 2 times a day for 10 days. 20 Capsule 0      Radha Oswald, PharmD

## 2021-11-08 NOTE — ED NOTES
Pt states her left leg appears more swollen than when she was here a couple days ago.  Pt states she tried to get the copper compression socks but they started to react with her artificial knee replacement.

## 2021-11-08 NOTE — PROGRESS NOTES
Pt's discharge paperwork printed out and gone over with the patient.  All questions and concerns addressed.  Pt's IV was removed and all belongings gathered for discharge.  Pt was escorted out via wheelchair.

## 2021-11-08 NOTE — ASSESSMENT & PLAN NOTE
Noted to be hypoxic when sleeping, saturating 90 nasal cannula  Suspect undiagnosed sleep apnea  Consider pulmonary consult in a.m.

## 2021-11-08 NOTE — PROGRESS NOTES
Report received from YOANA Terrell.  Patient sitting up in bed talking on her room phone.  POC gone over with the patient and all questions answered. Wound Care to see patient today before discharge.  Patient A & O  X 4.  No apparent signs of distress.  Safety precautions in place.  Patient educated to call for assistance.  Hourly rounding in place.  COVID 19 Surge in effect.

## 2021-11-08 NOTE — DISCHARGE PLANNING
Hospital Care Management Discharge Planning       Anticipated Discharge Disposition:   · Home with outpatient wound clinic     Action:   · This RN CM met with the patient at the bedside to discuss home health   · Pt declining HH, as she is currently living with her friend until the first of the month  · Pt agreeable with outpatient wound clinic   · Dr. Guillaume updated and placed outpatient wound clinic referral   · RN CM called outpatient wound clinic at ext. [phone]  · No answer, voicemail left requesting a call back to schedule an appointment     Barriers to Discharge:   · Outpatient wound clinic appointment scheduling      Plan:   · F/U with outpatient wound clinic   · Hospital Care Management Team to continue to provide support services and assistance with discharge planning as needed.     [id number] Addendum: This RN CM attempted to reach appointment scheduling for outpatient wound clinic again at the above phone number. No answer. RN CM will follow up.    0965 Addendum: This RN CM spoke to Antonio at the outpatient wound clinic ([id number]) and obtained appointment for pt. Only appointment available for the rest of the week is Thursday, 11/11/21 at 0730- appointment scheduled for this time. Bedside RN updated and requested to update pt of above. Appointment added to AVS along with outpatient wound clinic office phone number.

## 2021-11-08 NOTE — WOUND TEAM
Callus and non viable tissue to Left lateral distal plantar foot debrided away using scalpel, forceps and scissors. <20 cm2 debrided. no bleeding noted.

## 2021-11-08 NOTE — ED TRIAGE NOTES
Chief Complaint   Patient presents with   • Leg Swelling     left leg, Pt has ulcers on left leg with incresed swelling     Pt ambulatory to triage for above complaint. Pt seen 3 days ago for the same. Pt states it is getting worse and she is in more pain.

## 2021-11-08 NOTE — ED NOTES
Med rec updated and complete. Allergies reviewed. Pt denies antibiotic use in last 30 days.    Home pharmacy [facility] [phone]

## 2021-11-08 NOTE — CARE PLAN
The patient is Stable - Low risk of patient condition declining or worsening    Shift Goals  Clinical Goals: Safety  Patient Goals: Adequate pain control, rest    Progress made toward(s) clinical / shift goals:      Problem: Knowledge Deficit - Standard  Goal: Patient and family/care givers will demonstrate understanding of plan of care, disease process/condition, diagnostic tests and medications  Outcome: Progressing  Note: Educated patient on POC, pain management, medication administration, ambulation, safety, usage of call light, interventions to maintain/ improve skin integrity, diet. Will continue to educate patient on POC accordingly.     Problem: Pain - Standard  Goal: Alleviation of pain or a reduction in pain to the patient’s comfort goal  Outcome: Progressing  Flowsheets (Taken 11/8/2021 0204)  Pain Rating Scale (NPRS): 5  Note: Provided extra pillows and blankets. Will continue to assess and treat accordingly.

## 2021-11-08 NOTE — ASSESSMENT & PLAN NOTE
Admit patient to medical floor  DVT studies negative, BNP negative  Cefazolin given in ED, continue  Wound care in AM

## 2021-11-08 NOTE — WOUND TEAM
[facility]  Inpatient Services  Initial Wound and Skin Care Evaluation    Admission Date: 11/7/2021     Last order of IP CONSULT TO WOUND CARE was found on 11/8/2021 from Hospital Encounter on 11/7/2021     HPI, PMH, SH: Reviewed    Past Surgical History:   Procedure Laterality Date   • KNEE REPLACEMENT, TOTAL  left   • OTHER ORTHOPEDIC SURGERY     • TONSILLECTOMY       Social History     Tobacco Use   • Smoking status: Never Smoker   • Smokeless tobacco: Never Used   Substance Use Topics   • Alcohol use: Yes     Alcohol/week: 1.2 oz     Types: 2 Cans of beer per week     Comment: 2 beers at a tme.      Chief Complaint   Patient presents with   • Leg Swelling     left leg, Pt has ulcers on left leg with incresed swelling     Diagnosis: Cellulitis [L03.90]    Unit where seen by Wound Team: T207/00     WOUND CONSULT/FOLLOW UP RELATED TO:  LLE     WOUND HISTORY:  Pt is an older obese woman who presented with left leg swelling and weeping. Pt states she has had difficulty with her legs for roughly a year and last admit (October 2020) she was told to buy compression stockings. Pt purchased compression stockings at [facility] that had copper in them. Pt states she has a titanium knee and did not tolerate the compression with copper. Pt presented this admission from [facility] with same complaint. Wound team was requested to evaluate wounds.    WOUND ASSESSMENT/LDA  Wound 11/08/21 Partial Thickness Wound Leg Anterior;Posterior Left (Active)   Wound Image     11/08/21 0900   Site Assessment Purple;Pink;Red;Excoriated;Drainage;Fragile;Painful 11/08/21 0900   Periwound Assessment Warm;Pink;Red;Fragile 11/08/21 0900   Margins Defined edges 11/08/21 0900   Closure Adhesive bandage 11/08/21 0900   Drainage Amount Small 11/08/21 0900   Drainage Description Serosanguineous 11/08/21 0900   Treatments Cleansed;Site care;Offloading;Compression 11/08/21 0900   Wound Cleansing Approved Wound Cleanser 11/08/21 0900   Periwound  Protectant Skin Protectant Wipes to Periwound 11/08/21 0900   Dressing Cleansing/Solutions Not Applicable 11/08/21 0900   Dressing Options Viscopaste;Mepilex;Tubigrip 11/08/21 0900   Dressing Changed New 11/08/21 0900   Dressing Status Clean;Dry;Intact 11/08/21 0900   Dressing Change/Treatment Frequency Daily, and As Needed 11/08/21 0900   NEXT Dressing Change/Treatment Date 11/09/21 11/08/21 0900   NEXT Weekly Photo (Inpatient Only) 11/15/21 11/08/21 0900   Non-staged Wound Description Partial thickness 11/08/21 0900   Wound Length (cm) 3 cm 11/08/21 0900   Wound Width (cm) 3 cm 11/08/21 0900   Wound Depth (cm) 0.1 cm 11/08/21 0900   Wound Surface Area (cm^2) 9 cm^2 11/08/21 0900   Wound Volume (cm^3) 0.9 cm^3 11/08/21 0900   Shape Circular x1, irregular x3 11/08/21 0900   Wound Odor None 11/08/21 0900   Exposed Structures None 11/08/21 0900   WOUND NURSE ONLY - Time Spent with Patient (mins) 45 11/08/21 0900   Number of days: 0        Vascular:    ISABEL:   No results found.    Lab Values:    Lab Results   Component Value Date/Time    WBC 7.8 11/07/2021 10:16 PM    RBC 3.98 (L) 11/07/2021 10:16 PM    HEMOGLOBIN 11.8 (L) 11/07/2021 10:16 PM    HEMATOCRIT 36.2 (L) 11/07/2021 10:16 PM    CREACTPROT 1.01 (H) 06/16/2020 08:44 PM    SEDRATEWES 25 06/16/2020 08:44 PM        Culture Results show:  No results found for this or any previous visit (from the past 720 hour(s)).    Pain Level/Medicated:  Mild pain with lifting leg       INTERVENTIONS BY WOUND TEAM:  Chart and images reviewed. Discussed with bedside RN. All areas of concern (based on picture review, LDA review and discussion with bedside RN) have been thoroughly assessed. Documentation of areas based on significant findings. This RN in to assess patient. Performed standard wound care which includes appropriate positioning, dressing removal and non-selective debridement. Pictures and measurements obtained weekly if/when required.  Preparation for Dressing removal:  NA silicone adhesive foam removed without difficulty  Non-selectively Debrided with:  NS and gauze.  Sharp debridement: Left lateral plantar foot callus debrided by Kaylah RN (see note for more details).  Tiana wound: Cleansed with NS and gauze, Prepped with no sting  Primary Dressing: fan folded viscopaste  Secondary (Outer) Dressing: Mepilex and tubigrip G    Interdisciplinary consultation: Patient, Bedside RN (Mirtha), Wound RN (Kaylah)    EVALUATION / RATIONALE FOR TREATMENT:  Most Recent Date:  11/8/21: Viscopatch zinc impregnated gauze to encourage re-epithelialization of superficial 100% viable wound bed, and to provide a non-stick wound contact layer. Tubigrip G for mild compression.     Goals: Steady decrease in wound area and depth weekly.    WOUND TEAM PLAN OF CARE ([X] for frequency of wound follow up,):   Nursing to follow orders written for wound care. Contact wound team if area fails to progress, deteriorates or with any questions/concerns  Dressing changes by wound team:                   Follow up 3 times weekly:                NPWT change 3 times weekly:     Follow up 1-2 times weekly:      Follow up Bi-Monthly:                   Follow up as needed:   X  Other (explain):     NURSING PLAN OF CARE ORDERS (X):  Dressing changes: See Dressing Care orders: X  Skin care: See Skin Care orders:   RN Prevention Protocol:   Rectal tube care: See Rectal Tube Care orders:   Other orders:    RSKIN:   CURRENTLY IN PLACE (X), APPLIED THIS VISIT (A), ORDERED (O):   Q shift Los:  X  Q shift pressure point assessments:  X    Surface/Positioning   Pressure redistribution mattress      X      Low Airloss          Bariatric foam      Bariatric ART     Waffle cushion        Waffle Overlay          Reposition q 2 hours  Self mobile in bed    TAPs Turning system     Z Samir Pillow     Offloading/Redistribution GINI  Sacral Mepilex (Silicone dressing)     Heel Mepilex (Silicone dressing)         Heel float boots (Prevalon  boot)             Float Heels off Bed with Pillows           Respiratory Room air  Silicone O2 tubing         Gray Foam Ear protectors     Cannula fixation Device (Tender )          High flow offloading Clip    Elastic head band offloading device      Anchorfast                                                         Trach with Optifoam split foam             Containment/Moisture Prevention Continent    Rectal tube or BMS    Purwick/Condom Cath        Kaur Catheter    Barrier wipes           Barrier paste       Antifungal tx      Interdry        Mobilization       Up to chair      X  Ambulate  X    PT/OT      Nutrition       Dietician        Diabetes Education      PO  X   TF     TPN     NPO   # days     Other        Anticipated discharge plans:   LTACH:        SNF/Rehab:                  Home Health Care:           Outpatient Wound Center:     X       Self/Family Care:        Other:                  Vac Discharge Needs:   Not Applicable Pt not on a wound vac:     X  Regular Vac while inpatient, alternative dressing at DC:        Regular Vac in use and continued at DC:            Reg. Vac w/ Skin Sub/Biologic in use. Will need to be changed 2x wkly:      Veraflo Vac while inpatient, ok to transition to Regular Vac on Discharge:           Veraflo Vac while inpatient, will need to remain on Veraflo Vac upon discharge:

## 2021-11-08 NOTE — DISCHARGE INSTR - SUPPLEMENTARY INSTRUCTIONS
Outpatient wound clinic appointment scheduled for Thursday 11/11/21 at 7:30 AM. If you need to reach the clinic, please call [phone].

## 2021-11-11 ENCOUNTER — HOSPITAL ENCOUNTER (EMERGENCY)
Facility: MEDICAL CENTER | Age: 71
End: 2021-11-11
Attending: EMERGENCY MEDICINE | Admitting: EMERGENCY MEDICINE
Payer: COMMERCIAL

## 2021-11-11 VITALS
SYSTOLIC BLOOD PRESSURE: 135 MMHG | RESPIRATION RATE: 17 BRPM | HEIGHT: 62 IN | BODY MASS INDEX: 43.24 KG/M2 | OXYGEN SATURATION: 97 % | DIASTOLIC BLOOD PRESSURE: 60 MMHG | WEIGHT: 235 LBS | HEART RATE: 78 BPM | TEMPERATURE: 98 F

## 2021-11-11 DIAGNOSIS — T50.905A MEDICATION REACTION, INITIAL ENCOUNTER: ICD-10-CM

## 2021-11-11 DIAGNOSIS — Z51.89 VISIT FOR WOUND CHECK: ICD-10-CM

## 2021-11-11 DIAGNOSIS — L03.116 CELLULITIS OF LEFT LOWER EXTREMITY: ICD-10-CM

## 2021-11-11 PROCEDURE — 99283 EMERGENCY DEPT VISIT LOW MDM: CPT

## 2021-11-11 PROCEDURE — 302875 HCHG BANDAGE ACE 4 OR 6""

## 2021-11-11 RX ORDER — CLINDAMYCIN HYDROCHLORIDE 150 MG/1
300 CAPSULE ORAL 3 TIMES DAILY
Qty: 60 CAPSULE | Refills: 0 | Status: SHIPPED | OUTPATIENT
Start: 2021-11-11 | End: 2021-11-18

## 2021-11-11 ASSESSMENT — FIBROSIS 4 INDEX: FIB4 SCORE: 0.84

## 2021-11-11 NOTE — ED PROVIDER NOTES
ED Provider Note    Scribed for Darline Wen M.D. by Juliana Lopez. 11/11/2021, 3:36 PM.    Primary care provider: CECY Toscano  Means of arrival: Private Vehicle  History obtained from: Patient  History limited by: None    CHIEF COMPLAINT  Chief Complaint   Patient presents with    Medication Reaction    Dressing Change       HPI  Ariadna Sepulveda is a 70 y.o. female who presents to the Emergency Department for evaluation of medication reaction onset today.  She was hospitalized here on 11/7 for her wound and was prescribed Doxycycline for leg cellulitis. She has only taken Doxycycline for a few days. She notes that she was on her way to a dressing change appointment but began experiencing visual hallucinations. She is not currently experiencing these symptoms in the ED.Her medication regimen also includes Oxybutynin for urination and Mirapex. She has also been experiencing shortness of breath and thinks she is having a reaction to her medication. She denies rash, tongue swelling, fever, or vomiting. She denies medical history of diabetes. She denies any known allergies to antibiotics. She states her wound does not having any increased drainage. There are some slightly decreased swelling. She has not had any fevers and there is no redness.    REVIEW OF SYSTEMS  Pertinent positives include medication regimen, shortness of breath. Pertinent negatives include no rash, tongue swelling, fever, or vomiting.      PAST MEDICAL HISTORY   has a past medical history of Arthritis, At risk for sleep apnea, Hypertension, and RLS (restless legs syndrome) (10/5/2011).    SURGICAL HISTORY   has a past surgical history that includes tonsillectomy; knee replacement, total (left); and other orthopedic surgery.    SOCIAL HISTORY  Social History     Tobacco Use    Smoking status: Never Smoker    Smokeless tobacco: Never Used   Vaping Use    Vaping Use: Never used   Substance Use Topics    Alcohol use: Yes      "Alcohol/week: 1.2 oz     Types: 2 Cans of beer per week     Comment: 2 beers at a tme.     Drug use: No      Social History     Substance and Sexual Activity   Drug Use No       FAMILY HISTORY  Family History   Problem Relation Age of Onset    Cancer Mother     Arthritis Father     Diabetes Father     Heart Disease Father     Hypertension Father     Hyperlipidemia Father     Psychiatric Illness Brother     Drug abuse Brother     Alcohol abuse Brother     Lung Disease Neg Hx        CURRENT MEDICATIONS  Home Medications       Reviewed by Ruby Borden R.N. (Registered Nurse) on 11/11/21 at 1113  Med List Status: <None>     Medication Last Dose Status   doxycycline (MONODOX) 100 MG capsule  Active   oxybutynin (DITROPAN XL) 15 MG CR tablet  Active   pramipexole (MIRAPEX) 1 MG Tab  Active                    ALLERGIES  No Known Allergies    PHYSICAL EXAM  VITAL SIGNS: /62   Pulse 79   Temp 36.6 °C (97.8 °F) (Temporal)   Resp 18   Ht 1.575 m (5' 2\")   Wt 107 kg (235 lb)   LMP  (LMP Unknown)   SpO2 97%   BMI 42.98 kg/m²     Constitutional: Well developed, Well nourished, No acute distress, Non-toxic appearance.   Cardiovascular: Good pulses on the affected extremity. Good capillary refill.  Thorax & Lungs: No respiratory distress  Extremities:  3+ edema bilaterally. On the left lower extremity there is an intact bandage. She has mild diffuse erythema. Minimal drainage on the dressing. Right lower extremity has a bandage with minimal discharge.   Neurologic: Good sensation to light touch on the distal affected extremity.  Psychiatric: Affect and Mood normal. No hallucinations.     COURSE & MEDICAL DECISION MAKING  Nursing notes, VS, PMSFHx reviewed in chart.    Patient presents to the emergency department over concern she is having a reaction to the doxycycline. Patients chart was reviewed and she was recently diagnosed with cellulitis. I am not completely certain she is having a reaction to the " doxycycline. I am not sure how to explain her complaint of hallucinations. She is currently ANO x4. She not having any hallucinations and feels fine. I do not see any obvious evidence of allergic reaction. The other medication she takes she has been on for much longer period of time. Review of the chart shows she was ultrasounded and there is no evidence of a DVT. She also had a normal echocardiogram of her heart. I think at this point if she feels like she is having a reaction we will change her antibiotic to clindamycin. We will also do a dressing changes she was supposed to have that done at the wound care center. She is advised to call tomorrow to get her next wound care appointment. Return precautions were given. Patient overall looks well. She is not hypoxic and does not have a fever. I do not feel she needs antibiotics at this time.    3:36 PM - Patient seen and examined at bedside. Patient presents with medication reaction to Doxycycline. Discussed plan of care including changing her antibiotic medication to potentially Clindamycin and changing her wound dressing. I informed the patient for plans of discharge and return instructions. She will be prescribed Clindamycin 150 mg to take three times a day for the next 10 days. Patient verbalizes understanding and agreement to this plan of care.       The patient will return for new or worsening symptoms and is stable at the time of discharge.    DISPOSITION:  Patient will be discharged home in stable condition.    FOLLOW UP:  Nehal Palacio, MEEKPStefanieRStefanieN.  [address] [facility] [address]  [phone]    In 2 days  If symptoms worsen, return to the er.      OUTPATIENT MEDICATIONS:  New Prescriptions    CLINDAMYCIN (CLEOCIN) 150 MG CAP    Take 2 Capsules by mouth 3 times a day for 10 days.         FINAL IMPRESSION  1. Visit for wound check    2. Medication reaction, initial encounter    3. Cellulitis of left lower extremity          IJuliana  (Scribe), am scribing for, and in the presence of, Darline Wen M.D..    Electronically signed by: Juliana Lopez (Scribe), 11/11/2021    IDarline M.D. personally performed the services described in this documentation, as scribed by Juliana Lopez in my presence, and it is both accurate and complete.    The note accurately reflects work and decisions made by me.  Darline Wen M.D.  11/11/2021  5:24 PM

## 2021-11-11 NOTE — ED TRIAGE NOTES
"Vitals:    11/11/21 1105   BP: 139/62   Pulse: 79   Resp: 18   Temp: 36.6 °C (97.8 °F)   SpO2: 97%     Chief Complaint   Patient presents with   • Medication Reaction   • Dressing Change     Pt is taking doxycycline for leg cellulitis. She apparently left the house this morning a couple hours early for a dressing change appointment but never made it because she states \"the ABX are making me hallucinate, I was out in the middle of the street and seeing people that weren't there.\" Pt denies hallucinations currently but states she didn't make it to the wound care appt and needs her dressings changed. Pt is calm and cooperative.     "

## 2021-11-12 NOTE — ED NOTES
Pt dressing changed on left leg mepilex x 2 and ace wrapped both lower legs. Pt verbalized understanding of dc instructions and rx given. Pt ambulated out of ed w/o difficulty

## 2021-11-12 NOTE — DISCHARGE INSTRUCTIONS
Call today or early tomorrow to get your next wound care appointment. Stop the doxycycline and try the clindamycin for the leg infection. Any fever, continued symptoms or concern return. I hope you get better soon.

## 2021-11-18 ENCOUNTER — NON-PROVIDER VISIT (OUTPATIENT)
Dept: WOUND CARE | Facility: MEDICAL CENTER | Age: 71
End: 2021-11-18
Attending: STUDENT IN AN ORGANIZED HEALTH CARE EDUCATION/TRAINING PROGRAM
Payer: COMMERCIAL

## 2021-11-18 PROCEDURE — 97597 DBRDMT OPN WND 1ST 20 CM/<: CPT

## 2021-11-18 PROCEDURE — 99211 OFF/OP EST MAY X REQ PHY/QHP: CPT

## 2021-11-18 NOTE — PATIENT INSTRUCTIONS
Avoid prolonged standing or sitting without elevating your legs.    - Multilayer compression wrap to left leg. Do not get wet and keep on for the week. Only remove if temperature or sensation changes.   If compression needs to be removed, un-wrap it do not cut it off.     Should you experience any significant changes in your wound(s), such as infection (redness, swelling, localized heat, increased pain, fever > 101 F, chills) or have any questions regarding your home care instructions, please contact the wound center at [phone]. If after hours, contact your primary care physician or go to the hospital emergency room.   Keep dressing clean, dry and covered while bathing. Only change dressing if it becomes over saturated, soiled or falls off.

## 2021-11-18 NOTE — CERTIFICATION
"Non Provider Encounter- Lower Extremity Ulcer    HISTORY OF PRESENT ILLNESS  Wound History:    START OF CARE IN CLINIC: 11/18/2021    REFERRING PROVIDER: Ramos Guillaume MD     WOUND ETIOLOGY: Cellulitis   LOCATION: LLE Posterior   HISTORY: 71y/o female pt presents with LLE Posterior wound. Pt states she started to have cellulitis about 6months ago and wounds to LLE anterior kepts reopening. They are closed with scar tissue at this visit. Pt states posterior wound is new and opened about 3 weeks ago in October. Pt was admitted for overnight observation at [facility] from 11/7-11/8/21 and was discharged with prescription for Doxycycline. She has completed Doxycycline but still has notable edema. Pt states she was never diagnosed with venous disease.    TOBACCO USE: Never smoker    FALL RISK ASSESSMENT:    x65 years or older     Fall within the last 2 years   Uses ambulatory devices  Loss of protective sensation in feet   Use of prostethic/orthotic    Presence of lower extremity/foot/toe amputation   Taking medication that increases risk (per facility policy)    Interventions Recommended (if any of the above are selected):   Use of Assistive Device:   Supervision with ambulation: Caregiver   Assistance with ambulation: Caregiver   xHome safety education: Educational material provided    MOST RECENT VASCULAR STUDIES: Venous study 11/7/21:    \"CONCLUSIONS   Left lower extremity venous duplex imaging.    No evidence of superficial or deep venous thrombosis.\"    11/18/21: DP multiphasic and PT biphasic per doppler.    Patient allergies and medications reviewed via Epic.     WOUND ASSESSMENT      Wound 11/18/21 LLE Posterior Wound (Active)   Wound Image    11/18/21 1430   Site Assessment Red;Granulation tissue 11/18/21 1430   Periwound Assessment Edema;Scar tissue 11/18/21 1430   Margins Attached edges 11/18/21 1430   Drainage Amount Small 11/18/21 1430   Drainage Description Serosanguineous 11/18/21 1430   Treatments " Cleansed;Topical Lidocaine;CSWD - Conservative Sharp Wound Debridement;Site care 11/18/21 1430   Wound Cleansing Puracyn [place] 11/18/21 1430   Periwound Protectant Viscopaste 11/18/21 1430   Dressing Cleansing/Solutions Not Applicable 11/18/21 1430   Dressing Options Viscopaste;Unna Boot;Coban 11/18/21 1430   Non-staged Wound Description Full thickness 11/18/21 1430   Wound Length (cm) 5.2 cm 11/18/21 1430   Wound Width (cm) 5 cm 11/18/21 1430   Wound Surface Area (cm^2) 26 cm^2 11/18/21 1430   Post-Procedure Length (cm) 5.2 cm 11/18/21 1430   Post-Procedure Width (cm) 5 cm 11/18/21 1430   Post-Procedure Surface Area (cm^2) 26 cm^2 11/18/21 1430   Wound Bed Granulation (%) - Post-Procedure 100 % 11/18/21 1430   Tunneling (cm) 0 cm 11/18/21 1430   Undermining (cm) 0 cm 11/18/21 1430   Wound Odor None 11/18/21 1430   Exposed Structures None 11/18/21 1430         Procedures:      -2% viscous lidocaine applied topically to wound bed for approximately 5 minutes prior to debridement  -Curette used to debride wound bed. Partial surface of wound, ~10cm2 debrided.  -Refer to flowsheet for wound care details.     PATIENT EDUCATION  - Etiology of venous ulceration discussed with patient  - Importance of managing edema for healing of ulcer, and for prevention of new ulcer development  -Need for lifelong compression of lower legs   -Elevate legs above the level of the heart periodically throughout the day.  - Importance of adequate nutrition for wound healing  -Increase protein intake (unless contraindicated by renal status)  -Discussed importance of smoking cessation.  -Advised to go to ER for any increased redness, swelling, drainage or odor, or if patient develops fever, chills, nausea or vomiting.

## 2021-11-22 ENCOUNTER — NON-PROVIDER VISIT (OUTPATIENT)
Dept: WOUND CARE | Facility: MEDICAL CENTER | Age: 71
End: 2021-11-22
Attending: STUDENT IN AN ORGANIZED HEALTH CARE EDUCATION/TRAINING PROGRAM
Payer: COMMERCIAL

## 2021-11-22 ENCOUNTER — HOSPITAL ENCOUNTER (EMERGENCY)
Facility: MEDICAL CENTER | Age: 71
End: 2021-11-22
Attending: EMERGENCY MEDICINE
Payer: COMMERCIAL

## 2021-11-22 ENCOUNTER — APPOINTMENT (OUTPATIENT)
Dept: RADIOLOGY | Facility: MEDICAL CENTER | Age: 71
End: 2021-11-22
Attending: EMERGENCY MEDICINE
Payer: COMMERCIAL

## 2021-11-22 ENCOUNTER — HOSPITAL ENCOUNTER (OUTPATIENT)
Facility: MEDICAL CENTER | Age: 71
End: 2021-11-22
Attending: NURSE PRACTITIONER
Payer: COMMERCIAL

## 2021-11-22 VITALS
DIASTOLIC BLOOD PRESSURE: 64 MMHG | BODY MASS INDEX: 46.01 KG/M2 | TEMPERATURE: 97.4 F | WEIGHT: 250 LBS | HEIGHT: 62 IN | RESPIRATION RATE: 18 BRPM | HEART RATE: 61 BPM | OXYGEN SATURATION: 94 % | SYSTOLIC BLOOD PRESSURE: 135 MMHG

## 2021-11-22 DIAGNOSIS — N32.81 OVERACTIVE BLADDER: ICD-10-CM

## 2021-11-22 DIAGNOSIS — T14.8XXA WOUND INFECTION: Primary | ICD-10-CM

## 2021-11-22 DIAGNOSIS — K59.00 CONSTIPATION, UNSPECIFIED CONSTIPATION TYPE: ICD-10-CM

## 2021-11-22 DIAGNOSIS — K80.20 CALCULUS OF GALLBLADDER WITHOUT CHOLECYSTITIS WITHOUT OBSTRUCTION: ICD-10-CM

## 2021-11-22 DIAGNOSIS — T14.8XXA WOUND INFECTION: ICD-10-CM

## 2021-11-22 DIAGNOSIS — L08.9 WOUND INFECTION: Primary | ICD-10-CM

## 2021-11-22 DIAGNOSIS — L08.9 WOUND INFECTION: ICD-10-CM

## 2021-11-22 DIAGNOSIS — R10.9 RIGHT FLANK PAIN: ICD-10-CM

## 2021-11-22 LAB
ALBUMIN SERPL BCP-MCNC: 3.5 G/DL (ref 3.2–4.9)
ALBUMIN/GLOB SERPL: 1.1 G/DL
ALP SERPL-CCNC: 83 U/L (ref 30–99)
ALT SERPL-CCNC: 13 U/L (ref 2–50)
ANION GAP SERPL CALC-SCNC: 9 MMOL/L (ref 7–16)
APPEARANCE UR: CLEAR
AST SERPL-CCNC: 16 U/L (ref 12–45)
BASOPHILS # BLD AUTO: 0.4 % (ref 0–1.8)
BASOPHILS # BLD: 0.02 K/UL (ref 0–0.12)
BILIRUB SERPL-MCNC: 0.6 MG/DL (ref 0.1–1.5)
BILIRUB UR QL STRIP.AUTO: NEGATIVE
BUN SERPL-MCNC: 13 MG/DL (ref 8–22)
CALCIUM SERPL-MCNC: 8.5 MG/DL (ref 8.5–10.5)
CHLORIDE SERPL-SCNC: 106 MMOL/L (ref 96–112)
CO2 SERPL-SCNC: 24 MMOL/L (ref 20–33)
COLOR UR: YELLOW
CREAT SERPL-MCNC: 0.37 MG/DL (ref 0.5–1.4)
EOSINOPHIL # BLD AUTO: 0.19 K/UL (ref 0–0.51)
EOSINOPHIL NFR BLD: 3.7 % (ref 0–6.9)
ERYTHROCYTE [DISTWIDTH] IN BLOOD BY AUTOMATED COUNT: 46.1 FL (ref 35.9–50)
GLOBULIN SER CALC-MCNC: 3.2 G/DL (ref 1.9–3.5)
GLUCOSE SERPL-MCNC: 92 MG/DL (ref 65–99)
GLUCOSE UR STRIP.AUTO-MCNC: NEGATIVE MG/DL
HCT VFR BLD AUTO: 36.1 % (ref 37–47)
HGB BLD-MCNC: 11.2 G/DL (ref 12–16)
IMM GRANULOCYTES # BLD AUTO: 0.01 K/UL (ref 0–0.11)
IMM GRANULOCYTES NFR BLD AUTO: 0.2 % (ref 0–0.9)
KETONES UR STRIP.AUTO-MCNC: ABNORMAL MG/DL
LEUKOCYTE ESTERASE UR QL STRIP.AUTO: NEGATIVE
LYMPHOCYTES # BLD AUTO: 1.31 K/UL (ref 1–4.8)
LYMPHOCYTES NFR BLD: 25.3 % (ref 22–41)
MCH RBC QN AUTO: 28.3 PG (ref 27–33)
MCHC RBC AUTO-ENTMCNC: 31 G/DL (ref 33.6–35)
MCV RBC AUTO: 91.2 FL (ref 81.4–97.8)
MICRO URNS: ABNORMAL
MONOCYTES # BLD AUTO: 0.43 K/UL (ref 0–0.85)
MONOCYTES NFR BLD AUTO: 8.3 % (ref 0–13.4)
NEUTROPHILS # BLD AUTO: 3.21 K/UL (ref 2–7.15)
NEUTROPHILS NFR BLD: 62.1 % (ref 44–72)
NITRITE UR QL STRIP.AUTO: NEGATIVE
NRBC # BLD AUTO: 0 K/UL
NRBC BLD-RTO: 0 /100 WBC
PH UR STRIP.AUTO: 6 [PH] (ref 5–8)
PLATELET # BLD AUTO: 305 K/UL (ref 164–446)
PMV BLD AUTO: 9.4 FL (ref 9–12.9)
POTASSIUM SERPL-SCNC: 3.6 MMOL/L (ref 3.6–5.5)
PROT SERPL-MCNC: 6.7 G/DL (ref 6–8.2)
PROT UR QL STRIP: NEGATIVE MG/DL
RBC # BLD AUTO: 3.96 M/UL (ref 4.2–5.4)
RBC UR QL AUTO: NEGATIVE
SODIUM SERPL-SCNC: 139 MMOL/L (ref 135–145)
SP GR UR STRIP.AUTO: 1.02
UROBILINOGEN UR STRIP.AUTO-MCNC: 1 MG/DL
WBC # BLD AUTO: 5.2 K/UL (ref 4.8–10.8)

## 2021-11-22 PROCEDURE — 99285 EMERGENCY DEPT VISIT HI MDM: CPT | Mod: 25

## 2021-11-22 PROCEDURE — 87077 CULTURE AEROBIC IDENTIFY: CPT

## 2021-11-22 PROCEDURE — 87205 SMEAR GRAM STAIN: CPT

## 2021-11-22 PROCEDURE — 74176 CT ABD & PELVIS W/O CONTRAST: CPT | Mod: ME

## 2021-11-22 PROCEDURE — 87186 SC STD MICRODIL/AGAR DIL: CPT

## 2021-11-22 PROCEDURE — 87070 CULTURE OTHR SPECIMN AEROBIC: CPT

## 2021-11-22 PROCEDURE — 85025 COMPLETE CBC W/AUTO DIFF WBC: CPT

## 2021-11-22 PROCEDURE — 80053 COMPREHEN METABOLIC PANEL: CPT

## 2021-11-22 PROCEDURE — 36415 COLL VENOUS BLD VENIPUNCTURE: CPT

## 2021-11-22 PROCEDURE — 97597 DBRDMT OPN WND 1ST 20 CM/<: CPT

## 2021-11-22 PROCEDURE — 81003 URINALYSIS AUTO W/O SCOPE: CPT

## 2021-11-22 ASSESSMENT — LIFESTYLE VARIABLES: DO YOU DRINK ALCOHOL: NO

## 2021-11-22 ASSESSMENT — FIBROSIS 4 INDEX: FIB4 SCORE: 0.84

## 2021-11-22 NOTE — ED TRIAGE NOTES
"Chief Complaint   Patient presents with   • Flank Pain     right flank pain exacerbated by twisting from left to right       Patient BIB EMS for complaints of right flank pain for the past week. Patient states that twisting from left to right makes the pain worse and describes the pain as a sharp, intermittent pain.    EMS established IV and administered 4mg Zofran and 50 mcg Fentanyl PTA.    Patient placed on 2L/NC after medication administration D/T SpO2 dropping to 85% on room air.     SpO2 99% on 2L/NC    Pt is alert and oriented, speaking in full sentences, follows commands and responds appropriately to questions. Resp are even and unlabored.      Pt placed in lobby. Pt educated on triage process. Pt encouraged to alert staff for any changes.     Patient and staff wearing appropriate PPE    /55   Pulse (!) 58   Temp 36.1 °C (97 °F) (Temporal)   Resp 18   Ht 1.575 m (5' 2\")   Wt 113 kg (250 lb)   SpO2 99%     "

## 2021-11-22 NOTE — ED NOTES
Discharged home to self. Pt understands follow up. Continue all appointments as scheduled. All questions answered.

## 2021-11-22 NOTE — ED PROVIDER NOTES
ED Provider Note    CHIEF COMPLAINT  Chief Complaint   Patient presents with   • Flank Pain     right flank pain exacerbated by twisting from left to right       HPI  Ariadna Sepulveda is a 70 y.o. female who presents to the emergency department by ambulance for right flank pain.  Patient describes right flank pain for couple of hours.  However, it has resolved at this time.  She is pain-free.  No nausea or vomiting.  She describes normal bowel movements every few days, this is normal for her.  No dysuria, hematuria or frequency.  No fever or chills.  Denies trauma, injury or strain pattern.    Patient states she either has history of kidney stone or gallstone.  She has not followed up.    Also with history of cellulitis, chronic wound to left lower leg which is followed by wound care.  She has an appointment this afternoon.    REVIEW OF SYSTEMS  See HPI for further details. All other systems are negative.     PAST MEDICAL HISTORY   has a past medical history of Arthritis, At risk for sleep apnea, Hypertension, and RLS (restless legs syndrome) (10/5/2011).    SOCIAL HISTORY  Social History     Tobacco Use   • Smoking status: Never Smoker   • Smokeless tobacco: Never Used   Vaping Use   • Vaping Use: Never used   Substance and Sexual Activity   • Alcohol use: Yes     Alcohol/week: 1.2 oz     Types: 2 Cans of beer per week     Comment: 2 beers at a tme.    • Drug use: No   • Sexual activity: Not Currently       SURGICAL HISTORY   has a past surgical history that includes tonsillectomy; knee replacement, total (left); and other orthopedic surgery.    CURRENT MEDICATIONS  Home Medications     Reviewed by Eleazar Corona R.N. (Registered Nurse) on 11/22/21 at 0573  Med List Status: Not Addressed   Medication Last Dose Status   oxybutynin (DITROPAN XL) 15 MG CR tablet  Active   pramipexole (MIRAPEX) 1 MG Tab  Active                ALLERGIES  No Known Allergies    PHYSICAL EXAM  VITAL SIGNS: /61   Pulse (!) 55  "  Temp 36.1 °C (97 °F) (Temporal)   Resp 18   Ht 1.575 m (5' 2\")   Wt 113 kg (250 lb)   LMP  (LMP Unknown)   SpO2 93%   BMI 45.73 kg/m²   Pulse ox interpretation: I interpret this pulse ox as normal.  Constitutional: Alert in no apparent distress.  Obese  HENT: Normocephalic, atraumatic. Bilateral external ears normal, Nose normal.   Eyes: Pupils are equal and reactive, Conjunctiva normal.   Neck: Normal range of motion, Supple  Lymphatic: No lymphadenopathy noted.   Cardiovascular: Regular rate and rhythm, no murmurs. Distal pulses intact.   Thorax & Lungs: Normal breath sounds.  No wheezing/rales/ronchi. No increased work of breathing  Abdomen: Obese, soft, non-distended, non-tender to palpation.  No tenderness to palpation in the right upper, mid or lower quadrants.  No palpable mass or hernia.  No pulsatile masses. No peritoneal signs. No CVA tenderness.  Skin: Warm, Dry, No erythema, No rash.   Musculoskeletal: Good range of motion in all major joints.   Neurologic: Alert and orient x4.  Speech clear and cohesive.  Moves 4 extremity spontaneously.  Psychiatric: Affect normal, Judgment normal, Mood normal.       DIAGNOSTIC STUDIES / PROCEDURES    LABS  Results for orders placed or performed during the hospital encounter of 11/22/21   URINALYSIS (UA)    Specimen: Urine, Clean Catch   Result Value Ref Range    Color Yellow     Character Clear     Specific Gravity 1.025 <1.035    Ph 6.0 5.0 - 8.0    Glucose Negative Negative mg/dL    Ketones Trace (A) Negative mg/dL    Protein Negative Negative mg/dL    Bilirubin Negative Negative    Urobilinogen, Urine 1.0 Negative    Nitrite Negative Negative    Leukocyte Esterase Negative Negative    Occult Blood Negative Negative    Micro Urine Req see below    CBC WITH DIFFERENTIAL   Result Value Ref Range    WBC 5.2 4.8 - 10.8 K/uL    RBC 3.96 (L) 4.20 - 5.40 M/uL    Hemoglobin 11.2 (L) 12.0 - 16.0 g/dL    Hematocrit 36.1 (L) 37.0 - 47.0 %    MCV 91.2 81.4 - 97.8 fL    " MCH 28.3 27.0 - 33.0 pg    MCHC 31.0 (L) 33.6 - 35.0 g/dL    RDW 46.1 35.9 - 50.0 fL    Platelet Count 305 164 - 446 K/uL    MPV 9.4 9.0 - 12.9 fL    Neutrophils-Polys 62.10 44.00 - 72.00 %    Lymphocytes 25.30 22.00 - 41.00 %    Monocytes 8.30 0.00 - 13.40 %    Eosinophils 3.70 0.00 - 6.90 %    Basophils 0.40 0.00 - 1.80 %    Immature Granulocytes 0.20 0.00 - 0.90 %    Nucleated RBC 0.00 /100 WBC    Neutrophils (Absolute) 3.21 2.00 - 7.15 K/uL    Lymphs (Absolute) 1.31 1.00 - 4.80 K/uL    Monos (Absolute) 0.43 0.00 - 0.85 K/uL    Eos (Absolute) 0.19 0.00 - 0.51 K/uL    Baso (Absolute) 0.02 0.00 - 0.12 K/uL    Immature Granulocytes (abs) 0.01 0.00 - 0.11 K/uL    NRBC (Absolute) 0.00 K/uL   COMP METABOLIC PANEL   Result Value Ref Range    Sodium 139 135 - 145 mmol/L    Potassium 3.6 3.6 - 5.5 mmol/L    Chloride 106 96 - 112 mmol/L    Co2 24 20 - 33 mmol/L    Anion Gap 9.0 7.0 - 16.0    Glucose 92 65 - 99 mg/dL    Bun 13 8 - 22 mg/dL    Creatinine 0.37 (L) 0.50 - 1.40 mg/dL    Calcium 8.5 8.5 - 10.5 mg/dL    AST(SGOT) 16 12 - 45 U/L    ALT(SGPT) 13 2 - 50 U/L    Alkaline Phosphatase 83 30 - 99 U/L    Total Bilirubin 0.6 0.1 - 1.5 mg/dL    Albumin 3.5 3.2 - 4.9 g/dL    Total Protein 6.7 6.0 - 8.2 g/dL    Globulin 3.2 1.9 - 3.5 g/dL    A-G Ratio 1.1 g/dL   ESTIMATED GFR   Result Value Ref Range    GFR If African American >60 >60 mL/min/1.73 m 2    GFR If Non African American >60 >60 mL/min/1.73 m 2     RADIOLOGY  CT-RENAL COLIC EVALUATION(A/P W/O)   Final Result      1.  No renal stone or hydronephrosis.   2.  Normal appendix.   3.  Increased colonic stool.   4.  Contracted gallbladder with apparent wall thickening, uncertain significance.  Cholecystitis is not excluded.          COURSE & MEDICAL DECISION MAKING  Nursing notes and vital signs were reviewed. (See chart for details)  The patients records were reviewed, history was obtained from the patient;     ED evaluation for right flank pain is unrevealing and may be  multifactorial.  After review of records patient does have history of cholelithiasis, HIDA scan suggest chronic cholecystitis.  She has been admitted previously for abnormal LFTs and MRCP, but labs improved and pain resolved before imaging was complete.  This was more than 1 year ago, patient has not followed up but has not continue to have ongoing symptoms.  Cannot exclude symptomatic cholelithiasis although there is no evidence clinically today for cholecystitis, choledocholithiasis.  She is however encouraged to follow-up with general surgery soon for definitive management.  CT does not demonstrate ureteral colic/stone or other obstruction.  No hydronephrosis.  Urinalysis is unremarkable without blood or infection.  Clinical evidence for pyelonephritis.  CT with increased colonic stool, patient only has bowel movements every 3 days, although she states this is normal for her, cannot exclude some intestinal colic or cramping due to constipation.  But again patient remains asymptomatic during this evaluation.  No evidence for other obstruction or pathology.  Labs are unremarkable.  Hemodynamically stable without fever, tachycardia or hypotension.  She is tolerating oral fluids without difficulty.  She is anxious for discharge and outpatient follow-up.    Patient is stable for discharge at this time, anticipatory guidance provided, continue home medications, close follow-up is encouraged, and strict ED return instructions have been detailed. Patient is agreeable to the disposition and plan.    Patient's blood pressure was elevated in the emergency department, and has been referred to primary care for close monitoring.      FINAL IMPRESSION  (R10.9) Right flank pain  (K80.20) Calculus of gallbladder without cholecystitis without obstruction  (K59.00) Constipation, unspecified constipation type      Electronically signed by: Jaen Mesa D.O., 11/22/2021 11:54 AM      This dictation was created using voice  recognition software. The accuracy of the dictation is limited to the abilities of the software. I expect there may be some errors of grammar and possibly content. The nursing notes were reviewed and certain aspects of this information were incorporated into this note.

## 2021-11-22 NOTE — DISCHARGE INSTRUCTIONS
Follow-up with primary care 1 to 2 days for reevaluation, medication management and referral to general surgery for definitive treatment of gallstones.    Follow-up with wound care as scheduled this afternoon for continued management.    Continue home medications as previously indicated.    Encourage oral fluid hydration.  Diet as tolerated.  Recommend that you avoid dairy, fried fatty, spicy foods or other known triggers of this discomfort.    Return the emergency department for persistent or worsening flank pain, abdominal pain, fever, vomiting or other new concerns.

## 2021-11-23 ENCOUNTER — HOSPITAL ENCOUNTER (EMERGENCY)
Facility: MEDICAL CENTER | Age: 71
End: 2021-11-23
Attending: EMERGENCY MEDICINE | Admitting: EMERGENCY MEDICINE
Payer: COMMERCIAL

## 2021-11-23 VITALS
HEIGHT: 62 IN | WEIGHT: 250 LBS | BODY MASS INDEX: 46.01 KG/M2 | SYSTOLIC BLOOD PRESSURE: 132 MMHG | TEMPERATURE: 97.8 F | RESPIRATION RATE: 16 BRPM | OXYGEN SATURATION: 93 % | DIASTOLIC BLOOD PRESSURE: 59 MMHG | HEART RATE: 63 BPM

## 2021-11-23 DIAGNOSIS — R10.30 LOWER ABDOMINAL PAIN: ICD-10-CM

## 2021-11-23 LAB
ALBUMIN SERPL BCP-MCNC: 4 G/DL (ref 3.2–4.9)
ALBUMIN/GLOB SERPL: 1.4 G/DL
ALP SERPL-CCNC: 85 U/L (ref 30–99)
ALT SERPL-CCNC: 16 U/L (ref 2–50)
ANION GAP SERPL CALC-SCNC: 10 MMOL/L (ref 7–16)
AST SERPL-CCNC: 12 U/L (ref 12–45)
BASOPHILS # BLD AUTO: 0.6 % (ref 0–1.8)
BASOPHILS # BLD: 0.03 K/UL (ref 0–0.12)
BILIRUB SERPL-MCNC: 0.4 MG/DL (ref 0.1–1.5)
BUN SERPL-MCNC: 13 MG/DL (ref 8–22)
CALCIUM SERPL-MCNC: 8.7 MG/DL (ref 8.5–10.5)
CHLORIDE SERPL-SCNC: 105 MMOL/L (ref 96–112)
CO2 SERPL-SCNC: 25 MMOL/L (ref 20–33)
CREAT SERPL-MCNC: 0.95 MG/DL (ref 0.5–1.4)
EOSINOPHIL # BLD AUTO: 0.17 K/UL (ref 0–0.51)
EOSINOPHIL NFR BLD: 3.4 % (ref 0–6.9)
ERYTHROCYTE [DISTWIDTH] IN BLOOD BY AUTOMATED COUNT: 45.2 FL (ref 35.9–50)
GLOBULIN SER CALC-MCNC: 2.8 G/DL (ref 1.9–3.5)
GLUCOSE SERPL-MCNC: 100 MG/DL (ref 65–99)
GRAM STN SPEC: NORMAL
HCT VFR BLD AUTO: 37.1 % (ref 37–47)
HGB BLD-MCNC: 11.9 G/DL (ref 12–16)
IMM GRANULOCYTES # BLD AUTO: 0.02 K/UL (ref 0–0.11)
IMM GRANULOCYTES NFR BLD AUTO: 0.4 % (ref 0–0.9)
LIPASE SERPL-CCNC: 19 U/L (ref 11–82)
LYMPHOCYTES # BLD AUTO: 1.53 K/UL (ref 1–4.8)
LYMPHOCYTES NFR BLD: 30.6 % (ref 22–41)
MCH RBC QN AUTO: 29 PG (ref 27–33)
MCHC RBC AUTO-ENTMCNC: 32.1 G/DL (ref 33.6–35)
MCV RBC AUTO: 90.3 FL (ref 81.4–97.8)
MONOCYTES # BLD AUTO: 0.44 K/UL (ref 0–0.85)
MONOCYTES NFR BLD AUTO: 8.8 % (ref 0–13.4)
NEUTROPHILS # BLD AUTO: 2.81 K/UL (ref 2–7.15)
NEUTROPHILS NFR BLD: 56.2 % (ref 44–72)
NRBC # BLD AUTO: 0 K/UL
NRBC BLD-RTO: 0 /100 WBC
PLATELET # BLD AUTO: 301 K/UL (ref 164–446)
PMV BLD AUTO: 9.4 FL (ref 9–12.9)
POTASSIUM SERPL-SCNC: 3.6 MMOL/L (ref 3.6–5.5)
PROCALCITONIN SERPL-MCNC: <0.05 NG/ML
PROT SERPL-MCNC: 6.8 G/DL (ref 6–8.2)
RBC # BLD AUTO: 4.11 M/UL (ref 4.2–5.4)
SIGNIFICANT IND 70042: NORMAL
SITE SITE: NORMAL
SODIUM SERPL-SCNC: 140 MMOL/L (ref 135–145)
SOURCE SOURCE: NORMAL
WBC # BLD AUTO: 5 K/UL (ref 4.8–10.8)

## 2021-11-23 PROCEDURE — 80053 COMPREHEN METABOLIC PANEL: CPT

## 2021-11-23 PROCEDURE — 700102 HCHG RX REV CODE 250 W/ 637 OVERRIDE(OP): Performed by: EMERGENCY MEDICINE

## 2021-11-23 PROCEDURE — 36415 COLL VENOUS BLD VENIPUNCTURE: CPT

## 2021-11-23 PROCEDURE — 85025 COMPLETE CBC W/AUTO DIFF WBC: CPT

## 2021-11-23 PROCEDURE — A9270 NON-COVERED ITEM OR SERVICE: HCPCS | Performed by: EMERGENCY MEDICINE

## 2021-11-23 PROCEDURE — 84145 PROCALCITONIN (PCT): CPT

## 2021-11-23 PROCEDURE — 96374 THER/PROPH/DIAG INJ IV PUSH: CPT

## 2021-11-23 PROCEDURE — 700111 HCHG RX REV CODE 636 W/ 250 OVERRIDE (IP): Performed by: EMERGENCY MEDICINE

## 2021-11-23 PROCEDURE — 99285 EMERGENCY DEPT VISIT HI MDM: CPT

## 2021-11-23 PROCEDURE — 83690 ASSAY OF LIPASE: CPT

## 2021-11-23 RX ORDER — SUCRALFATE 1 G/1
1 TABLET ORAL ONCE
Status: COMPLETED | OUTPATIENT
Start: 2021-11-23 | End: 2021-11-23

## 2021-11-23 RX ORDER — SUCRALFATE 1 G/1
1 TABLET ORAL
Qty: 56 TABLET | Refills: 0 | Status: SHIPPED | OUTPATIENT
Start: 2021-11-23 | End: 2021-12-04

## 2021-11-23 RX ADMIN — SUCRALFATE 1 G: 1 TABLET ORAL at 03:33

## 2021-11-23 RX ADMIN — FAMOTIDINE 20 MG: 10 INJECTION INTRAVENOUS at 03:33

## 2021-11-23 ASSESSMENT — FIBROSIS 4 INDEX: FIB4 SCORE: 0.99

## 2021-11-23 NOTE — ED NOTES
Pt wheeled back by staff, agree with triage note, pt able to transfer to bed by self, changed into gown, chart up for ERP

## 2021-11-23 NOTE — ED NOTES
"Pt discharged home. IV discontinued and gauze placed, pt in possession of belongings. Pt provided discharge education and information pertaining to medications and follow up appointments. Pt received copy of discharge instructions and verbalized understanding. /59   Pulse 63   Temp 36.6 °C (97.8 °F) (Oral)   Resp 16   Ht 1.575 m (5' 2\")   Wt 113 kg (250 lb)   LMP  (LMP Unknown)   SpO2 93%   BMI 45.73 kg/m²   "

## 2021-11-23 NOTE — ED TRIAGE NOTES
"Chief Complaint   Patient presents with   • Abdominal Pain     Pt complains of right ab pain. States \"it's a problem with my gallbladder.\" Seen here yesterday for same     Pt BIB EMS from [facility]     Pt is alert and oriented, speaking in full sentences, follows commands and responds appropriately to questions. Resp are even and unlabored. No obvious acute distress.    Pt placed in lobby. Pt educated on triage process. Pt encouraged to alert staff for any changes.    Vitals:    11/23/21 0105   BP: 144/61   Pulse: 61   Resp: 19   Temp: 36.4 °C (97.5 °F)   SpO2: 98%       "

## 2021-11-23 NOTE — PATIENT INSTRUCTIONS
-Keep your wound dressing clean, dry, and intact.    -Remove your compression wrap if you have severe pain, severe swelling, numbness, color change, or temperature change in your toes. If you need to remove your compression wrap, do so by unrolling it. Do not cut the compression wrap off to prevent cutting yourself on accident.    -Should you experience any significant changes in your wound(s), such as infection (redness, swelling, localized heat, increased pain, fever > 101 F, chills) or have any questions regarding your home care instructions, please contact the wound center at [phone]. If after hours, contact your primary care physician or go to the hospital emergency room.

## 2021-11-23 NOTE — ED PROVIDER NOTES
"ED Provider Note    CHIEF COMPLAINT  Chief Complaint   Patient presents with   • Abdominal Pain     Pt complains of right ab pain. States \"it's a problem with my gallbladder.\" Seen here yesterday for same     HPI  Patient is a 70-year-old patient is a 70-year-old female who presents emergency room for evaluation of abdominal pain.  Patient points to her right flank and towards the right lower quadrant saying that she has some inconsistent pain that happens in small sporadic bursts and she describes this as a cramping sensation.  It is currently pain-free, she is able to move and palpate in this area with no exacerbations.  She denies any dysuria, hematuria or bowel changes.  She noticed the pain earlier tonight after she had eaten some chicken but was not having any epigastric discomfort, chest burning or other acute complaints.  She was concerned that this could be her gallbladder and came in for further evaluation.  She was seen yesterday and notes that she is unsure of what the work-up was but was told that there was \"nothing wrong.\"    PPE Note: I personally donned full PPE for all patient encounters during this visit, including being clean-shaven with an N95 respirator mask, gloves, and goggles.     Review of the chart shows the patient was seen yesterday for emergent evaluation of right-sided flank pain.  This had been resolved at the time of the ER evaluation and described this as being similar to prior events.  The patient's chart does show that she had a prior history of cholelithiasis and was admitted with HIDA scan showing findings that could have been due to chronic cholecystitis.  She has had prior abnormal LFTs and MRCP.  During that admission she was having improving labs with trending and this did not require acute surgical intervention.  On the most recent visit she had no leukocytosis, no LFT abnormalities and CT of the abdomen and pelvis for possible renal stone rule out showed contracted " "gallbladder with some wall thickening of uncertain significance.  There is also increased colonic stool and no evidence of renal stone or hydronephrosis.    REVIEW OF SYSTEMS  See HPI for further details. All other systems are negative.     PAST MEDICAL HISTORY   has a past medical history of Arthritis, At risk for sleep apnea, Hypertension, and RLS (restless legs syndrome) (10/5/2011).    SOCIAL HISTORY  Social History     Tobacco Use   • Smoking status: Never Smoker   • Smokeless tobacco: Never Used   Vaping Use   • Vaping Use: Never used   Substance and Sexual Activity   • Alcohol use: Yes     Alcohol/week: 1.2 oz     Types: 2 Cans of beer per week     Comment: 2 beers at a tme.    • Drug use: No   • Sexual activity: Not Currently     SURGICAL HISTORY   has a past surgical history that includes tonsillectomy; knee replacement, total (left); and other orthopedic surgery.    CURRENT MEDICATIONS  Home Medications     Reviewed by Vonnie Oleary R.N. (Registered Nurse) on 11/23/21 at 0111  Med List Status: <None>   Medication Last Dose Status   oxybutynin (DITROPAN XL) 15 MG CR tablet  Active   pramipexole (MIRAPEX) 1 MG Tab  Active              ALLERGIES  No Known Allergies    PHYSICAL EXAM  VITAL SIGNS: /59   Pulse 63   Temp 36.6 °C (97.8 °F) (Oral)   Resp 16   Ht 1.575 m (5' 2\")   Wt 113 kg (250 lb)   LMP  (LMP Unknown)   SpO2 93%   BMI 45.73 kg/m²   Pulse ox interpretation: I interpret this pulse ox as normal.  Genl: F sitting in gurney comfortably, speaking clearly, appears in no acute distress   Head: NC/AT   ENT: Mucous membranes moist, posterior pharynx clear, uvula midline, nares patent bilaterally   Eyes: Normal sclera, pupils equal round reactive to light  Neck: Supple, FROM, no LAD appreciated   Pulmonary: Lungs are clear to auscultation bilaterally  Chest: No TTP  CV:  RRR, no murmur appreciated, pulses 2+ in both upper and lower extremities,  Abdomen: soft, no epigastric or right upper " quadrant discomfort, she has no localizing right lower quadrant tenderness, no true flank discomfort, no true CVA tenderness.  ND; no rebound/guarding, no masses palpated, no HSM   : no CVA or suprapubic tenderness  Musculoskeletal: Pain free ROM of the neck. Moving upper and lower extremities and spontaneous in coordinated fashion  Neuro: A&Ox4 (person, place, time, situation), speech fluent, gait steady, no focal deficits appreciated, No cerebellar signs  Psych: Patient has an appropriate affect and behavior  Skin: No rash or lesions.  No pallor or jaundice.  No cyanosis.  Warm and dry.     DIAGNOSTIC STUDIES / PROCEDURES    LABS  Labs Reviewed   CBC WITH DIFFERENTIAL - Abnormal; Notable for the following components:       Result Value    RBC 4.11 (*)     Hemoglobin 11.9 (*)     MCHC 32.1 (*)     All other components within normal limits   COMP METABOLIC PANEL - Abnormal; Notable for the following components:    Glucose 100 (*)     All other components within normal limits   ESTIMATED GFR - Abnormal; Notable for the following components:    GFR If Non  58 (*)     All other components within normal limits   LIPASE   PROCALCITONIN     RADIOLOGY  No orders to display     COURSE & MEDICAL DECISION MAKING  Pertinent Labs & Imaging studies reviewed. (See chart for details)    DDX:  Epigastric abdominal pain: PUD, pancreatitis, gastritis, GERD, cholelithiasis, cholecystitis, choledocolithiasis    MDM  Initial evaluation at 0135:  Patient presented to the emergency is more in the right flank, right lower quadrant and reproducible in the soft tissues.  This is more consistent with a musculoskeletal abnormality, hematoma or muscular strain.  There is no asymmetry of the abdomen, no bulging with increased intra-abdominal pressure and she does not on serial exams have progression to peritonitis.  Lab work is repeated from previous work-up and there is no leukocytosis or other concerning signs of  hepatobiliary obstruction.  Patient is resting comfortably, does not require pain medications.  Thankfully procalcitonin is not elevated and I do not believe she has findings consistent with an acute surgical emergency.  Urinalysis from yesterday was unremarkable, CT scan of the abdomen for renal stone rule out was also unremarkable.  The patient has some very soft associations with food ingestions and will be placed on Carafate and given instruction regarding anti-inflammatory use for musculoskeletal injuries.  She will follow-up with her outpatient physician is discharged home in stable condition.    FINAL IMPRESSION  Visit Diagnoses     ICD-10-CM   1. Lower abdominal pain  R10.30     Electronically signed by: Stone Gerber M.D., 11/23/2021 1:35 AM

## 2021-11-25 LAB
BACTERIA WND AEROBE CULT: ABNORMAL
BACTERIA WND AEROBE CULT: ABNORMAL
GRAM STN SPEC: ABNORMAL
SIGNIFICANT IND 70042: ABNORMAL
SITE SITE: ABNORMAL
SOURCE SOURCE: ABNORMAL

## 2021-11-29 ENCOUNTER — NON-PROVIDER VISIT (OUTPATIENT)
Dept: WOUND CARE | Facility: MEDICAL CENTER | Age: 71
End: 2021-11-29
Attending: STUDENT IN AN ORGANIZED HEALTH CARE EDUCATION/TRAINING PROGRAM
Payer: COMMERCIAL

## 2021-11-29 DIAGNOSIS — N32.81 OVERACTIVE BLADDER: ICD-10-CM

## 2021-11-29 PROCEDURE — 97602 WOUND(S) CARE NON-SELECTIVE: CPT

## 2021-11-29 RX ORDER — OXYBUTYNIN CHLORIDE 15 MG/1
15 TABLET, EXTENDED RELEASE ORAL DAILY
Qty: 30 TABLET | Refills: 3 | Status: SHIPPED | OUTPATIENT
Start: 2021-11-29 | End: 2021-12-04 | Stop reason: SDUPTHER

## 2021-11-29 NOTE — TELEPHONE ENCOUNTER
Patient requests change in pharmacy.     [facility] [place]  [address]  [address]  [place] [address]  Phone: [phone] Fax: [phone]

## 2021-11-29 NOTE — PATIENT INSTRUCTIONS
-Keep your wound dressing clean, dry, and intact.    -Remove your compression wrap if you have severe pain, severe swelling, numbness, color change, or temperature change in your toes. If you need to remove your compression wrap, do so by unrolling it. Do not cut the compression wrap off to prevent cutting yourself on accident.    -Should you experience any significant changes in your wound(s), such as infection (redness, swelling, localized heat, increased pain, fever > 101 F, chills) or have any questions regarding your home care instructions, please contact the wound center at [phone]. If after hours, contact your primary care physician or go to the hospital emergency room.

## 2021-11-29 NOTE — PROCEDURES
Non-selective debridement with puracyn spray and clean gauze to debride wound bed and periwound of non-viable tissue. Unna boot with coban compression wrap applied to LLE. Patient tolerated the procedure well.

## 2021-12-04 ENCOUNTER — HOSPITAL ENCOUNTER (EMERGENCY)
Facility: MEDICAL CENTER | Age: 71
End: 2021-12-04
Attending: EMERGENCY MEDICINE | Admitting: SURGERY
Payer: COMMERCIAL

## 2021-12-04 ENCOUNTER — ANESTHESIA EVENT (OUTPATIENT)
Dept: SURGERY | Facility: MEDICAL CENTER | Age: 71
End: 2021-12-04

## 2021-12-04 ENCOUNTER — APPOINTMENT (OUTPATIENT)
Dept: RADIOLOGY | Facility: MEDICAL CENTER | Age: 71
End: 2021-12-04
Attending: EMERGENCY MEDICINE

## 2021-12-04 ENCOUNTER — ANESTHESIA (OUTPATIENT)
Dept: SURGERY | Facility: MEDICAL CENTER | Age: 71
End: 2021-12-04

## 2021-12-04 VITALS
HEIGHT: 62 IN | DIASTOLIC BLOOD PRESSURE: 79 MMHG | TEMPERATURE: 98 F | OXYGEN SATURATION: 95 % | WEIGHT: 233.69 LBS | SYSTOLIC BLOOD PRESSURE: 174 MMHG | BODY MASS INDEX: 43 KG/M2 | RESPIRATION RATE: 18 BRPM | HEART RATE: 83 BPM

## 2021-12-04 DIAGNOSIS — N32.81 OVERACTIVE BLADDER: ICD-10-CM

## 2021-12-04 DIAGNOSIS — K80.00 ACUTE CALCULOUS CHOLECYSTITIS: ICD-10-CM

## 2021-12-04 DIAGNOSIS — K81.9 CHOLECYSTITIS: ICD-10-CM

## 2021-12-04 DIAGNOSIS — G25.81 RLS (RESTLESS LEGS SYNDROME): ICD-10-CM

## 2021-12-04 DIAGNOSIS — L03.116 CELLULITIS OF LEFT LOWER EXTREMITY: ICD-10-CM

## 2021-12-04 DIAGNOSIS — G89.18 ACUTE POSTOPERATIVE PAIN: ICD-10-CM

## 2021-12-04 LAB
ALBUMIN SERPL BCP-MCNC: 4.1 G/DL (ref 3.2–4.9)
ALBUMIN/GLOB SERPL: 1.4 G/DL
ALP SERPL-CCNC: 95 U/L (ref 30–99)
ALT SERPL-CCNC: 13 U/L (ref 2–50)
ANION GAP SERPL CALC-SCNC: 12 MMOL/L (ref 7–16)
APPEARANCE UR: CLEAR
AST SERPL-CCNC: 12 U/L (ref 12–45)
BASOPHILS # BLD AUTO: 0.4 % (ref 0–1.8)
BASOPHILS # BLD: 0.03 K/UL (ref 0–0.12)
BILIRUB SERPL-MCNC: 0.2 MG/DL (ref 0.1–1.5)
BILIRUB UR QL STRIP.AUTO: NEGATIVE
BUN SERPL-MCNC: 18 MG/DL (ref 8–22)
CALCIUM SERPL-MCNC: 8.8 MG/DL (ref 8.5–10.5)
CHLORIDE SERPL-SCNC: 106 MMOL/L (ref 96–112)
CO2 SERPL-SCNC: 22 MMOL/L (ref 20–33)
COLOR UR: YELLOW
CREAT SERPL-MCNC: 0.54 MG/DL (ref 0.5–1.4)
EOSINOPHIL # BLD AUTO: 0.16 K/UL (ref 0–0.51)
EOSINOPHIL NFR BLD: 2.3 % (ref 0–6.9)
ERYTHROCYTE [DISTWIDTH] IN BLOOD BY AUTOMATED COUNT: 45.4 FL (ref 35.9–50)
GLOBULIN SER CALC-MCNC: 3 G/DL (ref 1.9–3.5)
GLUCOSE SERPL-MCNC: 102 MG/DL (ref 65–99)
GLUCOSE UR STRIP.AUTO-MCNC: NEGATIVE MG/DL
HCT VFR BLD AUTO: 37.9 % (ref 37–47)
HGB BLD-MCNC: 12.2 G/DL (ref 12–16)
IMM GRANULOCYTES # BLD AUTO: 0.02 K/UL (ref 0–0.11)
IMM GRANULOCYTES NFR BLD AUTO: 0.3 % (ref 0–0.9)
KETONES UR STRIP.AUTO-MCNC: NEGATIVE MG/DL
LEUKOCYTE ESTERASE UR QL STRIP.AUTO: NEGATIVE
LIPASE SERPL-CCNC: 35 U/L (ref 11–82)
LYMPHOCYTES # BLD AUTO: 1.27 K/UL (ref 1–4.8)
LYMPHOCYTES NFR BLD: 18.3 % (ref 22–41)
MCH RBC QN AUTO: 28.9 PG (ref 27–33)
MCHC RBC AUTO-ENTMCNC: 32.2 G/DL (ref 33.6–35)
MCV RBC AUTO: 89.8 FL (ref 81.4–97.8)
MICRO URNS: NORMAL
MONOCYTES # BLD AUTO: 0.5 K/UL (ref 0–0.85)
MONOCYTES NFR BLD AUTO: 7.2 % (ref 0–13.4)
NEUTROPHILS # BLD AUTO: 4.95 K/UL (ref 2–7.15)
NEUTROPHILS NFR BLD: 71.5 % (ref 44–72)
NITRITE UR QL STRIP.AUTO: NEGATIVE
NRBC # BLD AUTO: 0 K/UL
NRBC BLD-RTO: 0 /100 WBC
PH UR STRIP.AUTO: 6 [PH] (ref 5–8)
PLATELET # BLD AUTO: 282 K/UL (ref 164–446)
PMV BLD AUTO: 10 FL (ref 9–12.9)
POTASSIUM SERPL-SCNC: 3.5 MMOL/L (ref 3.6–5.5)
PROT SERPL-MCNC: 7.1 G/DL (ref 6–8.2)
PROT UR QL STRIP: NEGATIVE MG/DL
RBC # BLD AUTO: 4.22 M/UL (ref 4.2–5.4)
RBC UR QL AUTO: NEGATIVE
SARS-COV+SARS-COV-2 AG RESP QL IA.RAPID: NOTDETECTED
SODIUM SERPL-SCNC: 140 MMOL/L (ref 135–145)
SP GR UR STRIP.AUTO: 1.02
SPECIMEN SOURCE: NORMAL
UROBILINOGEN UR STRIP.AUTO-MCNC: 1 MG/DL
WBC # BLD AUTO: 6.9 K/UL (ref 4.8–10.8)

## 2021-12-04 PROCEDURE — 700101 HCHG RX REV CODE 250: Performed by: SURGERY

## 2021-12-04 PROCEDURE — 47562 LAPAROSCOPIC CHOLECYSTECTOMY: CPT | Performed by: SURGERY

## 2021-12-04 PROCEDURE — 501338 HCHG SHEARS, ENDO: Performed by: SURGERY

## 2021-12-04 PROCEDURE — 502571 HCHG PACK, LAP CHOLE: Performed by: SURGERY

## 2021-12-04 PROCEDURE — 501399 HCHG SPECIMAN BAG, ENDO CATC: Performed by: SURGERY

## 2021-12-04 PROCEDURE — 500868 HCHG NEEDLE, SURGI(VARES): Performed by: SURGERY

## 2021-12-04 PROCEDURE — 160046 HCHG PACU - 1ST 60 MINS PHASE II: Performed by: SURGERY

## 2021-12-04 PROCEDURE — 700101 HCHG RX REV CODE 250: Performed by: ANESTHESIOLOGY

## 2021-12-04 PROCEDURE — 160047 HCHG PACU  - EA ADDL 30 MINS PHASE II: Performed by: SURGERY

## 2021-12-04 PROCEDURE — 160035 HCHG PACU - 1ST 60 MINS PHASE I: Performed by: SURGERY

## 2021-12-04 PROCEDURE — 160009 HCHG ANES TIME/MIN: Performed by: SURGERY

## 2021-12-04 PROCEDURE — 160048 HCHG OR STATISTICAL LEVEL 1-5: Performed by: SURGERY

## 2021-12-04 PROCEDURE — 700111 HCHG RX REV CODE 636 W/ 250 OVERRIDE (IP): Performed by: ANESTHESIOLOGY

## 2021-12-04 PROCEDURE — 501838 HCHG SUTURE GENERAL: Performed by: SURGERY

## 2021-12-04 PROCEDURE — 88304 TISSUE EXAM BY PATHOLOGIST: CPT

## 2021-12-04 PROCEDURE — 80053 COMPREHEN METABOLIC PANEL: CPT

## 2021-12-04 PROCEDURE — 501583 HCHG TROCAR, THRD CAN&SEAL 5X100: Performed by: SURGERY

## 2021-12-04 PROCEDURE — 700102 HCHG RX REV CODE 250 W/ 637 OVERRIDE(OP): Performed by: ANESTHESIOLOGY

## 2021-12-04 PROCEDURE — 160002 HCHG RECOVERY MINUTES (STAT): Performed by: SURGERY

## 2021-12-04 PROCEDURE — 99222 1ST HOSP IP/OBS MODERATE 55: CPT | Mod: 57 | Performed by: SURGERY

## 2021-12-04 PROCEDURE — 76705 ECHO EXAM OF ABDOMEN: CPT

## 2021-12-04 PROCEDURE — 502240 HCHG MISC OR SUPPLY RC 0272: Performed by: SURGERY

## 2021-12-04 PROCEDURE — A9270 NON-COVERED ITEM OR SERVICE: HCPCS | Performed by: SURGERY

## 2021-12-04 PROCEDURE — 160036 HCHG PACU - EA ADDL 30 MINS PHASE I: Performed by: SURGERY

## 2021-12-04 PROCEDURE — 501570 HCHG TROCAR, SEPARATOR: Performed by: SURGERY

## 2021-12-04 PROCEDURE — 160041 HCHG SURGERY MINUTES - EA ADDL 1 MIN LEVEL 4: Performed by: SURGERY

## 2021-12-04 PROCEDURE — 87426 SARSCOV CORONAVIRUS AG IA: CPT

## 2021-12-04 PROCEDURE — 81003 URINALYSIS AUTO W/O SCOPE: CPT

## 2021-12-04 PROCEDURE — 85025 COMPLETE CBC W/AUTO DIFF WBC: CPT

## 2021-12-04 PROCEDURE — 96365 THER/PROPH/DIAG IV INF INIT: CPT

## 2021-12-04 PROCEDURE — 700111 HCHG RX REV CODE 636 W/ 250 OVERRIDE (IP): Performed by: EMERGENCY MEDICINE

## 2021-12-04 PROCEDURE — 700102 HCHG RX REV CODE 250 W/ 637 OVERRIDE(OP): Performed by: SURGERY

## 2021-12-04 PROCEDURE — 83690 ASSAY OF LIPASE: CPT

## 2021-12-04 PROCEDURE — 99291 CRITICAL CARE FIRST HOUR: CPT

## 2021-12-04 PROCEDURE — 501571 HCHG TROCAR, SEPARATOR 12X100: Performed by: SURGERY

## 2021-12-04 PROCEDURE — 160029 HCHG SURGERY MINUTES - 1ST 30 MINS LEVEL 4: Performed by: SURGERY

## 2021-12-04 PROCEDURE — 700105 HCHG RX REV CODE 258: Performed by: EMERGENCY MEDICINE

## 2021-12-04 PROCEDURE — 500002 HCHG ADHESIVE, DERMABOND: Performed by: SURGERY

## 2021-12-04 PROCEDURE — 700105 HCHG RX REV CODE 258: Performed by: ANESTHESIOLOGY

## 2021-12-04 PROCEDURE — 700111 HCHG RX REV CODE 636 W/ 250 OVERRIDE (IP)

## 2021-12-04 PROCEDURE — A9270 NON-COVERED ITEM OR SERVICE: HCPCS | Performed by: ANESTHESIOLOGY

## 2021-12-04 PROCEDURE — 160025 RECOVERY II MINUTES (STATS): Performed by: SURGERY

## 2021-12-04 RX ORDER — CEFOTETAN DISODIUM 2 G/20ML
INJECTION, POWDER, FOR SOLUTION INTRAMUSCULAR; INTRAVENOUS PRN
Status: DISCONTINUED | OUTPATIENT
Start: 2021-12-04 | End: 2021-12-04 | Stop reason: SURG

## 2021-12-04 RX ORDER — CLINDAMYCIN HYDROCHLORIDE 150 MG/1
300 CAPSULE ORAL 3 TIMES DAILY
Status: SHIPPED | COMMUNITY
Start: 2021-11-11 | End: 2021-12-04

## 2021-12-04 RX ORDER — SUCCINYLCHOLINE CHLORIDE 20 MG/ML
INJECTION INTRAMUSCULAR; INTRAVENOUS PRN
Status: DISCONTINUED | OUTPATIENT
Start: 2021-12-04 | End: 2021-12-04 | Stop reason: SURG

## 2021-12-04 RX ORDER — OXYCODONE HYDROCHLORIDE 5 MG/1
5 TABLET ORAL EVERY 4 HOURS PRN
Qty: 15 TABLET | Refills: 0 | Status: SHIPPED | OUTPATIENT
Start: 2021-12-04 | End: 2021-12-08

## 2021-12-04 RX ORDER — KETAMINE HYDROCHLORIDE 50 MG/ML
INJECTION, SOLUTION INTRAMUSCULAR; INTRAVENOUS PRN
Status: DISCONTINUED | OUTPATIENT
Start: 2021-12-04 | End: 2021-12-04 | Stop reason: SURG

## 2021-12-04 RX ORDER — LIDOCAINE HYDROCHLORIDE 20 MG/ML
INJECTION, SOLUTION EPIDURAL; INFILTRATION; INTRACAUDAL; PERINEURAL PRN
Status: DISCONTINUED | OUTPATIENT
Start: 2021-12-04 | End: 2021-12-04 | Stop reason: SURG

## 2021-12-04 RX ORDER — LISINOPRIL 20 MG/1
20 TABLET ORAL ONCE
Status: COMPLETED | OUTPATIENT
Start: 2021-12-04 | End: 2021-12-04

## 2021-12-04 RX ORDER — OXYCODONE HCL 5 MG/5 ML
5 SOLUTION, ORAL ORAL
Status: COMPLETED | OUTPATIENT
Start: 2021-12-04 | End: 2021-12-04

## 2021-12-04 RX ORDER — ROCURONIUM BROMIDE 10 MG/ML
INJECTION, SOLUTION INTRAVENOUS PRN
Status: DISCONTINUED | OUTPATIENT
Start: 2021-12-04 | End: 2021-12-04 | Stop reason: SURG

## 2021-12-04 RX ORDER — OXYCODONE HYDROCHLORIDE 5 MG/1
5 TABLET ORAL EVERY 4 HOURS PRN
Qty: 15 TABLET | Refills: 0 | Status: SHIPPED | OUTPATIENT
Start: 2021-12-04 | End: 2021-12-04 | Stop reason: SDUPTHER

## 2021-12-04 RX ORDER — HALOPERIDOL 5 MG/ML
INJECTION INTRAMUSCULAR
Status: COMPLETED
Start: 2021-12-04 | End: 2021-12-04

## 2021-12-04 RX ORDER — PRAMIPEXOLE DIHYDROCHLORIDE 1 MG/1
3 TABLET ORAL
Qty: 45 TABLET | Refills: 0 | Status: SHIPPED | OUTPATIENT
Start: 2021-12-04 | End: 2022-05-15 | Stop reason: SDUPTHER

## 2021-12-04 RX ORDER — OXYBUTYNIN CHLORIDE 15 MG/1
15 TABLET, EXTENDED RELEASE ORAL DAILY
Qty: 14 TABLET | Refills: 0 | Status: SHIPPED | OUTPATIENT
Start: 2021-12-04 | End: 2021-12-06

## 2021-12-04 RX ORDER — DIPHENHYDRAMINE HYDROCHLORIDE 50 MG/ML
12.5 INJECTION INTRAMUSCULAR; INTRAVENOUS
Status: COMPLETED | OUTPATIENT
Start: 2021-12-04 | End: 2021-12-04

## 2021-12-04 RX ORDER — HALOPERIDOL 5 MG/ML
1 INJECTION INTRAMUSCULAR
Status: COMPLETED | OUTPATIENT
Start: 2021-12-04 | End: 2021-12-04

## 2021-12-04 RX ORDER — ACETAMINOPHEN 500 MG
1000 TABLET ORAL ONCE
Status: COMPLETED | OUTPATIENT
Start: 2021-12-04 | End: 2021-12-04

## 2021-12-04 RX ORDER — SODIUM CHLORIDE, SODIUM LACTATE, POTASSIUM CHLORIDE, CALCIUM CHLORIDE 600; 310; 30; 20 MG/100ML; MG/100ML; MG/100ML; MG/100ML
INJECTION, SOLUTION INTRAVENOUS
Status: DISCONTINUED | OUTPATIENT
Start: 2021-12-04 | End: 2021-12-04 | Stop reason: SURG

## 2021-12-04 RX ORDER — PRAMIPEXOLE DIHYDROCHLORIDE 0.5 MG/1
3 TABLET ORAL ONCE
Status: DISCONTINUED | OUTPATIENT
Start: 2021-12-04 | End: 2021-12-04

## 2021-12-04 RX ORDER — DEXAMETHASONE SODIUM PHOSPHATE 4 MG/ML
INJECTION, SOLUTION INTRA-ARTICULAR; INTRALESIONAL; INTRAMUSCULAR; INTRAVENOUS; SOFT TISSUE PRN
Status: DISCONTINUED | OUTPATIENT
Start: 2021-12-04 | End: 2021-12-04 | Stop reason: SURG

## 2021-12-04 RX ORDER — LIDOCAINE HYDROCHLORIDE 40 MG/ML
SOLUTION TOPICAL PRN
Status: DISCONTINUED | OUTPATIENT
Start: 2021-12-04 | End: 2021-12-04 | Stop reason: SURG

## 2021-12-04 RX ORDER — BUPIVACAINE HYDROCHLORIDE AND EPINEPHRINE 5; 5 MG/ML; UG/ML
INJECTION, SOLUTION EPIDURAL; INTRACAUDAL; PERINEURAL
Status: DISCONTINUED | OUTPATIENT
Start: 2021-12-04 | End: 2021-12-04 | Stop reason: HOSPADM

## 2021-12-04 RX ORDER — SODIUM CHLORIDE, SODIUM LACTATE, POTASSIUM CHLORIDE, CALCIUM CHLORIDE 600; 310; 30; 20 MG/100ML; MG/100ML; MG/100ML; MG/100ML
1000 INJECTION, SOLUTION INTRAVENOUS ONCE
Status: COMPLETED | OUTPATIENT
Start: 2021-12-04 | End: 2021-12-04

## 2021-12-04 RX ORDER — DOXYCYCLINE HYCLATE 100 MG
100 TABLET ORAL 2 TIMES DAILY
Status: SHIPPED | COMMUNITY
Start: 2021-11-08 | End: 2021-12-04

## 2021-12-04 RX ORDER — PRAMIPEXOLE DIHYDROCHLORIDE 1 MG/1
3 TABLET ORAL
Qty: 45 TABLET | Refills: 0 | Status: SHIPPED | OUTPATIENT
Start: 2021-12-04 | End: 2021-12-04 | Stop reason: SDUPTHER

## 2021-12-04 RX ORDER — LABETALOL HYDROCHLORIDE 5 MG/ML
5 INJECTION, SOLUTION INTRAVENOUS
Status: DISCONTINUED | OUTPATIENT
Start: 2021-12-04 | End: 2021-12-04 | Stop reason: HOSPADM

## 2021-12-04 RX ORDER — AMOXICILLIN 500 MG/1
500 CAPSULE ORAL 3 TIMES DAILY
Qty: 30 CAPSULE | Refills: 0 | Status: SHIPPED | OUTPATIENT
Start: 2021-12-04 | End: 2021-12-20

## 2021-12-04 RX ORDER — SODIUM CHLORIDE, SODIUM LACTATE, POTASSIUM CHLORIDE, CALCIUM CHLORIDE 600; 310; 30; 20 MG/100ML; MG/100ML; MG/100ML; MG/100ML
INJECTION, SOLUTION INTRAVENOUS CONTINUOUS
Status: DISCONTINUED | OUTPATIENT
Start: 2021-12-04 | End: 2021-12-04 | Stop reason: HOSPADM

## 2021-12-04 RX ORDER — HYDROMORPHONE HYDROCHLORIDE 1 MG/ML
0.2 INJECTION, SOLUTION INTRAMUSCULAR; INTRAVENOUS; SUBCUTANEOUS
Status: DISCONTINUED | OUTPATIENT
Start: 2021-12-04 | End: 2021-12-04 | Stop reason: HOSPADM

## 2021-12-04 RX ORDER — OXYCODONE HCL 5 MG/5 ML
10 SOLUTION, ORAL ORAL
Status: COMPLETED | OUTPATIENT
Start: 2021-12-04 | End: 2021-12-04

## 2021-12-04 RX ORDER — PRAMIPEXOLE DIHYDROCHLORIDE 1 MG/1
3 TABLET ORAL
Qty: 42 TABLET | Refills: 0 | Status: SHIPPED | OUTPATIENT
Start: 2021-12-04 | End: 2021-12-04 | Stop reason: SDUPTHER

## 2021-12-04 RX ORDER — HYDROMORPHONE HYDROCHLORIDE 1 MG/ML
0.1 INJECTION, SOLUTION INTRAMUSCULAR; INTRAVENOUS; SUBCUTANEOUS
Status: DISCONTINUED | OUTPATIENT
Start: 2021-12-04 | End: 2021-12-04 | Stop reason: HOSPADM

## 2021-12-04 RX ORDER — HYDROMORPHONE HYDROCHLORIDE 1 MG/ML
0.4 INJECTION, SOLUTION INTRAMUSCULAR; INTRAVENOUS; SUBCUTANEOUS
Status: DISCONTINUED | OUTPATIENT
Start: 2021-12-04 | End: 2021-12-04 | Stop reason: HOSPADM

## 2021-12-04 RX ORDER — MIDAZOLAM HYDROCHLORIDE 1 MG/ML
INJECTION INTRAMUSCULAR; INTRAVENOUS PRN
Status: DISCONTINUED | OUTPATIENT
Start: 2021-12-04 | End: 2021-12-04 | Stop reason: SURG

## 2021-12-04 RX ORDER — HYDRALAZINE HYDROCHLORIDE 20 MG/ML
5 INJECTION INTRAMUSCULAR; INTRAVENOUS
Status: DISCONTINUED | OUTPATIENT
Start: 2021-12-04 | End: 2021-12-04 | Stop reason: HOSPADM

## 2021-12-04 RX ORDER — AMLODIPINE BESYLATE 10 MG/1
10 TABLET ORAL ONCE
Status: COMPLETED | OUTPATIENT
Start: 2021-12-04 | End: 2021-12-04

## 2021-12-04 RX ADMIN — HYDROMORPHONE HYDROCHLORIDE 0.4 MG: 1 INJECTION, SOLUTION INTRAMUSCULAR; INTRAVENOUS; SUBCUTANEOUS at 14:45

## 2021-12-04 RX ADMIN — FENTANYL CITRATE 50 MCG: 50 INJECTION, SOLUTION INTRAMUSCULAR; INTRAVENOUS at 13:58

## 2021-12-04 RX ADMIN — DIPHENHYDRAMINE HYDROCHLORIDE 12.5 MG: 50 INJECTION INTRAMUSCULAR; INTRAVENOUS at 14:28

## 2021-12-04 RX ADMIN — HYDROMORPHONE HYDROCHLORIDE 0.4 MG: 1 INJECTION, SOLUTION INTRAMUSCULAR; INTRAVENOUS; SUBCUTANEOUS at 14:28

## 2021-12-04 RX ADMIN — HYDRALAZINE HYDROCHLORIDE 5 MG: 20 INJECTION INTRAMUSCULAR; INTRAVENOUS at 14:01

## 2021-12-04 RX ADMIN — HALOPERIDOL LACTATE 1 MG: 5 INJECTION, SOLUTION INTRAMUSCULAR at 13:39

## 2021-12-04 RX ADMIN — HALOPERIDOL LACTATE 1 MG: 5 INJECTION, SOLUTION INTRAMUSCULAR at 14:00

## 2021-12-04 RX ADMIN — ROCURONIUM BROMIDE 15 MG: 10 INJECTION, SOLUTION INTRAVENOUS at 12:46

## 2021-12-04 RX ADMIN — LABETALOL HYDROCHLORIDE 5 MG: 5 INJECTION, SOLUTION INTRAVENOUS at 16:00

## 2021-12-04 RX ADMIN — MIDAZOLAM HYDROCHLORIDE 2 MG: 1 INJECTION, SOLUTION INTRAMUSCULAR; INTRAVENOUS at 11:52

## 2021-12-04 RX ADMIN — OXYCODONE HYDROCHLORIDE 10 MG: 5 SOLUTION ORAL at 13:48

## 2021-12-04 RX ADMIN — ROCURONIUM BROMIDE 20 MG: 10 INJECTION, SOLUTION INTRAVENOUS at 12:06

## 2021-12-04 RX ADMIN — SODIUM CHLORIDE, POTASSIUM CHLORIDE, SODIUM LACTATE AND CALCIUM CHLORIDE 1000 ML: 600; 310; 30; 20 INJECTION, SOLUTION INTRAVENOUS at 08:52

## 2021-12-04 RX ADMIN — PRAMIPEXOLE DIHYDROCHLORIDE 0.75 MG: 0.5 TABLET ORAL at 18:59

## 2021-12-04 RX ADMIN — FENTANYL CITRATE 50 MCG: 50 INJECTION, SOLUTION INTRAMUSCULAR; INTRAVENOUS at 14:14

## 2021-12-04 RX ADMIN — LISINOPRIL 20 MG: 20 TABLET ORAL at 17:50

## 2021-12-04 RX ADMIN — DIPHENHYDRAMINE HYDROCHLORIDE 12.5 MG: 50 INJECTION INTRAMUSCULAR; INTRAVENOUS at 14:07

## 2021-12-04 RX ADMIN — AMLODIPINE BESYLATE 10 MG: 10 TABLET ORAL at 16:56

## 2021-12-04 RX ADMIN — SODIUM CHLORIDE, POTASSIUM CHLORIDE, SODIUM LACTATE AND CALCIUM CHLORIDE: 600; 310; 30; 20 INJECTION, SOLUTION INTRAVENOUS at 11:52

## 2021-12-04 RX ADMIN — DEXAMETHASONE SODIUM PHOSPHATE 10 MG: 4 INJECTION, SOLUTION INTRA-ARTICULAR; INTRALESIONAL; INTRAMUSCULAR; INTRAVENOUS; SOFT TISSUE at 11:59

## 2021-12-04 RX ADMIN — ACETAMINOPHEN 1000 MG: 500 TABLET ORAL at 10:59

## 2021-12-04 RX ADMIN — SUCCINYLCHOLINE CHLORIDE 106 MG: 20 INJECTION, SOLUTION INTRAMUSCULAR; INTRAVENOUS; PARENTERAL at 11:57

## 2021-12-04 RX ADMIN — LIDOCAINE HYDROCHLORIDE 4 ML: 40 SOLUTION TOPICAL at 11:57

## 2021-12-04 RX ADMIN — LIDOCAINE HYDROCHLORIDE 100 MG: 20 INJECTION, SOLUTION EPIDURAL; INFILTRATION; INTRACAUDAL at 11:53

## 2021-12-04 RX ADMIN — PROPOFOL 150 MG: 10 INJECTION, EMULSION INTRAVENOUS at 11:57

## 2021-12-04 RX ADMIN — CEFOTETAN DISODIUM 2 G: 2 INJECTION, POWDER, FOR SOLUTION INTRAMUSCULAR; INTRAVENOUS at 11:59

## 2021-12-04 RX ADMIN — PIPERACILLIN AND TAZOBACTAM 3.38 G: 3; .375 INJECTION, POWDER, LYOPHILIZED, FOR SOLUTION INTRAVENOUS; PARENTERAL at 08:10

## 2021-12-04 RX ADMIN — HYDRALAZINE HYDROCHLORIDE 5 MG: 20 INJECTION INTRAMUSCULAR; INTRAVENOUS at 14:18

## 2021-12-04 RX ADMIN — KETAMINE HYDROCHLORIDE 50 MG: 50 INJECTION INTRAMUSCULAR; INTRAVENOUS at 11:59

## 2021-12-04 RX ADMIN — HYDRALAZINE HYDROCHLORIDE 5 MG: 20 INJECTION INTRAMUSCULAR; INTRAVENOUS at 13:49

## 2021-12-04 ASSESSMENT — ENCOUNTER SYMPTOMS
ABDOMINAL PAIN: 1
CHILLS: 0
VOMITING: 0
NAUSEA: 1
DIARRHEA: 0
FEVER: 0

## 2021-12-04 ASSESSMENT — FIBROSIS 4 INDEX: FIB4 SCORE: 0.68

## 2021-12-04 ASSESSMENT — PAIN DESCRIPTION - PAIN TYPE
TYPE: SURGICAL PAIN

## 2021-12-04 ASSESSMENT — PAIN SCALES - GENERAL: PAIN_LEVEL: 6

## 2021-12-04 NOTE — ED NOTES
Pt up to bsc. Urine sample obtained and sent to lab.   ERP re-eval complete. Planning for admission. Pt updated to POC. Abx infusing. Covid swab to lab.

## 2021-12-04 NOTE — ED NOTES
Pharmacy Medication Reconciliation    ~Med rec updated and complete per pt at bedside  ~Allergies have been verified  ~Pt home pharmacy:[facility]      ~Patient reports that she finished a 10 day course of Cleocin that was started on 11/11/2021    ~Patient was on a course of Vibramycin that was stopped on 11/11/2021 after 3 days.

## 2021-12-04 NOTE — ANESTHESIA TIME REPORT
Anesthesia Start and Stop Event Times     Date Time Event    12/4/2021 1135 Ready for Procedure     1152 Anesthesia Start     1324 Anesthesia Stop        Responsible Staff  12/04/21    Name Role Begin End    Desiree Santamaria M.D. Anesth 1152 1324        Preop Diagnosis (Free Text):  Pre-op Diagnosis     Acute Cholecystitis        Preop Diagnosis (Codes):    Premium Reason  E. Weekend    Comments:

## 2021-12-04 NOTE — DISCHARGE INSTRUCTIONS
Dr. Rodriguez's Laparoscopic Cholecystectomy Discharge Instructions:    1. DIET: Upon discharge from the hospital you may resume your normal preoperative diet. Depending on how you are feeling and whether you have nausea or not, you may wish to stay with a bland diet for the first few days. However, you can advance this as quickly as you feel ready.    2. ACTIVITIES: You have a 15 pound weight restriction for two weeks after surgery.  Routine activities such as bathing, walking, going up and down stairs, and driving* (see below) are safe.  Avoid strenuous activities and exercise that involves twisting, bending, and, running.    3. DRIVING: You may drive whenever you are off pain medications and are able to perform the activities needed to drive, i.e. turning, bending, twisting, wearing a seat belt, etc.    4. WOUND/BATHING: You may get the wound wet at any time after leaving the hospital. You may shower, but do not submerge in a bath or a pool for at least a week. You may peel off the skin glue using a finger or a pair of tweezers one week after surgery.    5. BOWEL FUNCTION: A few patients, after this operation, will develop either frequent or loose stools after meals. This usually corrects itself after a few days, to a few months.    Much more common than loose stools, is constipation. The combination of pain medication and decreased activity after surgery can cause constipation in otherwise normal patients. If you feel this is occurring, take an over the counter laxative (Milk of Magnesia, Ex-Lax, Senokot, Miralax, Magnesium Citrate, etc.) until the problem has resolved.    6. PAIN MEDICATION: You will be given a prescription for pain medication at discharge. Please take these as directed. It is important to remember not to take medications on an empty stomach as this may cause nausea.  Wean the use of pain medication as tolerated as soon as possible.    7.CALL IF YOU HAVE: (1) Fevers to more than 101F, (2) Unusual  chest or leg pain, (3) Drainage or fluid from incision that may be foul smelling, increased tenderness or soreness at the wound or the wound edges are no longer together, redness or swelling at the incision site. Please do not hesitate to call with any other questions.     8. APPOINTMENT: Contact our office at [phone] for a follow-up appointment in 1 to 2 weeks following your procedure.    If you have any additional questions, please do not hesitate to call the office and speak to either myself or the physician on call.    Office address:   [address], [address] [place] [address]    Parag Rodriguez M.D.  [facility]  [phone]      ACTIVITY: Rest and take it easy for the first 24 hours.  A responsible adult is recommended to remain with you during that time.  It is normal to feel sleepy.  We encourage you to not do anything that requires balance, judgment or coordination.    MILD FLU-LIKE SYMPTOMS ARE NORMAL. YOU MAY EXPERIENCE GENERALIZED MUSCLE ACHES, THROAT IRRITATION, HEADACHE AND/OR SOME NAUSEA.    FOR 24 HOURS DO NOT:  Drive, operate machinery or run household appliances.  Drink beer or alcoholic beverages.   Make important decisions or sign legal documents.    DIET: To avoid nausea, slowly advance diet as tolerated, avoiding spicy or greasy foods for the first day.  Add more substantial food to your diet according to your physician's instructions.  Babies can be fed formula or breast milk as soon as they are hungry.  INCREASE FLUIDS AND FIBER TO AVOID CONSTIPATION.    You should CALL YOUR PHYSICIAN if you develop:  Fever greater than 101 degrees F.  Pain not relieved by medication, or persistent nausea or vomiting.  Excessive bleeding (blood soaking through dressing) or unexpected drainage from the wound.  Extreme redness or swelling around the incision site, drainage of pus or foul smelling drainage.  Inability to urinate or empty your bladder within 8 hours.  Problems with breathing or  chest pain.    You should call 911 if you develop problems with breathing or chest pain.  If you are unable to contact your doctor or surgical center, you should go to the nearest emergency room or urgent care center.    If any questions arise, call your doctor.  If your doctor is not available, please feel free to call the Surgical Center at [phone].     A registered nurse may call you a few days after your surgery to see how you are doing after your procedure.    MEDICATIONS: Resume taking daily medication.  Take prescribed pain medication with food.  If no medication is prescribed, you may take non-aspirin pain medication if needed.  PAIN MEDICATION CAN BE VERY CONSTIPATING.  Take a stool softener or laxative such as senokot, pericolace, or milk of magnesia if needed.    Prescriptions for oxybutynin, pramipexole, amoxicillin, and oxycodone were sent to your pharmacy.  Last pain medication given at 1:48 pm.    If your physician has prescribed pain medication that includes Acetaminophen (Tylenol), do not take additional Acetaminophen (Tylenol) while taking the prescribed medication.    Depression / Suicide Risk    As you are discharged from this [facility] facility, it is important to learn how to keep safe from harming yourself.    Recognize the warning signs:  · Abrupt changes in personality, positive or negative- including increase in energy   · Giving away possessions  · Change in eating patterns- significant weight changes-  positive or negative  · Change in sleeping patterns- unable to sleep or sleeping all the time   · Unwillingness or inability to communicate  · Depression  · Unusual sadness, discouragement and loneliness  · Talk of wanting to die  · Neglect of personal appearance   · Rebelliousness- reckless behavior  · Withdrawal from people/activities they love  · Confusion- inability to concentrate     If you or a loved one observes any of these behaviors or has concerns about self-harm,  here's what you can do:  · Talk about it- your feelings and reasons for harming yourself  · Remove any means that you might use to hurt yourself (examples: pills, rope, extension cords, firearm)  · Get professional help from the community (Mental Health, Substance Abuse, psychological counseling)  · Do not be alone:Call your Safe Contact- someone whom you trust who will be there for you.  · Call your local CRISIS HOTLINE 083-4634 or 569-715-6515  · Call your local Children's Mobile Crisis Response Team [place] (573) 182-4882 or www.DadShed  · Call the toll free National Suicide Prevention Hotlines   · National Suicide Prevention Lifeline 588-046-NDCK (9374)  · National Hope Line Network 800-SUICIDE (534-2973)

## 2021-12-04 NOTE — ED TRIAGE NOTES
".  Chief Complaint   Patient presents with   • Abdominal Pain     RUQ and RLQ x 2 hours       Pt ambulatory to triage for above complaint. Pt states \"my gall bladder is acting up\". Negative for rebound tenderness in RLQ. Pt reports homelessness, smells of urine and has visibly soiled pants in triage. Provided urine cup and instructions for UA.     Pt is alert/oriented and follows commands. Pt speaking in full sentences and responds appropriately to questions. No acute distress noted in triage and respirations are even and unlabored.     Pt placed in lobby and educated on triage process. Pt encouraged to alert staff for any changes in condition.    "
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
## 2021-12-04 NOTE — ANESTHESIA PREPROCEDURE EVALUATION
Case: [id number] Date/Time: 12/04/21 1117    Procedure: CHOLECYSTECTOMY, LAPAROSCOPIC    Location: [facility] OR 10 / SURGERY [facility]    Surgeons: Jos Rodriguez M.D.        71yo F with cholecystitis, here for lap isaiah    Occasional EtoH use; suspected DANIEL    Relevant Problems   CARDIAC   (positive) Hypertensive emergency      Other   (positive) Morbid obesity (HCC)   (positive) Pain of upper abdomen with concern for acute cholecystitis vs. choledocholithiasis       Physical Exam    Airway   Mallampati: IV  TM distance: >3 FB  Neck ROM: full       Cardiovascular - normal exam  Rhythm: regular  Rate: normal  (-) murmur     Dental - normal exam           Pulmonary - normal exam  Breath sounds clear to auscultation     Abdominal    Neurological - normal exam               Anesthesia Plan    ASA 3- EMERGENT   ASA physical status 3 criteria: hypertension - poorly controlledASA physical status emergent criteria: acute deteriorating condition due to infection and acute peritonitis    Plan - general               Induction: intravenous    Postoperative Plan: Postoperative administration of opioids is intended.    Pertinent diagnostic labs and testing reviewed    Informed Consent:    Anesthetic plan and risks discussed with patient.    Use of blood products discussed with: patient whom consented to blood products.

## 2021-12-04 NOTE — OP REPORT
DATE OF OPERATION: 12/4/2021    PREOPERATIVE DIAGNOSIS: Acute Cholecystitis    POSTOPERATIVE DIAGNOSIS: Same.    PROCEDURE PERFORMED: Laparoscopic cholecystectomy.     SURGEON: Parag Rodriguez MD    ANESTHESIA: General endotracheal anesthesia.    FINDINGS: The gallbladder showed signs of mild acute inflammation and chronic inflammation within the hepatocystic triangle.  Ectatic cystic duct.    SPECIMEN: Gallbladder with contents.    ESTIMATED BLOOD LOSS: 15 mL.    PROCEDURE: Informed consent was obtained pre-operatively.  The patient was taken back to the operating room and placed in supine position.  General endotracheal anesthesia was administered and the patient was intubated. Intravenous antibiotics were administered by the anesthesiologist in correct time interval. Sequential compression devices were placed. The abdomen was prepped and draped in a sterile fashion.  A time-out was performed and the patient and procedure were both verified.      Marcaine 0.5% with epinephrine was used to infiltrate the port sites. A 5 mm vivien-umbilical incision was made and subcutaneous tissue spread bluntly. The umbilical stalk was grasped with a Kocher and elevated toward the ceiling.  A Veress needle was atraumatically inserted into the peritoneal space. Saline test was performed and supported proper intra-peritoneal placement.  Carbon dioxide gas was applied to the port and pneumoperitoneum was achieved.  The Veress needle was removed after adequate insufflation.  A 5 mm port was placed into the intra-peritoneal space. A laparoscope was placed through this port.  The abdominal contents were inspected noted to be free of injury from Veress needle and port placement.  A 12 mm and two 5 mm ports were then placed in the epigastric region, right subcostal, and right lateral locations under directed visualization, respectively.      The gallbladder was identified, elevated, and retracted cephalad over the liver edge by the fundus to  expose the infundibulum. Dissection was carried out within the hepatocystic triangle, definitively identifying the cystic duct and cystic artery. The cystic duct could be seen clearly terminating at the infundibulum.  The critical view of safety was achieved.      The cystic duct and artery were each clipped once proximally, twice distally, and divided. HemaLok clips were used to control the cystic duct due to its large diameter.  The gallbladder was dissected free from the undersurface of the liver using electrocautery and placed within an EndoCatch bag. The gallbladder was delivered intact from the abdominal cavity through the epigastric port site and submitted for pathology. The gallbladder fossa was inspected and hemostasis was noted to be satisfactory. HemaBlast was sprayed over all dissected surfaces.    The epigastric port site fascia was closed with a 0 Vicryl suture using a suture passer. Once tied down, the fascia was noted to be tight and well approximated.    The ports were opened and the abdomen was allowed to deflate. All ports were then removed.  The port site skin incisions were closed with interrupted 4-0 Vicryl subcuticular sutures.    The patient tolerated the procedure well and there were no apparent complications. All sponge, sharps, and instrument counts were correct on 2 separate occasions. The patient was awakened, extubated, and transferred to the PACU in satisfactory condition.               [facility]:    Emergency Case?----- Yes  Wound Classification?----- Contaminated  Wound Closure?----- All Layers  ASA Classification?----- III    ____________________________________   Parag Rodriguez MD, FACS    JRU / NTS     DD: 12/4/2021   DT: 1:14 PM
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
## 2021-12-04 NOTE — ANESTHESIA PROCEDURE NOTES
Airway    Date/Time: 12/4/2021 11:57 AM  Performed by: Desiree Santamaria M.D.  Authorized by: Desiree Santamaria M.D.     Location:  OR  Urgency:  Elective  Indications for Airway Management:  Anesthesia      Spontaneous Ventilation: absent    Sedation Level:  Deep  Preoxygenated: Yes    Patient Position:  Sniffing  Mask Difficulty Assessment:  0 - not attempted  Final Airway Type:  Endotracheal airway  Final Endotracheal Airway:  ETT  Cuffed: Yes    Technique Used for Successful ETT Placement:  Direct laryngoscopy    Insertion Site:  Oral  Blade Type:  Anaya  Laryngoscope Blade/Videolaryngoscope Blade Size:  3  ETT Size (mm):  6.5  Measured from:  Teeth  ETT to Teeth (cm):  21  Placement Verified by: auscultation and capnometry    Cormack-Lehane Classification:  Grade IIa - partial view of glottis  Number of Attempts at Approach:  1

## 2021-12-04 NOTE — OR NURSING
1317 - Pt arrived to PACU 4A sleeping. Lap sites CD&I. Per Dr. Santamaria treat SBP>180 and start with haldol for nausea.    1330 - Pt waking c/o pain and nausea, will treat per MAR.    1353 - Sister, Meme, updated.    1405 - Spoke to friend Galdino and he will pick pt up at 1600.    1430 - [facility] med[facility] will deliver meds by 1530. Pt continues to c/o pain and nausea, will continue to treat per MAR.    1500 - Pt states that pain and nausea are tolerable. Pt is sleepy.    1530 - Meds to beds doesn't have meds ordered so prescriptions will be sent to [facility] [address].

## 2021-12-05 NOTE — OR NURSING
1810 - Spoke with Dr. Rodriguez RE: mirapex. New order received. Will monitor pressure until 1850 and call Dr. Rodriguez back.    1903 - Spoke with Dr. Rodriguez about BP and MD states ok to discharge.    1917 - Discharge instructions explained to patient, understanding verbalized.    1940 - Pt discharged with friend Galdino in taxi.

## 2021-12-06 LAB — PATHOLOGY CONSULT NOTE: NORMAL

## 2021-12-06 RX ORDER — OXYBUTYNIN CHLORIDE 15 MG/1
15 TABLET, EXTENDED RELEASE ORAL DAILY
Qty: 90 TABLET | Refills: 1 | Status: SHIPPED | OUTPATIENT
Start: 2021-12-06 | End: 2022-03-24

## 2021-12-12 ENCOUNTER — HOSPITAL ENCOUNTER (EMERGENCY)
Facility: MEDICAL CENTER | Age: 71
End: 2021-12-12
Attending: EMERGENCY MEDICINE
Payer: COMMERCIAL

## 2021-12-12 VITALS
HEART RATE: 63 BPM | RESPIRATION RATE: 18 BRPM | DIASTOLIC BLOOD PRESSURE: 65 MMHG | SYSTOLIC BLOOD PRESSURE: 146 MMHG | HEIGHT: 62 IN | BODY MASS INDEX: 41.06 KG/M2 | WEIGHT: 223.11 LBS | TEMPERATURE: 98.1 F | OXYGEN SATURATION: 93 %

## 2021-12-12 DIAGNOSIS — G89.18 POSTOPERATIVE PAIN: ICD-10-CM

## 2021-12-12 PROCEDURE — 99284 EMERGENCY DEPT VISIT MOD MDM: CPT

## 2021-12-12 RX ORDER — TRAMADOL HYDROCHLORIDE 50 MG/1
50 TABLET ORAL EVERY 8 HOURS PRN
Qty: 12 TABLET | Refills: 0 | Status: SHIPPED | OUTPATIENT
Start: 2021-12-12 | End: 2021-12-16

## 2021-12-12 ASSESSMENT — PAIN DESCRIPTION - DESCRIPTORS: DESCRIPTORS: DULL

## 2021-12-12 ASSESSMENT — FIBROSIS 4 INDEX: FIB4 SCORE: 0.8

## 2021-12-12 NOTE — ED TRIAGE NOTES
.  Chief Complaint   Patient presents with   • Post-Op Pain     jolie colon 12/4, reports pain to ruq after lifting something     Ambulated to triage with above c/c reports moving and lifted a box yesterday ruq pain after.

## 2021-12-12 NOTE — ED PROVIDER NOTES
"ED Provider Note    Scribed for Darline Wen M.D. by Erin Garcia. 12/12/2021, 8:27 AM.    Primary care provider: CECY Toscano  Means of arrival: Walk in  History obtained from: Patient  History limited by: Non3    CHIEF COMPLAINT  Chief Complaint   Patient presents with    Post-Op Pain     lap isaiah 12/4, reports pain to ruq after lifting something       HPI  Ariadna Sepulveda is a 70 y.o. female who presents to the Emergency Department for right upper quadrant abdominal pain with an onset yesterday status post cholecystectomy here on 12/4/21. Patient was instructed not to lift anything over 15 pounds, however her pain began after she lifted a 50 lb box yesterday while moving. She is concerned regarding stretching out her surgical site and stitches. She has attempted to use Tylenol without any improvement. Patient is otherwise eating and drinking normally. She denies any associated vomiting, diarrhea, fever, or decreased appetite. Exacerbating factors include movement. No alleviating factors were identified.     REVIEW OF SYSTEMS  Pertinent positives include right upper quadrant abdominal pain. Pertinent negatives include no vomiting, diarrhea, fever, or decreased appetite.     PAST MEDICAL HISTORY   has a past medical history of Arthritis, At risk for sleep apnea, Hypertension, and RLS (restless legs syndrome) (10/5/2011).    SURGICAL HISTORY   has a past surgical history that includes tonsillectomy; knee replacement, total (left); other orthopedic surgery; and isaiah by laparoscopy (N/A, 12/4/2021).    SOCIAL HISTORY  Social History     Tobacco Use    Smoking status: Never Smoker    Smokeless tobacco: Never Used   Vaping Use    Vaping Use: Never used   Substance Use Topics    Alcohol use: Yes     Alcohol/week: 1.2 oz     Types: 2 Cans of beer per week     Comment: \"A couple beers or glasses of wine a week\"    Drug use: No      Social History     Substance and Sexual Activity   Drug Use No " "      FAMILY HISTORY  Family History   Problem Relation Age of Onset    Cancer Mother     Arthritis Father     Diabetes Father     Heart Disease Father     Hypertension Father     Hyperlipidemia Father     Psychiatric Illness Brother     Drug abuse Brother     Alcohol abuse Brother     Lung Disease Neg Hx        CURRENT MEDICATIONS  Home Medications       Reviewed by Radhika Coleman R.N. (Registered Nurse) on 12/12/21 at 0740  Med List Status: Complete     Medication Last Dose Status   amoxicillin (AMOXIL) 500 MG Cap 12/12/2021 Active   oxybutynin (DITROPAN XL) 15 MG CR tablet 12/12/2021 Active   pramipexole (MIRAPEX) 1 MG Tab 12/12/2021 Active                    ALLERGIES  No Known Allergies    PHYSICAL EXAM  VITAL SIGNS: /76   Pulse 65   Temp 36.7 °C (98 °F) (Temporal)   Resp 18   Ht 1.575 m (5' 2\")   Wt 101 kg (223 lb 1.7 oz)   LMP  (LMP Unknown)   SpO2 94%   BMI 40.81 kg/m²     Constitutional: Well developed, No acute distress, Non-toxic appearance.   HENT: Normocephalic, Atraumatic, Bilateral external ears normal,   Eyes: PERRL, EOMI, Conjunctiva normal.    Neck: Normal range of motion, No tenderness, Supple.     Cardiovascular: Normal heart rate, Normal rhythm.    Thorax & Lungs: Normal breath sounds, No respiratory distress.    Abdomen:  Minimal right upper quadrant  tenderness, no distention, no guarding, no rebound. Incision healing well. Some minimal ecchymosis, but no evidence of infection.   Skin: Warm, Dry, No erythema, No rash.   Back: No tenderness, No CVA tenderness.   Extremities: Intact distal pulses, No edema, No tenderness   Neurologic: Alert & oriented x 3, Normal motor function, Normal sensory function, No focal deficits noted.   Psychiatric: Appropriate                                                     COURSE & MEDICAL DECISION MAKING  Nursing notes, VS, PMSFHx reviewed in chart.     Patient presents to the emergency department with right upper quadrant pain after heavy " lifting.  History of recent surgery about a week ago for cholecystectomy via laparoscopic.  Patient does not have any abdominal distention.  There is no peritoneal signs on exam.  There is no evidence of infection.  I do not feel the patient needs imaging at this time.  I do suspect she likely overdid it lifting heavy boxes while moving.  However I do not suspect she has internal bleeding or other complication from heavy lifting after the surgery.  I think she stable for outpatient management advised to continue Tylenol and ibuprofen and avoid heavy lifting.  Return precautions were given.    8:27 AM Patient seen and examined at bedside. Discussed plan of care. I informed her imaging and further work up is not beneficial at this time. Patient was advised to avoid heavy lifting. She will be prescribed tramadol for her symptoms. I reviewed prescription monitoring program for patient's narcotic use before prescribing a scheduled drug. The patient will not drink alcohol nor drive with prescribed medications. The patient will return for new or worsening symptoms such as fever, or increased pain. She is stable at the time of discharge.    In prescribing controlled substances to this patient, I certify that I have obtained and reviewed the medical history of Ariadna Sepulveda. I have also made a good christina effort to obtain applicable records from other providers who have treated the patient and records did not demonstrate any increased risk of substance abuse that would prevent me from prescribing controlled substances.     I have conducted a physical exam and documented it. I have reviewed Ms. Sepulveda’s prescription history as maintained by the [place] Prescription Monitoring Program.     I have assessed the patient’s risk for abuse, dependency, and addiction using the validated Opioid Risk Tool available at https://www.mdcalc.com/ykictp-aalr-ksjf-ort-narcotic-abuse.     Given the above, I believe the benefits of  controlled substance therapy outweigh the risks. The reasons for prescribing controlled substances include non-narcotic, oral analgesic alternatives have been inadequate for pain control. Accordingly, I have discussed the risk and benefits, treatment plan, and alternative therapies with the patient.       DISPOSITION:  Patient will be discharged home in stable condition.    FOLLOW UP:  CECY Toscano  [address]  [place] [id number] [phone]    In 3 days  If symptoms worsen, return to the er.      OUTPATIENT MEDICATIONS:  New Prescriptions    TRAMADOL (ULTRAM) 50 MG TAB    Take 1 Tablet by mouth every 8 hours as needed for Severe Pain for up to 4 days.       FINAL IMPRESSION  1. Postoperative pain          Erin LEE (Scribe), am scribing for, and in the presence of, Darline Wen M.D..    Electronically signed by: Erin Garcia (Scribe), 12/12/2021    IDarline M.D. personally performed the services described in this documentation, as scribed by Erin Garcia in my presence, and it is both accurate and complete.    E    The note accurately reflects work and decisions made by me.  Darline Wen M.D.  12/12/2021  2:48 PM

## 2021-12-12 NOTE — DISCHARGE INSTRUCTIONS
Try to avoid heavy lifting.  If you develop worsening abdominal pain, abdominal distention, vomiting or fever return immediately.  I think your pain is because he did too much yesterday.  You can take Tylenol or ibuprofen for mild pain.  Use the pain medication for severe pain only.  Follow-up with your surgeon.

## 2021-12-12 NOTE — ED NOTES
Pt ready for discharge.  Instructions provided, prescription called in.  Pt verbalized understanding.  Leaves ER via WC and states she will get home via bus system that she is familiar with.

## 2021-12-12 NOTE — ED TRIAGE NOTES
Pt wheeled to [address], chart up for ERP.  Pt c/o R sided abd pain after lifting heavy objects yesterday.  Soft, non-tender.  Reports pain increases when bending over.

## 2021-12-20 PROCEDURE — 93005 ELECTROCARDIOGRAM TRACING: CPT

## 2021-12-20 PROCEDURE — 99284 EMERGENCY DEPT VISIT MOD MDM: CPT

## 2021-12-20 PROCEDURE — 93005 ELECTROCARDIOGRAM TRACING: CPT | Performed by: EMERGENCY MEDICINE

## 2021-12-20 ASSESSMENT — FIBROSIS 4 INDEX: FIB4 SCORE: 0.8

## 2021-12-21 ENCOUNTER — HOSPITAL ENCOUNTER (EMERGENCY)
Facility: MEDICAL CENTER | Age: 71
End: 2021-12-21
Attending: EMERGENCY MEDICINE
Payer: COMMERCIAL

## 2021-12-21 ENCOUNTER — APPOINTMENT (OUTPATIENT)
Dept: RADIOLOGY | Facility: MEDICAL CENTER | Age: 71
End: 2021-12-21
Attending: EMERGENCY MEDICINE
Payer: COMMERCIAL

## 2021-12-21 VITALS
DIASTOLIC BLOOD PRESSURE: 53 MMHG | OXYGEN SATURATION: 100 % | SYSTOLIC BLOOD PRESSURE: 119 MMHG | TEMPERATURE: 97.8 F | RESPIRATION RATE: 16 BRPM | HEART RATE: 70 BPM | BODY MASS INDEX: 41.75 KG/M2 | WEIGHT: 226.85 LBS | HEIGHT: 62 IN
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
## 2021-12-21 DIAGNOSIS — M19.011 OSTEOARTHRITIS OF RIGHT SHOULDER, UNSPECIFIED OSTEOARTHRITIS TYPE: ICD-10-CM

## 2021-12-21 DIAGNOSIS — M65.80 TENDON CALCIFICATION: ICD-10-CM

## 2021-12-21 DIAGNOSIS — M25.511 ACUTE PAIN OF RIGHT SHOULDER: ICD-10-CM

## 2021-12-21 LAB — EKG IMPRESSION: NORMAL

## 2021-12-21 PROCEDURE — A9270 NON-COVERED ITEM OR SERVICE: HCPCS | Performed by: EMERGENCY MEDICINE

## 2021-12-21 PROCEDURE — 700102 HCHG RX REV CODE 250 W/ 637 OVERRIDE(OP): Performed by: EMERGENCY MEDICINE

## 2021-12-21 PROCEDURE — 73030 X-RAY EXAM OF SHOULDER: CPT | Mod: RT

## 2021-12-21 RX ORDER — ACETAMINOPHEN 325 MG/1
650 TABLET ORAL ONCE
Status: COMPLETED | OUTPATIENT
Start: 2021-12-21 | End: 2021-12-21

## 2021-12-21 RX ADMIN — ACETAMINOPHEN 650 MG: 325 TABLET, FILM COATED ORAL at 02:45

## 2021-12-21 NOTE — ED NOTES
Called pt in lobby, lobby restroom, and senior lounge. Unable to locate pt for triage. Will re-try.

## 2021-12-21 NOTE — ED NOTES
Pt medicated per MAR. Pt ambulatory without assistance. Reviewed discharge instructions, pt verbalized understanding of instructions and medication. States she will schedule follow-up appointment. Denies further questions at this time. Pt ambulatory out of ER with steady gait.

## 2021-12-21 NOTE — ED PROVIDER NOTES
ED Provider Note    CHIEF COMPLAINT  Fall, shoulder pain    HPI  Ariadna Sepulveda is a 70 y.o. female who presents to the emergency department for evaluation after ground-level fall.  The patient states that she slipped on ice and fell to the ground.  She landed on her right shoulder.  She denies hitting her head or having loss of consciousness.  She had people help her up but has been able to ambulate since then.  She denies taking any blood thinners.  She states that she is having intermittent right shoulder pain that is exacerbated with movement.  She states that she is right-hand dominant.  She denies any weakness or numbness.  She recently had her gallbladder removed at the beginning of the month but states that this seems to be healing well with no abdominal pain, nausea, or vomiting.  She denies having any fevers.  She states that she does have a history of bilateral lower extremity swelling but denies any new or different swelling or pain in her calves.  She has not had any coughing, wheezing, congestion, runny nose, sore throat, chest pain, shortness of breath.    REVIEW OF SYSTEMS  See HPI for further details. All other systems are negative.     PAST MEDICAL HISTORY   has a past medical history of Arthritis, At risk for sleep apnea, Hypertension, and RLS (restless legs syndrome) (10/5/2011).    SOCIAL HISTORY  Social History     Tobacco Use   • Smoking status: Never Smoker   • Smokeless tobacco: Never Used   Vaping Use   • Vaping Use: Never used   Substance and Sexual Activity   • Alcohol use: Yes     Alcohol/week: 1.2 oz     Types: 2 Cans of beer per week     Comment: occasional   • Drug use: No   • Sexual activity: Not Currently       SURGICAL HISTORY   has a past surgical history that includes tonsillectomy; knee replacement, total (left); other orthopedic surgery; and isaiah by laparoscopy (N/A, 12/4/2021).    CURRENT MEDICATIONS  Home Medications     Reviewed by Mercy Moore R.N. (Registered Nurse)  "on 12/20/21 at 2320  Med List Status: Partial   Medication Last Dose Status   oxybutynin (DITROPAN XL) 15 MG CR tablet  Active   pramipexole (MIRAPEX) 1 MG Tab  Active                ALLERGIES  No Known Allergies    PHYSICAL EXAM  VITAL SIGNS: /88   Pulse 88   Temp 37.1 °C (98.8 °F) (Temporal)   Resp 19   Ht 1.575 m (5' 2\")   Wt 103 kg (226 lb 13.7 oz)   LMP  (LMP Unknown)   SpO2 95%   BMI 41.49 kg/m²   Constitutional: Alert and in no apparent distress.  HENT: Normocephalic atraumatic. Bilateral external ears normal. Bilateral TM's clear. Nose normal. Mucous membranes are moist.  Eyes: Pupils are equal and reactive. Conjunctiva normal. Non-icteric sclera.   Neck: Normal range of motion without tenderness. Supple. No meningeal signs.  No cervical spine tenderness.  Cardiovascular: Regular rate and rhythm. No murmurs, gallops or rubs.  Thorax & Lungs: No retractions, nasal flaring, or tachypnea. Breath sounds are clear to auscultation bilaterally. No wheezing, rhonchi or rales.  Abdomen: Soft, nontender and nondistended. No hepatosplenomegaly.  Surgical incisions from the laparoscopic cholecystectomy appear to be healing well with no surrounding erythema, drainage or fluctuance.  Skin: Warm and dry. No rashes are noted.  Back: No midline bony tenderness, No CVA tenderness.   Extremities: 2+ peripheral pulses. Cap refill is less than 2 seconds.  The patient does have edema of bilateral lower extremities with scaling skin.  No well demarcated erythema is noted.  No warmth is noted.  No calf tenderness is noted.  Musculoskeletal: Good range of motion in all major joints. No tenderness to palpation or major deformities noted.  Focused exam of the right upper extremity: No obvious deformities are noted.  No tenderness palpation over the clavicle humerus, elbow, forearm or wrist is noted.  2+ radial pulses noted.  She can make a thumbs up, A-OK, and abduct fingers against resistance.  Sensation grossly " intact.  Neurologic: Alert and appropriate for age. The patient moves all 4 extremities without obvious deficits.    DIAGNOSTIC STUDIES / PROCEDURES    RADIOLOGY  DX-SHOULDER 2+ RIGHT   Final Result      1.  No radiographic evidence of acute traumatic injury.   2.  Calcific tendinosis of the rotator cuff   3.  Acromioclavicular osteoarthritis        COURSE & MEDICAL DECISION MAKING  Pertinent Labs & Imaging studies reviewed. (See chart for details)    This is a 70-year-old female presenting to the emergency department for evaluation of right upper extremity pain after a mechanical ground-level fall.  The patient appeared well and in no acute distress.  Her vital signs were completely normal.  She had no obvious traumatic injury on my exam but was complaining of pain of the right shoulder when she moved it.  She was grossly neurovascularly intact distal to the shoulder.  A plain film was obtained no obvious fracture or dislocation was noted.  Incidental findings of calcific tendinosis of the rotator cuff and acromioclavicular osteoarthritis were noted.  This could be contributing to her discomfort.  I have low clinical suspicion for occult fracture given that there is no tenderness to palpation over the shoulder.  An EKG had been performed in triage and did not have any evidence of acute ischemia.  She denied any other anginal equivalents and I have low clinical suspicion for ACS.  She did not hit her head or have any loss of consciousness.  Her GCS here is 15 and she is grossly neurologically intact.  She does not take any blood thinners.  I do not think that a CT of the head is indicated at this time.  The patient was treated with Tylenol here in the ED.  She was able to ambulate with a steady gait without assistance.  I do believe she stable for discharge but encouraged her to follow-up with both her primary care physician as well as Ortho.  She is given the information for the orthopedic surgeon on-call.  She  will return to the emergency department with any worsening signs or symptoms.    I verified that the patient was wearing a mask and I was wearing appropriate PPE every time I entered the room. The patient's mask was on the patient at all times during my encounter except for a brief view of the oropharynx.    FINAL IMPRESSION  1. Acute pain of right shoulder    2. Accident due to mechanical fall without injury, initial encounter    3. Tendon calcification    4. Osteoarthritis of right shoulder, unspecified osteoarthritis type      PRESCRIPTIONS  New Prescriptions    No medications on file     FOLLOW UP  [facility] Toscano  [address]  [facility] [id number] [phone]    Call in 1 day  To schedule a follow up appointment    [facility], Emergency Dept  [address] [facility] [address] [phone]  Go to   As needed    Javon Ann M.D.  [address]  [facility] [id number] [phone]    Call in 1 day  To schedule a follow up appointment    -DISCHARGE-    Electronically signed by: Mariely Price D.O., 12/21/2021 1:52 AM

## 2021-12-21 NOTE — ED NOTES
PT rv @0040. Thanked her for her patience and advised her that if there any changes to please let us know.

## 2021-12-21 NOTE — ED TRIAGE NOTES
Ariadna Sepulveda  70 y.o.  Chief Complaint   Patient presents with   • GLF     fell on ice, - LOC, denies hitting head   • Shoulder Pain     RIGHT, describes pain as 2/10 ache increasing to 10/10 with movement, CMS intact, full ROM to joint     Ambulatory to triage with slow steady gait for above. A & O x 4, GCS 15, Mask in place.    No ASA/thinners.    Triage process explained to patient, apologized for wait time, and returned to lobby.

## 2022-01-06 PROCEDURE — 99283 EMERGENCY DEPT VISIT LOW MDM: CPT

## 2022-01-06 ASSESSMENT — FIBROSIS 4 INDEX: FIB4 SCORE: 0.81

## 2022-01-07 ENCOUNTER — HOSPITAL ENCOUNTER (EMERGENCY)
Facility: MEDICAL CENTER | Age: 72
End: 2022-01-07
Attending: EMERGENCY MEDICINE
Payer: COMMERCIAL

## 2022-01-07 VITALS
DIASTOLIC BLOOD PRESSURE: 105 MMHG | SYSTOLIC BLOOD PRESSURE: 177 MMHG | WEIGHT: 226 LBS | OXYGEN SATURATION: 91 % | TEMPERATURE: 97.8 F | RESPIRATION RATE: 16 BRPM | HEIGHT: 62 IN | HEART RATE: 80 BPM | BODY MASS INDEX: 41.59 KG/M2

## 2022-01-07 DIAGNOSIS — L03.116 CELLULITIS OF LEFT LOWER EXTREMITY: ICD-10-CM

## 2022-01-07 PROCEDURE — 700102 HCHG RX REV CODE 250 W/ 637 OVERRIDE(OP): Performed by: EMERGENCY MEDICINE

## 2022-01-07 PROCEDURE — A9270 NON-COVERED ITEM OR SERVICE: HCPCS | Performed by: EMERGENCY MEDICINE

## 2022-01-07 RX ORDER — DOXYCYCLINE 100 MG/1
100 CAPSULE ORAL 2 TIMES DAILY
Qty: 20 CAPSULE | Refills: 0 | Status: SHIPPED | OUTPATIENT
Start: 2022-01-07 | End: 2022-01-17

## 2022-01-07 RX ORDER — DOXYCYCLINE 100 MG/1
100 TABLET ORAL ONCE
Status: COMPLETED | OUTPATIENT
Start: 2022-01-07 | End: 2022-01-07

## 2022-01-07 RX ADMIN — DOXYCYCLINE 100 MG: 100 TABLET, FILM COATED ORAL at 01:51

## 2022-01-07 NOTE — ED PROVIDER NOTES
ER Provider Note     Scribed for Javon Cano M.D. by Gillian Carpio. 1/7/2022, 1:13 AM.    Primary Care Provider: [facility] Toscano  Means of Arrival: Walk-in   History obtained from: Patient  History limited by: None     CHIEF COMPLAINT  Chief Complaint   Patient presents with    Wound Check     left calf     HPI  Ariadna Sepulveda is a 71 y.o. female who presents to the Emergency Department for a wound check to the left calf. There is a wound located on the right lower calf and there is associated dryness and scabbing to the area. She has been on antibiotics three times for the same wound. She is being managed by wound care, but missed her last appointment. She was unable to reach anyone so she went to Wound Care this morning.     REVIEW OF SYSTEMS  See HPI for further details. All other systems are negative.   E    PAST MEDICAL HISTORY   has a past medical history of Arthritis, At risk for sleep apnea, Hypertension, and RLS (restless legs syndrome) (10/5/2011).    SURGICAL HISTORY   has a past surgical history that includes tonsillectomy; knee replacement, total (left); other orthopedic surgery; and isaiah by laparoscopy (N/A, 12/4/2021).    SOCIAL HISTORY  Social History     Tobacco Use    Smoking status: Never Smoker    Smokeless tobacco: Never Used   Vaping Use    Vaping Use: Never used   Substance Use Topics    Alcohol use: Yes     Alcohol/week: 1.2 oz     Types: 2 Cans of beer per week     Comment: occasional    Drug use: No      Social History     Substance and Sexual Activity   Drug Use No       FAMILY HISTORY  Family History   Problem Relation Age of Onset    Cancer Mother     Arthritis Father     Diabetes Father     Heart Disease Father     Hypertension Father     Hyperlipidemia Father     Psychiatric Illness Brother     Drug abuse Brother     Alcohol abuse Brother     Lung Disease Neg Hx        CURRENT MEDICATIONS  Home Medications       Reviewed by Chhaya Purcell R.N. (Registered Nurse) on  "01/06/22 at 2339  Med List Status: Not Addressed     Medication Last Dose Status   oxybutynin (DITROPAN XL) 15 MG CR tablet  Active   pramipexole (MIRAPEX) 1 MG Tab  Active                    ALLERGIES  No Known Allergies    PHYSICAL EXAM  VITAL SIGNS: BP (!) 176/94   Pulse 79   Temp 36.6 °C (97.8 °F) (Temporal)   Resp 16   Ht 1.575 m (5' 2\")   Wt 103 kg (226 lb)   LMP  (LMP Unknown)   SpO2 93%   BMI 41.34 kg/m²      Constitutional: Alert in no apparent distress.  HENT: No signs of trauma, Bilateral external ears normal, Nose normal.   Eyes: Pupils are equal and reactive, Conjunctiva normal, Non-icteric.   Neck: Normal range of motion, No tenderness, Supple, No stridor.   Lymphatic: No lymphadenopathy noted.   Cardiovascular: Regular rate and rhythm, no palpable thrill  Thorax & Lungs: No respiratory distress,  No chest tenderness.  CTAB  Abdomen: Bowel sounds normal, Soft, No tenderness, No masses, No pulsatile masses. No peritoneal signs.  Skin: Wound to the lower left leg with scabbing and surrounding erythema; no drainage. Warm, Dry, No rash.   Back: No bony tenderness, No CVA tenderness.   Extremities: Intact distal pulses, No edema, No tenderness, No cyanosis.  Musculoskeletal: Good range of motion in all major joints. No tenderness to palpation or major deformities noted.   Neurologic: Alert , Normal motor function, Normal sensory function, No focal deficits noted.   Psychiatric: Affect normal, Judgment normal, Mood normal.     DIAGNOSTIC STUDIES / PROCEDURES    COURSE & MEDICAL DECISION MAKING  Pertinent Labs & Imaging studies reviewed. (See chart for details)    This is a 71 y.o. female that presents with what appears to be cellulitis on the left calf. There is no abscess. Reviewed the patient doxycycline. Patient is not septic. We will dress the wound. We will discharge the patient home and have her follow-up with wound care..     1:13 AM - Patient seen and examined at bedside. Patient was informed " that she will be discharged with antibiotics for her wound infection. Patient is agreeable to the plan of care moving forward. She was given the opportunity to ask questions and voice concerns moving forward.     PPE Note: I personally donned full PPE for all patient encounters during this visit, including being clean-shaven with an N95 respirator mask, gloves, gown, and goggles.     Scribe remained outside the patient's room and did not have any contact with the patient for the duration of patient encounter.     The patient will return for new or worsening symptoms and is stable at the time of discharge.    The patient is referred to a primary physician for blood pressure management, diabetic screening, and for all other preventative health concerns.    DISPOSITION:  Patient will be discharged home in stable condition.    FOLLOW UP:  MEEK ToscanoPStefanieR.N.  [address] [facility] [address]  [phone]          [facility] AT [address] [id number] [phone]      OUTPATIENT MEDICATIONS:  Discharge Medication List as of 1/7/2022  1:30 AM        START taking these medications    Details   doxycycline (MONODOX) 100 MG capsule Take 1 Capsule by mouth 2 times a day for 10 days., Disp-20 Capsule, R-0, Normal           FINAL IMPRESSION  1. Cellulitis of left lower extremity          Gillian LEE), am scribing for, and in the presence of, Javon Cano M.D..    Electronically signed by: Gillian Figueroa), 1/7/2022    Javon LEE M.D. personally performed the services described in this documentation, as scribed by Gillian Carpio in my presence, and it is both accurate and complete.     The note accurately reflects work and decisions made by me.  Javon Cano M.D.  1/7/2022  3:05 AM

## 2022-01-07 NOTE — ED TRIAGE NOTES
"Chief Complaint   Patient presents with   • Wound Check     left calf     Pt has a wound on her left calf that has been \"draining blood and puss\". Pt is normally seen at [facility] but has no been able to be seen for a month.     Pt is alert and oriented, speaking in full sentences, follows commands and responds appropriately to questions.      Pt placed in lobby. Pt educated on triage process and apologized for wait time. Pt encouraged to alert staff for any changes.     Patient and staff wearing appropriate PPE      BP (!) 204/94   Pulse 77   Temp 36.6 °C (97.8 °F) (Temporal)   Resp 16   Ht 1.575 m (5' 2\")   Wt 103 kg (226 lb)   SpO2 97%       "

## 2022-01-07 NOTE — ED NOTES
Wound wrapped, antibiotic ointment applied. Patient discharged in stable condition per orders. Wristband removed per protocol. Patient verbalized understanding of all discharge instructions. All belongings accounted for. Ambulatory to lobby with slow steady gait.

## 2022-03-24 DIAGNOSIS — N32.81 OVERACTIVE BLADDER: ICD-10-CM

## 2022-03-24 RX ORDER — OXYBUTYNIN CHLORIDE 15 MG/1
15 TABLET, EXTENDED RELEASE ORAL DAILY
Qty: 30 TABLET | Refills: 47 | Status: SHIPPED | OUTPATIENT
Start: 2022-03-24 | End: 2022-05-31 | Stop reason: SDUPTHER

## 2022-04-13 ENCOUNTER — HOSPITAL ENCOUNTER (EMERGENCY)
Facility: MEDICAL CENTER | Age: 72
End: 2022-04-13
Attending: EMERGENCY MEDICINE
Payer: COMMERCIAL

## 2022-04-13 VITALS
TEMPERATURE: 96.9 F | SYSTOLIC BLOOD PRESSURE: 157 MMHG | BODY MASS INDEX: 39.35 KG/M2 | WEIGHT: 213.85 LBS | DIASTOLIC BLOOD PRESSURE: 77 MMHG | HEART RATE: 70 BPM | HEIGHT: 62 IN | OXYGEN SATURATION: 99 % | RESPIRATION RATE: 15 BRPM

## 2022-04-13 DIAGNOSIS — H61.23 BILATERAL IMPACTED CERUMEN: ICD-10-CM

## 2022-04-13 PROCEDURE — 99282 EMERGENCY DEPT VISIT SF MDM: CPT

## 2022-04-13 PROCEDURE — 69209 REMOVE IMPACTED EAR WAX UNI: CPT

## 2022-04-13 ASSESSMENT — ENCOUNTER SYMPTOMS
FEVER: 0
COUGH: 1
FOCAL WEAKNESS: 0
HEADACHES: 0
SENSORY CHANGE: 0
TINGLING: 0
SHORTNESS OF BREATH: 0
VOMITING: 0

## 2022-04-13 ASSESSMENT — FIBROSIS 4 INDEX: FIB4 SCORE: 0.81

## 2022-04-13 NOTE — ED TRIAGE NOTES
"Chief Complaint   Patient presents with   • Earache     Pt reports that two weeks ago she had a tooth pulled in he right lower jaw and developed a cold shortly after with a productive cough, but since developing the cold her ears have become plugged and she is having a hard time hearing.      /70   Pulse 67   Temp 35.9 °C (96.7 °F) (Temporal)   Resp 16   Ht 1.575 m (5' 2\")   Wt 97 kg (213 lb 13.5 oz)   LMP  (LMP Unknown)   SpO2 98%   BMI 39.11 kg/m²     Pt is ambulatory in and out of triage. Appropriate PPE worn throughout entire encounter. Pt placed back in the lobby and educated about triage process.    "

## 2022-04-13 NOTE — ED NOTES
Pt provided with dsicharge instrucitons. Pt verbalized understanding. Pt assisted out of ed with steady gait.

## 2022-04-13 NOTE — ED PROVIDER NOTES
"ED Provider Note    ED Provider Note    Primary care provider: CECY Toscano  Means of arrival: POV  History obtained from: patient  History limited by: None    CHIEF COMPLAINT  Chief Complaint   Patient presents with   • Earache     Pt reports that two weeks ago she had a tooth pulled in he right lower jaw and developed a cold shortly after with a productive cough, but since developing the cold her ears have become plugged and she is having a hard time hearing.        HPI  Ariadna Sepulveda is a 71 y.o. female who presents to the Emergency Department brought in by a friend.  Patient states that 2 weeks ago she had a tooth removed and since then she has not been able to hear.  She states that she has not had any pain despite triage note of it \"earache\".  She states she has no pain she just cannot hear.  She tells me that her ear roommate brought her in because she left the shower on this morning and he got tired of her asking what he was saying.  She does state that she cleans her ears.  She complains of a mild cough.  No fever.  No other neurologic deficits.  Symptoms have been going on for 2 weeks.    REVIEW OF SYSTEMS  Review of Systems   Constitutional: Negative for fever.   HENT: Positive for hearing loss. Negative for ear pain.    Respiratory: Positive for cough. Negative for shortness of breath.    Cardiovascular: Negative for chest pain.   Gastrointestinal: Negative for vomiting.   Neurological: Negative for tingling, sensory change, focal weakness and headaches.   All other systems reviewed and are negative.      PAST MEDICAL HISTORY   has a past medical history of Arthritis, At risk for sleep apnea, Hypertension, and RLS (restless legs syndrome) (10/5/2011).    SURGICAL HISTORY   has a past surgical history that includes tonsillectomy; knee replacement, total (left); other orthopedic surgery; and isaiah by laparoscopy (N/A, 12/4/2021).    SOCIAL HISTORY  Social History     Tobacco Use   • Smoking " "status: Never Smoker   • Smokeless tobacco: Never Used   Vaping Use   • Vaping Use: Never used   Substance Use Topics   • Alcohol use: Yes     Alcohol/week: 1.2 oz     Types: 2 Cans of beer per week     Comment: occasional   • Drug use: No      Social History     Substance and Sexual Activity   Drug Use No       FAMILY HISTORY  Family History   Problem Relation Age of Onset   • Cancer Mother    • Arthritis Father    • Diabetes Father    • Heart Disease Father    • Hypertension Father    • Hyperlipidemia Father    • Psychiatric Illness Brother    • Drug abuse Brother    • Alcohol abuse Brother    • Lung Disease Neg Hx        CURRENT MEDICATIONS  Home Medications     Reviewed by Mague Washington R.N. (Registered Nurse) on 04/13/22 at 1057  Med List Status: <None>   Medication Last Dose Status   oxybutynin (DITROPAN XL) 15 MG CR tablet  Active   pramipexole (MIRAPEX) 1 MG Tab  Active                ALLERGIES  No Known Allergies    PHYSICAL EXAM  VITAL SIGNS: /70   Pulse 67   Temp 35.9 °C (96.7 °F) (Temporal)   Resp 16   Ht 1.575 m (5' 2\")   Wt 97 kg (213 lb 13.5 oz)   LMP  (LMP Unknown)   SpO2 98%   BMI 39.11 kg/m²   Vitals reviewed.  Constitutional: Patient is oriented to person, place, and time. Appears well-developed and well-nourished. No distress.    Head: Normocephalic and atraumatic.   Ears: Normal external ears bilaterally.  TMs are obscured by cerumen bilaterally.  No erythema to the external auditory canal  Mouth/Throat: Oropharynx is clear and moist, no exudates.   Eyes: Conjunctivae are normal. Pupils are equal and round.  Neck: Normal range of motion.  Cardiovascular: Normal rate, regular rhythm and normal heart sounds.  Pulmonary/Chest: Effort normal.  No respiratory distress.  Musculoskeletal: No edema and no tenderness.   Lymphadenopathy: No cervical adenopathy.   Neurological: No focal deficits.   Skin: Skin is warm and dry. No erythema. No pallor.   Psychiatric: Patient has a normal " mood and affect.     COURSE & MEDICAL DECISION MAKING  Pertinent Labs & Imaging studies reviewed. (See chart for details)    Obtained and reviewed past medical records.  Last encounter was January of this year patient was seen for a wound check.  Last hospital encounter was in December of last year patient had a laparoscopic cholecystectomy.    12:06 PM - Patient seen and examined at bedside.  This is a pleasant 71-year-old female.  She presents with bilateral hearing loss.  No ear pain.  Exam reveals bilateral cerumen impactions.  Of advised irrigation and spoken with the ER tech.      1:20 PM patient's reevaluated the bedside.  After irrigation by ER tech and significant earwax removed, I can now visualize both TMs.  Patient is reporting return of her hearing.  She is advised against cleaning her ears.  At this point, I feel she can safely be discharged home.  She is agreeable.  She is in stable condition.      FINAL IMPRESSION  1. Bilateral impacted cerumen

## 2022-05-15 DIAGNOSIS — G25.81 RLS (RESTLESS LEGS SYNDROME): ICD-10-CM

## 2022-05-15 RX ORDER — PRAMIPEXOLE DIHYDROCHLORIDE 1 MG/1
3 TABLET ORAL
Qty: 15 TABLET | Refills: 0 | Status: SHIPPED | OUTPATIENT
Start: 2022-05-15 | End: 2022-05-26

## 2022-05-18 ENCOUNTER — HOSPITAL ENCOUNTER (EMERGENCY)
Facility: MEDICAL CENTER | Age: 72
End: 2022-05-18
Attending: EMERGENCY MEDICINE
Payer: COMMERCIAL

## 2022-05-18 ENCOUNTER — APPOINTMENT (OUTPATIENT)
Dept: RADIOLOGY | Facility: MEDICAL CENTER | Age: 72
End: 2022-05-18
Attending: EMERGENCY MEDICINE
Payer: COMMERCIAL

## 2022-05-18 VITALS
OXYGEN SATURATION: 98 % | RESPIRATION RATE: 20 BRPM | HEIGHT: 63 IN | WEIGHT: 220.9 LBS | DIASTOLIC BLOOD PRESSURE: 66 MMHG | TEMPERATURE: 98.4 F | BODY MASS INDEX: 39.14 KG/M2 | HEART RATE: 75 BPM | SYSTOLIC BLOOD PRESSURE: 134 MMHG

## 2022-05-18 DIAGNOSIS — L03.116 LEFT LEG CELLULITIS: ICD-10-CM

## 2022-05-18 DIAGNOSIS — R60.9 PERIPHERAL EDEMA: ICD-10-CM

## 2022-05-18 LAB
ALBUMIN SERPL BCP-MCNC: 3.7 G/DL (ref 3.2–4.9)
ALBUMIN/GLOB SERPL: 1.4 G/DL
ALP SERPL-CCNC: 79 U/L (ref 30–99)
ALT SERPL-CCNC: 16 U/L (ref 2–50)
ANION GAP SERPL CALC-SCNC: 11 MMOL/L (ref 7–16)
AST SERPL-CCNC: 16 U/L (ref 12–45)
BASOPHILS # BLD AUTO: 0.5 % (ref 0–1.8)
BASOPHILS # BLD: 0.03 K/UL (ref 0–0.12)
BILIRUB SERPL-MCNC: 0.3 MG/DL (ref 0.1–1.5)
BUN SERPL-MCNC: 16 MG/DL (ref 8–22)
CALCIUM SERPL-MCNC: 8.4 MG/DL (ref 8.5–10.5)
CHLORIDE SERPL-SCNC: 108 MMOL/L (ref 96–112)
CO2 SERPL-SCNC: 22 MMOL/L (ref 20–33)
CREAT SERPL-MCNC: 0.52 MG/DL (ref 0.5–1.4)
EOSINOPHIL # BLD AUTO: 0.24 K/UL (ref 0–0.51)
EOSINOPHIL NFR BLD: 3.8 % (ref 0–6.9)
ERYTHROCYTE [DISTWIDTH] IN BLOOD BY AUTOMATED COUNT: 46.2 FL (ref 35.9–50)
GFR SERPLBLD CREATININE-BSD FMLA CKD-EPI: 100 ML/MIN/1.73 M 2
GLOBULIN SER CALC-MCNC: 2.6 G/DL (ref 1.9–3.5)
GLUCOSE SERPL-MCNC: 103 MG/DL (ref 65–99)
HCT VFR BLD AUTO: 35.1 % (ref 37–47)
HGB BLD-MCNC: 11.7 G/DL (ref 12–16)
IMM GRANULOCYTES # BLD AUTO: 0.01 K/UL (ref 0–0.11)
IMM GRANULOCYTES NFR BLD AUTO: 0.2 % (ref 0–0.9)
LYMPHOCYTES # BLD AUTO: 1.95 K/UL (ref 1–4.8)
LYMPHOCYTES NFR BLD: 30.8 % (ref 22–41)
MCH RBC QN AUTO: 29.4 PG (ref 27–33)
MCHC RBC AUTO-ENTMCNC: 33.3 G/DL (ref 33.6–35)
MCV RBC AUTO: 88.2 FL (ref 81.4–97.8)
MONOCYTES # BLD AUTO: 0.87 K/UL (ref 0–0.85)
MONOCYTES NFR BLD AUTO: 13.7 % (ref 0–13.4)
NEUTROPHILS # BLD AUTO: 3.23 K/UL (ref 2–7.15)
NEUTROPHILS NFR BLD: 51 % (ref 44–72)
NRBC # BLD AUTO: 0 K/UL
NRBC BLD-RTO: 0 /100 WBC
NT-PROBNP SERPL IA-MCNC: 94 PG/ML (ref 0–125)
PLATELET # BLD AUTO: 265 K/UL (ref 164–446)
PMV BLD AUTO: 9.4 FL (ref 9–12.9)
POTASSIUM SERPL-SCNC: 3.6 MMOL/L (ref 3.6–5.5)
PROT SERPL-MCNC: 6.3 G/DL (ref 6–8.2)
RBC # BLD AUTO: 3.98 M/UL (ref 4.2–5.4)
SODIUM SERPL-SCNC: 141 MMOL/L (ref 135–145)
WBC # BLD AUTO: 6.3 K/UL (ref 4.8–10.8)

## 2022-05-18 PROCEDURE — 80053 COMPREHEN METABOLIC PANEL: CPT

## 2022-05-18 PROCEDURE — 36415 COLL VENOUS BLD VENIPUNCTURE: CPT

## 2022-05-18 PROCEDURE — 700102 HCHG RX REV CODE 250 W/ 637 OVERRIDE(OP): Performed by: EMERGENCY MEDICINE

## 2022-05-18 PROCEDURE — 93971 EXTREMITY STUDY: CPT | Mod: LT

## 2022-05-18 PROCEDURE — 83880 ASSAY OF NATRIURETIC PEPTIDE: CPT

## 2022-05-18 PROCEDURE — 99283 EMERGENCY DEPT VISIT LOW MDM: CPT

## 2022-05-18 PROCEDURE — 85025 COMPLETE CBC W/AUTO DIFF WBC: CPT

## 2022-05-18 PROCEDURE — A9270 NON-COVERED ITEM OR SERVICE: HCPCS | Performed by: EMERGENCY MEDICINE

## 2022-05-18 RX ORDER — PRAMIPEXOLE DIHYDROCHLORIDE 0.5 MG/1
3 TABLET ORAL ONCE
Status: COMPLETED | OUTPATIENT
Start: 2022-05-18 | End: 2022-05-18

## 2022-05-18 RX ORDER — CEPHALEXIN 500 MG/1
500 CAPSULE ORAL ONCE
Status: COMPLETED | OUTPATIENT
Start: 2022-05-18 | End: 2022-05-18

## 2022-05-18 RX ORDER — CEPHALEXIN 500 MG/1
500 CAPSULE ORAL 4 TIMES DAILY
Qty: 20 CAPSULE | Refills: 0 | Status: SHIPPED | OUTPATIENT
Start: 2022-05-18 | End: 2022-05-23

## 2022-05-18 RX ADMIN — PRAMIPEXOLE DIHYDROCHLORIDE 3 MG: 0.5 TABLET ORAL at 21:00

## 2022-05-18 RX ADMIN — CEPHALEXIN 500 MG: 500 CAPSULE ORAL at 22:45

## 2022-05-18 ASSESSMENT — FIBROSIS 4 INDEX: FIB4 SCORE: 0.81

## 2022-05-18 NOTE — ED TRIAGE NOTES
"Chief Complaint   Patient presents with   • Leg Swelling     PT reports left foot and leg swelling beginning 2 days ago.  Redness and swelling noted, 3+ pitting edema.  Pt hx cellulitis.       Pt to triage from [facility] with above complaint.      BP (!) 150/120   Pulse 92   Temp 36.7 °C (98.1 °F) (Temporal)   Resp 18   Ht 1.6 m (5' 3\")   Wt 100 kg (220 lb 14.4 oz)   LMP  (LMP Unknown)   SpO2 97%   BMI 39.13 kg/m²     "

## 2022-05-19 NOTE — DISCHARGE INSTRUCTIONS
You were seen in the emergency department for increased redness, swelling, pain in your leg.  Your ultrasound shows no signs of blood clot.  Your blood work shows no signs of heart failure, liver failure, kidney failure.    If you are able to use compression stockings, these have been shown to greatly reduce the risk of infection.    You were seen in the ED today for a rash.  Your rash appears to be cellulitis, which is an infection of the skin. You have been given a prescription for antibiotics which you will need to continue to take at home. Please take all of the antibiotics as prescribed.     Your infection should begin to improve within 2 days. Your rash may spread a small amount before improving. It should not increase in size more than 25%.    Please return to the ED if you have any worsening symptoms, spreading rash, fever, increasing pain, numbness or other concerns.

## 2022-05-19 NOTE — ED PROVIDER NOTES
ED Provider Note    Scribed for Tan King M.D. by Miguel Davenport. 5/18/2022,  8:07 PM.    Means of Arrival: walk in   History obtained from: Patient   History limited by: None     CHIEF COMPLAINT  Chief Complaint   Patient presents with   • Leg Swelling     PT reports left foot and leg swelling beginning 2 days ago.  Redness and swelling noted, 3+ pitting edema.  Pt hx cellulitis.         HPI  Ariadna Sepulveda is a 71 y.o. female who presents to the Emergency Department for evaluation of acute worsening bilateral lower extremities onset 2 days ago. Patient reports she has history of cellulitis and feels as though this episode has been similar to her past 4-5 episodes this year. She presents associated symptoms of erythema to bilateral lower extremities, edema to bilateral feet, and limited range of motion to bilateral lower extremities, increased shortness of breath. Denies fever or chest pain. No alleviating factors noted at this time. Patent is not on any blood thinners or water pills. but takes supplemental oxygen at home. She states that she has tried using compression stockings but the stockings wont go up her legs. Patient is vaccinated for COVID x3. Patient has additional history of Arthritis and Hypertension.         REVIEW OF SYSTEMS  CONSTITUTIONAL:  No fever.  CARDIOVASCULAR:  No chest discomfort.  RESPIRATORY:  No pleuritic chest pain. Increased shortness of breath  GASTROINTESTINAL:  No abdominal pain.  GENITOURINARY:   No dysuria.  MUSCULOSKELETAL:  Erythema to bilateral lower extremities, edema to bilateral feet, and limited range of motion to bilateral lower extremities  SKIN:  No rash or suspicious lesions. Erythema to bilateral lower extremities  NEUROLOGIC:   No headache.  See HPI for further details.   All other systems are negative.     PAST MEDICAL HISTORY  Past Medical History:   Diagnosis Date   • Arthritis    • At risk for sleep apnea     CPAP   • Hypertension    • RLS (restless legs  "syndrome) 10/5/2011       FAMILY HISTORY  Family History   Problem Relation Age of Onset   • Cancer Mother    • Arthritis Father    • Diabetes Father    • Heart Disease Father    • Hypertension Father    • Hyperlipidemia Father    • Psychiatric Illness Brother    • Drug abuse Brother    • Alcohol abuse Brother    • Lung Disease Neg Hx        SOCIAL HISTORY   reports that she has never smoked. She has never used smokeless tobacco. She reports current alcohol use of about 3.0 oz of alcohol per week. She reports that she does not use drugs.    SURGICAL HISTORY  Past Surgical History:   Procedure Laterality Date   • NAILA BY LAPAROSCOPY N/A 12/4/2021    Procedure: CHOLECYSTECTOMY, LAPAROSCOPIC;  Surgeon: Jos Rodriguez M.D.;  Location: SURGERY [facility];  Service: General   • KNEE REPLACEMENT, TOTAL  left   • OTHER ORTHOPEDIC SURGERY     • TONSILLECTOMY         CURRENT MEDICATIONS  Current Outpatient Medications   Medication Instructions   • oxybutynin (DITROPAN XL) 15 mg, Oral, DAILY   • pramipexole (MIRAPEX) 3 mg, Oral, EVERY BEDTIME          ALLERGIES  No Known Allergies    PHYSICAL EXAM  VITAL SIGNS: /66   Pulse 75   Temp 36.9 °C (98.4 °F) (Temporal)   Resp 20   Ht 1.6 m (5' 3\")   Wt 100 kg (220 lb 14.4 oz)   LMP  (LMP Unknown)   SpO2 98%   BMI 39.13 kg/m²    Gen: Alert, no acute distress  HEENT: ATNC  Eyes: PERRL, EOMI, normal conjunctiva.   Neck: trachea midline  Resp: no respiratory distress  CV: No JVD  Abd: non-distended  Ext: Bilateral pitting edema larger on the left with erythema and slight warmth.   Psych: normal mood  Neuro: speech fluent     DIAGNOSTIC STUDIES / PROCEDURES     LABS  Labs Reviewed   CBC WITH DIFFERENTIAL - Abnormal; Notable for the following components:       Result Value    RBC 3.98 (*)     Hemoglobin 11.7 (*)     Hematocrit 35.1 (*)     MCHC 33.3 (*)     Monocytes 13.70 (*)     Monos (Absolute) 0.87 (*)     All other components within normal limits   COMP METABOLIC " "PANEL - Abnormal; Notable for the following components:    Glucose 103 (*)     Calcium 8.4 (*)     All other components within normal limits   PROBRAIN NATRIURETIC PEPTIDE, NT   ESTIMATED GFR     All labs reviewed by me.    RADIOLOGY  US-EXTREMITY VENOUS LOWER UNILAT LEFT   Final Result        The radiologist’s interpretation of all radiology studies have been reviewed by me.    COURSE & MEDICAL DECISION MAKING  Pertinent Labs & Imaging studies reviewed. (See chart for details)    8:07 PM Patient evaluated at bedside. Patient will be treated with Mirapex 3 mg. Discussed with patient about administering basic labs and imaging for further evaluation. Counseled patient on wearing compression stockings. Patient verbalizes understanding and agreement to this plan of care.     10:17 PM Patient was re-evaluated at bedside. Patient feels improved. Patient will be treated with Keflex 500 mg prior to discharge. Discussed labs and radiology as shown above. I discussed with patient plan of care following discharge. Patient was given opportunity to ask questions at this time. Counseled patient on proper prescription medication dosages for pain control and relief. Patient verbalizes understanding and agrees to care of plan.  Patient will be discharged with a prescription for Keflex and a referral to PCP. Her vital signs prior to discharge: /66   Pulse 75   Temp 36.9 °C (98.4 °F) (Temporal)   Resp 20   Ht 1.6 m (5' 3\")   Wt 100 kg (220 lb 14.4 oz)   LMP  (LMP Unknown)   SpO2 98%   BMI 39.13 kg/m²          Medical Decision Making:  Patient presents with left lower extremity swelling.  She does have bilateral lower extremity edema.  Labs demonstrate no signs of sepsis, liver failure, kidney failure, heart failure.  Ultrasound demonstrates no blood clots.  Possible cellulitis versus noncellulitic etiology for the patient's erythema.  She was counseled on the benefits of compression stockings.  We will treat with " cephalexin in case there is a cellulitis component to the patient's presentation.  She is advised to follow-up closely with her regular doctor.  She demonstrates no respiratory distress, denies any orthopnea.    The patient will return for new or worsening symptoms and is stable at the time of discharge.    The patient is referred to a primary physician for blood pressure management, diabetic screening, and for all other preventative health concerns.    DISPOSITION:  Patient will be discharged home in stable condition.    FOLLOW UP:  CECY Toscano  [address]  [place] [id number] [phone]    Schedule an appointment as soon as possible for a visit       [facility], Emergency Dept  [address] [address] [phone]    If symptoms worsen      OUTPATIENT MEDICATIONS:  Discharge Medication List as of 5/18/2022 10:46 PM      START taking these medications    Details   cephALEXin (KEFLEX) 500 MG Cap Take 1 Capsule by mouth 4 times a day for 5 days., Disp-20 Capsule, R-0, Normal              FINAL IMPRESSION  1. Left leg cellulitis    2. Peripheral edema            Miguel LEE (Ashanti), am scribing for, and in the presence of, Tan King M.D..    Electronically signed by: Miguel Davenport (Ashanti), 5/18/2022    ITan M.D. personally performed the services described in this documentation, as scribed by Miguel Davenport in my presence, and it is both accurate and complete.    C    The note accurately reflects work and decisions made by me.  Tan King M.D.  5/19/2022  2:48 AM      This dictation was created using voice recognition software. The accuracy of the dictation is limited to the abilities of the software. I expect there may be some errors of grammar and possibly content. The nursing notes were reviewed and certain aspects of this information were incorporated into this note.

## 2022-05-25 ENCOUNTER — HOSPITAL ENCOUNTER (INPATIENT)
Facility: MEDICAL CENTER | Age: 72
LOS: 1 days | DRG: 603 | End: 2022-05-26
Attending: INTERNAL MEDICINE | Admitting: STUDENT IN AN ORGANIZED HEALTH CARE EDUCATION/TRAINING PROGRAM
Payer: MEDICARE

## 2022-05-25 ENCOUNTER — HOSPITAL ENCOUNTER (INPATIENT)
Facility: MEDICAL CENTER | Age: 72
LOS: 1 days | DRG: 603 | End: 2022-05-25
Attending: EMERGENCY MEDICINE | Admitting: STUDENT IN AN ORGANIZED HEALTH CARE EDUCATION/TRAINING PROGRAM
Payer: MEDICARE

## 2022-05-25 ENCOUNTER — APPOINTMENT (OUTPATIENT)
Dept: RADIOLOGY | Facility: MEDICAL CENTER | Age: 72
DRG: 603 | End: 2022-05-25
Attending: EMERGENCY MEDICINE
Payer: MEDICARE

## 2022-05-25 VITALS
WEIGHT: 220 LBS | RESPIRATION RATE: 16 BRPM | BODY MASS INDEX: 38.98 KG/M2 | HEIGHT: 63 IN | SYSTOLIC BLOOD PRESSURE: 153 MMHG | HEART RATE: 60 BPM | TEMPERATURE: 98.1 F | DIASTOLIC BLOOD PRESSURE: 64 MMHG | OXYGEN SATURATION: 100 %

## 2022-05-25 DIAGNOSIS — G25.81 RLS (RESTLESS LEGS SYNDROME): Chronic | ICD-10-CM

## 2022-05-25 DIAGNOSIS — L03.116 CELLULITIS OF LEFT LOWER EXTREMITY: ICD-10-CM

## 2022-05-25 DIAGNOSIS — M79.89 LEG SWELLING: ICD-10-CM

## 2022-05-25 DIAGNOSIS — I89.0 CHRONIC ACQUIRED LYMPHEDEMA: ICD-10-CM

## 2022-05-25 PROBLEM — Z71.89 ADVANCE CARE PLANNING: Status: ACTIVE | Noted: 2022-05-25

## 2022-05-25 PROBLEM — E87.6 HYPOKALEMIA: Status: ACTIVE | Noted: 2022-05-25

## 2022-05-25 PROBLEM — E66.9 OBESITY: Status: ACTIVE | Noted: 2022-05-25

## 2022-05-25 LAB
ALBUMIN SERPL BCP-MCNC: 3.8 G/DL (ref 3.2–4.9)
ALBUMIN/GLOB SERPL: 1.4 G/DL
ALP SERPL-CCNC: 86 U/L (ref 30–99)
ALT SERPL-CCNC: 13 U/L (ref 2–50)
ANION GAP SERPL CALC-SCNC: 11 MMOL/L (ref 7–16)
AST SERPL-CCNC: 18 U/L (ref 12–45)
BASOPHILS # BLD AUTO: 0.5 % (ref 0–1.8)
BASOPHILS # BLD: 0.03 K/UL (ref 0–0.12)
BILIRUB SERPL-MCNC: 0.3 MG/DL (ref 0.1–1.5)
BUN SERPL-MCNC: 16 MG/DL (ref 8–22)
CALCIUM SERPL-MCNC: 8.8 MG/DL (ref 8.5–10.5)
CHLORIDE SERPL-SCNC: 105 MMOL/L (ref 96–112)
CHOLEST SERPL-MCNC: 187 MG/DL (ref 100–199)
CO2 SERPL-SCNC: 24 MMOL/L (ref 20–33)
CREAT SERPL-MCNC: 0.48 MG/DL (ref 0.5–1.4)
CRP SERPL HS-MCNC: 0.49 MG/DL (ref 0–0.75)
EOSINOPHIL # BLD AUTO: 0.3 K/UL (ref 0–0.51)
EOSINOPHIL NFR BLD: 4.5 % (ref 0–6.9)
ERYTHROCYTE [DISTWIDTH] IN BLOOD BY AUTOMATED COUNT: 46.2 FL (ref 35.9–50)
ERYTHROCYTE [SEDIMENTATION RATE] IN BLOOD BY WESTERGREN METHOD: 23 MM/HOUR (ref 0–25)
EST. AVERAGE GLUCOSE BLD GHB EST-MCNC: 111 MG/DL
FERRITIN SERPL-MCNC: 45.3 NG/ML (ref 10–291)
GFR SERPLBLD CREATININE-BSD FMLA CKD-EPI: 102 ML/MIN/1.73 M 2
GLOBULIN SER CALC-MCNC: 2.8 G/DL (ref 1.9–3.5)
GLUCOSE SERPL-MCNC: 119 MG/DL (ref 65–99)
HBA1C MFR BLD: 5.5 % (ref 4–5.6)
HCT VFR BLD AUTO: 36.8 % (ref 37–47)
HDLC SERPL-MCNC: 70 MG/DL
HGB BLD-MCNC: 12.1 G/DL (ref 12–16)
IMM GRANULOCYTES # BLD AUTO: 0.02 K/UL (ref 0–0.11)
IMM GRANULOCYTES NFR BLD AUTO: 0.3 % (ref 0–0.9)
IRON SATN MFR SERPL: 19 % (ref 15–55)
IRON SERPL-MCNC: 53 UG/DL (ref 40–170)
LACTATE BLD-SCNC: 1.1 MMOL/L (ref 0.5–2)
LACTATE BLD-SCNC: 1.4 MMOL/L (ref 0.5–2)
LDLC SERPL CALC-MCNC: 93 MG/DL
LYMPHOCYTES # BLD AUTO: 1.14 K/UL (ref 1–4.8)
LYMPHOCYTES NFR BLD: 17.2 % (ref 22–41)
MCH RBC QN AUTO: 29.4 PG (ref 27–33)
MCHC RBC AUTO-ENTMCNC: 32.9 G/DL (ref 33.6–35)
MCV RBC AUTO: 89.5 FL (ref 81.4–97.8)
MONOCYTES # BLD AUTO: 0.56 K/UL (ref 0–0.85)
MONOCYTES NFR BLD AUTO: 8.4 % (ref 0–13.4)
NEUTROPHILS # BLD AUTO: 4.58 K/UL (ref 2–7.15)
NEUTROPHILS NFR BLD: 69.1 % (ref 44–72)
NRBC # BLD AUTO: 0 K/UL
NRBC BLD-RTO: 0 /100 WBC
PLATELET # BLD AUTO: 303 K/UL (ref 164–446)
PMV BLD AUTO: 9.4 FL (ref 9–12.9)
POTASSIUM SERPL-SCNC: 3.3 MMOL/L (ref 3.6–5.5)
PROCALCITONIN SERPL-MCNC: <0.05 NG/ML
PROT SERPL-MCNC: 6.6 G/DL (ref 6–8.2)
RBC # BLD AUTO: 4.11 M/UL (ref 4.2–5.4)
SODIUM SERPL-SCNC: 140 MMOL/L (ref 135–145)
TIBC SERPL-MCNC: 275 UG/DL (ref 250–450)
TRIGL SERPL-MCNC: 121 MG/DL (ref 0–149)
TSH SERPL DL<=0.005 MIU/L-ACNC: 0.64 UIU/ML (ref 0.38–5.33)
UIBC SERPL-MCNC: 222 UG/DL (ref 110–370)
WBC # BLD AUTO: 6.6 K/UL (ref 4.8–10.8)

## 2022-05-25 PROCEDURE — 96367 TX/PROPH/DG ADDL SEQ IV INF: CPT

## 2022-05-25 PROCEDURE — 700111 HCHG RX REV CODE 636 W/ 250 OVERRIDE (IP): Performed by: INTERNAL MEDICINE

## 2022-05-25 PROCEDURE — 84145 PROCALCITONIN (PCT): CPT

## 2022-05-25 PROCEDURE — 96365 THER/PROPH/DIAG IV INF INIT: CPT

## 2022-05-25 PROCEDURE — 83550 IRON BINDING TEST: CPT

## 2022-05-25 PROCEDURE — 80061 LIPID PANEL: CPT

## 2022-05-25 PROCEDURE — 84443 ASSAY THYROID STIM HORMONE: CPT

## 2022-05-25 PROCEDURE — 86140 C-REACTIVE PROTEIN: CPT

## 2022-05-25 PROCEDURE — 700111 HCHG RX REV CODE 636 W/ 250 OVERRIDE (IP): Performed by: EMERGENCY MEDICINE

## 2022-05-25 PROCEDURE — A9270 NON-COVERED ITEM OR SERVICE: HCPCS | Performed by: STUDENT IN AN ORGANIZED HEALTH CARE EDUCATION/TRAINING PROGRAM

## 2022-05-25 PROCEDURE — 99285 EMERGENCY DEPT VISIT HI MDM: CPT

## 2022-05-25 PROCEDURE — 83540 ASSAY OF IRON: CPT

## 2022-05-25 PROCEDURE — 85652 RBC SED RATE AUTOMATED: CPT

## 2022-05-25 PROCEDURE — 85025 COMPLETE CBC W/AUTO DIFF WBC: CPT

## 2022-05-25 PROCEDURE — 99223 1ST HOSP IP/OBS HIGH 75: CPT | Performed by: STUDENT IN AN ORGANIZED HEALTH CARE EDUCATION/TRAINING PROGRAM

## 2022-05-25 PROCEDURE — 93971 EXTREMITY STUDY: CPT | Mod: LT

## 2022-05-25 PROCEDURE — 700102 HCHG RX REV CODE 250 W/ 637 OVERRIDE(OP): Performed by: STUDENT IN AN ORGANIZED HEALTH CARE EDUCATION/TRAINING PROGRAM

## 2022-05-25 PROCEDURE — 700105 HCHG RX REV CODE 258: Performed by: STUDENT IN AN ORGANIZED HEALTH CARE EDUCATION/TRAINING PROGRAM

## 2022-05-25 PROCEDURE — 306637 HCHG MISC ORTHO ITEM RC 0274

## 2022-05-25 PROCEDURE — 700111 HCHG RX REV CODE 636 W/ 250 OVERRIDE (IP): Performed by: STUDENT IN AN ORGANIZED HEALTH CARE EDUCATION/TRAINING PROGRAM

## 2022-05-25 PROCEDURE — 82728 ASSAY OF FERRITIN: CPT

## 2022-05-25 PROCEDURE — 87040 BLOOD CULTURE FOR BACTERIA: CPT | Mod: 91

## 2022-05-25 PROCEDURE — 99284 EMERGENCY DEPT VISIT MOD MDM: CPT

## 2022-05-25 PROCEDURE — 83605 ASSAY OF LACTIC ACID: CPT

## 2022-05-25 PROCEDURE — 80053 COMPREHEN METABOLIC PANEL: CPT

## 2022-05-25 PROCEDURE — A9270 NON-COVERED ITEM OR SERVICE: HCPCS | Performed by: INTERNAL MEDICINE

## 2022-05-25 PROCEDURE — 700102 HCHG RX REV CODE 250 W/ 637 OVERRIDE(OP): Performed by: INTERNAL MEDICINE

## 2022-05-25 PROCEDURE — 96375 TX/PRO/DX INJ NEW DRUG ADDON: CPT

## 2022-05-25 PROCEDURE — 700105 HCHG RX REV CODE 258: Performed by: INTERNAL MEDICINE

## 2022-05-25 PROCEDURE — 770001 HCHG ROOM/CARE - MED/SURG/GYN PRIV*

## 2022-05-25 PROCEDURE — 96372 THER/PROPH/DIAG INJ SC/IM: CPT

## 2022-05-25 PROCEDURE — 83036 HEMOGLOBIN GLYCOSYLATED A1C: CPT

## 2022-05-25 PROCEDURE — 36415 COLL VENOUS BLD VENIPUNCTURE: CPT

## 2022-05-25 RX ORDER — ONDANSETRON 4 MG/1
4 TABLET, ORALLY DISINTEGRATING ORAL EVERY 4 HOURS PRN
Status: DISCONTINUED | OUTPATIENT
Start: 2022-05-25 | End: 2022-05-26 | Stop reason: HOSPADM

## 2022-05-25 RX ORDER — ACETAMINOPHEN 325 MG/1
650 TABLET ORAL EVERY 6 HOURS PRN
Status: CANCELLED | OUTPATIENT
Start: 2022-05-25

## 2022-05-25 RX ORDER — OXYCODONE HYDROCHLORIDE 5 MG/1
2.5 TABLET ORAL
Status: DISCONTINUED | OUTPATIENT
Start: 2022-05-25 | End: 2022-05-25 | Stop reason: HOSPADM

## 2022-05-25 RX ORDER — POLYETHYLENE GLYCOL 3350 17 G/17G
1 POWDER, FOR SOLUTION ORAL
Status: DISCONTINUED | OUTPATIENT
Start: 2022-05-25 | End: 2022-05-26 | Stop reason: HOSPADM

## 2022-05-25 RX ORDER — AMOXICILLIN 250 MG
2 CAPSULE ORAL 2 TIMES DAILY
Status: DISCONTINUED | OUTPATIENT
Start: 2022-05-25 | End: 2022-05-26 | Stop reason: HOSPADM

## 2022-05-25 RX ORDER — SODIUM CHLORIDE, SODIUM LACTATE, POTASSIUM CHLORIDE, CALCIUM CHLORIDE 600; 310; 30; 20 MG/100ML; MG/100ML; MG/100ML; MG/100ML
INJECTION, SOLUTION INTRAVENOUS CONTINUOUS
Status: ACTIVE | OUTPATIENT
Start: 2022-05-25 | End: 2022-05-25

## 2022-05-25 RX ORDER — BISACODYL 10 MG
10 SUPPOSITORY, RECTAL RECTAL
Status: CANCELLED | OUTPATIENT
Start: 2022-05-25

## 2022-05-25 RX ORDER — ONDANSETRON 2 MG/ML
4 INJECTION INTRAMUSCULAR; INTRAVENOUS EVERY 4 HOURS PRN
Status: CANCELLED | OUTPATIENT
Start: 2022-05-25

## 2022-05-25 RX ORDER — POLYETHYLENE GLYCOL 3350 17 G/17G
1 POWDER, FOR SOLUTION ORAL
Status: CANCELLED | OUTPATIENT
Start: 2022-05-25

## 2022-05-25 RX ORDER — OXYBUTYNIN CHLORIDE 5 MG/1
15 TABLET, EXTENDED RELEASE ORAL DAILY
Status: DISCONTINUED | OUTPATIENT
Start: 2022-05-25 | End: 2022-05-25 | Stop reason: HOSPADM

## 2022-05-25 RX ORDER — HYDRALAZINE HYDROCHLORIDE 20 MG/ML
10 INJECTION INTRAMUSCULAR; INTRAVENOUS EVERY 4 HOURS PRN
Status: DISCONTINUED | OUTPATIENT
Start: 2022-05-25 | End: 2022-05-25 | Stop reason: HOSPADM

## 2022-05-25 RX ORDER — OXYCODONE HYDROCHLORIDE 5 MG/1
2.5 TABLET ORAL
Status: CANCELLED | OUTPATIENT
Start: 2022-05-25

## 2022-05-25 RX ORDER — HEPARIN SODIUM 5000 [USP'U]/ML
5000 INJECTION, SOLUTION INTRAVENOUS; SUBCUTANEOUS EVERY 8 HOURS
Status: DISCONTINUED | OUTPATIENT
Start: 2022-05-25 | End: 2022-05-25 | Stop reason: HOSPADM

## 2022-05-25 RX ORDER — OXYCODONE HYDROCHLORIDE 5 MG/1
5 TABLET ORAL
Status: DISCONTINUED | OUTPATIENT
Start: 2022-05-25 | End: 2022-05-26 | Stop reason: HOSPADM

## 2022-05-25 RX ORDER — BISACODYL 10 MG
10 SUPPOSITORY, RECTAL RECTAL
Status: DISCONTINUED | OUTPATIENT
Start: 2022-05-25 | End: 2022-05-25 | Stop reason: HOSPADM

## 2022-05-25 RX ORDER — HYDROMORPHONE HYDROCHLORIDE 1 MG/ML
0.25 INJECTION, SOLUTION INTRAMUSCULAR; INTRAVENOUS; SUBCUTANEOUS
Status: CANCELLED | OUTPATIENT
Start: 2022-05-25

## 2022-05-25 RX ORDER — ONDANSETRON 4 MG/1
4 TABLET, ORALLY DISINTEGRATING ORAL EVERY 4 HOURS PRN
Status: CANCELLED | OUTPATIENT
Start: 2022-05-25

## 2022-05-25 RX ORDER — HYDRALAZINE HYDROCHLORIDE 20 MG/ML
10 INJECTION INTRAMUSCULAR; INTRAVENOUS EVERY 4 HOURS PRN
Status: CANCELLED | OUTPATIENT
Start: 2022-05-25

## 2022-05-25 RX ORDER — OXYBUTYNIN CHLORIDE 5 MG/1
15 TABLET, EXTENDED RELEASE ORAL DAILY
Status: DISCONTINUED | OUTPATIENT
Start: 2022-05-26 | End: 2022-05-26 | Stop reason: HOSPADM

## 2022-05-25 RX ORDER — CEFAZOLIN SODIUM 2 G/100ML
2000 INJECTION, SOLUTION INTRAVENOUS ONCE
Status: COMPLETED | OUTPATIENT
Start: 2022-05-25 | End: 2022-05-25

## 2022-05-25 RX ORDER — POTASSIUM CHLORIDE 20 MEQ/1
40 TABLET, EXTENDED RELEASE ORAL ONCE
Status: COMPLETED | OUTPATIENT
Start: 2022-05-25 | End: 2022-05-25

## 2022-05-25 RX ORDER — HYDROMORPHONE HYDROCHLORIDE 1 MG/ML
0.25 INJECTION, SOLUTION INTRAMUSCULAR; INTRAVENOUS; SUBCUTANEOUS
Status: DISCONTINUED | OUTPATIENT
Start: 2022-05-25 | End: 2022-05-26 | Stop reason: HOSPADM

## 2022-05-25 RX ORDER — PRAMIPEXOLE DIHYDROCHLORIDE 0.5 MG/1
3 TABLET ORAL
Status: CANCELLED | OUTPATIENT
Start: 2022-05-25

## 2022-05-25 RX ORDER — HEPARIN SODIUM 5000 [USP'U]/ML
5000 INJECTION, SOLUTION INTRAVENOUS; SUBCUTANEOUS EVERY 8 HOURS
Status: DISCONTINUED | OUTPATIENT
Start: 2022-05-25 | End: 2022-05-26 | Stop reason: HOSPADM

## 2022-05-25 RX ORDER — ONDANSETRON 4 MG/1
4 TABLET, ORALLY DISINTEGRATING ORAL EVERY 4 HOURS PRN
Status: DISCONTINUED | OUTPATIENT
Start: 2022-05-25 | End: 2022-05-25 | Stop reason: HOSPADM

## 2022-05-25 RX ORDER — OXYCODONE HYDROCHLORIDE 5 MG/1
5 TABLET ORAL
Status: DISCONTINUED | OUTPATIENT
Start: 2022-05-25 | End: 2022-05-25 | Stop reason: HOSPADM

## 2022-05-25 RX ORDER — SODIUM CHLORIDE, SODIUM LACTATE, POTASSIUM CHLORIDE, CALCIUM CHLORIDE 600; 310; 30; 20 MG/100ML; MG/100ML; MG/100ML; MG/100ML
INJECTION, SOLUTION INTRAVENOUS CONTINUOUS
Status: CANCELLED | OUTPATIENT
Start: 2022-05-25 | End: 2022-05-25

## 2022-05-25 RX ORDER — HEPARIN SODIUM 5000 [USP'U]/ML
5000 INJECTION, SOLUTION INTRAVENOUS; SUBCUTANEOUS EVERY 8 HOURS
Status: CANCELLED | OUTPATIENT
Start: 2022-05-25

## 2022-05-25 RX ORDER — BISACODYL 10 MG
10 SUPPOSITORY, RECTAL RECTAL
Status: DISCONTINUED | OUTPATIENT
Start: 2022-05-25 | End: 2022-05-26 | Stop reason: HOSPADM

## 2022-05-25 RX ORDER — ACETAMINOPHEN 325 MG/1
650 TABLET ORAL EVERY 6 HOURS PRN
Status: DISCONTINUED | OUTPATIENT
Start: 2022-05-25 | End: 2022-05-26 | Stop reason: HOSPADM

## 2022-05-25 RX ORDER — AMOXICILLIN 250 MG
2 CAPSULE ORAL 2 TIMES DAILY
Status: CANCELLED | OUTPATIENT
Start: 2022-05-25

## 2022-05-25 RX ORDER — HYDROMORPHONE HYDROCHLORIDE 1 MG/ML
0.25 INJECTION, SOLUTION INTRAMUSCULAR; INTRAVENOUS; SUBCUTANEOUS
Status: DISCONTINUED | OUTPATIENT
Start: 2022-05-25 | End: 2022-05-25 | Stop reason: HOSPADM

## 2022-05-25 RX ORDER — MORPHINE SULFATE 4 MG/ML
4 INJECTION INTRAVENOUS ONCE
Status: COMPLETED | OUTPATIENT
Start: 2022-05-25 | End: 2022-05-25

## 2022-05-25 RX ORDER — IBUPROFEN 200 MG
800 TABLET ORAL EVERY 6 HOURS PRN
Status: ON HOLD | COMMUNITY
End: 2022-05-26

## 2022-05-25 RX ORDER — ONDANSETRON 2 MG/ML
4 INJECTION INTRAMUSCULAR; INTRAVENOUS ONCE
Status: COMPLETED | OUTPATIENT
Start: 2022-05-25 | End: 2022-05-25

## 2022-05-25 RX ORDER — SODIUM CHLORIDE, SODIUM LACTATE, POTASSIUM CHLORIDE, CALCIUM CHLORIDE 600; 310; 30; 20 MG/100ML; MG/100ML; MG/100ML; MG/100ML
INJECTION, SOLUTION INTRAVENOUS CONTINUOUS
Status: DISCONTINUED | OUTPATIENT
Start: 2022-05-25 | End: 2022-05-25

## 2022-05-25 RX ORDER — OXYCODONE HYDROCHLORIDE 5 MG/1
5 TABLET ORAL
Status: CANCELLED | OUTPATIENT
Start: 2022-05-25

## 2022-05-25 RX ORDER — AMOXICILLIN 250 MG
2 CAPSULE ORAL 2 TIMES DAILY
Status: DISCONTINUED | OUTPATIENT
Start: 2022-05-25 | End: 2022-05-25 | Stop reason: HOSPADM

## 2022-05-25 RX ORDER — PRAMIPEXOLE DIHYDROCHLORIDE 0.5 MG/1
3 TABLET ORAL
Status: DISCONTINUED | OUTPATIENT
Start: 2022-05-25 | End: 2022-05-25 | Stop reason: HOSPADM

## 2022-05-25 RX ORDER — OXYCODONE HYDROCHLORIDE 5 MG/1
2.5 TABLET ORAL
Status: DISCONTINUED | OUTPATIENT
Start: 2022-05-25 | End: 2022-05-26 | Stop reason: HOSPADM

## 2022-05-25 RX ORDER — ONDANSETRON 2 MG/ML
4 INJECTION INTRAMUSCULAR; INTRAVENOUS EVERY 4 HOURS PRN
Status: DISCONTINUED | OUTPATIENT
Start: 2022-05-25 | End: 2022-05-25 | Stop reason: HOSPADM

## 2022-05-25 RX ORDER — ACETAMINOPHEN 325 MG/1
650 TABLET ORAL EVERY 6 HOURS PRN
Status: DISCONTINUED | OUTPATIENT
Start: 2022-05-25 | End: 2022-05-25 | Stop reason: HOSPADM

## 2022-05-25 RX ORDER — CEPHALEXIN 500 MG/1
500 CAPSULE ORAL 4 TIMES DAILY
Status: ON HOLD | COMMUNITY
Start: 2022-05-18 | End: 2022-05-26

## 2022-05-25 RX ORDER — ONDANSETRON 2 MG/ML
4 INJECTION INTRAMUSCULAR; INTRAVENOUS EVERY 4 HOURS PRN
Status: DISCONTINUED | OUTPATIENT
Start: 2022-05-25 | End: 2022-05-26 | Stop reason: HOSPADM

## 2022-05-25 RX ORDER — PRAMIPEXOLE DIHYDROCHLORIDE 0.5 MG/1
3 TABLET ORAL
Status: DISCONTINUED | OUTPATIENT
Start: 2022-05-25 | End: 2022-05-26 | Stop reason: HOSPADM

## 2022-05-25 RX ORDER — HYDRALAZINE HYDROCHLORIDE 20 MG/ML
10 INJECTION INTRAMUSCULAR; INTRAVENOUS EVERY 4 HOURS PRN
Status: DISCONTINUED | OUTPATIENT
Start: 2022-05-25 | End: 2022-05-26 | Stop reason: HOSPADM

## 2022-05-25 RX ORDER — POLYETHYLENE GLYCOL 3350 17 G/17G
1 POWDER, FOR SOLUTION ORAL
Status: DISCONTINUED | OUTPATIENT
Start: 2022-05-25 | End: 2022-05-25 | Stop reason: HOSPADM

## 2022-05-25 RX ADMIN — OXYBUTYNIN CHLORIDE 15 MG: 5 TABLET, EXTENDED RELEASE ORAL at 08:12

## 2022-05-25 RX ADMIN — HEPARIN SODIUM 5000 UNITS: 5000 INJECTION INTRAVENOUS; SUBCUTANEOUS at 23:35

## 2022-05-25 RX ADMIN — SODIUM CHLORIDE 3 G: 900 INJECTION INTRAVENOUS at 06:42

## 2022-05-25 RX ADMIN — SENNOSIDES AND DOCUSATE SODIUM 2 TABLET: 50; 8.6 TABLET ORAL at 09:02

## 2022-05-25 RX ADMIN — CEFAZOLIN SODIUM 2000 MG: 2 INJECTION, SOLUTION INTRAVENOUS at 04:28

## 2022-05-25 RX ADMIN — SODIUM CHLORIDE, POTASSIUM CHLORIDE, SODIUM LACTATE AND CALCIUM CHLORIDE: 600; 310; 30; 20 INJECTION, SOLUTION INTRAVENOUS at 08:11

## 2022-05-25 RX ADMIN — SODIUM CHLORIDE 3 G: 900 INJECTION INTRAVENOUS at 23:35

## 2022-05-25 RX ADMIN — SODIUM CHLORIDE 3 G: 900 INJECTION INTRAVENOUS at 13:15

## 2022-05-25 RX ADMIN — PRAMIPEXOLE DIHYDROCHLORIDE 3 MG: 0.5 TABLET ORAL at 20:12

## 2022-05-25 RX ADMIN — SODIUM CHLORIDE 3 G: 900 INJECTION INTRAVENOUS at 17:04

## 2022-05-25 RX ADMIN — ONDANSETRON HYDROCHLORIDE 4 MG: 2 SOLUTION INTRAMUSCULAR; INTRAVENOUS at 04:26

## 2022-05-25 RX ADMIN — SODIUM CHLORIDE, POTASSIUM CHLORIDE, SODIUM LACTATE AND CALCIUM CHLORIDE: 600; 310; 30; 20 INJECTION, SOLUTION INTRAVENOUS at 17:12

## 2022-05-25 RX ADMIN — HEPARIN SODIUM 5000 UNITS: 5000 INJECTION, SOLUTION INTRAVENOUS; SUBCUTANEOUS at 06:42

## 2022-05-25 RX ADMIN — POTASSIUM CHLORIDE 40 MEQ: 1500 TABLET, EXTENDED RELEASE ORAL at 09:02

## 2022-05-25 RX ADMIN — MORPHINE SULFATE 4 MG: 4 INJECTION INTRAVENOUS at 04:25

## 2022-05-25 ASSESSMENT — ENCOUNTER SYMPTOMS
BLOOD IN STOOL: 0
FEVER: 0
VOMITING: 0
SHORTNESS OF BREATH: 0
HEARTBURN: 0
PALPITATIONS: 0
BACK PAIN: 0
COUGH: 0
DEPRESSION: 0
CHILLS: 0
INSOMNIA: 0
ABDOMINAL PAIN: 0
ABDOMINAL PAIN: 0
WEAKNESS: 0
SORE THROAT: 0
SHORTNESS OF BREATH: 0
WHEEZING: 0
CHILLS: 0
EYES NEGATIVE: 1
BLURRED VISION: 0
LOSS OF CONSCIOUSNESS: 0
COUGH: 0
MYALGIAS: 0
FOCAL WEAKNESS: 0
NECK PAIN: 0
CONSTIPATION: 0
VOMITING: 0
HEADACHES: 0
BRUISES/BLEEDS EASILY: 0
WEAKNESS: 0
FEVER: 0
NAUSEA: 0
DOUBLE VISION: 0
DIARRHEA: 0
NAUSEA: 0
DIARRHEA: 0

## 2022-05-25 ASSESSMENT — COGNITIVE AND FUNCTIONAL STATUS - GENERAL
STANDING UP FROM CHAIR USING ARMS: A LITTLE
TOILETING: A LITTLE
HELP NEEDED FOR BATHING: A LITTLE
STANDING UP FROM CHAIR USING ARMS: A LITTLE
DRESSING REGULAR LOWER BODY CLOTHING: A LITTLE
MOVING TO AND FROM BED TO CHAIR: A LITTLE
SUGGESTED CMS G CODE MODIFIER DAILY ACTIVITY: CJ
WALKING IN HOSPITAL ROOM: A LITTLE
DRESSING REGULAR LOWER BODY CLOTHING: A LITTLE
SUGGESTED CMS G CODE MODIFIER DAILY ACTIVITY: CJ
SUGGESTED CMS G CODE MODIFIER MOBILITY: CK
CLIMB 3 TO 5 STEPS WITH RAILING: A LITTLE
MOBILITY SCORE: 19
MOVING FROM LYING ON BACK TO SITTING ON SIDE OF FLAT BED: A LITTLE
MOBILITY SCORE: 19
MOVING TO AND FROM BED TO CHAIR: A LITTLE
TOILETING: A LITTLE
MOVING FROM LYING ON BACK TO SITTING ON SIDE OF FLAT BED: A LITTLE
WALKING IN HOSPITAL ROOM: A LITTLE
DRESSING REGULAR UPPER BODY CLOTHING: A LITTLE
DAILY ACTIVITIY SCORE: 20
SUGGESTED CMS G CODE MODIFIER MOBILITY: CK
DAILY ACTIVITIY SCORE: 21
CLIMB 3 TO 5 STEPS WITH RAILING: A LITTLE
HELP NEEDED FOR BATHING: A LITTLE

## 2022-05-25 ASSESSMENT — FIBROSIS 4 INDEX
FIB4 SCORE: 1.04
FIB4 SCORE: 1.14
FIB4 SCORE: 1.14

## 2022-05-25 ASSESSMENT — LIFESTYLE VARIABLES
EVER HAD A DRINK FIRST THING IN THE MORNING TO STEADY YOUR NERVES TO GET RID OF A HANGOVER: NO
AVERAGE NUMBER OF DAYS PER WEEK YOU HAVE A DRINK CONTAINING ALCOHOL: 3
TOTAL SCORE: 0
ON A TYPICAL DAY WHEN YOU DRINK ALCOHOL HOW MANY DRINKS DO YOU HAVE: 1
TOTAL SCORE: 0
HOW MANY TIMES IN THE PAST YEAR HAVE YOU HAD 5 OR MORE DRINKS IN A DAY: 0
EVER FELT BAD OR GUILTY ABOUT YOUR DRINKING: NO
TOTAL SCORE: 0
ALCOHOL_USE: YES
CONSUMPTION TOTAL: NEGATIVE
HAVE PEOPLE ANNOYED YOU BY CRITICIZING YOUR DRINKING: NO
HAVE YOU EVER FELT YOU SHOULD CUT DOWN ON YOUR DRINKING: NO

## 2022-05-25 ASSESSMENT — PAIN DESCRIPTION - PAIN TYPE
TYPE: ACUTE PAIN
TYPE: ACUTE PAIN

## 2022-05-25 NOTE — PROGRESS NOTES
Gave report to YOANA Bergeron who will receiving patient at [facility]. Transport set to arrive to  patient at approximately 1430. Patient in no acute distress.

## 2022-05-25 NOTE — PROGRESS NOTES
4 Eyes Skin Assessment Completed by YOANA Bergeron and YOANA Castillo.    Head WDL  Ears WDL  Nose WDL  Mouth WDL  Neck WDL  Breast/Chest WDL  Shoulder Blades WDL  Spine WDL  (R) Arm/Elbow/Hand WDL  (L) Arm/Elbow/Hand WDL  Abdomen WDL  Groin WDL  Scrotum/Coccyx/Buttocks WDL  (R) Leg Redness and Edema  (L) Leg Redness and Edema  (R) Heel/Foot/Toe WDL  (L) Heel/Foot/Toe WDL          Devices In Places Blood Pressure Cuff and Pulse Ox      Interventions In Place Pillows and Pressure Redistribution Mattress    Possible Skin Injury No    Pictures Uploaded Into Epic N/A  Wound Consult Placed N/A  RN Wound Prevention Protocol Ordered No

## 2022-05-25 NOTE — ED TRIAGE NOTES
"Chief Complaint   Patient presents with   • Leg Swelling     Pt has unilateral leg swelling to the left leg. Left leg presents with redness, warmth to the touch, and 2+ pitting edema. Pt reports she was seen here last week and started on PO abx for cellulitis. Pt reports no improvement in swelling/pain.         72 yo female to triage for above complaint. Hx of cellulitis. Protocol ordered. GCS=15.     Pt is alert and oriented, speaking in full sentences, follows commands and responds appropriately to questions. NAD. Resp are even and unlabored.      Pt placed in lobby. Pt educated on triage process. Pt encouraged to alert staff for any changes.     Patient and staff wearing appropriate PPE    /65   Pulse 73   Temp 36.3 °C (97.3 °F) (Temporal)   Resp 18   Ht 1.6 m (5' 3\")   Wt 99.8 kg (220 lb)   LMP  (LMP Unknown)   SpO2 97%   BMI 38.97 kg/m²     "

## 2022-05-25 NOTE — DISCHARGE PLANNING
Note:  0809: Received Voalte from Dr. Link that pt will transfer to [facility], Dr. Salgado accepting. Per Dr. Link, pt agreeable with transfer. Facesheet, Approved Service and [facility] PCS faxed to [facility] transfer center. Pending room assignment and transportation set up.  1215: Per [facility] transfer center, room number assigned, pending transportation.  RN CM spoke to pt at bedside. Received consent for transfer to [facility] and transportation. COBRA, transfer packet with ED RN.

## 2022-05-25 NOTE — HOSPITAL COURSE
A 71-year-old woman presented with recurrent left lower extremity cellulitis. Patient states that she has had left lower extremity cellulitis 5 times in the past year. She has failed outpatient antibiotics and came in with worsening erythema and left lower extremity swelling.   She states she is fully vaccinated against COVID-19 and denies any signs or symptoms of COVID-19.    Left lower extremity ultrasound negative for DVT.  CBC/CMP relatively unremarkable with exception of mild hypokalemia.  No infectious etiology appreciated on CBC.  Lactic acid measured at 1.4 and subsequently 1.1.  CRP resulted low and low likelihood of osteomyelitis at this time.  Analgesics and IV antibiotics initiated in ED.

## 2022-05-25 NOTE — ED PROVIDER NOTES
ED Provider Note    Scribed for Zachary Bolden M.D. by Lea Drew. 5/25/2022  3:40 AM    Primary care provider: CECY Toscano  Means of arrival: EMS  History obtained from: Patient  History limited by: None    CHIEF COMPLAINT  Chief Complaint   Patient presents with   • Leg Swelling     Pt has unilateral leg swelling to the left leg. Left leg presents with redness, warmth to the touch, and 2+ pitting edema. Pt reports she was seen here last week and started on PO abx for cellulitis. Pt reports no improvement in swelling/pain.         HPI  Ariadna Sepulveda is a 71 y.o. female who presents to the Emergency Department via EMS for evaluation of persistent left leg swelling. She was seen here last week for the same and was prescribed antibiotics for cellulitis. She has been taking this as prescribed but she has not had shashank improvement in her symptoms. She is having severe pain due to the swelling. Additionally, she has had similar episodes of this five times this year. No fevers. She has not had an MRI in the last month. No history of diabetes.     REVIEW OF SYSTEMS  Pertinent positives include: leg swelling and pain. Pertinent negatives include: no fevers. See history of present illness. All other systems are negative.     PAST MEDICAL HISTORY   has a past medical history of Arthritis, At risk for sleep apnea, Hypertension, and RLS (restless legs syndrome) (10/5/2011).    SURGICAL HISTORY   has a past surgical history that includes tonsillectomy; knee replacement, total (left); other orthopedic surgery; and isaiah by laparoscopy (N/A, 12/4/2021).    SOCIAL HISTORY  Social History     Tobacco Use   • Smoking status: Never Smoker   • Smokeless tobacco: Never Used   Vaping Use   • Vaping Use: Never used   Substance Use Topics   • Alcohol use: Yes     Alcohol/week: 3.0 oz     Types: 5 Glasses of wine per week     Comment: occasional   • Drug use: No      Social History     Substance and Sexual Activity  "  Drug Use No       FAMILY HISTORY  Family History   Problem Relation Age of Onset   • Cancer Mother    • Arthritis Father    • Diabetes Father    • Heart Disease Father    • Hypertension Father    • Hyperlipidemia Father    • Psychiatric Illness Brother    • Drug abuse Brother    • Alcohol abuse Brother    • Lung Disease Neg Hx        CURRENT MEDICATIONS  Home Medications     Reviewed by Lesia Alnoso (Pharmacy Tech) on 05/25/22 at 0535  Med List Status: Complete   Medication Last Dose Status   cephALEXin (KEFLEX) 500 MG Cap 5/24/2022 Active   ibuprofen (MOTRIN) 200 MG Tab >1 week Active   oxybutynin (DITROPAN XL) 15 MG CR tablet >1 week Active   pramipexole (MIRAPEX) 1 MG Tab 5/24/2022 Active                ALLERGIES  No Known Allergies    PHYSICAL EXAM  VITAL SIGNS: /65   Pulse 73   Temp 36.3 °C (97.3 °F) (Temporal)   Resp 18   Ht 1.6 m (5' 3\")   Wt 99.8 kg (220 lb)   LMP  (LMP Unknown)   SpO2 97%   BMI 38.97 kg/m²     Constitutional: Alert in no apparent distress.  HENT: No signs of trauma, Bilateral external ears normal, Nose normal. Uvula midline.   Eyes: Pupils are equal and reactive, Conjunctiva normal, Non-icteric.   Neck: Normal range of motion, No tenderness, Supple, No stridor.   Lymphatic: No lymphadenopathy noted.   Cardiovascular: Regular rate and rhythm, no murmurs.   Thorax & Lungs: Normal breath sounds, No respiratory distress, No wheezing, No chest tenderness.   Abdomen:  Soft, No tenderness, No peritoneal signs, No masses, No pulsatile masses.   Skin: Warm, Dry, No erythema, No rash.   Back: No bony tenderness, No CVA tenderness.   Extremities: 2+ peripheral pulses, Intact distal pulses, erythema extending up left lower extremity up to proximal thigh, increased warmth on left compared to right, scattered erythema to right, 2+ pitting edema  Musculoskeletal: Good range of motion in all major joints. No tenderness to palpation or major deformities noted.   Neurologic: Alert , " "Normal motor function, Normal sensory function, No focal deficits noted.   Psychiatric: Affect normal, Judgment normal, Mood normal.           DIAGNOSTIC STUDIES / PROCEDURES    LABS  Labs Reviewed   CBC WITH DIFFERENTIAL - Abnormal; Notable for the following components:       Result Value    RBC 4.11 (*)     Hematocrit 36.8 (*)     MCHC 32.9 (*)     Lymphocytes 17.20 (*)     All other components within normal limits   COMP METABOLIC PANEL - Abnormal; Notable for the following components:    Potassium 3.3 (*)     Glucose 119 (*)     Creatinine 0.48 (*)     All other components within normal limits   LACTIC ACID   LACTIC ACID    Narrative:     Repeat if initial lactic acid result is greater than 2   ESTIMATED GFR   PROCALCITONIN   SED RATE   CRP QUANTITIVE (NON-CARDIAC)   IRON/TOTAL IRON BIND   LIPID PROFILE   TSH   FERRITIN   BLOOD CULTURE    Narrative:     Per Hospital Policy: Only change Specimen Src: to \"Line\" if  specified by physician order.   BLOOD CULTURE    Narrative:     Per Hospital Policy: Only change Specimen Src: to \"Line\" if  specified by physician order.   HEMOGLOBIN A1C      All labs reviewed by me.    RADIOLOGY  US-EXTREMITY VENOUS LOWER UNILAT LEFT   Final Result        The radiologist's interpretation of all radiological studies have been reviewed by me.    COURSE & MEDICAL DECISION MAKING  Nursing notes, VS, PMSFHx reviewed in chart.    71 y.o. female p/w chief complaint of left leg swelling.    3:40 AM Patient seen and examined at bedside.      I verified that the patient was wearing a mask and I was wearing appropriate PPE every time I entered the room. The patient's mask was on the patient at all times during my encounter except for a brief view of the oropharynx.     The differential diagnoses include but are not limited to:     Hx and PE c/w cellulitis  Pt w/ no crepitus or tissue necrosis to suggest concern for Necrotizing fascitis at this time  Cannot rule out osteonecrosis at this " time  May 18th DVT US was negative  Seen here and started on Keflex   BNP from last week within normal limits  Pt treated with Morphine, cefazolin, and Zofran  Repeat US pending at time of admit    Given failure of outpt tx, plan for admission for IV antibx.     4:39 AM I discussed the patient's case and the above findings with Dr. Meyer (Hospitalist) who agrees to evaluate the patient for hospitalization.    DISPOSITION:  Patient will be hospitalized by Dr. Meyer in guarded condition.    FINAL IMPRESSION  1. Cellulitis of left lower extremity    2. Leg swelling        Lea LEE (Scribe), am scribing for, and in the presence of, Zachary Bolden M.D..    Electronically signed by: Lea Drew (Scribe), 5/25/2022    IZachary M.D. personally performed the services described in this documentation, as scribed by Lea Drew in my presence, and it is both accurate and complete.    The note accurately reflects work and decisions made by me.  Zachary Bolden M.D.  5/25/2022  7:24 AM

## 2022-05-25 NOTE — ASSESSMENT & PLAN NOTE
Strong recommendation for follow-up outpatient PCP for lifestyle modification including diet and exercise regiment of at least 30 min of brisk cardiovascular exercise 3-5 times weekly.  Lipid panel ordered and pending  Hemoglobin A1c ordered and pending

## 2022-05-25 NOTE — PROGRESS NOTES
- patient is appropriate for transfer to [facility].  - Discussed with patient, patient is agreeable to the transfer.  - Orders reconciled to carry over to the new [facility] encounter.   - Admission H&P in place by Dr. Meyer.  - Discussed with Dr. Salgado. [facility] hospitalists to take over care once patient arrives in [facility].

## 2022-05-25 NOTE — PROGRESS NOTES
Hospital Medicine Daily Progress Note    Date of Service  5/25/2022    Chief Complaint  Ariadna Sepulveda is a 71 y.o. female admitted 5/25/2022 with left leg swelling    Hospital Course  A 71-year-old woman presented with recurrent left lower extremity cellulitis. Patient states that she has had left lower extremity cellulitis 5 times in the past year. She has failed outpatient antibiotics and came in with worsening erythema and left lower extremity swelling.   She states she is fully vaccinated against COVID-19 and denies any signs or symptoms of COVID-19.    Left lower extremity ultrasound negative for DVT.  CBC/CMP relatively unremarkable with exception of mild hypokalemia.  No infectious etiology appreciated on CBC.  Lactic acid measured at 1.4 and subsequently 1.1.  CRP resulted low and low likelihood of osteomyelitis at this time.  Analgesics and IV antibiotics initiated in ED.      Interval Problem Update  Patient denies any pain this morning.   Afebrile, hemodynamically stable.  CBC unremarkable  K 3.3 - replaced.  Lactic acid 1.1  Patient will be transferred to [facility] for further management.    I have personally seen and examined the patient at bedside. I discussed the plan of care with patient and bedside RN.    Consultants/Specialty  None    Code Status  Full Code    Disposition  Patient is not medically cleared for discharge.   Anticipate discharge to to a short-term general hosptial for inpatient care.  I have placed the appropriate orders for post-discharge needs.    Review of Systems  Review of Systems   Constitutional: Negative for chills, fever and malaise/fatigue.   HENT: Negative.    Eyes: Negative.    Respiratory: Negative for cough and shortness of breath.    Cardiovascular: Positive for leg swelling. Negative for chest pain.   Gastrointestinal: Negative for abdominal pain, diarrhea, nausea and vomiting.   Genitourinary: Negative for dysuria.   Musculoskeletal: Negative  for myalgias.   Skin: Positive for rash.   Neurological: Negative for weakness.        Physical Exam  Temp:  [36.3 °C (97.3 °F)] 36.3 °C (97.3 °F)  Pulse:  [64-74] 74  Resp:  [16-18] 16  BP: (120-131)/(58-65) 123/58  SpO2:  [89 %-97 %] 97 %    Physical Exam  Vitals and nursing note reviewed.   Constitutional:       Appearance: She is obese. She is ill-appearing.   HENT:      Head: Normocephalic.      Nose: Nose normal.      Mouth/Throat:      Mouth: Mucous membranes are moist.      Pharynx: Oropharynx is clear.   Eyes:      Pupils: Pupils are equal, round, and reactive to light.   Cardiovascular:      Rate and Rhythm: Normal rate and regular rhythm.   Pulmonary:      Effort: Pulmonary effort is normal. No respiratory distress.      Breath sounds: No wheezing or rales.   Abdominal:      General: Abdomen is flat. Bowel sounds are normal. There is no distension.      Tenderness: There is no abdominal tenderness. There is no guarding.   Musculoskeletal:         General: Normal range of motion.      Cervical back: Normal range of motion.      Right lower leg: Edema present.      Left lower leg: Edema present.   Skin:     General: Skin is warm.      Findings: Erythema present.      Comments: Left shin erythematous, swollen, tender to palpation. Has callus on plantar aspect of base of left 5th toe   Neurological:      General: No focal deficit present.      Mental Status: She is alert and oriented to person, place, and time.         Fluids  No intake or output data in the 24 hours ending 05/25/22 1159    Laboratory  Recent Labs     05/25/22 0228   WBC 6.6   RBC 4.11*   HEMOGLOBIN 12.1   HEMATOCRIT 36.8*   MCV 89.5   MCH 29.4   MCHC 32.9*   RDW 46.2   PLATELETCT 303   MPV 9.4     Recent Labs     05/25/22 0228   SODIUM 140   POTASSIUM 3.3*   CHLORIDE 105   CO2 24   GLUCOSE 119*   BUN 16   CREATININE 0.48*   CALCIUM 8.8             Recent Labs     05/25/22 0228   TRIGLYCERIDE 121   HDL 70   LDL 93        Imaging  US-EXTREMITY VENOUS LOWER UNILAT LEFT   Final Result           Assessment/Plan  * Cellulitis- (present on admission)  Assessment & Plan  Recurrent left lower extremity cellulitis.  Blood culture obtained in ED x2  IV antibiotics initiated in ED we will continue onward.  Consider ID consultation for recurrent cellulitis with multiple failed agents in the past.  Analgesics for pain control  Images uploaded as seen above  No indication for surgical consultation or concern for necrotizing fasciitis at this time.    Hypokalemia- (present on admission)  Assessment & Plan  Mild and asymptomatic  Magnesium level ordered prior to administration of potassium products and if low we will administer magnesium first for adequate potassium absorption.    Advance care planning- (present on admission)  Assessment & Plan  I have spent greater than 20 minutes at bedside discussing diagnostic work-up/prognosis and treatment plan.  CODE STATUS obtained at bedside and patient agreeable to full CODE STATUS at time of evaluation and alert and oriented x4.    Obesity- (present on admission)  Assessment & Plan  Strong recommendation for follow-up outpatient PCP for lifestyle modification including diet and exercise regiment of at least 30 min of brisk cardiovascular exercise 3-5 times weekly.  Lipid panel ordered and pending  Hemoglobin A1c ordered and pending      RLS (restless legs syndrome)- (present on admission)  Assessment & Plan  Continue home pramipexole 3 mg p.o. nightly  Iron/ferritin studies ordered       VTE prophylaxis: heparin ppx    I have performed a physical exam and reviewed and updated ROS and Plan today (5/25/2022). In review of yesterday's note (5/24/2022), there are no changes except as documented above.

## 2022-05-25 NOTE — H&P
[facility] Medicine History & Physical Note    Date of Service  5/25/2022    Primary Care Physician  [facility] Toscano    Consultants  None    Code Status  Full Code    Chief Complaint  Chief Complaint   Patient presents with   • Leg Swelling     Pt has unilateral leg swelling to the left leg. Left leg presents with redness, warmth to the touch, and 2+ pitting edema. Pt reports she was seen here last week and started on PO abx for cellulitis. Pt reports no improvement in swelling/pain.         History of Presenting Illness  Ariadna Sepulveda is a 71 y.o. female who presented 5/25/2022 with complaints of recurrent left lower extremity cellulitis.  Patient states that she has had left lower extremity cellulitis 5 times in the past year.  She has failed outpatient antibiotics and came in with worsening erythema and left lower extremity swelling.  She states that she believes her source to be treatment of corn on the bottom of her foot after using salicylic acid and possibly now has entryway for infection.  Images uploaded as seen below.  Patient otherwise denies having any febrile illness, tachycardia, chills, nausea, vomiting, cough or sputum production, constipation, diarrhea, melena, hematochezia, dysuria, hematuria, incoordination, vertigo, syncope, visual changes.  She states she is fully vaccinated against COVID-19 and denies any signs or symptoms of COVID-19.  She otherwise does not have any other complaints at this time.    Vital signs at time presentation were as follows: 97.3, 73, 18, 120/65, 97% room air.    Left lower extremity ultrasound performed and negative for DVT.    CBC/CMP relatively unremarkable with exception of mild hypokalemia.  No infectious etiology appreciated on CBC.  Lactic acid measured at 1.4 and subsequently 1.1.  CRP resulted low and low likelihood of osteomyelitis at this time.    Analgesics and IV antibiotics initiated in ED.  Patient agreeable to inpatient hospitalization for  left lower extremity cellulitis.  She agrees to full CODE STATUS at time of evaluation and alert and oriented x4.  She will be optimized in ED and subsequently transferred to medical [facility] floor for further optimization medical management.    I discussed the plan of care with patient.    Review of Systems  Review of Systems   Constitutional: Negative for chills and fever.   HENT: Negative for congestion and sore throat.    Eyes: Negative for blurred vision and double vision.   Respiratory: Negative for cough, shortness of breath and wheezing.    Cardiovascular: Positive for leg swelling. Negative for chest pain and palpitations.   Gastrointestinal: Negative for abdominal pain, blood in stool, constipation, diarrhea, heartburn, melena, nausea and vomiting.   Genitourinary: Negative for dysuria and frequency.   Musculoskeletal: Negative for back pain and neck pain.   Skin: Negative for itching and rash.   Neurological: Negative for focal weakness, loss of consciousness, weakness and headaches.   Endo/Heme/Allergies: Negative for environmental allergies. Does not bruise/bleed easily.   Psychiatric/Behavioral: Negative for depression. The patient does not have insomnia.        Past Medical History   has a past medical history of Arthritis, At risk for sleep apnea, Hypertension, and RLS (restless legs syndrome) (10/5/2011).    Surgical History   has a past surgical history that includes tonsillectomy; knee replacement, total (left); other orthopedic surgery; and isaiah by laparoscopy (N/A, 12/4/2021).     Family History  family history includes Alcohol abuse in her brother; Arthritis in her father; Cancer in her mother; Diabetes in her father; Drug abuse in her brother; Heart Disease in her father; Hyperlipidemia in her father; Hypertension in her father; Psychiatric Illness in her brother.   Family history reviewed with patient. There is no family history that is pertinent to the chief complaint.     Social History    reports that she has never smoked. She has never used smokeless tobacco. She reports current alcohol use of about 3.0 oz of alcohol per week. She reports that she does not use drugs.    Allergies  No Known Allergies    Medications  Prior to Admission Medications   Prescriptions Last Dose Informant Patient Reported? Taking?   oxybutynin (DITROPAN XL) 15 MG CR tablet   No No   Sig: TAKE 1 TABLET BY MOUTH EVERY DAY   pramipexole (MIRAPEX) 1 MG Tab   No No   Sig: Take 3 Tablets by mouth at bedtime.      Facility-Administered Medications: None       Physical Exam  Temp:  [36.3 °C (97.3 °F)] 36.3 °C (97.3 °F)  Pulse:  [73] 73  Resp:  [18] 18  BP: (120)/(65) 120/65  SpO2:  [97 %] 97 %  Blood Pressure : 120/65   Temperature: 36.3 °C (97.3 °F)   Pulse: 73   Respiration: 18   Pulse Oximetry: 97 %       Physical Exam  Constitutional:       Appearance: Normal appearance. She is obese.   HENT:      Head: Normocephalic and atraumatic.      Mouth/Throat:      Mouth: Mucous membranes are moist.      Pharynx: Oropharynx is clear. No posterior oropharyngeal erythema.   Eyes:      General: No scleral icterus.     Extraocular Movements: Extraocular movements intact.      Conjunctiva/sclera: Conjunctivae normal.      Pupils: Pupils are equal, round, and reactive to light.   Neck:      Vascular: No carotid bruit.   Cardiovascular:      Rate and Rhythm: Normal rate and regular rhythm.      Pulses: Normal pulses.      Heart sounds: Normal heart sounds. No murmur heard.    No friction rub. No gallop.   Pulmonary:      Effort: Pulmonary effort is normal.      Breath sounds: Normal breath sounds. No wheezing, rhonchi or rales.   Abdominal:      General: Abdomen is flat. Bowel sounds are normal. There is no distension.      Palpations: There is no mass.      Tenderness: There is no abdominal tenderness. There is no rebound.   Musculoskeletal:         General: No swelling. Normal range of motion.      Cervical back: Normal range of motion.       Right lower leg: Edema present.      Left lower leg: Edema (worse than right) present.   Lymphadenopathy:      Cervical: No cervical adenopathy.   Skin:     General: Skin is warm and dry.      Capillary Refill: Capillary refill takes less than 2 seconds.      Findings: No erythema or rash.      Comments: Hypopigmentation plantar aspect left foot as seen below   Neurological:      General: No focal deficit present.      Mental Status: She is alert and oriented to person, place, and time. Mental status is at baseline.      Cranial Nerves: No cranial nerve deficit.      Sensory: No sensory deficit.      Motor: No weakness.   Psychiatric:         Mood and Affect: Mood normal.         Behavior: Behavior normal.         Laboratory:  Recent Labs     05/25/22 0228   WBC 6.6   RBC 4.11*   HEMOGLOBIN 12.1   HEMATOCRIT 36.8*   MCV 89.5   MCH 29.4   MCHC 32.9*   RDW 46.2   PLATELETCT 303   MPV 9.4     Recent Labs     05/25/22 0228   SODIUM 140   POTASSIUM 3.3*   CHLORIDE 105   CO2 24   GLUCOSE 119*   BUN 16   CREATININE 0.48*   CALCIUM 8.8     Recent Labs     05/25/22 0228   ALTSGPT 13   ASTSGOT 18   ALKPHOSPHAT 86   TBILIRUBIN 0.3   GLUCOSE 119*         No results for input(s): NTPROBNP in the last 72 hours.    No results for input(s): TROPONINT in the last 72 hours.    Imaging:  US-EXTREMITY VENOUS LOWER UNILAT LEFT   Final Result            Assessment/Plan:  Justification for Admission Status  I anticipate this patient will require at least two midnights for appropriate medical management, necessitating inpatient admission because Meets inpatient criteria    * Cellulitis- (present on admission)  Assessment & Plan  Recurrent left lower extremity cellulitis.  Blood culture obtained in ED x2  IV antibiotics initiated in ED we will continue onward.  Consider ID consultation for recurrent cellulitis with multiple failed agents in the past.  Analgesics for pain control  Images uploaded as seen above  No indication for surgical  consultation or concern for necrotizing fasciitis at this time.    Hypokalemia- (present on admission)  Assessment & Plan  Mild and asymptomatic  Magnesium level ordered prior to administration of potassium products and if low we will administer magnesium first for adequate potassium absorption.    Obesity- (present on admission)  Assessment & Plan  Strong recommendation for follow-up outpatient PCP for lifestyle modification including diet and exercise regiment of at least 30 min of brisk cardiovascular exercise 3-5 times weekly.  Lipid panel ordered and pending  Hemoglobin A1c ordered and pending      RLS (restless legs syndrome)- (present on admission)  Assessment & Plan  Continue home pramipexole 3 mg p.o. nightly  Iron/ferritin studies ordered    Advance care planning- (present on admission)  Assessment & Plan  I have spent greater than 20 minutes at bedside discussing diagnostic work-up/prognosis and treatment plan.  CODE STATUS obtained at bedside and patient agreeable to full CODE STATUS at time of evaluation and alert and oriented x4.      VTE prophylaxis: heparin ppx

## 2022-05-25 NOTE — ED NOTES
Pt to red 3 via wheelchair. Pt able to transfer self to [facility]. Chart up for ERP.  Pt states she was at the [facility] and employees told pt that her legs looked swollen and that she should be checked out.

## 2022-05-25 NOTE — PROGRESS NOTES
Received report from YOANA Santos. Pt arrived to room 220 via remsa, assisted over to hospital bed. AAOx4, VSS. Pt denies pain, nausea, SOB at this time. Oriented to room and updated on plan of care for the day. Redness and heat noted to BLE, pt denies any numbness/tingling. Answered any questions, safety precautions in place. Pt educated to call for assistance.

## 2022-05-25 NOTE — ASSESSMENT & PLAN NOTE
Mild and asymptomatic  Magnesium level ordered prior to administration of potassium products and if low we will administer magnesium first for adequate potassium absorption.

## 2022-05-25 NOTE — ASSESSMENT & PLAN NOTE
Recurrent left lower extremity cellulitis.  Blood culture obtained in ED x2  IV antibiotics initiated in ED we will continue onward.  Consider ID consultation for recurrent cellulitis with multiple failed agents in the past.  Analgesics for pain control  Images uploaded as seen above  No indication for surgical consultation or concern for necrotizing fasciitis at this time.

## 2022-05-25 NOTE — H&P
This version of the note has been redacted during the course of a chart correction case. If you need access to the original text of this version of the note, please contact the Health Information Management department at [phone].

## 2022-05-25 NOTE — ASSESSMENT & PLAN NOTE
I have spent greater than 20 minutes at bedside discussing diagnostic work-up/prognosis and treatment plan.  CODE STATUS obtained at bedside and patient agreeable to full CODE STATUS at time of evaluation and alert and oriented x4.

## 2022-05-26 ENCOUNTER — PATIENT OUTREACH (OUTPATIENT)
Dept: SCHEDULING | Facility: IMAGING CENTER | Age: 72
End: 2022-05-26
Payer: COMMERCIAL

## 2022-05-26 VITALS
WEIGHT: 226 LBS | DIASTOLIC BLOOD PRESSURE: 51 MMHG | HEART RATE: 64 BPM | BODY MASS INDEX: 40.04 KG/M2 | HEIGHT: 63 IN | RESPIRATION RATE: 17 BRPM | SYSTOLIC BLOOD PRESSURE: 151 MMHG | TEMPERATURE: 98.8 F | OXYGEN SATURATION: 98 %

## 2022-05-26 PROCEDURE — 700102 HCHG RX REV CODE 250 W/ 637 OVERRIDE(OP): Performed by: INTERNAL MEDICINE

## 2022-05-26 PROCEDURE — A9270 NON-COVERED ITEM OR SERVICE: HCPCS | Performed by: INTERNAL MEDICINE

## 2022-05-26 PROCEDURE — 700105 HCHG RX REV CODE 258: Performed by: INTERNAL MEDICINE

## 2022-05-26 PROCEDURE — 99239 HOSP IP/OBS DSCHRG MGMT >30: CPT | Performed by: INTERNAL MEDICINE

## 2022-05-26 PROCEDURE — 97161 PT EVAL LOW COMPLEX 20 MIN: CPT

## 2022-05-26 PROCEDURE — 306311 ANTI-EMBOLISM STOCKINGS XXLRG LNG: Performed by: INTERNAL MEDICINE

## 2022-05-26 PROCEDURE — 700111 HCHG RX REV CODE 636 W/ 250 OVERRIDE (IP): Performed by: INTERNAL MEDICINE

## 2022-05-26 RX ADMIN — SENNOSIDES AND DOCUSATE SODIUM 2 TABLET: 50; 8.6 TABLET ORAL at 05:49

## 2022-05-26 RX ADMIN — HEPARIN SODIUM 5000 UNITS: 5000 INJECTION INTRAVENOUS; SUBCUTANEOUS at 05:49

## 2022-05-26 RX ADMIN — SODIUM CHLORIDE 3 G: 900 INJECTION INTRAVENOUS at 05:49

## 2022-05-26 RX ADMIN — OXYBUTYNIN CHLORIDE 15 MG: 5 TABLET, EXTENDED RELEASE ORAL at 05:49

## 2022-05-26 ASSESSMENT — PAIN DESCRIPTION - PAIN TYPE: TYPE: ACUTE PAIN

## 2022-05-26 ASSESSMENT — COGNITIVE AND FUNCTIONAL STATUS - GENERAL
MOBILITY SCORE: 24
SUGGESTED CMS G CODE MODIFIER MOBILITY: CH

## 2022-05-26 ASSESSMENT — GAIT ASSESSMENTS
ASSISTIVE DEVICE: SINGLE POINT CANE
DEVIATION: STEP TO
DISTANCE (FEET): 150
GAIT LEVEL OF ASSIST: SUPERVISED

## 2022-05-26 NOTE — CARE PLAN
The patient is Stable - Low risk of patient condition declining or worsening    Shift Goals  Clinical Goals: Pain 2/10 or less this shift, free from falls and injury  Patient Goals: Rest comfortably    Progress made toward(s) clinical / shift goals:  Patient reports pain is at a tolerable level in order to rest and sleep overnight. Patient has safety measures in place, educated on call light use for needs and assistance.     Patient is not progressing towards the following goals: N/A      Problem: Fall Risk  Goal: Patient will remain free from falls  Outcome: Progressing     Problem: Pain - Standard  Goal: Alleviation of pain or a reduction in pain to the patient’s comfort goal  Outcome: Progressing

## 2022-05-26 NOTE — DISCHARGE SUMMARY
"Discharge Summary    CHIEF COMPLAINT ON ADMISSION  No chief complaint on file.      Reason for Admission  Lower extremity cellulitis     Admission Date  5/25/2022    CODE STATUS  Full Code    HPI & HOSPITAL COURSE    Per notes, \"71-year-old woman presented with recurrent left lower extremity cellulitis. Patient states that she has had left lower extremity cellulitis 5 times in the past year. She has failed outpatient antibiotics and came in with worsening erythema and left lower extremity swelling.   She states she is fully vaccinated against COVID-19 and denies any signs or symptoms of COVID-19.    Left lower extremity ultrasound negative for DVT.  CBC/CMP relatively unremarkable with exception of mild hypokalemia.  No infectious etiology appreciated on CBC.  Lactic acid measured at 1.4 and subsequently 1.1.  CRP resulted low and low likelihood of osteomyelitis at this time.  Analgesics and IV antibiotics initiated in ED.\"    Patient was admitted and initially started on IV antibiotics for untreated cellulitis.  Patient has apparently been treated 5 times in the past year for lower extremity cellulitis.  Patient states her legs do swell, has always been worse on the left side than the right side.  Symptoms and physical exam findings more consistent with chronic lymphedema and lower extremity venous stasis.  No elevated leukocytosis, procalcitonin not elevated.  Do not think this is infectious at this time.  Would avoid unnecessary antibiotics in the future unless clear signs of infection (fever, leukocytosis, increased pain, secretions) are noted.  I discussed this in detail with patient and patient stated she has always had more swelling on left side since surgery.  I recommended she follow-up with lymphedema clinic and also wear DALTON hose.    Therefore, she is discharged in good and stable condition to home with organized home healthcare and close outpatient follow-up.    The patient recovered much more quickly " than anticipated on admission.    Discharge Date  5/26/2022    FOLLOW UP ITEMS POST DISCHARGE  FU with physical therapy lymphedema clinic  Follow-up with PCP    DISCHARGE DIAGNOSES  Active Problems:    * No active hospital problems. *  Resolved Problems:    * No resolved hospital problems. *      FOLLOW UP  Future Appointments   Date Time Provider Department Center   5/27/2022  2:20 PM GABINO Eubanks III [facility]     [facility]  [address]  [facility] [id number] [phone]    Schedule an appointment as soon as possible for a visit in 1 week(s)        MEDICATIONS ON DISCHARGE     Medication List      CONTINUE taking these medications      Instructions   oxybutynin 15 MG CR tablet  Commonly known as: DITROPAN XL   TAKE 1 TABLET BY MOUTH EVERY DAY  Dose: 15 mg     pramipexole 1 MG Tabs  Commonly known as: MIRAPEX   Doctor's comments: Needs further refills from her PCP.  Take 3 Tablets by mouth at bedtime.  Dose: 3 mg        STOP taking these medications    cephALEXin 500 MG Caps  Commonly known as: KEFLEX     ibuprofen 200 MG Tabs  Commonly known as: MOTRIN            Allergies  No Known Allergies    DIET  Orders Placed This Encounter   Procedures   • Diet Order Diet: Cardiac; Second Modifier: (optional): 2 Gram Sodium; Fluid modifications: (optional): 1500 ml Fluid Restriction     Standing Status:   Standing     Number of Occurrences:   1     Order Specific Question:   Diet:     Answer:   Cardiac [6]     Order Specific Question:   Second Modifier: (optional)     Answer:   2 Gram Sodium [7]     Order Specific Question:   Fluid modifications: (optional)     Answer:   1500 ml Fluid Restriction [9]       ACTIVITY  As tolerated.  Weight bearing as tolerated    CONSULTATIONS  None    PROCEDURES  None    LABORATORY  Lab Results   Component Value Date    SODIUM 140 05/25/2022    POTASSIUM 3.3 (L) 05/25/2022    CHLORIDE 105 05/25/2022    CO2 24 05/25/2022    GLUCOSE 119 (H) 05/25/2022     BUN 16 05/25/2022    CREATININE 0.48 (L) 05/25/2022        Lab Results   Component Value Date    WBC 6.6 05/25/2022    HEMOGLOBIN 12.1 05/25/2022    HEMATOCRIT 36.8 (L) 05/25/2022    PLATELETCT 303 05/25/2022        Total time of the discharge process exceeds 45 minutes.

## 2022-05-26 NOTE — DIETARY
NUTRITION SERVICES: BMI - Pt with BMI >40 (=Body mass index is 40.04 kg/m².), Class III obesity. Weight loss counseling not appropriate in acute care setting.     RECOMMEND - If appropriate at DC please refer to outpatient nutrition services for weight management.     RD available PRN.

## 2022-05-26 NOTE — CARE PLAN
The patient is Stable - Low risk of patient condition declining or worsening    Shift Goals  Clinical Goals: pain willl be tolerable at least 2/10 this shift, comfort  Patient Goals: comfort, rest    Progress made toward(s) clinical / shift goals:  pt states pain is tolerable, no other needs at this time. Progressing on other goals.   Problem: Knowledge Deficit - Standard  Goal: Patient and family/care givers will demonstrate understanding of plan of care, disease process/condition, diagnostic tests and medications  Outcome: Progressing     Problem: Fall Risk  Goal: Patient will remain free from falls  Outcome: Progressing     Problem: Pain - Standard  Goal: Alleviation of pain or a reduction in pain to the patient’s comfort goal  Outcome: Progressing       Patient is not progressing towards the following goals:

## 2022-05-26 NOTE — HOSPITAL COURSE
"Per notes, \"71-year-old woman presented with recurrent left lower extremity cellulitis. Patient states that she has had left lower extremity cellulitis 5 times in the past year. She has failed outpatient antibiotics and came in with worsening erythema and left lower extremity swelling.   She states she is fully vaccinated against COVID-19 and denies any signs or symptoms of COVID-19.    Left lower extremity ultrasound negative for DVT.  CBC/CMP relatively unremarkable with exception of mild hypokalemia.  No infectious etiology appreciated on CBC.  Lactic acid measured at 1.4 and subsequently 1.1.  CRP resulted low and low likelihood of osteomyelitis at this time.  Analgesics and IV antibiotics initiated in ED.\"  "

## 2022-05-26 NOTE — DISCHARGE INSTRUCTIONS
Discharge Instructions    Discharged to home by taxi with self. Discharged via wheelchair, hospital escort: Yes.  Special equipment needed: Cane    Be sure to schedule a follow-up appointment with your primary care doctor or any specialists as instructed.     Discharge Plan:   Diet Plan: Discussed  Activity Level: Discussed  Confirmed Follow up Appointment: Patient to Call and Schedule Appointment  Confirmed Symptoms Management: Discussed  Medication Reconciliation Updated: Yes    I understand that a diet low in cholesterol, fat, and sodium is recommended for good health. Unless I have been given specific instructions below for another diet, I accept this instruction as my diet prescription.   Other diet: Resume home diet     Special Instructions: None    Is patient discharged on Warfarin / Coumadin?   No     Depression / Suicide Risk    As you are discharged from this [facility] facility, it is important to learn how to keep safe from harming yourself.    Recognize the warning signs:  Abrupt changes in personality, positive or negative- including increase in energy   Giving away possessions  Change in eating patterns- significant weight changes-  positive or negative  Change in sleeping patterns- unable to sleep or sleeping all the time   Unwillingness or inability to communicate  Depression  Unusual sadness, discouragement and loneliness  Talk of wanting to die  Neglect of personal appearance   Rebelliousness- reckless behavior  Withdrawal from people/activities they love  Confusion- inability to concentrate     If you or a loved one observes any of these behaviors or has concerns about self-harm, here's what you can do:  Talk about it- your feelings and reasons for harming yourself  Remove any means that you might use to hurt yourself (examples: pills, rope, extension cords, firearm)  Get professional help from the community (Mental Health, Substance Abuse, psychological counseling)  Do not be alone:Call your  Safe Contact- someone whom you trust who will be there for you.  Call your local CRISIS HOTLINE 508-9360 or 137-213-8385  Call your local Children's Mobile Crisis Response Team [place] (817) 861-3296 or www.Larky  Call the toll free National Suicide Prevention Hotlines   National Suicide Prevention Lifeline 649-310-KNNP (7683)  [facility] Line Network 800-SUICIDE (208-6574)    Lymphedema    Lymphedema is swelling that is caused by the abnormal collection of lymph in the tissues under the skin. Lymph is fluid from the tissues in your body that is removed through the lymphatic system. This system is part of your body's defense system (immune system) and includes lymph nodes and lymph vessels. The lymph vessels collect and carry the excess fluid, fats, proteins, and wastes from the tissues of the body to the bloodstream. This system also works to clean and remove bacteria and waste products from the body.  Lymphedema occurs when the lymphatic system is blocked. When the lymph vessels or lymph nodes are blocked or damaged, lymph does not drain properly. This causes an abnormal buildup of lymph, which leads to swelling in the affected area. This may include the trunk area, or an arm or leg. Lymphedema cannot be cured by medicines, but various methods can be used to help reduce the swelling.  There are two types of lymphedema: primary lymphedema and secondary lymphedema.  What are the causes?  The cause of this condition depends on the type of lymphedema that you have.  Primary lymphedema is caused by the absence of lymph vessels or having abnormal lymph vessels at birth.  Secondary lymphedema occurs when lymph vessels are blocked or damaged. Secondary lymphedema is more common. Common causes of lymph vessel blockage include:  Skin infection, such as cellulitis.  Infection by parasites (filariasis).  Injury.  Radiation therapy.  Cancer.  Formation of scar tissue.  Surgery.  What are the signs or  symptoms?  Symptoms of this condition include:  Swelling of the arm or leg.  A heavy or tight feeling in the arm or leg.  Swelling of the feet, toes, or fingers. Shoes or rings may fit more tightly than before.  Redness of the skin over the affected area.  Limited movement of the affected limb.  Sensitivity to touch or discomfort in the affected limb.  How is this diagnosed?  This condition may be diagnosed based on:  Your symptoms and medical history.  A physical exam.  Bioimpedance spectroscopy. In this test, painless electrical currents are used to measure fluid levels in your body.  Imaging tests, such as:  Lymphoscintigraphy. In this test, a low dose of a radioactive substance is injected to trace the flow of lymph through the lymph vessels.  MRI.  CT scan.  Duplex ultrasound. This test uses sound waves to produce images of the vessels and the blood flow on a screen.  Lymphangiography. In this test, a contrast dye is injected into the lymph vessel to help show blockages.  How is this treated?  Treatment for this condition may depend on the cause of your lymphedema. Treatment may include:  Complete decongestive therapy (CDT). This is done by a certified lymphedema therapist to reduce fluid congestion. This therapy includes:  Manual lymph drainage. This is a special massage technique that promotes lymph drainage out of a limb.  Skin care.  Compression wrapping of the affected area.  Specific exercises. Certain exercises can help fluid move out of the affected limb.  Compression. Various methods may be used to apply pressure to the affected limb to reduce the swelling. They include:  Wearing compression stockings or sleeves on the affected limb.  Wrapping the affected limb with special bandages.  Surgery. This is usually done for severe cases only. For example, surgery may be done if you have trouble moving the limb or if the swelling does not get better with other treatments.  If an underlying condition is  causing the lymphedema, treatment for that condition will be done. For example, antibiotic medicines may be used to treat an infection.  Follow these instructions at home:  Self-care  The affected area is more likely to become injured or infected. Take these steps to help prevent infection:  Keep the affected area clean and dry.  Use approved creams or lotions to keep the skin moisturized.  Protect your skin from cuts:  Use gloves while cooking or gardening.  Do not walk barefoot.  If you shave the affected area, use an electric razor.  Do not wear tight clothes, shoes, or jewelry.  Eat a healthy diet that includes a lot of fruits and vegetables.  Activity  Exercise regularly as directed by your health care provider.  Do not sit with your legs crossed.  When possible, keep the affected limb raised (elevated) above the level of your heart.  Avoid carrying things with an arm that is affected by lymphedema.  General instructions  Wear compression stockings or sleeves as told by your health care provider.  Note any changes in size of the affected limb. You may be instructed to take regular measurements and keep track of them.  Take over-the-counter and prescription medicines only as told by your health care provider.  If you were prescribed an antibiotic medicine, take or apply it as told by your health care provider. Do not stop using the antibiotic even if you start to feel better.  Do not use heating pads or ice packs over the affected area.  Avoid having blood draws, IV insertions, or blood pressure checked on the affected limb.  Keep all follow-up visits as told by your health care provider. This is important.  Contact a health care provider if you:  Continue to have swelling in your limb.  Have a cut that does not heal.  Have redness or pain in the affected area.  Get help right away if you:  Have new swelling in your limb that comes on suddenly.  Develop purplish spots, rash or sores (lesions) on your affected  limb.  Have shortness of breath.  Have a fever or chills.  Summary  Lymphedema is swelling that is caused by the abnormal collection of lymph in the tissues under the skin.  Lymph is fluid from the tissues in your body that is removed through the lymphatic system. This system collects and carries excess fluid, fats, proteins, and wastes from the tissues of the body to the bloodstream.  Lymphedema causes swelling, pain, and redness in the affected area. This may include the trunk area, or an arm or leg.  Treatment for this condition may depend on the cause of your lymphedema. Treatment may include complete decongestive therapy (CDT), compression methods, surgery, or treating the underlying cause.  This information is not intended to replace advice given to you by your health care provider. Make sure you discuss any questions you have with your health care provider.  Document Released: 10/14/2008 Document Revised: 12/31/2018 Document Reviewed: 12/31/2018  [employer] Patient Education © 2020 [employer]

## 2022-05-26 NOTE — PROGRESS NOTES
Received report from night shift RN, assumed care of pt. ELIZABETH OgdenS. Pt denies pain, nausea, or SOB at this time. Sitting up in bed eating breakfast, updated pt on plan of care for the day. Answered any questions. Safety precautions in place, pt educated to call for assistance.

## 2022-05-26 NOTE — PROGRESS NOTES
Pt discharged home in good and stable condition. Reviewed all discharge instructions and answered any questions. IV discontinued. Taiwo brito provided and assisted pt with putting on. Pt received new cane to go home with. Escorted downstairs via  at 1428.

## 2022-05-26 NOTE — DISCHARGE PLANNING
Received Choice form at 2673  Agency/Facility Name: [facility]  Referral sent per Choice form @ [phone]

## 2022-05-26 NOTE — THERAPY
Physical Therapy   Initial Evaluation     Patient Name: Ariadna Sepulveda  Age:  71 y.o., Sex:  female  Medical Record #: [id number]  Today's Date: 5/26/2022     Precautions  Precautions: (P) Fall Risk    Assessment  Patient is 71 y.o. female with a diagnosis of cellulitis L LE Pt has been staying with friends whilst she awaits housing.She ambulates in the community but reports numerous falls.Would benefit from a SPC.Pt is safe with bed mob,transfers and amb with a cane.No acute PT needs     Plan    Recommend Physical Therapy for Evaluation only    05/26/22 1000   Charge Group   PT Evaluation PT Evaluation Low   Total Time Spent   PT Total Time Yes   PT Evaluation Time Spent (Mins) 30   Initial Contact Note    Initial Contact Note Order Received and Verified, Physical Therapy Evaluation in Progress with Full Report to Follow.   Precautions   Precautions Fall Risk   Pain 0 - 10 Group   Therapist Pain Assessment 1   Prior Living Situation   Prior Services None   Equipment Owned None   Lives with - Patient's Self Care Capacity Friends   Prior Level of Functional Mobility   Bed Mobility Independent   Transfer Status Independent   Ambulation Independent   Distance Ambulation (Feet)   (limited community)   Assistive Devices Used None   Stairs Independent   History of Falls   History of Falls Yes   Cognition    Cognition / Consciousness WDL   Passive ROM Lower Body   Passive ROM Lower Body WDL   Active ROM Lower Body    Active ROM Lower Body  WDL   Strength Lower Body   Lower Body Strength  X   Comments general weakness   Coordination Lower Body    Coordination Lower Body  WDL   Balance Assessment   Sitting Balance (Static) Good   Sitting Balance (Dynamic) Good   Standing Balance (Static) Fair   Standing Balance (Dynamic) Fair -   Weight Shift Sitting Good   Weight Shift Standing Fair   Gait Analysis   Gait Level Of Assist Supervised   Assistive Device Single Point Cane   Distance (Feet) 150   # of Times Distance was Traveled  2   Deviation Step To   Weight Bearing Status full   Bed Mobility    Supine to Sit Modified Independent   Sit to Supine Modified Independent   Scooting Modified Independent   Functional Mobility   Sit to Stand Supervised   Bed, Chair, Wheelchair Transfer Supervised   Transfer Method Stand Step   How much difficulty does the patient currently have...   Turning over in bed (including adjusting bedclothes, sheets and blankets)? 4   Sitting down on and standing up from a chair with arms (e.g., wheelchair, bedside commode, etc.) 4   Moving from lying on back to sitting on the side of the bed? 4   How much help from another person does the patient currently need...   Moving to and from a bed to a chair (including a wheelchair)? 4   Need to walk in a hospital room? 4   Climbing 3-5 steps with a railing? 4   6 clicks Mobility Score 24   Activity Tolerance   Sitting Edge of Bed 10   Standing 10   Patient / Family Goals    Patient / Family Goal #1 Home   Anticipated Discharge Equipment and Recommendations   DC Equipment Recommendations Single Point Cane   Discharge Recommendations Anticipate that the patient will have no further physical therapy needs after discharge from the hospital   Interdisciplinary Plan of Care Collaboration   IDT Collaboration with  Nursing   Session Information   Date / Session Number  5/26   Priority 0       DC Equipment Recommendations: (P) Single Point Cane  Discharge Recommendations: (P) Anticipate that the patient will have no further physical therapy needs after discharge from the hospital

## 2022-05-26 NOTE — DISCHARGE PLANNING
Case Management Discharge Planning    Admission Date: 5/25/2022  GMLOS:    ALOS: 1    6-Clicks ADL Score: 20  6-Clicks Mobility Score: 24      Anticipated Discharge Dispo: Home with DME cane    DME Needed: Yes    DME Ordered: Yes    Action(s) Taken: LSW notified that pt is discharging, needs cab voucher and cane. Per RN, pt does not have any one to pick her up and would like ride to [address] [place] [address].     LSW faxed DME choice for cane for pac med to [facility] per continuity of care.     LSW provided cab voucher to bedside RN.    Escalations Completed: None    Medically Clear: Yes    Next Steps: LSW to follow and assist as needed.    Barriers to Discharge: None

## 2022-05-27 ENCOUNTER — APPOINTMENT (OUTPATIENT)
Dept: MEDICAL GROUP | Facility: PHYSICIAN GROUP | Age: 72
End: 2022-05-27
Payer: COMMERCIAL

## 2022-05-27 ENCOUNTER — PATIENT OUTREACH (OUTPATIENT)
Dept: MEDICAL GROUP | Facility: PHYSICIAN GROUP | Age: 72
End: 2022-05-27

## 2022-05-27 NOTE — PROGRESS NOTES
Subjective:     Ariadna Sepulveda is a 71 y.o. female who presents for Hospital Follow-up.    POST DISCHARGE CALL:  Discharge Date:*   Date of Outreach Call: *  Now that you're home, how are you doing? *  Do you have questions about your medications? *  Did you fill your medications? *  Do you have a follow-up appointment scheduled?*  Discharging Department: *  Number of Attempts: *  Current or previous attempts completed within two business days of discharge? *  Provided education regarding treatment plan, medication, self-management, ADLs? *  Has patient completed Advance Directive? If yes, advise them to bring to appointment. *  Care Manager phone number provided? *  Is there anything else I can help you with? *    HPI:   Recently hospitalized for ***    Current medicines (including reconciliation performed today)  Current Outpatient Medications   Medication Sig Dispense Refill   • oxybutynin (DITROPAN XL) 15 MG CR tablet TAKE 1 TABLET BY MOUTH EVERY DAY 30 Tablet 47     No current facility-administered medications for this visit.       Allergies:   Patient has no known allergies.    Social History     Tobacco Use   • Smoking status: Never Smoker   • Smokeless tobacco: Never Used   Vaping Use   • Vaping Use: Never used   Substance Use Topics   • Alcohol use: Yes     Alcohol/week: 3.0 oz     Types: 5 Glasses of wine per week     Comment: occasional   • Drug use: No       ROS:  ***    Objective:     There were no vitals filed for this visit.  There is no height or weight on file to calculate BMI.    Physical Exam:  ***    Assessment and Plan:   There are no diagnoses linked to this encounter.    - Chart and discharge summary were reviewed.   - Hospitalization and results reviewed with patient.   - Medications reviewed including instructions regarding high risk medications, dosing and side effects.  - Recommended Services: {COORDINATION OF SERVICES:20405}  - Advance directive/POLST on file?   {Yes/No:20266}    Follow-up:No follow-ups on file.    Face-to-face transitional care management services with {TCM Complexity:62635} medical decision complexity were provided.     LACE+ Historical Score Over Time (0-28: Low, 29-58: Medium, 59+: High): 14      {                                                      reference text will not save to note  Coding guide   74771        - face-to-face within 14 day        - moderate decision complexity (LACE+ score of 28-58)  59501       - face-to-face within 7 days       - high medical decision complexity (LACE+ score 59+)    :50494}

## 2022-05-27 NOTE — PROGRESS NOTES
Attempt # 1 made for TCM discharge out reach. Patient has an appointment today at 220 with Dr. Lj Marion for med refills and follow up from her original ER visit. Will change to a [facility] follow up.

## 2022-05-31 ENCOUNTER — OFFICE VISIT (OUTPATIENT)
Dept: MEDICAL GROUP | Facility: PHYSICIAN GROUP | Age: 72
End: 2022-05-31
Payer: COMMERCIAL

## 2022-05-31 VITALS
OXYGEN SATURATION: 98 % | DIASTOLIC BLOOD PRESSURE: 76 MMHG | TEMPERATURE: 98.2 F | HEART RATE: 98 BPM | BODY MASS INDEX: 40.48 KG/M2 | SYSTOLIC BLOOD PRESSURE: 136 MMHG | WEIGHT: 220 LBS | HEIGHT: 62 IN | RESPIRATION RATE: 18 BRPM

## 2022-05-31 DIAGNOSIS — N32.81 OVERACTIVE BLADDER: ICD-10-CM

## 2022-05-31 DIAGNOSIS — R60.0 BILATERAL LOWER EXTREMITY EDEMA: ICD-10-CM

## 2022-05-31 DIAGNOSIS — G25.81 RLS (RESTLESS LEGS SYNDROME): Chronic | ICD-10-CM

## 2022-05-31 DIAGNOSIS — E66.01 MORBID OBESITY (HCC): ICD-10-CM

## 2022-05-31 PROBLEM — G89.18 ACUTE POSTOPERATIVE PAIN: Status: RESOLVED | Noted: 2021-12-04 | Resolved: 2022-05-31

## 2022-05-31 PROBLEM — R09.02 HYPOXIA: Status: RESOLVED | Noted: 2021-11-08 | Resolved: 2022-05-31

## 2022-05-31 PROBLEM — K80.00 ACUTE CALCULOUS CHOLECYSTITIS: Status: RESOLVED | Noted: 2021-12-04 | Resolved: 2022-05-31

## 2022-05-31 PROBLEM — R10.10 PAIN OF UPPER ABDOMEN: Status: RESOLVED | Noted: 2020-10-23 | Resolved: 2022-05-31

## 2022-05-31 PROBLEM — Z71.89 ADVANCE CARE PLANNING: Status: RESOLVED | Noted: 2022-05-25 | Resolved: 2022-05-31

## 2022-05-31 PROBLEM — L03.90 CELLULITIS: Status: RESOLVED | Noted: 2021-11-08 | Resolved: 2022-05-31

## 2022-05-31 PROBLEM — I16.1 HYPERTENSIVE EMERGENCY: Status: RESOLVED | Noted: 2020-10-23 | Resolved: 2022-05-31

## 2022-05-31 PROCEDURE — 99203 OFFICE O/P NEW LOW 30 MIN: CPT | Performed by: FAMILY MEDICINE

## 2022-05-31 RX ORDER — PRAMIPEXOLE DIHYDROCHLORIDE 1 MG/1
3 TABLET ORAL NIGHTLY
Qty: 90 TABLET | Refills: 5 | Status: SHIPPED | OUTPATIENT
Start: 2022-05-31 | End: 2022-11-21

## 2022-05-31 RX ORDER — OXYBUTYNIN CHLORIDE 15 MG/1
30 TABLET, EXTENDED RELEASE ORAL DAILY
Qty: 60 TABLET | Refills: 5 | Status: SHIPPED | OUTPATIENT
Start: 2022-05-31 | End: 2022-07-25

## 2022-05-31 ASSESSMENT — FIBROSIS 4 INDEX: FIB4 SCORE: 1.14

## 2022-05-31 NOTE — ASSESSMENT & PLAN NOTE
This is a chronic condition.  Patient is here asking for refill on her medications.  She states if she takes 2 a day she does much better.

## 2022-05-31 NOTE — PROGRESS NOTES
Subjective:     CC: Here for several issues.    HPI:   Ariadna presents today with the following medical concerns:    Overactive bladder  This is a chronic condition.  Patient is here asking for refill on her medications.  She states if she takes 2 a day she does much better.    RLS (restless legs syndrome)  This is a chronic problem.  Patient is here asking for refill on her Mirapex.  States it works very well to help control her restless legs.    Bilateral lower extremity edema  This is a chronic problem.  Patient was recently hospitalized with swelling and redness to her left lower leg.  It was determined is all due to lymphedema and had not been infection.  She has been referred to the lymphedema clinic but has not seen them as of yet.      Morbid obesity (HCC)  This is a chronic problem.  Patient to work on weight reduction.      Past Medical History:   Diagnosis Date   • Arthritis    • At risk for sleep apnea     CPAP   • Hypertension    • RLS (restless legs syndrome) 10/5/2011       Social History     Tobacco Use   • Smoking status: Never Smoker   • Smokeless tobacco: Never Used   Vaping Use   • Vaping Use: Never used   Substance Use Topics   • Alcohol use: Yes     Alcohol/week: 3.0 oz     Types: 5 Glasses of wine per week     Comment: occasional   • Drug use: No       Current Outpatient Medications Ordered in Epic   Medication Sig Dispense Refill   • oxybutynin (DITROPAN XL) 15 MG CR tablet Take 2 Tablets by mouth every day. 60 Tablet 5   • pramipexole (MIRAPEX) 1 MG Tab Take 3 Tablets by mouth every evening. 90 Tablet 5     No current Epic-ordered facility-administered medications on file.       Allergies:  Patient has no known allergies.    Health Maintenance: Completed    ROS:  Gen: no fevers/chills, no changes in weight  Eyes: no changes in vision  ENT: no sore throat, no hearing loss, no bloody nose  Pulm: no sob, no cough  CV: no chest pain, no palpitations  GI: no nausea/vomiting, no diarrhea  : no  "dysuria  MSk: no myalgias  Skin: no rash  Neuro: no headaches, no numbness/tingling  Heme/Lymph: no easy bruising      Objective:       Exam:  /76 (BP Location: Right arm, Patient Position: Sitting, BP Cuff Size: Adult)   Pulse 98   Temp 36.8 °C (98.2 °F) (Temporal)   Resp 18   Ht 1.581 m (5' 2.25\")   Wt 99.8 kg (220 lb)   LMP  (LMP Unknown)   SpO2 98%   Breastfeeding No   BMI 39.92 kg/m²  Body mass index is 39.92 kg/m².    Gen: Alert and oriented, No apparent distress.  Ext: No clubbing, cyanosis.  Patient has +3 edema to lower extremities.      Labs: Reviewed    Assessment & Plan:     71 y.o. female with the following -     1. Overactive bladder  This is a chronic problem.  Medication renewed.  - oxybutynin (DITROPAN XL) 15 MG CR tablet; Take 2 Tablets by mouth every day.  Dispense: 60 Tablet; Refill: 5    2. Morbid obesity (HCC)  This is a chronic problem.  Continue to work on weight reduction.  - Patient identified as fall risk.  Appropriate orders and counseling given.    3. Bilateral lower extremity edema  This is a chronic problem.  Referral to lymphedema clinic pending.  Patient does elevate her legs is much as possible and is to continue to do so.  Weight reduction would also be beneficial.    4. RLS (restless legs syndrome)  This is a chronic problem.  Medication renewed.      Return if symptoms worsen or fail to improve.    Please note that this dictation was created using voice recognition software. I have made every reasonable attempt to correct obvious errors, but I expect that there are errors of grammar and possibly content that I did not discover before finalizing the note.      "

## 2022-05-31 NOTE — ASSESSMENT & PLAN NOTE
This is a chronic problem.  Patient is here asking for refill on her Mirapex.  States it works very well to help control her restless legs.

## 2022-05-31 NOTE — ASSESSMENT & PLAN NOTE
This is a chronic problem.  Patient was recently hospitalized with swelling and redness to her left lower leg.  It was determined is all due to lymphedema and had not been infection.  She has been referred to the lymphedema clinic but has not seen them as of yet.

## 2022-07-20 ENCOUNTER — APPOINTMENT (OUTPATIENT)
Dept: PHYSICAL THERAPY | Facility: REHABILITATION | Age: 72
End: 2022-07-20
Attending: INTERNAL MEDICINE
Payer: COMMERCIAL

## 2022-07-22 ENCOUNTER — APPOINTMENT (OUTPATIENT)
Dept: PHYSICAL THERAPY | Facility: REHABILITATION | Age: 72
End: 2022-07-22
Attending: INTERNAL MEDICINE
Payer: COMMERCIAL

## 2022-07-23 DIAGNOSIS — N32.81 OVERACTIVE BLADDER: ICD-10-CM

## 2022-07-25 RX ORDER — OXYBUTYNIN CHLORIDE 15 MG/1
30 TABLET, EXTENDED RELEASE ORAL DAILY
Qty: 180 TABLET | Refills: 2 | Status: SHIPPED | OUTPATIENT
Start: 2022-07-25 | End: 2023-05-10

## 2022-07-26 ENCOUNTER — APPOINTMENT (OUTPATIENT)
Dept: PHYSICAL THERAPY | Facility: REHABILITATION | Age: 72
End: 2022-07-26
Attending: INTERNAL MEDICINE
Payer: COMMERCIAL

## 2022-07-28 ENCOUNTER — APPOINTMENT (OUTPATIENT)
Dept: PHYSICAL THERAPY | Facility: REHABILITATION | Age: 72
End: 2022-07-28
Attending: INTERNAL MEDICINE
Payer: COMMERCIAL

## 2022-08-01 ENCOUNTER — APPOINTMENT (OUTPATIENT)
Dept: PHYSICAL THERAPY | Facility: REHABILITATION | Age: 72
End: 2022-08-01
Attending: INTERNAL MEDICINE
Payer: COMMERCIAL

## 2022-09-18 NOTE — PROGRESS NOTES
Chart reviewed. Assessment completed.   Pt is A&Ox4. Neuro intact.   Denies pain.   HERNANDEZ, CMS intact, denies numbness and tingling.   Mobilizes out of bed with x 1 assist. Pt calls appropriately.   On RA. Pt wears CPAP at night. Denies SOB or chest pain; achieves 1000 on IS.   Hypoactive BS x 4. NPO for HIDA scan today. Denies N&V.   - flatus, - BM.   + void. Reports urinary urgency.   Skin intact   PIV running KVO for IV abx   Updated on POC. Belongings and call light within reach. All needs met at this time.    History/Exam/Medical Decision Making

## 2022-10-21 ENCOUNTER — HOSPITAL ENCOUNTER (EMERGENCY)
Facility: MEDICAL CENTER | Age: 72
End: 2022-10-21
Attending: EMERGENCY MEDICINE
Payer: COMMERCIAL

## 2022-10-21 VITALS
SYSTOLIC BLOOD PRESSURE: 169 MMHG | OXYGEN SATURATION: 98 % | RESPIRATION RATE: 20 BRPM | HEIGHT: 62 IN | BODY MASS INDEX: 41.54 KG/M2 | TEMPERATURE: 98.1 F | DIASTOLIC BLOOD PRESSURE: 95 MMHG | WEIGHT: 225.75 LBS | HEART RATE: 84 BPM

## 2022-10-21 DIAGNOSIS — L30.9 DERMATITIS: ICD-10-CM

## 2022-10-21 PROCEDURE — A9270 NON-COVERED ITEM OR SERVICE: HCPCS | Performed by: EMERGENCY MEDICINE

## 2022-10-21 PROCEDURE — 99284 EMERGENCY DEPT VISIT MOD MDM: CPT

## 2022-10-21 PROCEDURE — 700102 HCHG RX REV CODE 250 W/ 637 OVERRIDE(OP): Performed by: EMERGENCY MEDICINE

## 2022-10-21 RX ORDER — GINSENG 100 MG
CAPSULE ORAL
Qty: 14 G | Refills: 0 | Status: SHIPPED | OUTPATIENT
Start: 2022-10-21 | End: 2023-06-01

## 2022-10-21 RX ORDER — BENZOCAINE/MENTHOL 6 MG-10 MG
LOZENGE MUCOUS MEMBRANE 2 TIMES DAILY
Status: DISCONTINUED | OUTPATIENT
Start: 2022-10-21 | End: 2022-10-22 | Stop reason: HOSPADM

## 2022-10-21 RX ORDER — BENZOCAINE/MENTHOL 6 MG-10 MG
1 LOZENGE MUCOUS MEMBRANE 2 TIMES DAILY
Qty: 14 G | Refills: 0 | Status: SHIPPED | OUTPATIENT
Start: 2022-10-21 | End: 2022-10-26

## 2022-10-21 RX ADMIN — HYDROCORTISONE: 10 CREAM TOPICAL at 21:44

## 2022-10-21 ASSESSMENT — PAIN DESCRIPTION - PAIN TYPE: TYPE: ACUTE PAIN

## 2022-10-21 ASSESSMENT — FIBROSIS 4 INDEX: FIB4 SCORE: 1.14

## 2022-10-22 NOTE — DISCHARGE INSTRUCTIONS
Wash the area gently with soap and water daily and apply cream and ointment as prescribed.  Return here if you develop new or worsening symptoms.  Call your primary care doctor first thing Monday morning and arrange office recheck during the week

## 2022-10-22 NOTE — ED PROVIDER NOTES
ED Provider Note    CHIEF COMPLAINT  Chief Complaint   Patient presents with    Rash     Pt states she was driving in her car back from [place] and pooped her pants. Pt states she did not pull over and sat in her stool for 4 hours and has residual contact dermatitis from the incontinence. Pt states she has been putting neosporin on the area with some relief but overall the rash has worsened and covers her entire groin area.       HPI  Ariadna Sepulveda is a 71 y.o. female who presents to the emergency department complaining of an irritated rash on the inside of both thighs.  The patient states that she drove home from [place] last Sunday after eating eggs for breakfast, eggs typically caused her to have abdominal upset and diarrhea and on the drive home she had an episode of uncontrollable diarrhea while driving, she did not pull over to clean this up instead she drove 4 hours home sitting in the stool and this resulted in discomfort to the skin.  The patient has been using Neosporin on the area during the week but still has painful excoriated skin on the inner aspect of the thighs so is come to the emergency department for evaluation.    REVIEW OF SYSTEMS no fever or chills no pain or discomfort with urination no vaginal symptoms, no black or bloody stool no abdominal pain.    PAST MEDICAL HISTORY  Past Medical History:   Diagnosis Date    Arthritis     At risk for sleep apnea     CPAP    Hypertension     RLS (restless legs syndrome) 10/5/2011       FAMILY HISTORY  Family History   Problem Relation Age of Onset    Cancer Mother     Arthritis Father     Diabetes Father     Heart Disease Father     Hypertension Father     Hyperlipidemia Father     Psychiatric Illness Brother     Drug abuse Brother     Alcohol abuse Brother     Lung Disease Neg Hx        SOCIAL HISTORY  Social History     Socioeconomic History    Marital status: Single   Tobacco Use    Smoking status: Never    Smokeless tobacco: Never   Vaping  "Use    Vaping Use: Never used   Substance and Sexual Activity    Alcohol use: Yes     Alcohol/week: 3.0 oz     Types: 5 Glasses of wine per week     Comment: occasional    Drug use: No    Sexual activity: Not Currently       SURGICAL HISTORY  Past Surgical History:   Procedure Laterality Date    NAILA BY LAPAROSCOPY N/A 12/4/2021    Procedure: CHOLECYSTECTOMY, LAPAROSCOPIC;  Surgeon: Jos Rodriguez M.D.;  Location: SURGERY [facility];  Service: General    KNEE REPLACEMENT, TOTAL  left    OTHER ORTHOPEDIC SURGERY      TONSILLECTOMY         CURRENT MEDICATIONS  Home Medications       Reviewed by Mercy Moore R.N. (Registered Nurse) on 10/21/22 at 2111  Med List Status: Partial     Medication Last Dose Status   oxybutynin (DITROPAN XL) 15 MG CR tablet  Active   pramipexole (MIRAPEX) 1 MG Tab  Active                    ALLERGIES  No Known Allergies    PHYSICAL EXAM  VITAL SIGNS: BP (!) 169/95   Pulse 84   Temp 36.7 °C (98.1 °F) (Temporal)   Resp 20   Ht 1.575 m (5' 2\")   Wt 102 kg (225 lb 12 oz)   LMP  (LMP Unknown)   SpO2 98% Comment: RA  BMI 41.29 kg/m²    Oxygen saturation is interpreted as adequate  Constitutional: Awake verbal nontoxic-appearing  Cardiovascular: Regular rate and rhythm  Lungs: Clear bilaterally with no apparent difficulty breathing  Abdomen/Back: Morbidly obese soft and nontender no rebound guarding or peritoneal findings  Skin: Warm and dry  Musculoskeletal: No acute bony deformity  Neurologic: And external pelvic exam was done with nurse chaperone at the bedside and the patient does have excoriated skin on the inner aspect of both thighs this does not appear to involve the vulva or perineum and does not look infected either but the skin is broken down a bit in places.      MEDICAL DECISION MAKING and DISPOSITION  In the emergency department hydrocortisone cream was placed on the rash I think you will be safe to continue treatment on outpatient basis and I have advised the patient " to wash the area gently with soap and water every day and have written prescriptions for hydrocortisone cream and bacitracin ointment.  If the patient has new or worsening symptoms she is to return here otherwise she is to call her doctor Monday and arrange office recheck during the week    IMPRESSION  1.  Excoriation of the skin on the inner aspect of both thighs         Electronically signed by: Markus Smith M.D., 10/21/2022 10:14 PM

## 2022-10-22 NOTE — ED NOTES
Patient vital signs reassessed and documented in EPIC. Patient states that her condition has not changed since arrival to emergency department. Staff apologized to patient for wait times and instructed patient to notify triage tech or RN of any changes in condition.

## 2022-10-22 NOTE — ED NOTES
Patient from [facility] to [facility] 60 ambulatory with slow steady gait accompanied by ED RN. Chart up for ERP.

## 2022-11-03 ENCOUNTER — PATIENT MESSAGE (OUTPATIENT)
Dept: HEALTH INFORMATION MANAGEMENT | Facility: OTHER | Age: 72
End: 2022-11-03

## 2022-11-21 RX ORDER — PRAMIPEXOLE DIHYDROCHLORIDE 1 MG/1
3 TABLET ORAL NIGHTLY
Qty: 270 TABLET | Refills: 1 | Status: SHIPPED | OUTPATIENT
Start: 2022-11-21 | End: 2023-05-03

## 2023-02-11 ENCOUNTER — HOSPITAL ENCOUNTER (EMERGENCY)
Facility: MEDICAL CENTER | Age: 73
End: 2023-02-12
Attending: EMERGENCY MEDICINE
Payer: COMMERCIAL

## 2023-02-11 DIAGNOSIS — L30.9 DERMATITIS: ICD-10-CM

## 2023-02-11 DIAGNOSIS — I89.0 LYMPHEDEMA OF BOTH LOWER EXTREMITIES: Primary | ICD-10-CM

## 2023-02-11 PROCEDURE — 99283 EMERGENCY DEPT VISIT LOW MDM: CPT

## 2023-02-11 ASSESSMENT — FIBROSIS 4 INDEX: FIB4 SCORE: 1.15

## 2023-02-12 VITALS
BODY MASS INDEX: 40.93 KG/M2 | SYSTOLIC BLOOD PRESSURE: 108 MMHG | OXYGEN SATURATION: 92 % | DIASTOLIC BLOOD PRESSURE: 53 MMHG | HEART RATE: 71 BPM | WEIGHT: 222.44 LBS | HEIGHT: 62 IN | TEMPERATURE: 97.6 F | RESPIRATION RATE: 20 BRPM

## 2023-02-12 PROCEDURE — 700102 HCHG RX REV CODE 250 W/ 637 OVERRIDE(OP): Performed by: EMERGENCY MEDICINE

## 2023-02-12 PROCEDURE — A9270 NON-COVERED ITEM OR SERVICE: HCPCS | Performed by: EMERGENCY MEDICINE

## 2023-02-12 PROCEDURE — 700101 HCHG RX REV CODE 250: Performed by: EMERGENCY MEDICINE

## 2023-02-12 RX ORDER — HYDROXYZINE HYDROCHLORIDE 25 MG/1
25 TABLET, FILM COATED ORAL ONCE
Status: COMPLETED | OUTPATIENT
Start: 2023-02-12 | End: 2023-02-12

## 2023-02-12 RX ORDER — HYDROXYZINE HYDROCHLORIDE 25 MG/1
25 TABLET, FILM COATED ORAL 3 TIMES DAILY PRN
Qty: 30 TABLET | Refills: 0 | Status: SHIPPED | OUTPATIENT
Start: 2023-02-12 | End: 2023-06-01

## 2023-02-12 RX ADMIN — HYDROXYZINE HYDROCHLORIDE 25 MG: 25 TABLET, FILM COATED ORAL at 00:55

## 2023-02-12 RX ADMIN — MUPIROCIN 22 G: 20 OINTMENT TOPICAL at 01:15

## 2023-02-12 NOTE — ED TRIAGE NOTES
Chief Complaint   Patient presents with    Leg Swelling     Pt has bilateral leg swelling, worst to L leg associated with redness and itching but no pain     Pt ambulatory to triage for above complaint. Hx of cellulitis, she thinks she has it again. VSS, in NAD

## 2023-02-12 NOTE — ED NOTES
Patient currently resting with eyes closed, equal rise and fall of chest noted. Call light within reach.

## 2023-02-12 NOTE — ED PROVIDER NOTES
ED Provider Note    CHIEF COMPLAINT  Chief Complaint   Patient presents with    Leg Swelling     Pt has bilateral leg swelling, worst to L leg associated with redness and itching but no pain       EXTERNAL RECORDS REVIEWED  Outpatient Notes 5/31/2022 [facility] office visit with Dr. Marion.  Chronic problem of bilateral lower extremity edema discussed.  She has had a previous work-up and is determined that edema is due to lymphedema.  She was referred to lymphedema clinic.  She had ultrasound of lower extremities twice May 2022 which were negative for DVTs.    HPI/ROS  LIMITATION TO HISTORY   None  OUTSIDE HISTORIAN(S):  None    Ariadna Sepulveda is a 72 y.o. female who presents for leg swelling. She reports that she believes she has cellulitis, which she has had 3-4 times before. (Review of prior admission on 5/25/22, discussion how symptoms not cellulitis likely related to lymphedema, dermatitis). She denies any fever or pain to the leg but reports that it feels very itchy. She reports keeping her legs elevated while sleeping which helps, but denies the use of compression stockings. Additionally, she denies any history of diabetes. No fever or systemic symptoms. She is followed by Dr. Marion.    PAST MEDICAL HISTORY   has a past medical history of Arthritis, At risk for sleep apnea, Hypertension, and RLS (restless legs syndrome) (10/5/2011).    SURGICAL HISTORY   has a past surgical history that includes tonsillectomy; knee replacement, total (left); other orthopedic surgery; and isaiah by laparoscopy (N/A, 12/4/2021).    FAMILY HISTORY  Family History   Problem Relation Age of Onset    Cancer Mother     Arthritis Father     Diabetes Father     Heart Disease Father     Hypertension Father     Hyperlipidemia Father     Psychiatric Illness Brother     Drug abuse Brother     Alcohol abuse Brother     Lung Disease Neg Hx      SOCIAL HISTORY  Social History     Tobacco Use    Smoking status: Never     "Smokeless tobacco: Never   Vaping Use    Vaping Use: Never used   Substance and Sexual Activity    Alcohol use: Not Currently     Alcohol/week: 4.8 oz     Types: 8 Cans of beer per week     Comment: occasional    Drug use: No    Sexual activity: Not Currently     CURRENT MEDICATIONS  Home Medications       Reviewed by Veronica Sanchez R.N. (Registered Nurse) on 02/11/23 at 2336  Med List Status: Not Addressed     Medication Last Dose Status   bacitracin 500 UNIT/GM ointment  Active   oxybutynin (DITROPAN XL) 15 MG CR tablet  Active   pramipexole (MIRAPEX) 1 MG Tab  Active                    ALLERGIES  No Known Allergies    PHYSICAL EXAM  VITAL SIGNS: /59   Pulse 83   Temp 35.9 °C (96.7 °F) (Temporal)   Resp 18   Ht 1.575 m (5' 2\")   Wt 101 kg (222 lb 7.1 oz)   LMP  (LMP Unknown)   SpO2 95%   BMI 40.69 kg/m²      Constitutional: Pleasant 72 y.o. woman in no distress.  Cardiovascular: Normal heart rate of 83 with palpable distal pulses in bilateral feet.  Respiratory: Unlabored breathing.  Extremities: Non-pitting bilateral lower extremity edema without tenderness, erythema, or warmth. At the anterior left ankle there are some excoriations. Normal strength in feet and ankles    DIAGNOSTIC STUDIES / PROCEDURES    COURSE & MEDICAL DECISION MAKING    ED Observation Status? No; Patient does not meet criteria for ED Observation.     INITIAL ASSESSMENT, COURSE AND PLAN  Care Narrative: This is an emergent evaluation of a 72-year-old woman with history of bilateral lower extremity lymphedema now presenting with itching and redness to the left anterior lower extremity.  On exam at that area there is no increased warmth, minimal redness, some excoriations.  No weeping wounds or drainage.  Bilateral lower extremities warm and well perfused.  Most likely this is a mild dermatitis secondary to lymphedema.  She has had similar symptoms in the past.  In case there is a cellulitis developing/or to prevent at the " excoriated regions I have prescribed Bactroban to be placed at the superficial abrasions.  I did consider ordering serum studies to look to see if there is any signs of leukocytosis which would be a clue to an underlying cellulitis but exam is not suggestive of cellulitis at all in my opinion.  I have prescribed her hydroxyzine to help with the itching sensation.  I have asked her to continue to monitor the leg for signs of spreading redness, monitor for fever and if she is developing worsening symptoms she should come back to the ER for reevaluation.  She should follow-up with her primary provider for further evaluation of lymphedema.  I can see in prior notes discussion about being referred to a lymphedema specialist and she denies ever following up with them.     ADDITIONAL PROBLEM LIST  #Dermatitis with excoriations   -bactroban over excoriations   -hydroxyzine for itching    #Lymphedema   -follow up with primary care, continue leg elevation, compression stockings as tolerated    DISPOSITION AND DISCUSSIONS    Discussion of management with other QHP or appropriate source(s): None     Escalation of care considered, and ultimately not performed:the patient was evaluated by me, after discussion I have recommended the patient to be discharged    Barriers to care at this time, including but not limited to:  None .         FINAL DIAGNOSIS  1. Lymphedema of both lower extremities    2. Dermatitis       Wilber LEE (Ashanti), am scribing for, and in the presence of, FLEX Shoemaker II.    Electronically signed by: Wilber Figueroa), 2/12/2023    Javon LEE II, MD personally performed the services described in this documentation, as scribed by Wilber Krueger in my presence, and it is both accurate and complete.     Electronically signed by: Javon Nogueira II, M.D., 2/12/2023 12:28 AM

## 2023-02-12 NOTE — ED NOTES
Patient provided discharge instructions and verbalized understanding about follow-up and prescriptions. Pt wheeled to lobby by ER tech with all belongings accounted for.

## 2023-03-01 NOTE — ED TRIAGE NOTES
"Chief Complaint   Patient presents with    Rash     Pt states she was driving in her car back from [place] and pooped her pants. Pt states she did not pull over and sat in her stool for 4 hours and has residual contact dermatitis from the incontinence. Pt states she has been putting neosporin on the area with some relief but overall the rash has worsened and covers her entire groin area.       Ambulatory to triage for above complaint.   Educated on triage process, encourage to inform staff of any changes.     BP (!) 173/87   Pulse 94   Temp 37.1 °C (98.8 °F) (Temporal)   Resp 18   Ht 1.575 m (5' 2\")   Wt 102 kg (225 lb 12 oz)   LMP  (LMP Unknown)   SpO2 98%   BMI 41.29 kg/m²     "
normal

## 2023-04-19 ENCOUNTER — HOSPITAL ENCOUNTER (EMERGENCY)
Facility: MEDICAL CENTER | Age: 73
End: 2023-04-20
Attending: EMERGENCY MEDICINE
Payer: COMMERCIAL

## 2023-04-19 ENCOUNTER — APPOINTMENT (OUTPATIENT)
Dept: RADIOLOGY | Facility: MEDICAL CENTER | Age: 73
End: 2023-04-19
Attending: EMERGENCY MEDICINE
Payer: COMMERCIAL

## 2023-04-19 VITALS
TEMPERATURE: 97 F | HEIGHT: 62 IN | RESPIRATION RATE: 18 BRPM | DIASTOLIC BLOOD PRESSURE: 70 MMHG | WEIGHT: 210.32 LBS | SYSTOLIC BLOOD PRESSURE: 154 MMHG | OXYGEN SATURATION: 95 % | HEART RATE: 74 BPM | BODY MASS INDEX: 38.7 KG/M2

## 2023-04-19 DIAGNOSIS — R05.1 ACUTE COUGH: ICD-10-CM

## 2023-04-19 DIAGNOSIS — E87.6 HYPOKALEMIA: ICD-10-CM

## 2023-04-19 DIAGNOSIS — J06.9 VIRAL UPPER RESPIRATORY TRACT INFECTION: ICD-10-CM

## 2023-04-19 LAB
ALBUMIN SERPL BCP-MCNC: 3.8 G/DL (ref 3.2–4.9)
ALBUMIN/GLOB SERPL: 1.3 G/DL
ALP SERPL-CCNC: 90 U/L (ref 30–99)
ALT SERPL-CCNC: 12 U/L (ref 2–50)
ANION GAP SERPL CALC-SCNC: 13 MMOL/L (ref 7–16)
APPEARANCE UR: CLEAR
AST SERPL-CCNC: 13 U/L (ref 12–45)
BASOPHILS # BLD AUTO: 0.4 % (ref 0–1.8)
BASOPHILS # BLD: 0.03 K/UL (ref 0–0.12)
BILIRUB SERPL-MCNC: 0.4 MG/DL (ref 0.1–1.5)
BILIRUB UR QL STRIP.AUTO: NEGATIVE
BUN SERPL-MCNC: 12 MG/DL (ref 8–22)
CALCIUM ALBUM COR SERPL-MCNC: 8.5 MG/DL (ref 8.5–10.5)
CALCIUM SERPL-MCNC: 8.3 MG/DL (ref 8.5–10.5)
CHLORIDE SERPL-SCNC: 104 MMOL/L (ref 96–112)
CO2 SERPL-SCNC: 22 MMOL/L (ref 20–33)
COLOR UR: YELLOW
CREAT SERPL-MCNC: 0.75 MG/DL (ref 0.5–1.4)
EOSINOPHIL # BLD AUTO: 0.14 K/UL (ref 0–0.51)
EOSINOPHIL NFR BLD: 1.7 % (ref 0–6.9)
ERYTHROCYTE [DISTWIDTH] IN BLOOD BY AUTOMATED COUNT: 44 FL (ref 35.9–50)
FLUAV RNA SPEC QL NAA+PROBE: NEGATIVE
FLUBV RNA SPEC QL NAA+PROBE: NEGATIVE
GFR SERPLBLD CREATININE-BSD FMLA CKD-EPI: 85 ML/MIN/1.73 M 2
GLOBULIN SER CALC-MCNC: 2.9 G/DL (ref 1.9–3.5)
GLUCOSE SERPL-MCNC: 108 MG/DL (ref 65–99)
GLUCOSE UR STRIP.AUTO-MCNC: NEGATIVE MG/DL
HCT VFR BLD AUTO: 36.9 % (ref 37–47)
HGB BLD-MCNC: 12.9 G/DL (ref 12–16)
IMM GRANULOCYTES # BLD AUTO: 0.03 K/UL (ref 0–0.11)
IMM GRANULOCYTES NFR BLD AUTO: 0.4 % (ref 0–0.9)
KETONES UR STRIP.AUTO-MCNC: ABNORMAL MG/DL
LEUKOCYTE ESTERASE UR QL STRIP.AUTO: NEGATIVE
LYMPHOCYTES # BLD AUTO: 1.3 K/UL (ref 1–4.8)
LYMPHOCYTES NFR BLD: 15.6 % (ref 22–41)
MCH RBC QN AUTO: 30.1 PG (ref 27–33)
MCHC RBC AUTO-ENTMCNC: 35 G/DL (ref 33.6–35)
MCV RBC AUTO: 86 FL (ref 81.4–97.8)
MICRO URNS: ABNORMAL
MONOCYTES # BLD AUTO: 0.73 K/UL (ref 0–0.85)
MONOCYTES NFR BLD AUTO: 8.7 % (ref 0–13.4)
NEUTROPHILS # BLD AUTO: 6.12 K/UL (ref 2–7.15)
NEUTROPHILS NFR BLD: 73.2 % (ref 44–72)
NITRITE UR QL STRIP.AUTO: NEGATIVE
NRBC # BLD AUTO: 0 K/UL
NRBC BLD-RTO: 0 /100 WBC
PH UR STRIP.AUTO: 6.5 [PH] (ref 5–8)
PLATELET # BLD AUTO: 216 K/UL (ref 164–446)
PMV BLD AUTO: 9.9 FL (ref 9–12.9)
POTASSIUM SERPL-SCNC: 3.2 MMOL/L (ref 3.6–5.5)
PROT SERPL-MCNC: 6.7 G/DL (ref 6–8.2)
PROT UR QL STRIP: NEGATIVE MG/DL
RBC # BLD AUTO: 4.29 M/UL (ref 4.2–5.4)
RBC UR QL AUTO: NEGATIVE
RSV RNA SPEC QL NAA+PROBE: NEGATIVE
SARS-COV-2 RNA RESP QL NAA+PROBE: NOTDETECTED
SODIUM SERPL-SCNC: 139 MMOL/L (ref 135–145)
SP GR UR STRIP.AUTO: 1.02
SPECIMEN SOURCE: NORMAL
UROBILINOGEN UR STRIP.AUTO-MCNC: 1 MG/DL
WBC # BLD AUTO: 8.4 K/UL (ref 4.8–10.8)

## 2023-04-19 PROCEDURE — 81003 URINALYSIS AUTO W/O SCOPE: CPT

## 2023-04-19 PROCEDURE — 85025 COMPLETE CBC W/AUTO DIFF WBC: CPT

## 2023-04-19 PROCEDURE — A9270 NON-COVERED ITEM OR SERVICE: HCPCS | Performed by: EMERGENCY MEDICINE

## 2023-04-19 PROCEDURE — 36415 COLL VENOUS BLD VENIPUNCTURE: CPT

## 2023-04-19 PROCEDURE — 71045 X-RAY EXAM CHEST 1 VIEW: CPT

## 2023-04-19 PROCEDURE — 700102 HCHG RX REV CODE 250 W/ 637 OVERRIDE(OP): Performed by: EMERGENCY MEDICINE

## 2023-04-19 PROCEDURE — C9803 HOPD COVID-19 SPEC COLLECT: HCPCS | Performed by: EMERGENCY MEDICINE

## 2023-04-19 PROCEDURE — 0241U HCHG SARS-COV-2 COVID-19 NFCT DS RESP RNA 4 TRGT MIC: CPT

## 2023-04-19 PROCEDURE — 99284 EMERGENCY DEPT VISIT MOD MDM: CPT

## 2023-04-19 PROCEDURE — 80053 COMPREHEN METABOLIC PANEL: CPT

## 2023-04-19 RX ORDER — ALBUTEROL SULFATE 90 UG/1
2 AEROSOL, METERED RESPIRATORY (INHALATION) ONCE
Status: DISCONTINUED | OUTPATIENT
Start: 2023-04-20 | End: 2023-04-20 | Stop reason: HOSPADM

## 2023-04-19 RX ORDER — BENZONATATE 100 MG/1
200 CAPSULE ORAL ONCE
Status: COMPLETED | OUTPATIENT
Start: 2023-04-19 | End: 2023-04-19

## 2023-04-19 RX ORDER — IPRATROPIUM BROMIDE AND ALBUTEROL SULFATE 2.5; .5 MG/3ML; MG/3ML
3 SOLUTION RESPIRATORY (INHALATION) ONCE
Status: COMPLETED | OUTPATIENT
Start: 2023-04-20 | End: 2023-04-20

## 2023-04-19 RX ORDER — BENZONATATE 100 MG/1
200 CAPSULE ORAL 3 TIMES DAILY PRN
Qty: 90 CAPSULE | Refills: 0 | Status: SHIPPED | OUTPATIENT
Start: 2023-04-19 | End: 2023-06-01

## 2023-04-19 RX ORDER — POTASSIUM CHLORIDE 20 MEQ/1
40 TABLET, EXTENDED RELEASE ORAL ONCE
Status: COMPLETED | OUTPATIENT
Start: 2023-04-19 | End: 2023-04-19

## 2023-04-19 RX ADMIN — BENZONATATE 200 MG: 100 CAPSULE ORAL at 23:13

## 2023-04-19 RX ADMIN — POTASSIUM CHLORIDE 40 MEQ: 1500 TABLET, EXTENDED RELEASE ORAL at 22:47

## 2023-04-19 ASSESSMENT — FIBROSIS 4 INDEX: FIB4 SCORE: 1.15

## 2023-04-20 PROCEDURE — 700101 HCHG RX REV CODE 250: Performed by: EMERGENCY MEDICINE

## 2023-04-20 RX ADMIN — IPRATROPIUM BROMIDE AND ALBUTEROL SULFATE 3 ML: .5; 3 SOLUTION RESPIRATORY (INHALATION) at 00:00

## 2023-04-20 NOTE — ED NOTES
Patient discharged home per ERP.  Discharge teaching and education discussed with patient. POC discussed.   Patient verbalized understanding of discharge teaching and education. No other questions at this time. Teaching re: inhaler provided, & albuterol inhaler given for home + spacer.    RX for tessalon sent to pt's pharmacy.     VSS. Patient alert and oriented. Patient arranged ride for self. Able to ambulate off unit safely with steady gait.

## 2023-04-20 NOTE — ED PROVIDER NOTES
ER Provider Note    Scribed for Dr. Darline Muñoz D.O. by Angelika Montana. 4/19/2023  9:32 PM    Primary Care Provider: Lj Marion III, M.D.    CHIEF COMPLAINT  Chief Complaint   Patient presents with    Flu Like Symptoms     Patient reports cough x 3 days and chills.      EXTERNAL RECORDS REVIEWED  Reviewed to show that she was last seen in February for lymphedema - not pertinent to current complaints.    HPI/ROS  LIMITATION TO HISTORY   Select: : None    OUTSIDE HISTORIAN(S):  None    Ariadna Sepulveda is a 72 y.o. female who presents to the ED for evaluation of moderate flu-like symptoms onset three days ago. The patient endorses associated symptoms of chills, sore throat, and cough, but denies fever or pain with urination. No alleviating or exacerbating factors noted. The patient denies history of diabetes, but notes that she has cellulitis, hypertension, and restless leg syndrome. She also notes that she had her gallbladder removed. The patient states that she also believes she has sleep apnea, but had not been diagnosed. She denies smoking.     PAST MEDICAL HISTORY  Past Medical History:   Diagnosis Date    Arthritis     At risk for sleep apnea     CPAP    Hypertension     RLS (restless legs syndrome) 10/5/2011     SURGICAL HISTORY  Past Surgical History:   Procedure Laterality Date    NAILA BY LAPAROSCOPY N/A 12/4/2021    Procedure: CHOLECYSTECTOMY, LAPAROSCOPIC;  Surgeon: Jos Rodriguez M.D.;  Location: SURGERY [facility];  Service: General    KNEE REPLACEMENT, TOTAL  left    OTHER ORTHOPEDIC SURGERY      TONSILLECTOMY       FAMILY HISTORY  Family History   Problem Relation Age of Onset    Cancer Mother     Arthritis Father     Diabetes Father     Heart Disease Father     Hypertension Father     Hyperlipidemia Father     Psychiatric Illness Brother     Drug abuse Brother     Alcohol abuse Brother     Lung Disease Neg Hx      SOCIAL HISTORY   reports that she has never smoked. She has never used  "smokeless tobacco. She reports current alcohol use of about 4.8 oz per week. She reports that she does not use drugs.    CURRENT MEDICATIONS  Previous Medications    BACITRACIN 500 UNIT/GM OINTMENT    Apply thin layer to rash twice daily for the next 7 days    HYDROXYZINE HCL (ATARAX) 25 MG TAB    Take 1 Tablet by mouth 3 times a day as needed for Itching.    MUPIROCIN (BACTROBAN) 2 % OINTMENT    Apply 1 Application topically 2 times a day. To area with scabs at left ankle    OXYBUTYNIN (DITROPAN XL) 15 MG CR TABLET    TAKE 2 TABLETS BY MOUTH EVERY DAY    PRAMIPEXOLE (MIRAPEX) 1 MG TAB    TAKE 3 TABLETS BY MOUTH EVERY EVENING     ALLERGIES  Patient has no known allergies.    PHYSICAL EXAM  /58   Pulse 80   Temp 36.6 °C (97.9 °F) (Temporal)   Resp 18   Ht 1.575 m (5' 2\")   Wt 95.4 kg (210 lb 5.1 oz)   LMP  (LMP Unknown)   SpO2 97%   BMI 38.47 kg/m²   Constitutional: Patient is well developed, well nourished. Mild distress. Morbidly obese, very strong odor of urine  HENT: Normocephalic, atraumatic.  Moist oral mucosa  Cardiovascular: Normal heart rate and Regular rhythm. No murmur  Thorax & Lungs: Lungs with coarse rhonchi in upper airways, no wheezing.  Abdomen: Bowel sounds normal in all four quadrants. Soft,nontender, no rebound , guarding, palpable masses.   Skin: Warm, Dry  Extremities: 2+ edema nonpitting, with chronic venous stasis changes, neurovascularly intact  Neurologic: Alert & oriented x 3, Normal motor function, Normal sensory function  Psychiatric: Affect normal, Judgment normal, Mood normal.     DIAGNOSTIC STUDIES & PROCEDURES    Labs:   Results for orders placed or performed during the hospital encounter of 04/19/23   CBC with Differential   Result Value Ref Range    WBC 8.4 4.8 - 10.8 K/uL    RBC 4.29 4.20 - 5.40 M/uL    Hemoglobin 12.9 12.0 - 16.0 g/dL    Hematocrit 36.9 (L) 37.0 - 47.0 %    MCV 86.0 81.4 - 97.8 fL    MCH 30.1 27.0 - 33.0 pg    MCHC 35.0 33.6 - 35.0 g/dL    RDW 44.0 " 35.9 - 50.0 fL    Platelet Count 216 164 - 446 K/uL    MPV 9.9 9.0 - 12.9 fL    Neutrophils-Polys 73.20 (H) 44.00 - 72.00 %    Lymphocytes 15.60 (L) 22.00 - 41.00 %    Monocytes 8.70 0.00 - 13.40 %    Eosinophils 1.70 0.00 - 6.90 %    Basophils 0.40 0.00 - 1.80 %    Immature Granulocytes 0.40 0.00 - 0.90 %    Nucleated RBC 0.00 /100 WBC    Neutrophils (Absolute) 6.12 2.00 - 7.15 K/uL    Lymphs (Absolute) 1.30 1.00 - 4.80 K/uL    Monos (Absolute) 0.73 0.00 - 0.85 K/uL    Eos (Absolute) 0.14 0.00 - 0.51 K/uL    Baso (Absolute) 0.03 0.00 - 0.12 K/uL    Immature Granulocytes (abs) 0.03 0.00 - 0.11 K/uL    NRBC (Absolute) 0.00 K/uL   Comp Metabolic Panel   Result Value Ref Range    Sodium 139 135 - 145 mmol/L    Potassium 3.2 (L) 3.6 - 5.5 mmol/L    Chloride 104 96 - 112 mmol/L    Co2 22 20 - 33 mmol/L    Anion Gap 13.0 7.0 - 16.0    Glucose 108 (H) 65 - 99 mg/dL    Bun 12 8 - 22 mg/dL    Creatinine 0.75 0.50 - 1.40 mg/dL    Calcium 8.3 (L) 8.5 - 10.5 mg/dL    AST(SGOT) 13 12 - 45 U/L    ALT(SGPT) 12 2 - 50 U/L    Alkaline Phosphatase 90 30 - 99 U/L    Total Bilirubin 0.4 0.1 - 1.5 mg/dL    Albumin 3.8 3.2 - 4.9 g/dL    Total Protein 6.7 6.0 - 8.2 g/dL    Globulin 2.9 1.9 - 3.5 g/dL    A-G Ratio 1.3 g/dL   CoV-2, FLU A/B, and RSV by PCR (2-4 Hours CEPHEID) : Collect NP swab in VTM    Specimen: Nasopharyngeal; Respirate   Result Value Ref Range    Influenza virus A RNA Negative Negative    Influenza virus B, PCR Negative Negative    RSV, PCR Negative Negative    SARS-CoV-2 by PCR NotDetected     SARS-CoV-2 Source NP Swab    URINALYSIS (UA)    Specimen: Urine   Result Value Ref Range    Color Yellow     Character Clear     Specific Gravity 1.017 <1.035    Ph 6.5 5.0 - 8.0    Glucose Negative Negative mg/dL    Ketones Trace (A) Negative mg/dL    Protein Negative Negative mg/dL    Bilirubin Negative Negative    Urobilinogen, Urine 1.0 Negative    Nitrite Negative Negative    Leukocyte Esterase Negative Negative    Occult  Blood Negative Negative    Micro Urine Req see below    ESTIMATED GFR   Result Value Ref Range    GFR (CKD-EPI) 85 >60 mL/min/1.73 m 2   CORRECTED CALCIUM   Result Value Ref Range    Correct Calcium 8.5 8.5 - 10.5 mg/dL     All labs reviewed by me.    EKG:   I have independently interpreted this EKG.     Radiology:   The attending Emergency Physician has independently interpreted the diagnostic imaging associated with this visit and is awaiting the final reading from the radiologist, which will be displayed below.    I have independently interpreted this radiology to show: No acute cardiopulmonary disease, pneumonia    DX-CHEST-PORTABLE (1 VIEW)   Final Result         1.  No acute cardiopulmonary disease.   2.  Cardiomegaly   3.  Atherosclerosis         COURSE & MEDICAL DECISION MAKING    ED Observation Status? Yes; I am placing the patient in to an observation status due to a diagnostic uncertainty as well as therapeutic intensity. Patient placed in observation status at 9:36 PM, 4/19/2023.     Observation plan is as follows: Laboratories, breathing treatment, chest x-ray    Upon Reevaluation, the patient's condition has: Improved; and will be discharged.    Patient discharged from ED Observation status at 11:42 PM on 4/19/23.     INITIAL ASSESSMENT AND PLAN  Care Narrative:       9:32 PM - Patient seen and evaluated at bedside. Discussed plan of care, including lab work and imaging. Patient agrees to plan of care. Patient will be treated with Kdur 40 mEq and Tessalon 200 mg for her symptoms. Ordered EKG, CMP, CBC w/ Diff, CoV-2, Flu A/B, and RSV by PCR, UA, and DX-Chest to evaluate. Differential diagnoses include but are not limited to: influenza, COVID, pneumonia    Chest x-ray was unremarkable, laboratories were within normal limits excluding a potassium that was low.  Patient was improved after the Tessalon and a breathing treatment.  She will be sent home with utero inhaler and spacer.  I will also give her a  prescription for Tessalon.  She is to follow-up with her primary care doctor within the week for recheck and return if problems or worsening.  She is discharged in stable and improved condition.    11:23 PM - Patient was reevaluated at bedside. Discussed lab and radiology results with the patient and informed them of my findings. Patient has moderate cough still, breathing treatment ordered. Spacer instructions given by respiratory staff. Discussed the plan for discharge with outpatient medications. Patient verbalizes understanding and agreement to this plan of care.                    DISPOSITION AND DISCUSSIONS  I have discussed management of the patient with the following physicians and JAMES's: None    Discussion of management with other QHP or appropriate source(s): None     Escalation of care considered, and ultimately not performed: acute inpatient care management, however at this time, the patient is most appropriate for outpatient management.    Barriers to care at this time, including but not limited to: Patient does not have established PCP.     Decision tools and prescription drugs considered including, but not limited to:  Tessalon .    The patient will return for new or worsening symptoms and is stable at the time of discharge.  Use the inhaler as needed, take the Tessalon for the cough, increase clear fluids and vaporizer at the bedside and return if problems.    The patient is referred to a primary physician for blood pressure management, diabetic screening, and for all other preventative health concerns.    DISPOSITION:  Patient will be discharged home in stable condition.    FOLLOW UP:  [facility]  [address] [place] [address] [address] [place] [id number]  [phone]  Schedule an appointment as soon as possible for a visit in 1 day      OUTPATIENT MEDICATIONS:  New Prescriptions    BENZONATATE (TESSALON) 100 MG CAP    Take 2 Capsules by mouth 3 times a day as needed for Cough.     FINAL  IMPRESSION   1. Hypokalemia    2. Acute cough    3. Viral upper respiratory tract infection       Angelika LEE (Scribe), am scribing for, and in the presence of, Darline Muñoz D.O..    Electronically signed by: Angelika Montana (Enioiblinda), 4/19/2023    Darline LEE D.O. personally performed the services described in this documentation, as scribed by Angelika Montana in my presence, and it is both accurate and complete.    The note accurately reflects work and decisions made by me.  Darline Muñoz D.O.  4/20/2023  4:29 AM

## 2023-04-20 NOTE — DISCHARGE INSTRUCTIONS
Make sure you are eating foods high in potassium, look them up on the Internet or eat at least a banana or dried apricots or kiwi fruit daily  Call tomorrow to the [facility] number that I have given you and see what physicians are available that are close to where you live, make sure you give them your address so they can find someone close to you.  Increase clear liquids with no dairy products for the next several days  Take the medications I am prescribing you for the cough and use the inhaler every 6 hours as needed for persistent cough and trouble breathing.  Tylenol if needed for fever and return if any problems or worsening

## 2023-04-20 NOTE — ED TRIAGE NOTES
Chief Complaint   Patient presents with    Flu Like Symptoms     Patient reports cough x 3 days and chills.

## 2023-05-03 RX ORDER — PRAMIPEXOLE DIHYDROCHLORIDE 1 MG/1
3 TABLET ORAL NIGHTLY
Qty: 270 TABLET | Refills: 0 | Status: SHIPPED | OUTPATIENT
Start: 2023-05-03 | End: 2023-08-25

## 2023-05-09 DIAGNOSIS — N32.81 OVERACTIVE BLADDER: ICD-10-CM

## 2023-05-10 RX ORDER — OXYBUTYNIN CHLORIDE 15 MG/1
30 TABLET, EXTENDED RELEASE ORAL DAILY
Qty: 180 TABLET | Refills: 0 | Status: SHIPPED | OUTPATIENT
Start: 2023-05-10 | End: 2023-09-06 | Stop reason: SDUPTHER

## 2023-05-15 ENCOUNTER — HOSPITAL ENCOUNTER (EMERGENCY)
Facility: MEDICAL CENTER | Age: 73
End: 2023-05-15
Attending: EMERGENCY MEDICINE
Payer: COMMERCIAL

## 2023-05-15 VITALS
SYSTOLIC BLOOD PRESSURE: 148 MMHG | BODY MASS INDEX: 39.92 KG/M2 | DIASTOLIC BLOOD PRESSURE: 77 MMHG | OXYGEN SATURATION: 98 % | RESPIRATION RATE: 18 BRPM | HEIGHT: 62 IN | TEMPERATURE: 96.9 F | WEIGHT: 216.93 LBS | HEART RATE: 68 BPM

## 2023-05-15 DIAGNOSIS — N39.0 ACUTE UTI: ICD-10-CM

## 2023-05-15 LAB
APPEARANCE UR: ABNORMAL
BACTERIA #/AREA URNS HPF: ABNORMAL /HPF
BILIRUB UR QL STRIP.AUTO: NEGATIVE
COLOR UR: YELLOW
EPI CELLS #/AREA URNS HPF: ABNORMAL /HPF
GLUCOSE UR STRIP.AUTO-MCNC: NEGATIVE MG/DL
HYALINE CASTS #/AREA URNS LPF: ABNORMAL /LPF
KETONES UR STRIP.AUTO-MCNC: NEGATIVE MG/DL
LEUKOCYTE ESTERASE UR QL STRIP.AUTO: ABNORMAL
MICRO URNS: ABNORMAL
NITRITE UR QL STRIP.AUTO: NEGATIVE
PH UR STRIP.AUTO: 6 [PH] (ref 5–8)
PROT UR QL STRIP: 100 MG/DL
RBC # URNS HPF: >150 /HPF
RBC UR QL AUTO: ABNORMAL
SP GR UR STRIP.AUTO: 1.01
UROBILINOGEN UR STRIP.AUTO-MCNC: 1 MG/DL
WBC #/AREA URNS HPF: ABNORMAL /HPF

## 2023-05-15 PROCEDURE — 87077 CULTURE AEROBIC IDENTIFY: CPT

## 2023-05-15 PROCEDURE — 87186 SC STD MICRODIL/AGAR DIL: CPT

## 2023-05-15 PROCEDURE — 81001 URINALYSIS AUTO W/SCOPE: CPT

## 2023-05-15 PROCEDURE — 99284 EMERGENCY DEPT VISIT MOD MDM: CPT

## 2023-05-15 PROCEDURE — 87086 URINE CULTURE/COLONY COUNT: CPT

## 2023-05-15 RX ORDER — SULFAMETHOXAZOLE AND TRIMETHOPRIM 800; 160 MG/1; MG/1
1 TABLET ORAL 2 TIMES DAILY
Qty: 6 TABLET | Refills: 0 | Status: ACTIVE | OUTPATIENT
Start: 2023-05-15 | End: 2023-05-18

## 2023-05-15 RX ORDER — PHENAZOPYRIDINE HYDROCHLORIDE 200 MG/1
200 TABLET, FILM COATED ORAL 3 TIMES DAILY PRN
Qty: 6 TABLET | Refills: 0 | Status: SHIPPED | OUTPATIENT
Start: 2023-05-15 | End: 2023-06-01

## 2023-05-15 ASSESSMENT — FIBROSIS 4 INDEX: FIB4 SCORE: 1.22

## 2023-05-15 NOTE — ED TRIAGE NOTES
".  Chief Complaint   Patient presents with    Painful Urination     X 2 days. Denies fevers.        73 yo female ambulated to triage for above complaint. Urine sample collected and sent.      Pt is alert and oriented, speaking in full sentences, follows commands and responds appropriately to questions.      Patient placed back in lobby and educated on triage process. Asked to inform RN of any changes.    BP (!) 152/70   Pulse 62   Temp 36 °C (96.8 °F) (Temporal)   Resp 16   Ht 1.575 m (5' 2\")   Wt 98.4 kg (216 lb 14.9 oz)   LMP  (LMP Unknown)   SpO2 98%   BMI 39.68 kg/m²     "

## 2023-05-15 NOTE — ED PROVIDER NOTES
ED Provider Note    CHIEF COMPLAINT  Chief Complaint   Patient presents with    Painful Urination     X 2 days. Denies fevers.        EXTERNAL RECORDS REVIEWED  Outpatient Notes I checked for urine culture results but she did not have any positive in epic    HPI/ROS  LIMITATION TO HISTORY   Select: : None  OUTSIDE HISTORIAN(S):  None    Ariadna Sepulveda is a 72 y.o. female who presents with 1 day of burning with urination.  She denies any abdominal pain or back pain.  She denies any vomiting or nausea.  She describes the burning as moderate.    PAST MEDICAL HISTORY   has a past medical history of Arthritis, At risk for sleep apnea, Hypertension, and RLS (restless legs syndrome) (10/5/2011).    SURGICAL HISTORY   has a past surgical history that includes tonsillectomy; knee replacement, total (left); other orthopedic surgery; and isaiah by laparoscopy (N/A, 12/4/2021).    FAMILY HISTORY  Family History   Problem Relation Age of Onset    Cancer Mother     Arthritis Father     Diabetes Father     Heart Disease Father     Hypertension Father     Hyperlipidemia Father     Psychiatric Illness Brother     Drug abuse Brother     Alcohol abuse Brother     Lung Disease Neg Hx        SOCIAL HISTORY  Social History     Tobacco Use    Smoking status: Never    Smokeless tobacco: Never   Vaping Use    Vaping Use: Never used   Substance and Sexual Activity    Alcohol use: Yes     Alcohol/week: 4.8 oz     Types: 8 Cans of beer per week     Comment: occasional    Drug use: No    Sexual activity: Not Currently       CURRENT MEDICATIONS  Home Medications       Reviewed by Glory Phillips R.N. (Registered Nurse) on 05/15/23 at 0740  Med List Status: Partial     Medication Last Dose Status   bacitracin 500 UNIT/GM ointment  Active   benzonatate (TESSALON) 100 MG Cap  Active   hydrOXYzine HCl (ATARAX) 25 MG Tab  Active   mupirocin (BACTROBAN) 2 % Ointment  Active   oxybutynin (DITROPAN XL) 15 MG CR tablet  Active   pramipexole (MIRAPEX) 1  "MG Tab  Active                    ALLERGIES  No Known Allergies    PHYSICAL EXAM  VITAL SIGNS: BP (!) 152/70   Pulse 62   Temp 36 °C (96.8 °F) (Temporal)   Resp 16   Ht 1.575 m (5' 2\")   Wt 98.4 kg (216 lb 14.9 oz)   LMP  (LMP Unknown)   SpO2 98%   BMI 39.68 kg/m²    Constitutional: Alert.  HENT: No signs of trauma, Bilateral external ears normal, Nose normal.   Eyes: Pupils are equal and reactive, Conjunctiva normal, Non-icteric.   Neck: Normal range of motion, No tenderness, Supple, No stridor.   Lymphatic: No lymphadenopathy noted.   Cardiovascular: Regular rate and rhythm, no murmurs.   Thorax & Lungs: Normal breath sounds, No respiratory distress, No wheezing, No chest tenderness.   Abdomen: Bowel sounds normal, Soft, No tenderness, No peritoneal signs, No masses, No pulsatile masses.   Skin: Warm, Dry, No erythema, No rash.   Back: No bony tenderness, No CVA tenderness.   Extremities: Intact distal pulses, No edema, No tenderness, No cyanosis.  Musculoskeletal: Good range of motion in all major joints. No tenderness to palpation or major deformities noted.   Neurologic: Alert , Normal motor function, Normal sensory function, No focal deficits noted.   Psychiatric: Affect normal, Judgment normal, Mood normal.       DIAGNOSTIC STUDIES / PROCEDURES    LABS  Labs Reviewed   URINALYSIS,CULTURE IF INDICATED - Abnormal; Notable for the following components:       Result Value    Character Cloudy (*)     Protein 100 (*)     Leukocyte Esterase Large (*)     Occult Blood Large (*)     All other components within normal limits    Narrative:     Indication for culture:->Patient WITHOUT an indwelling Kaur  catheter in place with new onset of Dysuria, Frequency,  Urgency, and/or Suprapubic pain   URINE MICROSCOPIC (W/UA) - Abnormal; Notable for the following components:    WBC Packed (*)     RBC >150 (*)     Bacteria Few (*)     All other components within normal limits    Narrative:     Indication for " culture:->Patient WITHOUT an indwelling Kaur  catheter in place with new onset of Dysuria, Frequency,  Urgency, and/or Suprapubic pain   URINE CULTURE(NEW)    Narrative:     Indication for culture:->Patient WITHOUT an indwelling Kaur  catheter in place with new onset of Dysuria, Frequency,  Urgency, and/or Suprapubic pain           COURSE & MEDICAL DECISION MAKING    ED Observation Status? No; Patient does not meet criteria for ED Observation.     INITIAL ASSESSMENT, COURSE AND PLAN  Care Narrative: The patient presents with burning with urination.  Urinalysis was ordered.        ADDITIONAL PROBLEM LIST  Arthritis, sleep apnea, hypertension  DISPOSITION AND DISCUSSIONS    The patient has a positive urinalysis here.  Urine culture was sent and is pending.  I have prescribed her Pyridium for 2 days and Bactrim for 3 days.  She will return for worsening symptoms.    I have discussed management of the patient with the following physicians and JAMES's: None    Discussion of management with other [facility] or appropriate source(s): None     Escalation of care considered, and ultimately not performed:diagnostic imaging and acute inpatient care management, however at this time, the patient is most appropriate for outpatient management    Barriers to care at this time, including but not limited to:  None .   The patient will return for new or worsening symptoms and is stable at the time of discharge.    The patient is referred to a primary physician for blood pressure management, diabetic screening, and for all other preventative health concerns.      DISPOSITION:  Patient will be discharged home in stable condition.    FOLLOW UP:  [facility], Emergency Dept  [address] [id number] [phone]  Follow up  If symptoms worsen    Lj Marion III, M.D.  [address]  [facility] [id number] [phone]    Follow up  As needed      OUTPATIENT MEDICATIONS:  New Prescriptions     PHENAZOPYRIDINE (PHENAZO) 200 MG TAB    Take 1 Tablet by mouth 3 times a day as needed (pain).    SULFAMETHOXAZOLE-TRIMETHOPRIM (BACTRIM DS) 800-160 MG TABLET    Take 1 Tablet by mouth 2 times a day for 3 days.     FINAL DIAGNOSIS  1. Acute UTI           Electronically signed by: Anselmo Lala M.D., 5/15/2023 8:41 AM

## 2023-05-17 LAB
BACTERIA UR CULT: ABNORMAL
BACTERIA UR CULT: ABNORMAL
SIGNIFICANT IND 70042: ABNORMAL
SITE SITE: ABNORMAL
SOURCE SOURCE: ABNORMAL

## 2023-05-17 NOTE — ED NOTES
"ED Positive Culture Follow-up/Notification Note:    Date: 5/17/23     Patient seen in the ED on 5/15/2023 for painful urination. Patient denies abdominal pain or back pain. Patient describes the pain as moderate burning.   1. Acute UTI       Discharge Medication List as of 5/15/2023  8:42 AM        START taking these medications    Details   phenazopyridine (PHENAZO) 200 MG Tab Take 1 Tablet by mouth 3 times a day as needed (pain)., Disp-6 Tablet, R-0, Normal      sulfamethoxazole-trimethoprim (BACTRIM DS) 800-160 MG tablet Take 1 Tablet by mouth 2 times a day for 3 days., Disp-6 Tablet, R-0, Normal             Allergies: Patient has no known allergies.     Vitals:    05/15/23 0725 05/15/23 0738 05/15/23 0841   BP: (!) 152/70  (!) 148/77   Pulse: 62  68   Resp: 16  18   Temp: 36 °C (96.8 °F)  36.1 °C (96.9 °F)   TempSrc: Temporal  Temporal   SpO2: 98%  98%   Weight:  98.4 kg (216 lb 14.9 oz)    Height:  1.575 m (5' 2\")        Final cultures:   Results       Procedure Component Value Units Date/Time    URINE CULTURE(NEW) [[id number]]  (Abnormal)  (Susceptibility) Collected: 05/15/23 0738    Order Status: Completed Specimen: Urine Updated: 05/17/23 0826     Significant Indicator POS     Source UR     Site -     Culture Result -      Proteus mirabilis  >100,000      Narrative:      Indication for culture:->Patient WITHOUT an indwelling Kaur  catheter in place with new onset of Dysuria, Frequency,  Urgency, and/or Suprapubic pain    Susceptibility       Proteus mirabilis (1)       Antibiotic Interpretation Microscan   Method Status    Amikacin  [*]  Sensitive <=16 mcg/mL DULCE Final    Ampicillin Sensitive <=8 mcg/mL DULCE Final    Amoxicillin/CA  [*]  Sensitive <=8/4 mcg/mL DULCE Final    Aztreonam  [*]  Sensitive <=4 mcg/mL DULCE Final    Ceftolozane+Tazobactam  [*]  Sensitive <=2 mcg/mL DULCE Final    Ceftriaxone Sensitive <=1 mcg/mL DULCE Final    Ceftazidime  [*]  Sensitive <=1 mcg/mL DULCE Final    Cefazolin Sensitive <=2 " mcg/mL DULCE Final     Breakpoints when Cefazolin is used for therapy of infections  other than uncomplicated UTIs due to Enterobacterales are as  follows:  DULCE and Interpretation:  <=2 S  4 I  >=8 R         Ciprofloxacin Sensitive <=0.25 mcg/mL DULCE Final    Cefepime Sensitive <=2 mcg/mL DULCE Final    Ampicillin/sulbactam Sensitive <=4/2 mcg/mL DULCE Final    Cefuroxime Sensitive <=4 mcg/mL DULCE Final    Tobramycin Sensitive <=2 mcg/mL DULCE Final    Ertapenem  [*]  Sensitive <=0.5 mcg/mL DULCE Final    Nitrofurantoin Resistant 64 mcg/mL DULCE Final    Gentamicin Sensitive <=2 mcg/mL DULCE Final    Levofloxacin Sensitive <=0.5 mcg/mL DULCE Final    Meropenem  [*]  Sensitive <=1 mcg/mL DULCE Final    Meropenem/Vaborbactam  [*]  Sensitive <=2 mcg/mL DULCE Final    Minocycline Resistant >8 mcg/mL DULCE Final    Pip/Tazobactam Sensitive <=8 mcg/mL DULCE Final    Trimeth/Sulfa Sensitive <=0.5/9.5 mcg/mL DULCE Final    Tetracycline  [*]  Resistant >8 mcg/mL DULCE Final               [*]  Suppressed Antibiotic                   Urinalysis, Culture if Indicated [[id number]]  (Abnormal) Collected: 05/15/23 0738    Order Status: Completed Specimen: Urine, Clean Catch Updated: 05/15/23 0833     Color Yellow     Character Cloudy     Specific Gravity 1.015     Ph 6.0     Glucose Negative mg/dL      Ketones Negative mg/dL      Protein 100 mg/dL      Bilirubin Negative     Urobilinogen, Urine 1.0     Nitrite Negative     Leukocyte Esterase Large     Occult Blood Large     Micro Urine Req Microscopic    Narrative:      Indication for culture:->Patient WITHOUT an indwelling Kaur  catheter in place with new onset of Dysuria, Frequency,  Urgency, and/or Suprapubic pain            Plan:   Urine culture grew proteus mirabilis. Patient prescribed Bactrim DS twice daily for 3 days. Appropriate antibiotic therapy prescribed. No changes required based upon culture result.    Dianna Briggs, PharmD

## 2023-06-01 ENCOUNTER — APPOINTMENT (OUTPATIENT)
Dept: RADIOLOGY | Facility: MEDICAL CENTER | Age: 73
End: 2023-06-01
Attending: EMERGENCY MEDICINE
Payer: COMMERCIAL

## 2023-06-01 ENCOUNTER — HOSPITAL ENCOUNTER (OUTPATIENT)
Facility: MEDICAL CENTER | Age: 73
End: 2023-06-02
Attending: EMERGENCY MEDICINE | Admitting: STUDENT IN AN ORGANIZED HEALTH CARE EDUCATION/TRAINING PROGRAM
Payer: COMMERCIAL

## 2023-06-01 DIAGNOSIS — L03.116 LEFT LEG CELLULITIS: ICD-10-CM

## 2023-06-01 LAB
ALBUMIN SERPL BCP-MCNC: 3.9 G/DL (ref 3.2–4.9)
ALBUMIN/GLOB SERPL: 1.2 G/DL
ALP SERPL-CCNC: 120 U/L (ref 30–99)
ALT SERPL-CCNC: 12 U/L (ref 2–50)
ANION GAP SERPL CALC-SCNC: 13 MMOL/L (ref 7–16)
APPEARANCE UR: CLEAR
AST SERPL-CCNC: 12 U/L (ref 12–45)
BASOPHILS # BLD AUTO: 0.4 % (ref 0–1.8)
BASOPHILS # BLD: 0.03 K/UL (ref 0–0.12)
BILIRUB SERPL-MCNC: 0.2 MG/DL (ref 0.1–1.5)
BILIRUB UR QL STRIP.AUTO: NEGATIVE
BUN SERPL-MCNC: 18 MG/DL (ref 8–22)
CALCIUM ALBUM COR SERPL-MCNC: 8.8 MG/DL (ref 8.5–10.5)
CALCIUM SERPL-MCNC: 8.7 MG/DL (ref 8.5–10.5)
CHLORIDE SERPL-SCNC: 103 MMOL/L (ref 96–112)
CO2 SERPL-SCNC: 21 MMOL/L (ref 20–33)
COLOR UR: YELLOW
CREAT SERPL-MCNC: 0.53 MG/DL (ref 0.5–1.4)
CRP SERPL HS-MCNC: 1.11 MG/DL (ref 0–0.75)
EOSINOPHIL # BLD AUTO: 0.2 K/UL (ref 0–0.51)
EOSINOPHIL NFR BLD: 2.6 % (ref 0–6.9)
ERYTHROCYTE [DISTWIDTH] IN BLOOD BY AUTOMATED COUNT: 47.2 FL (ref 35.9–50)
ERYTHROCYTE [SEDIMENTATION RATE] IN BLOOD BY WESTERGREN METHOD: 27 MM/HOUR (ref 0–25)
GFR SERPLBLD CREATININE-BSD FMLA CKD-EPI: 99 ML/MIN/1.73 M 2
GLOBULIN SER CALC-MCNC: 3.2 G/DL (ref 1.9–3.5)
GLUCOSE SERPL-MCNC: 115 MG/DL (ref 65–99)
GLUCOSE UR STRIP.AUTO-MCNC: NEGATIVE MG/DL
HCT VFR BLD AUTO: 36.4 % (ref 37–47)
HGB BLD-MCNC: 11.9 G/DL (ref 12–16)
IMM GRANULOCYTES # BLD AUTO: 0.03 K/UL (ref 0–0.11)
IMM GRANULOCYTES NFR BLD AUTO: 0.4 % (ref 0–0.9)
KETONES UR STRIP.AUTO-MCNC: NEGATIVE MG/DL
LACTATE SERPL-SCNC: 0.7 MMOL/L (ref 0.5–2)
LEUKOCYTE ESTERASE UR QL STRIP.AUTO: NEGATIVE
LYMPHOCYTES # BLD AUTO: 1.25 K/UL (ref 1–4.8)
LYMPHOCYTES NFR BLD: 16.3 % (ref 22–41)
MCH RBC QN AUTO: 29.2 PG (ref 27–33)
MCHC RBC AUTO-ENTMCNC: 32.7 G/DL (ref 32.2–35.5)
MCV RBC AUTO: 89.2 FL (ref 81.4–97.8)
MICRO URNS: NORMAL
MONOCYTES # BLD AUTO: 0.56 K/UL (ref 0–0.85)
MONOCYTES NFR BLD AUTO: 7.3 % (ref 0–13.4)
NEUTROPHILS # BLD AUTO: 5.62 K/UL (ref 1.82–7.42)
NEUTROPHILS NFR BLD: 73 % (ref 44–72)
NITRITE UR QL STRIP.AUTO: NEGATIVE
NRBC # BLD AUTO: 0 K/UL
NRBC BLD-RTO: 0 /100 WBC (ref 0–0.2)
PH UR STRIP.AUTO: 7.5 [PH] (ref 5–8)
PLATELET # BLD AUTO: 286 K/UL (ref 164–446)
PMV BLD AUTO: 9.5 FL (ref 9–12.9)
POTASSIUM SERPL-SCNC: 3.8 MMOL/L (ref 3.6–5.5)
PROT SERPL-MCNC: 7.1 G/DL (ref 6–8.2)
PROT UR QL STRIP: NEGATIVE MG/DL
RBC # BLD AUTO: 4.08 M/UL (ref 4.2–5.4)
RBC UR QL AUTO: NEGATIVE
SCCMEC + MECA PNL NOSE NAA+PROBE: NEGATIVE
SODIUM SERPL-SCNC: 137 MMOL/L (ref 135–145)
SP GR UR STRIP.AUTO: 1.02
UROBILINOGEN UR STRIP.AUTO-MCNC: 1 MG/DL
WBC # BLD AUTO: 7.7 K/UL (ref 4.8–10.8)

## 2023-06-01 PROCEDURE — 85025 COMPLETE CBC W/AUTO DIFF WBC: CPT

## 2023-06-01 PROCEDURE — 83605 ASSAY OF LACTIC ACID: CPT

## 2023-06-01 PROCEDURE — 96366 THER/PROPH/DIAG IV INF ADDON: CPT

## 2023-06-01 PROCEDURE — 700111 HCHG RX REV CODE 636 W/ 250 OVERRIDE (IP): Performed by: EMERGENCY MEDICINE

## 2023-06-01 PROCEDURE — 99222 1ST HOSP IP/OBS MODERATE 55: CPT | Performed by: STUDENT IN AN ORGANIZED HEALTH CARE EDUCATION/TRAINING PROGRAM

## 2023-06-01 PROCEDURE — 96365 THER/PROPH/DIAG IV INF INIT: CPT

## 2023-06-01 PROCEDURE — 85652 RBC SED RATE AUTOMATED: CPT

## 2023-06-01 PROCEDURE — G0378 HOSPITAL OBSERVATION PER HR: HCPCS

## 2023-06-01 PROCEDURE — 700102 HCHG RX REV CODE 250 W/ 637 OVERRIDE(OP): Performed by: STUDENT IN AN ORGANIZED HEALTH CARE EDUCATION/TRAINING PROGRAM

## 2023-06-01 PROCEDURE — 99285 EMERGENCY DEPT VISIT HI MDM: CPT

## 2023-06-01 PROCEDURE — 700105 HCHG RX REV CODE 258: Performed by: EMERGENCY MEDICINE

## 2023-06-01 PROCEDURE — 700111 HCHG RX REV CODE 636 W/ 250 OVERRIDE (IP): Performed by: STUDENT IN AN ORGANIZED HEALTH CARE EDUCATION/TRAINING PROGRAM

## 2023-06-01 PROCEDURE — 96367 TX/PROPH/DG ADDL SEQ IV INF: CPT

## 2023-06-01 PROCEDURE — 87641 MR-STAPH DNA AMP PROBE: CPT

## 2023-06-01 PROCEDURE — 76882 US LMTD JT/FCL EVL NVASC XTR: CPT | Mod: LT

## 2023-06-01 PROCEDURE — A9270 NON-COVERED ITEM OR SERVICE: HCPCS | Performed by: STUDENT IN AN ORGANIZED HEALTH CARE EDUCATION/TRAINING PROGRAM

## 2023-06-01 PROCEDURE — 96375 TX/PRO/DX INJ NEW DRUG ADDON: CPT

## 2023-06-01 PROCEDURE — 700105 HCHG RX REV CODE 258: Performed by: STUDENT IN AN ORGANIZED HEALTH CARE EDUCATION/TRAINING PROGRAM

## 2023-06-01 PROCEDURE — 87086 URINE CULTURE/COLONY COUNT: CPT

## 2023-06-01 PROCEDURE — 36415 COLL VENOUS BLD VENIPUNCTURE: CPT

## 2023-06-01 PROCEDURE — 86140 C-REACTIVE PROTEIN: CPT

## 2023-06-01 PROCEDURE — 81003 URINALYSIS AUTO W/O SCOPE: CPT

## 2023-06-01 PROCEDURE — 87040 BLOOD CULTURE FOR BACTERIA: CPT | Mod: 91

## 2023-06-01 PROCEDURE — 93971 EXTREMITY STUDY: CPT | Mod: LT

## 2023-06-01 PROCEDURE — 80053 COMPREHEN METABOLIC PANEL: CPT

## 2023-06-01 RX ORDER — OXYCODONE HYDROCHLORIDE 5 MG/1
5 TABLET ORAL
Status: DISCONTINUED | OUTPATIENT
Start: 2023-06-01 | End: 2023-06-02 | Stop reason: HOSPADM

## 2023-06-01 RX ORDER — OXYBUTYNIN CHLORIDE 10 MG/1
30 TABLET, EXTENDED RELEASE ORAL DAILY
Status: DISCONTINUED | OUTPATIENT
Start: 2023-06-01 | End: 2023-06-02 | Stop reason: HOSPADM

## 2023-06-01 RX ORDER — PRAMIPEXOLE DIHYDROCHLORIDE 0.5 MG/1
3 TABLET ORAL NIGHTLY
Status: DISCONTINUED | OUTPATIENT
Start: 2023-06-01 | End: 2023-06-02 | Stop reason: HOSPADM

## 2023-06-01 RX ORDER — ACETAMINOPHEN 325 MG/1
650 TABLET ORAL EVERY 6 HOURS PRN
Status: DISCONTINUED | OUTPATIENT
Start: 2023-06-01 | End: 2023-06-02 | Stop reason: HOSPADM

## 2023-06-01 RX ORDER — ENOXAPARIN SODIUM 100 MG/ML
40 INJECTION SUBCUTANEOUS DAILY
Status: DISCONTINUED | OUTPATIENT
Start: 2023-06-01 | End: 2023-06-02 | Stop reason: HOSPADM

## 2023-06-01 RX ORDER — HYDROMORPHONE HYDROCHLORIDE 1 MG/ML
0.25 INJECTION, SOLUTION INTRAMUSCULAR; INTRAVENOUS; SUBCUTANEOUS
Status: DISCONTINUED | OUTPATIENT
Start: 2023-06-01 | End: 2023-06-02 | Stop reason: HOSPADM

## 2023-06-01 RX ORDER — BISACODYL 10 MG
10 SUPPOSITORY, RECTAL RECTAL
Status: DISCONTINUED | OUTPATIENT
Start: 2023-06-01 | End: 2023-06-02 | Stop reason: HOSPADM

## 2023-06-01 RX ORDER — LABETALOL HYDROCHLORIDE 5 MG/ML
10 INJECTION, SOLUTION INTRAVENOUS EVERY 4 HOURS PRN
Status: DISCONTINUED | OUTPATIENT
Start: 2023-06-01 | End: 2023-06-02 | Stop reason: HOSPADM

## 2023-06-01 RX ORDER — FUROSEMIDE 10 MG/ML
20 INJECTION INTRAMUSCULAR; INTRAVENOUS ONCE
Status: COMPLETED | OUTPATIENT
Start: 2023-06-01 | End: 2023-06-01

## 2023-06-01 RX ORDER — CEFTRIAXONE 2 G/1
2000 INJECTION, POWDER, FOR SOLUTION INTRAMUSCULAR; INTRAVENOUS ONCE
Status: COMPLETED | OUTPATIENT
Start: 2023-06-01 | End: 2023-06-01

## 2023-06-01 RX ORDER — OXYCODONE HYDROCHLORIDE 5 MG/1
2.5 TABLET ORAL
Status: DISCONTINUED | OUTPATIENT
Start: 2023-06-01 | End: 2023-06-02 | Stop reason: HOSPADM

## 2023-06-01 RX ORDER — ONDANSETRON 2 MG/ML
4 INJECTION INTRAMUSCULAR; INTRAVENOUS EVERY 4 HOURS PRN
Status: DISCONTINUED | OUTPATIENT
Start: 2023-06-01 | End: 2023-06-02 | Stop reason: HOSPADM

## 2023-06-01 RX ORDER — POLYETHYLENE GLYCOL 3350 17 G/17G
1 POWDER, FOR SOLUTION ORAL
Status: DISCONTINUED | OUTPATIENT
Start: 2023-06-01 | End: 2023-06-02 | Stop reason: HOSPADM

## 2023-06-01 RX ORDER — ONDANSETRON 4 MG/1
4 TABLET, ORALLY DISINTEGRATING ORAL EVERY 4 HOURS PRN
Status: DISCONTINUED | OUTPATIENT
Start: 2023-06-01 | End: 2023-06-02 | Stop reason: HOSPADM

## 2023-06-01 RX ADMIN — PRAMIPEXOLE DIHYDROCHLORIDE 3 MG: 0.5 TABLET ORAL at 21:46

## 2023-06-01 RX ADMIN — FUROSEMIDE 20 MG: 10 INJECTION, SOLUTION INTRAVENOUS at 13:11

## 2023-06-01 RX ADMIN — AMPICILLIN SODIUM AND SULBACTAM SODIUM 3 G: 2; 1 INJECTION, POWDER, FOR SOLUTION INTRAMUSCULAR; INTRAVENOUS at 20:06

## 2023-06-01 RX ADMIN — OXYBUTYNIN CHLORIDE 30 MG: 10 TABLET, EXTENDED RELEASE ORAL at 14:27

## 2023-06-01 RX ADMIN — CEFTRIAXONE SODIUM 2000 MG: 2 INJECTION, POWDER, FOR SOLUTION INTRAMUSCULAR; INTRAVENOUS at 08:48

## 2023-06-01 RX ADMIN — VANCOMYCIN HYDROCHLORIDE 2500 MG: 5 INJECTION, POWDER, LYOPHILIZED, FOR SOLUTION INTRAVENOUS at 09:20

## 2023-06-01 ASSESSMENT — ENCOUNTER SYMPTOMS
ORTHOPNEA: 0
NAUSEA: 0
FEVER: 0
HEARTBURN: 0
VOMITING: 0
BLURRED VISION: 0
HEADACHES: 0
SHORTNESS OF BREATH: 0
PALPITATIONS: 0
NECK PAIN: 0
DOUBLE VISION: 0
FOCAL WEAKNESS: 0
SPUTUM PRODUCTION: 0
ABDOMINAL PAIN: 0
SORE THROAT: 0
PND: 0
MYALGIAS: 0
CHILLS: 0
COUGH: 0
DEPRESSION: 0
DIZZINESS: 0

## 2023-06-01 ASSESSMENT — LIFESTYLE VARIABLES
TOTAL SCORE: 0
HAVE PEOPLE ANNOYED YOU BY CRITICIZING YOUR DRINKING: NO
TOTAL SCORE: 0
DOES PATIENT WANT TO STOP DRINKING: NO
EVER HAD A DRINK FIRST THING IN THE MORNING TO STEADY YOUR NERVES TO GET RID OF A HANGOVER: NO
EVER FELT BAD OR GUILTY ABOUT YOUR DRINKING: NO
ON A TYPICAL DAY WHEN YOU DRINK ALCOHOL HOW MANY DRINKS DO YOU HAVE: 3
TOTAL SCORE: 0
HAVE YOU EVER FELT YOU SHOULD CUT DOWN ON YOUR DRINKING: NO
CONSUMPTION TOTAL: POSITIVE
HOW MANY TIMES IN THE PAST YEAR HAVE YOU HAD 5 OR MORE DRINKS IN A DAY: 0
AVERAGE NUMBER OF DAYS PER WEEK YOU HAVE A DRINK CONTAINING ALCOHOL: 3
ALCOHOL_USE: YES

## 2023-06-01 ASSESSMENT — PATIENT HEALTH QUESTIONNAIRE - PHQ9
SUM OF ALL RESPONSES TO PHQ9 QUESTIONS 1 AND 2: 0
1. LITTLE INTEREST OR PLEASURE IN DOING THINGS: NOT AT ALL
SUM OF ALL RESPONSES TO PHQ9 QUESTIONS 1 AND 2: 0
2. FEELING DOWN, DEPRESSED, IRRITABLE, OR HOPELESS: NOT AT ALL
1. LITTLE INTEREST OR PLEASURE IN DOING THINGS: NOT AT ALL
2. FEELING DOWN, DEPRESSED, IRRITABLE, OR HOPELESS: NOT AT ALL

## 2023-06-01 ASSESSMENT — FIBROSIS 4 INDEX
FIB4 SCORE: 0.85
FIB4 SCORE: 1.22

## 2023-06-01 ASSESSMENT — PAIN DESCRIPTION - PAIN TYPE: TYPE: ACUTE PAIN

## 2023-06-01 NOTE — ED NOTES
Patient ambulatory to the restroom and back, slow but steady gait.  Urine spec to lab.  Reports called to Twila LEES in CDU, awaiting transport.

## 2023-06-01 NOTE — PROGRESS NOTES
"Received to unit. Able to ambulate with standby assist from gurney to scale to bed. States is 5ft2in, appears 8yl08kg. States \" I must have shrunk\". Very cheerful and pleasant. States was out all night gambling. Noted sore to back of left thigh x2 days. Denies pain. Bilat lower extremity edema, 3+ noted. LLE red in color and larger than the RLE.  IV antibiotics infusing. Admission completed. Lunch provided.   "

## 2023-06-01 NOTE — H&P
Hospital Medicine History & Physical Note    Date of Service  6/1/2023    Primary Care Physician  Lj Marion III, M.D.    Consultants  N/A    Code Status  Full Code    Chief Complaint  Chief Complaint   Patient presents with    Wound Check     Located on back of L thigh. Started 2 days ago. Wound site red, swollen, and painful. L leg edematous at baseline since knee replacement. Pt denies fever.       History of Presenting Illness  Ariadna Sepulveda is a 72 y.o. female with HTN (diet controlled), obesity class III, RLS, chronic venous deficiency and urinary incontinence who presented 6/1/2023 with 3 days history of posterior thigh swelling, redness, warmth and a small area of induration. No specific inciting event, draining or animal bite. She does not know how it is started but given the increase in size, she presented to ED for an evaluation.    In ED, afebrile, vitals wnl. Pertinent exam findings: left posterior thigh with indurated area measuring 2 cm with bandage in place with drainage; streaking of the left medial thigh is noted, no crepitus, no pain out of proportion to exam  Labs were grossly unremarkable except mild anemia. US LE was notable for Subcutaneous edema of the left posterior thigh in the area of interest. No organized fluid collection seen. Pt was provided with IV vancomycin and ceftriaxone.     Pt was then referred to Hospitalist services for further management.       I discussed the plan of care with patient, family, and ERP .    Review of Systems  Review of Systems   Constitutional:  Negative for chills, fever and malaise/fatigue.   HENT:  Negative for congestion, sore throat and tinnitus.    Eyes:  Negative for blurred vision and double vision.   Respiratory:  Negative for cough, sputum production and shortness of breath.    Cardiovascular:  Positive for leg swelling. Negative for chest pain, palpitations, orthopnea and PND.   Gastrointestinal:  Negative for abdominal pain, heartburn,  nausea and vomiting.   Genitourinary:  Negative for dysuria, frequency and urgency.   Musculoskeletal:  Negative for myalgias and neck pain.   Skin:  Positive for rash.   Neurological:  Negative for dizziness, focal weakness and headaches.   Psychiatric/Behavioral:  Negative for depression.        Past Medical History   has a past medical history of Arthritis, At risk for sleep apnea, Hypertension, and RLS (restless legs syndrome) (10/5/2011).    Surgical History   has a past surgical history that includes tonsillectomy; knee replacement, total (left); other orthopedic surgery; and isaiah by laparoscopy (N/A, 12/4/2021).     Family History  family history includes Alcohol abuse in her brother; Arthritis in her father; Cancer in her mother; Diabetes in her father; Drug abuse in her brother; Heart Disease in her father; Hyperlipidemia in her father; Hypertension in her father; Psychiatric Illness in her brother.   Family history reviewed with patient. There is no family history that is pertinent to the chief complaint.     Social History   reports that she has never smoked. She has never used smokeless tobacco. She reports current alcohol use of about 4.8 oz of alcohol per week. She reports that she does not use drugs.    Allergies  No Known Allergies    Medications  Prior to Admission Medications   Prescriptions Last Dose Informant Patient Reported? Taking?   oxybutynin (DITROPAN XL) 15 MG CR tablet 3d ago at ran out  No No   Sig: TAKE 2 TABLETS BY MOUTH EVERY DAY   pramipexole (MIRAPEX) 1 MG Tab 5/31/2023 at   No No   Sig: TAKE 3 TABLETS BY MOUTH EVERY EVENING      Facility-Administered Medications: None       Physical Exam  Temp:  [36.3 °C (97.3 °F)-36.4 °C (97.5 °F)] 36.3 °C (97.3 °F)  Pulse:  [64-73] 64  Resp:  [16-18] 16  BP: (119-156)/(65-82) 119/70  SpO2:  [93 %-98 %] 97 %  Blood Pressure : 119/70   Temperature: 36.3 °C (97.3 °F)   Pulse: 64   Respiration: 16   Pulse Oximetry: 97 %       Physical Exam  Vitals  and nursing note reviewed.   Constitutional:       General: She is not in acute distress.     Appearance: Normal appearance. She is not ill-appearing.   HENT:      Head: Normocephalic and atraumatic.      Mouth/Throat:      Mouth: Mucous membranes are moist.      Pharynx: Oropharynx is clear.   Eyes:      General: No scleral icterus.     Conjunctiva/sclera: Conjunctivae normal.   Cardiovascular:      Rate and Rhythm: Normal rate and regular rhythm.      Pulses: Normal pulses.      Heart sounds: Normal heart sounds.   Pulmonary:      Effort: Pulmonary effort is normal. No respiratory distress.      Breath sounds: Normal breath sounds. No wheezing.   Abdominal:      General: Bowel sounds are normal. There is no distension.      Palpations: Abdomen is soft.      Tenderness: There is no abdominal tenderness.   Musculoskeletal:         General: No swelling or tenderness. Normal range of motion.      Right lower leg: Edema (2+) present.      Left lower leg: Edema (2+) present.   Skin:     General: Skin is warm and dry.      Capillary Refill: Capillary refill takes less than 2 seconds.      Comments: Posterior thigh swelling, redness, warmth and a small area of induration around 2 cm in center (marker now in place).    No crepitus      Neurological:      General: No focal deficit present.      Mental Status: She is alert and oriented to person, place, and time. Mental status is at baseline.   Psychiatric:         Mood and Affect: Mood normal.         Laboratory:  Recent Labs     06/01/23  0749   WBC 7.7   RBC 4.08*   HEMOGLOBIN 11.9*   HEMATOCRIT 36.4*   MCV 89.2   MCH 29.2   MCHC 32.7   RDW 47.2   PLATELETCT 286   MPV 9.5     Recent Labs     06/01/23  0749   SODIUM 137   POTASSIUM 3.8   CHLORIDE 103   CO2 21   GLUCOSE 115*   BUN 18   CREATININE 0.53   CALCIUM 8.7     Recent Labs     06/01/23  0749   ALTSGPT 12   ASTSGOT 12   ALKPHOSPHAT 120*   TBILIRUBIN 0.2   GLUCOSE 115*         No results for input(s): NTPROBNP in the  last 72 hours.      No results for input(s): TROPONINT in the last 72 hours.    Imaging:  US-EXTREMITY NON VASCULAR UNILATERAL LEFT   Final Result      1.  Subcutaneous edema of the left posterior thigh in the area of interest.   2.  No organized fluid collection seen.   3.  Correlate for cellulitis.      US-EXTREMITY VENOUS LOWER UNILAT LEFT   Final Result          no X-Ray or EKG requiring interpretation    Assessment/Plan:  Justification for Admission Status  I anticipate this patient is appropriate for observation status at this time because simple cellulitis without sepsis or fluid collection at this moment. Expect to improve within Obs time frame on IV Abx    Patient will need a Med/Surg bed on MEDICAL service .  The need is secondary to as above.    * Cellulitis of left lower extremity- (present on admission)  Assessment & Plan  Unclear inciting event but might have had a small superficial abscess  US LE was notable for Subcutaneous edema of the left posterior thigh in the area of interest. No organized fluid collection seen.   US Duplex - no DVT  Empiric IV Abx with Unasyn  Monitor clinical improvement     Pain management and supportive care  Trend ESR/CRP    Bilateral lower extremity edema- (present on admission)  Assessment & Plan  Chronic   Likely recently exacerbated by whole night gambling with legs down  SCD  One dose of Furosemide IV    Morbid obesity (HCC)- (present on admission)  Assessment & Plan  Life style and diet modification counseling before DC     Overactive bladder- (present on admission)  Assessment & Plan  C/w home oxybutynin    RLS (restless legs syndrome)- (present on admission)  Assessment & Plan  C/w home mirapex        VTE prophylaxis: SCDs/TEDs and enoxaparin ppx

## 2023-06-01 NOTE — ED PROVIDER NOTES
"ED Provider Note    CHIEF COMPLAINT  Chief Complaint   Patient presents with    Wound Check     Located on back of L thigh. Started 2 days ago. Wound site red, swollen, and painful. L leg edematous at baseline since knee replacement. Pt denies fever.       EXTERNAL RECORDS REVIEWED  Other last ER visit on 5/15/23 pt seen for dysuria.  Dx'd with UTI.    HPI/ROS  LIMITATION TO HISTORY   Select: : None  OUTSIDE HISTORIAN(S):  None    Ariadna Sepulveda is a 72 y.o. female with h/o HTN, RLS, and arthritis who presents for left lower extremity swelling and wound on the same leg.    Patient states she noted a \"boil\" on the back of her left thigh several days ago.  She has noticed increased swelling in the same leg.  She states the leg is swollen at baseline secondary to knee replacement years ago.    She denies trauma, history of PE/DVT, chest pain, shortness of breath, fever, chills, history of diabetes,    PAST MEDICAL HISTORY   has a past medical history of Arthritis, At risk for sleep apnea, Hypertension, and RLS (restless legs syndrome) (10/5/2011).    SURGICAL HISTORY   has a past surgical history that includes tonsillectomy; knee replacement, total (left); other orthopedic surgery; and isaiah by laparoscopy (N/A, 12/4/2021).    FAMILY HISTORY  Family History   Problem Relation Age of Onset    Cancer Mother     Arthritis Father     Diabetes Father     Heart Disease Father     Hypertension Father     Hyperlipidemia Father     Psychiatric Illness Brother     Drug abuse Brother     Alcohol abuse Brother     Lung Disease Neg Hx        SOCIAL HISTORY  Social History     Tobacco Use    Smoking status: Never    Smokeless tobacco: Never   Vaping Use    Vaping Use: Never used   Substance and Sexual Activity    Alcohol use: Yes     Alcohol/week: 4.8 oz     Types: 8 Cans of beer per week     Comment: occasional    Drug use: No    Sexual activity: Not Currently     Patient is originally from [facility] MEDICATIONS  Home " "Medications       Reviewed by Martin Sotelo R.N. (Registered Nurse) on 06/01/23 at 0739  Med List Status: Partial     Medication Last Dose Status   bacitracin 500 UNIT/GM ointment  Active   benzonatate (TESSALON) 100 MG Cap  Active   hydrOXYzine HCl (ATARAX) 25 MG Tab  Active   mupirocin (BACTROBAN) 2 % Ointment  Active   oxybutynin (DITROPAN XL) 15 MG CR tablet  Active   phenazopyridine (PHENAZO) 200 MG Tab  Active   pramipexole (MIRAPEX) 1 MG Tab  Active                    ALLERGIES  No Known Allergies    PHYSICAL EXAM  VITAL SIGNS: BP (!) 151/65   Pulse 73   Temp 36.4 °C (97.5 °F) (Temporal)   Resp 17   Ht 1.575 m (5' 2\")   Wt 99.6 kg (219 lb 9.3 oz)   LMP  (LMP Unknown)   SpO2 95%   BMI 40.16 kg/m²    General:  WD female with elevated BMI, nontoxic appearing in NAD; A+Ox3; V/S as above; afebrile, elevated blood pressure, not tachycardic or hypotensive; smells of urine  Skin: warm and dry; good color; no rash  HEENT: NCAT; EOMs intact; PERRL; no scleral icterus   Neck: FROM; no stridor  Cardiovascular: Regular heart rate and rhythm.  No murmurs, rubs, or gallops; pulses 2+ bilaterally radially and DP areas  Lungs: No respiratory distress or tachypnea; Clear to auscultation with good air movement bilaterally.  No wheezes, rhonchi, or rales.   Abdomen: BS present; soft; NTND; no rebound, guarding, or rigidity.  No organomegaly or pulsatile mass  Extremities: HERNANDEZ x 4; left posterior thigh with indurated area measuring 2 cm with bandage in place with drainage; streaking of the left medial thigh is noted, no crepitus, no pain out of proportion to exam; bilateral nonpitting 2+ lower extremity edema; neg Cyndy's  Neurologic: CNs III-XII grossly intact; speech clear; distal sensation intact; strength 5/5 UE/LEs  Psychiatric: Appropriate affect, normal mood      DIAGNOSTIC STUDIES / PROCEDURES  LABS  Results for orders placed or performed during the hospital encounter of 06/01/23   CBC With Differential   Result " Value Ref Range    WBC 7.7 4.8 - 10.8 K/uL    RBC 4.08 (L) 4.20 - 5.40 M/uL    Hemoglobin 11.9 (L) 12.0 - 16.0 g/dL    Hematocrit 36.4 (L) 37.0 - 47.0 %    MCV 89.2 81.4 - 97.8 fL    MCH 29.2 27.0 - 33.0 pg    MCHC 32.7 32.2 - 35.5 g/dL    RDW 47.2 35.9 - 50.0 fL    Platelet Count 286 164 - 446 K/uL    MPV 9.5 9.0 - 12.9 fL    Neutrophils-Polys 73.00 (H) 44.00 - 72.00 %    Lymphocytes 16.30 (L) 22.00 - 41.00 %    Monocytes 7.30 0.00 - 13.40 %    Eosinophils 2.60 0.00 - 6.90 %    Basophils 0.40 0.00 - 1.80 %    Immature Granulocytes 0.40 0.00 - 0.90 %    Nucleated RBC 0.00 0.00 - 0.20 /100 WBC    Neutrophils (Absolute) 5.62 1.82 - 7.42 K/uL    Lymphs (Absolute) 1.25 1.00 - 4.80 K/uL    Monos (Absolute) 0.56 0.00 - 0.85 K/uL    Eos (Absolute) 0.20 0.00 - 0.51 K/uL    Baso (Absolute) 0.03 0.00 - 0.12 K/uL    Immature Granulocytes (abs) 0.03 0.00 - 0.11 K/uL    NRBC (Absolute) 0.00 K/uL   Comp Metabolic Panel   Result Value Ref Range    Sodium 137 135 - 145 mmol/L    Potassium 3.8 3.6 - 5.5 mmol/L    Chloride 103 96 - 112 mmol/L    Co2 21 20 - 33 mmol/L    Anion Gap 13.0 7.0 - 16.0    Glucose 115 (H) 65 - 99 mg/dL    Bun 18 8 - 22 mg/dL    Creatinine 0.53 0.50 - 1.40 mg/dL    Calcium 8.7 8.5 - 10.5 mg/dL    AST(SGOT) 12 12 - 45 U/L    ALT(SGPT) 12 2 - 50 U/L    Alkaline Phosphatase 120 (H) 30 - 99 U/L    Total Bilirubin 0.2 0.1 - 1.5 mg/dL    Albumin 3.9 3.2 - 4.9 g/dL    Total Protein 7.1 6.0 - 8.2 g/dL    Globulin 3.2 1.9 - 3.5 g/dL    A-G Ratio 1.2 g/dL   LACTIC ACID   Result Value Ref Range    Lactic Acid 0.7 0.5 - 2.0 mmol/L   CORRECTED CALCIUM   Result Value Ref Range    Correct Calcium 8.8 8.5 - 10.5 mg/dL   ESTIMATED GFR   Result Value Ref Range    GFR (CKD-EPI) 99 >60 mL/min/1.73 m 2         RADIOLOGY  Radiologist interpretation:   US-EXTREMITY NON VASCULAR UNILATERAL LEFT   Final Result      1.  Subcutaneous edema of the left posterior thigh in the area of interest.   2.  No organized fluid collection seen.    3.  Correlate for cellulitis.      US-EXTREMITY VENOUS LOWER UNILAT LEFT               COURSE & MEDICAL DECISION MAKING    ED Observation Status? Yes; I am placing the patient in to an observation status due to a diagnostic uncertainty as well as therapeutic intensity. Patient placed in observation status at 8:02 AM, 6/1/2023.     Observation plan is as follows: labs, imaging, unclear dispo    Upon Reevaluation, the patient's condition has: not improved; and will be escalated to hospitalization.    Patient discharged from ED Observation status at 9:33 AM   (Time) 6/1/23 (Date).     INITIAL ASSESSMENT, COURSE AND PLAN  Care Narrative: This is a 72-year-old female who presents for left lower extremity cellulitis symptoms.  Lymphangitis present.  Ultrasound was obtained to evaluate for DVT and abscess.  I do not suspect necrotizing fasciitis.    Appropriate protocol labs were ordered prior to my seeing the patient.  I added a urinalysis given urine smell, recent history of UTI and no urine recheck following completion of antibiotics.    Vancomycin and Rocephin were ordered for cellulitis.    Patient's white blood cell count is normal.  Lactic acid is normal.    Ultrasound demonstrated no DVT and no abscess.    HTN/IDDM FOLLOW UP:  The patient has known hypertension and is being followed by their primary care doctor      ADDITIONAL PROBLEM LIST  Hypertension    DISPOSITION AND DISCUSSIONS  I have discussed management of the patient with the following physicians and JAMES's:    I discussed the case with Dr. Guillaume from the hospitalist service who agrees to admit the patient.    Decision tools and prescription drugs considered including, but not limited to: Antibiotics antibiotic choice .    FINAL DIAGNOSIS  1. Left leg cellulitis           Electronically signed by: Tasha Chávez M.D., 6/1/2023 8:01 AM

## 2023-06-01 NOTE — ASSESSMENT & PLAN NOTE
Unclear inciting event but might have had a small superficial abscess  US LE was notable for Subcutaneous edema of the left posterior thigh in the area of interest. No organized fluid collection seen.   US Duplex - no DVT  Empiric IV Abx with Unasyn  Monitor clinical improvement     Pain management and supportive care  Trend ESR/CRP

## 2023-06-01 NOTE — ED TRIAGE NOTES
"Chief Complaint   Patient presents with    Wound Check     Located on back of L thigh. Started 2 days ago. Wound site red, swollen, and painful. L leg edematous at baseline since knee replacement. Pt denies fever.     BP (!) 156/65   Pulse 67   Temp 36.4 °C (97.5 °F) (Temporal)   Resp 17   Ht 1.575 m (5' 2\")   Wt 99.6 kg (219 lb 9.3 oz)   LMP  (LMP Unknown)   SpO2 98%   BMI 40.16 kg/m²     "

## 2023-06-01 NOTE — ASSESSMENT & PLAN NOTE
Chronic   Likely recently exacerbated by whole night gambling with legs down  SCD  One dose of Furosemide IV

## 2023-06-01 NOTE — PROGRESS NOTES
4 Eyes Skin Assessment Completed by Barbara RN and Twila RN.    Head WDL  Ears WDL  Nose WDL  Mouth WDL  Neck WDL  Breast/Chest WDL  Shoulder Blades WDL  Spine WDL  (R) Arm/Elbow/Hand WDL  (L) Arm/Elbow/Hand WDL  Abdomen WDL  Groin WDL  Scrotum/Coccyx/Buttocks WDL  (R) Leg Swelling  (L) Leg Swelling  (R) Heel/Foot/Toe WDL  (L) Heel/Foot/Toe WDL    Devices In Places Blood Pressure Cuff and Pulse Ox      Interventions In Place Pillows    Possible Skin Injury No    Pictures Uploaded Into Epic N/A  Wound Consult Placed N/A  RN Wound Prevention Protocol Ordered No

## 2023-06-02 VITALS
HEART RATE: 58 BPM | BODY MASS INDEX: 43.47 KG/M2 | DIASTOLIC BLOOD PRESSURE: 97 MMHG | SYSTOLIC BLOOD PRESSURE: 144 MMHG | RESPIRATION RATE: 18 BRPM | WEIGHT: 215.61 LBS | OXYGEN SATURATION: 97 % | HEIGHT: 59 IN | TEMPERATURE: 98.1 F

## 2023-06-02 LAB
BASOPHILS # BLD AUTO: 0.3 % (ref 0–1.8)
BASOPHILS # BLD: 0.02 K/UL (ref 0–0.12)
EOSINOPHIL # BLD AUTO: 0.24 K/UL (ref 0–0.51)
EOSINOPHIL NFR BLD: 4 % (ref 0–6.9)
ERYTHROCYTE [DISTWIDTH] IN BLOOD BY AUTOMATED COUNT: 48.8 FL (ref 35.9–50)
HCT VFR BLD AUTO: 35.5 % (ref 37–47)
HGB BLD-MCNC: 11.6 G/DL (ref 12–16)
IMM GRANULOCYTES # BLD AUTO: 0.02 K/UL (ref 0–0.11)
IMM GRANULOCYTES NFR BLD AUTO: 0.3 % (ref 0–0.9)
LYMPHOCYTES # BLD AUTO: 1.26 K/UL (ref 1–4.8)
LYMPHOCYTES NFR BLD: 21.2 % (ref 22–41)
MCH RBC QN AUTO: 29.7 PG (ref 27–33)
MCHC RBC AUTO-ENTMCNC: 32.7 G/DL (ref 32.2–35.5)
MCV RBC AUTO: 91 FL (ref 81.4–97.8)
MONOCYTES # BLD AUTO: 0.52 K/UL (ref 0–0.85)
MONOCYTES NFR BLD AUTO: 8.7 % (ref 0–13.4)
NEUTROPHILS # BLD AUTO: 3.89 K/UL (ref 1.82–7.42)
NEUTROPHILS NFR BLD: 65.5 % (ref 44–72)
NRBC # BLD AUTO: 0 K/UL
NRBC BLD-RTO: 0 /100 WBC (ref 0–0.2)
PLATELET # BLD AUTO: 219 K/UL (ref 164–446)
PMV BLD AUTO: 9.3 FL (ref 9–12.9)
RBC # BLD AUTO: 3.9 M/UL (ref 4.2–5.4)
WBC # BLD AUTO: 6 K/UL (ref 4.8–10.8)

## 2023-06-02 PROCEDURE — A9270 NON-COVERED ITEM OR SERVICE: HCPCS | Performed by: STUDENT IN AN ORGANIZED HEALTH CARE EDUCATION/TRAINING PROGRAM

## 2023-06-02 PROCEDURE — 700102 HCHG RX REV CODE 250 W/ 637 OVERRIDE(OP): Performed by: STUDENT IN AN ORGANIZED HEALTH CARE EDUCATION/TRAINING PROGRAM

## 2023-06-02 PROCEDURE — 700105 HCHG RX REV CODE 258: Performed by: STUDENT IN AN ORGANIZED HEALTH CARE EDUCATION/TRAINING PROGRAM

## 2023-06-02 PROCEDURE — 99239 HOSP IP/OBS DSCHRG MGMT >30: CPT | Performed by: STUDENT IN AN ORGANIZED HEALTH CARE EDUCATION/TRAINING PROGRAM

## 2023-06-02 PROCEDURE — 85025 COMPLETE CBC W/AUTO DIFF WBC: CPT

## 2023-06-02 PROCEDURE — G0378 HOSPITAL OBSERVATION PER HR: HCPCS

## 2023-06-02 PROCEDURE — 700111 HCHG RX REV CODE 636 W/ 250 OVERRIDE (IP): Performed by: STUDENT IN AN ORGANIZED HEALTH CARE EDUCATION/TRAINING PROGRAM

## 2023-06-02 RX ORDER — AMOXICILLIN AND CLAVULANATE POTASSIUM 875; 125 MG/1; MG/1
1 TABLET, FILM COATED ORAL 2 TIMES DAILY
Qty: 14 TABLET | Refills: 0 | Status: SHIPPED | OUTPATIENT
Start: 2023-06-02 | End: 2023-06-09

## 2023-06-02 RX ADMIN — OXYBUTYNIN CHLORIDE 30 MG: 10 TABLET, EXTENDED RELEASE ORAL at 04:56

## 2023-06-02 RX ADMIN — AMPICILLIN SODIUM AND SULBACTAM SODIUM 3 G: 2; 1 INJECTION, POWDER, FOR SOLUTION INTRAMUSCULAR; INTRAVENOUS at 01:58

## 2023-06-02 NOTE — PROGRESS NOTES
Assumed care at 0700. A/O, states feels good. Denies pain. Assisted MD with inspection of infection site. Swelling looks to have subsided a bit and there has been no spreading past the point of outline from yesterday. Will DC home today. Will place order for DC lounge. IV DC'd.

## 2023-06-02 NOTE — PROGRESS NOTES
DC lounge and transport staff went to take patient down and she was not found in the room. All restroom checked. Patient still not found on unit. All belongings gone from room.     Assumed patient left without follow up and DC instructions.

## 2023-06-02 NOTE — CARE PLAN
The patient is Stable - Low risk of patient condition declining or worsening    Shift Goals  Clinical Goals: ABX, monitor labs  Patient Goals: Comfort  Family Goals: GINI      Problem: Knowledge Deficit - Standard  Goal: Patient and family/care givers will demonstrate understanding of plan of care, disease process/condition, diagnostic tests and medications  Outcome: Progressing

## 2023-06-03 LAB
BACTERIA UR CULT: NORMAL
SIGNIFICANT IND 70042: NORMAL
SITE SITE: NORMAL
SOURCE SOURCE: NORMAL

## 2023-06-03 NOTE — DISCHARGE SUMMARY
Discharge Summary    CHIEF COMPLAINT ON ADMISSION  Chief Complaint   Patient presents with    Wound Check     Located on back of L thigh. Started 2 days ago. Wound site red, swollen, and painful. L leg edematous at baseline since knee replacement. Pt denies fever.       Reason for Admission  Acute     Admission Date  6/1/2023    CODE STATUS  FULL    HPI & HOSPITAL COURSE  Ariadna Sepulveda is a 72 y.o. female with HTN (diet controlled), obesity class III, RLS, chronic venous deficiency and urinary incontinence who presented 6/1/2023 with 3 days history of posterior thigh swelling, redness, warmth and a small area of induration. No specific inciting event, draining or animal bite. She does not know how it is started but given the increase in size, she presented to ED for an evaluation.     In ED, afebrile, vitals wnl. Pertinent exam findings: left posterior thigh with indurated area measuring 2 cm with bandage in place with drainage; streaking of the left medial thigh is noted, no crepitus, no pain out of proportion to exam  Labs were grossly unremarkable except mild anemia. US LE was notable for Subcutaneous edema of the left posterior thigh in the area of interest. No organized fluid collection seen. Pt was provided with IV vancomycin and ceftriaxone.      Pt was then referred to Hospitalist services for further management. The patient was placed on IV Abx with Unasyn.    Hospital course: afebrile and vitals wnl. Exam at bedside was with improvement of left posterior thigh cellulitis. Labs and imagings findings discussed with patient. At this point, the patient is deemed stable for DC with a close outpatient follow up.    Therefore, she is discharged in fair and stable condition to home with close outpatient follow-up.    The patient recovered much more quickly than anticipated on admission.    Discharge Date  6/2/2023    FOLLOW UP ITEMS POST DISCHARGE  N/A    DISCHARGE DIAGNOSES  Principal Problem:    Cellulitis of  left lower extremity (POA: Yes)  Active Problems:    RLS (restless legs syndrome) (Chronic) (POA: Yes)    Overactive bladder (POA: Yes)    Morbid obesity (HCC) (POA: Yes)    Bilateral lower extremity edema (POA: Yes)  Resolved Problems:    * No resolved hospital problems. *      FOLLOW UP  Lj Marion III, M.D.  [address]  [facility] [id number] [phone]    Schedule an appointment as soon as possible for a visit in 1 week(s)      [facility], Emergency Dept  [address] [facility] [address] [phone]  Follow up  If symptoms worsen      MEDICATIONS ON DISCHARGE     Medication List        START taking these medications        Instructions   amoxicillin-clavulanate 875-125 MG Tabs  Commonly known as: AUGMENTIN   Take 1 Tablet by mouth 2 times a day for 7 days.  Dose: 1 Tablet            CONTINUE taking these medications        Instructions   oxybutynin 15 MG CR tablet  Commonly known as: DITROPAN XL   Doctor's comments: Diagnoses:N32.81    Overactive bladder Needs appt before more refils  TAKE 2 TABLETS BY MOUTH EVERY DAY  Dose: 30 mg     pramipexole 1 MG Tabs  Commonly known as: MIRAPEX   TAKE 3 TABLETS BY MOUTH EVERY EVENING  Dose: 3 mg              Allergies  No Known Allergies    DIET  Regular      ACTIVITY  As tolerated.  Weight bearing as tolerated    CONSULTATIONS  N/A    PROCEDURES  N/A    LABORATORY  Lab Results   Component Value Date    SODIUM 137 06/01/2023    POTASSIUM 3.8 06/01/2023    CHLORIDE 103 06/01/2023    CO2 21 06/01/2023    GLUCOSE 115 (H) 06/01/2023    BUN 18 06/01/2023    CREATININE 0.53 06/01/2023        Lab Results   Component Value Date    WBC 6.0 06/02/2023    HEMOGLOBIN 11.6 (L) 06/02/2023    HEMATOCRIT 35.5 (L) 06/02/2023    PLATELETCT 219 06/02/2023        Total time of the discharge process exceeds 36 minutes.

## 2023-06-05 ENCOUNTER — PATIENT OUTREACH (OUTPATIENT)
Dept: MEDICAL GROUP | Facility: PHYSICIAN GROUP | Age: 73
End: 2023-06-05
Payer: COMMERCIAL

## 2023-06-06 NOTE — PROGRESS NOTES
Patient outreach for TCM attempt #2.  Voicemail not set up, unable to LVM with contact information.

## 2023-06-29 ENCOUNTER — HOSPITAL ENCOUNTER (EMERGENCY)
Facility: MEDICAL CENTER | Age: 73
End: 2023-06-29
Attending: EMERGENCY MEDICINE
Payer: COMMERCIAL

## 2023-06-29 ENCOUNTER — APPOINTMENT (OUTPATIENT)
Dept: RADIOLOGY | Facility: MEDICAL CENTER | Age: 73
End: 2023-06-29
Attending: EMERGENCY MEDICINE
Payer: COMMERCIAL

## 2023-06-29 VITALS
WEIGHT: 218.7 LBS | DIASTOLIC BLOOD PRESSURE: 60 MMHG | TEMPERATURE: 98.5 F | OXYGEN SATURATION: 97 % | BODY MASS INDEX: 44.17 KG/M2 | HEART RATE: 74 BPM | RESPIRATION RATE: 18 BRPM | SYSTOLIC BLOOD PRESSURE: 122 MMHG

## 2023-06-29 DIAGNOSIS — S52.531A CLOSED COLLES' FRACTURE OF RIGHT RADIUS, INITIAL ENCOUNTER: ICD-10-CM

## 2023-06-29 PROCEDURE — 99284 EMERGENCY DEPT VISIT MOD MDM: CPT

## 2023-06-29 PROCEDURE — 73130 X-RAY EXAM OF HAND: CPT | Mod: LT

## 2023-06-29 PROCEDURE — 302874 HCHG BANDAGE ACE 2 OR 3""

## 2023-06-29 PROCEDURE — 73100 X-RAY EXAM OF WRIST: CPT | Mod: LT

## 2023-06-29 PROCEDURE — A9270 NON-COVERED ITEM OR SERVICE: HCPCS | Performed by: EMERGENCY MEDICINE

## 2023-06-29 PROCEDURE — 29125 APPL SHORT ARM SPLINT STATIC: CPT

## 2023-06-29 PROCEDURE — 700102 HCHG RX REV CODE 250 W/ 637 OVERRIDE(OP): Performed by: EMERGENCY MEDICINE

## 2023-06-29 RX ORDER — HYDROCODONE BITARTRATE AND ACETAMINOPHEN 5; 325 MG/1; MG/1
1 TABLET ORAL EVERY 6 HOURS PRN
Qty: 11 TABLET | Refills: 0 | Status: SHIPPED | OUTPATIENT
Start: 2023-06-29 | End: 2023-07-02

## 2023-06-29 RX ORDER — ACETAMINOPHEN 325 MG/1
650 TABLET ORAL ONCE
Status: COMPLETED | OUTPATIENT
Start: 2023-06-29 | End: 2023-06-29

## 2023-06-29 RX ORDER — IBUPROFEN 600 MG/1
600 TABLET ORAL ONCE
Status: COMPLETED | OUTPATIENT
Start: 2023-06-29 | End: 2023-06-29

## 2023-06-29 RX ADMIN — IBUPROFEN 600 MG: 600 TABLET, FILM COATED ORAL at 15:44

## 2023-06-29 RX ADMIN — ACETAMINOPHEN 650 MG: 325 TABLET, FILM COATED ORAL at 15:44

## 2023-06-29 ASSESSMENT — PAIN DESCRIPTION - PAIN TYPE: TYPE: ACUTE PAIN

## 2023-06-29 ASSESSMENT — FIBROSIS 4 INDEX: FIB4 SCORE: 1.11

## 2023-06-29 NOTE — ED NOTES
Discharge instructions given to pt. Prescriptions sent to pt's pharmacy. Pt educated, verbalizes understanding. All belongings accounted for. Pt ambulated out of ED with steady gait using cane to go home.

## 2023-06-29 NOTE — ED PROVIDER NOTES
ED Provider Note    CHIEF COMPLAINT  Chief Complaint   Patient presents with    Fall     Pt states she fell over her bags at the laundry mat yesterday injuring left hand/wrist     Hand Pain     Left     Wrist Injury     Wrist        EXTERNAL RECORDS REVIEWED  Inpatient Notes did earlier in the month for cellulitis    HPI/ROS  LIMITATION TO HISTORY   Select: : None  OUTSIDE HISTORIAN(S):      Ariadna Sepulveda is a 72 y.o. female who presents to the emergency department left hand and wrist pain.  Patient tripped over her bags at the laundromat yesterday and fell on outstretched left hand.  Reports pain over the dorsum of the left hand and left wrist today.  No elbow pain no shoulder pain no head injury loss consciousness no chest abdominal pain no pelvic pain no difficulty with ambulation no other acute symptom changes or concern    PAST MEDICAL HISTORY   has a past medical history of Arthritis, At risk for sleep apnea, Hypertension, and RLS (restless legs syndrome) (10/5/2011).    SURGICAL HISTORY   has a past surgical history that includes tonsillectomy; knee replacement, total (left); other orthopedic surgery; and isaiah by laparoscopy (N/A, 12/4/2021).    FAMILY HISTORY  Family History   Problem Relation Age of Onset    Cancer Mother     Arthritis Father     Diabetes Father     Heart Disease Father     Hypertension Father     Hyperlipidemia Father     Psychiatric Illness Brother     Drug abuse Brother     Alcohol abuse Brother     Lung Disease Neg Hx        SOCIAL HISTORY  Social History     Tobacco Use    Smoking status: Never    Smokeless tobacco: Never   Vaping Use    Vaping Use: Never used   Substance and Sexual Activity    Alcohol use: Yes     Alcohol/week: 4.8 oz     Types: 8 Cans of beer per week     Comment: occasional    Drug use: No    Sexual activity: Not Currently       CURRENT MEDICATIONS  Home Medications       Reviewed by [place] R.N. (Registered Nurse) on 06/29/23 at 4103  Med List Status:  Partial     Medication Last Dose Status   oxybutynin (DITROPAN XL) 15 MG CR tablet  Active   pramipexole (MIRAPEX) 1 MG Tab  Active                    ALLERGIES  No Known Allergies    PHYSICAL EXAM  VITAL SIGNS: /58   Pulse 70   Temp 36.9 °C (98.5 °F) (Temporal)   Resp 18   Wt 99.2 kg (218 lb 11.1 oz)   LMP  (LMP Unknown)   SpO2 96%   BMI 44.17 kg/m²    Pulse ox interpretation: I interpret this pulse ox as normal.  Constitutional: Alert and oriented x 3, minimal Distress  HEENT: Atraumatic normocephalic, pupils are equal round reactive to light extraocular movements are intact. The nares is clear, external ears are normal, mouth shows moist mucous membranes normal dentition for age  Neck: Supple, no JVD no tracheal deviation  Cardiovascular: Regular rate and rhythm no murmur rub or gallop 2+ pulses peripherally x4  Thorax & Lungs: No respiratory distress, no wheezes rales or rhonchi, No chest tenderness.   GI: Soft nontender nondistended positive bowel sounds, no peritoneal signs  Skin: Warm dry no acute rash or lesion  Musculoskeletal: Moving all extremities with full range and 5 of 5 strength no acute  deformity  Neurologic: Cranial nerves III through XII are grossly intact no sensory deficit no cerebellar dysfunction   Psychiatric: Appropriate affect for situation at this time          DIAGNOSTIC STUDIES / PROCEDURES      RADIOLOGY  I have independently interpreted the diagnostic imaging associated with this visit and am waiting the final reading from the radiologist.   My preliminary interpretation is as follows: 3:15 PM distal radius fracture    Radiologist interpretation:   DX-HAND 3+ LEFT   Final Result      Acute distal radius fracture with mild angulation.      DX-WRIST-LIMITED 2- LEFT   Final Result      Acute fracture of the distal radius with mild angulation.   Bones are demineralized.            COURSE & MEDICAL DECISION MAKING    ED Observation Status? No; Patient does not meet criteria  for ED Observation.     INITIAL ASSESSMENT, COURSE AND PLAN  Care Narrative: X-rays as above.  Not requiring reduction at this time.  Placed in sugar-tong splint and sling for comfort patient requests cane because she is having slight instability and do not want to further exacerbate this injury with repeat falls therefore she is ordered a cane.  She is given a prescription for Norco.  Given instructions follow-up with renal orthopedic hand clinic in 1 week return here for worsening pain numbness tingling weakness any other acute symptom change or concern otherwise discharged in stable and improved condition.        ADDITIONAL PROBLEM LIST    DISPOSITION AND DISCUSSIONS    I have discussed management of the patient with the following physicians and JAMES's:      Discussion of management with other QHP or appropriate source(s): None     Escalation of care considered, and ultimately not performed:acute inpatient care management, however at this time, the patient is most appropriate for outpatient management    Barriers to care at this time, including but not limited to: .     Decision tools and prescription drugs considered including, but not limited to: Pain Medications for acute fracture    Prescription monitoring program queried and unremarkable.  Patient counseled on the risks of controlled substances including potential risks and benefits proper use alternative treatments, cause the symptoms, provisions of treatment plan, risk of dependence addiction and overdose method safely dispose of the medication, the fact that they would be given no refills from the emergency department.     In prescribing controlled substances to this patient, I certify that I have obtained and reviewed the medical history of Ariadna Sepulveda. I have also made a good christina effort to obtain applicable records from other providers who have treated the patient and records did not demonstrate any increased risk of substance abuse that would  prevent me from prescribing controlled substances.     I have conducted a physical exam and documented it. I have reviewed Ms. Sepulveda’s prescription history as maintained by the [place] Prescription Monitoring Program.     I have assessed the patient’s risk for abuse, dependency, and addiction using the validated Opioid Risk Tool available at https://www.mdcalc.com/fxkomp-qcpy-asjj-ort-narcotic-abuse.     Given the above, I believe the benefits of controlled substance therapy outweigh the risks. The reasons for prescribing controlled substances include non-narcotic, oral analgesic alternatives have been inadequate for pain control. Accordingly, I have discussed the risk and benefits, treatment plan, and alternative therapies with the patient.     FINAL IMPRESSION  1. Closed Colles' fracture of right radius, initial encounter Active       PRESCRIPTIONS  New Prescriptions    HYDROCODONE-ACETAMINOPHEN (NORCO) 5-325 MG TAB PER TABLET    Take 1 Tablet by mouth every 6 hours as needed (pain) for up to 3 days.       FOLLOW UP  [facility]  [address] [place] [id number] [phone]  Schedule an appointment as soon as possible for a visit in 1 week      [facility], Emergency Dept  [address] [facility] [address] [phone]  In 1 week  in 12-24 hours if symptoms persist, immediately If symptoms worsen, or if you develop any other symptoms or concerns        -DISCHARGE-

## 2023-06-29 NOTE — ED TRIAGE NOTES
Pt to triage .  Chief Complaint   Patient presents with    Fall     Pt states she fell over her bags at the laundry mat yesterday injuring left hand/wrist     Hand Pain     Left     Wrist Injury     Wrist

## 2023-07-05 ENCOUNTER — HOSPITAL ENCOUNTER (EMERGENCY)
Facility: MEDICAL CENTER | Age: 73
End: 2023-07-05
Attending: EMERGENCY MEDICINE
Payer: COMMERCIAL

## 2023-07-05 VITALS
DIASTOLIC BLOOD PRESSURE: 65 MMHG | RESPIRATION RATE: 16 BRPM | SYSTOLIC BLOOD PRESSURE: 134 MMHG | BODY MASS INDEX: 45.2 KG/M2 | TEMPERATURE: 97.4 F | HEART RATE: 61 BPM | OXYGEN SATURATION: 93 % | WEIGHT: 223.77 LBS

## 2023-07-05 DIAGNOSIS — K08.89 PAIN, DENTAL: ICD-10-CM

## 2023-07-05 DIAGNOSIS — S02.5XXA CLOSED FRACTURE OF TOOTH, INITIAL ENCOUNTER: ICD-10-CM

## 2023-07-05 PROCEDURE — 99282 EMERGENCY DEPT VISIT SF MDM: CPT

## 2023-07-05 RX ORDER — PENICILLIN V POTASSIUM 500 MG/1
500 TABLET ORAL 4 TIMES DAILY
Qty: 28 TABLET | Refills: 0 | Status: ACTIVE | OUTPATIENT
Start: 2023-07-05 | End: 2023-07-12

## 2023-07-05 ASSESSMENT — FIBROSIS 4 INDEX: FIB4 SCORE: 1.11

## 2023-07-05 NOTE — DISCHARGE INSTRUCTIONS
Follow-up with a dentist ASAP.  Either call one of the local dentist in the area, or you can follow-up with one of the larger dental clinics such as [facility], [facility], or [facility].    Buy some over-the-counter dental wax and placed the wax over the piece of tooth that is poking into your tongue.  This might provide you some relief.    Return to the ER for any worsening dental pain, changing dental pain, facial swelling, fevers over 100.4, shaking chills, swelling along the gumline, swelling under your tongue or under your chin, difficulty swallowing fluids or saliva, difficulty breathing, or for any concerns.

## 2023-07-05 NOTE — ED PROVIDER NOTES
"ER Provider Note    Scribed for Dr. Alis Theodore M.D. by Wilber Krueger. 7/5/2023 1:02 PM    Primary Care Provider: Lj Marion III, M.D.    CHIEF COMPLAINT  Chief Complaint   Patient presents with    Dental Pain     L lower side, chipped tooth 4 days ago and is in a lot of pain     EXTERNAL RECORDS REVIEWED  Records were reviewed which showed that the patient was admitted in June of 2023 for thigh cellulitis, and just recently broke her wrist last week and was seen for a distal radius fracture.     HPI/ROS  LIMITATION TO HISTORY   None  OUTSIDE HISTORIAN(S):  None    Ariadna Sepulveda is a 72 y.o. female who presents to the ED complaining of dental pain secondary to chipping a tooth 4 days ago. She states that she thinks the tooth had been chipped for quite some time, but then it chipped even more 4 days ago.  Did not feel it chip, but knows its now shorter and sharper than it had been, and only noticed the new chip when the pain started, since the tooth was already chipped previously, but is more chipped now, stating \"its only a half a tooth now.\" Since the recent chip, the tooth now pokes her tongue leading to both pain from the tooth and discomfort from the poking. She reports associated mild facial swelling but denies any fever, chest pain, shortness of breath, vomiting, or other dental changes. No alleviating factors reported and reports exacerbation of pain when swallowing but no difficulty swallowing. She has been seen by an oral surgeon a few years ago but declined care at the time and went to see a dentist 2 days ago, but her insurance was not compatible with the office.      PAST MEDICAL HISTORY  Past Medical History:   Diagnosis Date    Arthritis     At risk for sleep apnea     CPAP    Hypertension     RLS (restless legs syndrome) 10/5/2011     SURGICAL HISTORY  Past Surgical History:   Procedure Laterality Date    NAILA BY LAPAROSCOPY N/A 12/4/2021    Procedure: CHOLECYSTECTOMY, LAPAROSCOPIC;  Surgeon: " Jos Rodriguez M.D.;  Location: SURGERY [facility];  Service: General    KNEE REPLACEMENT, TOTAL  left    OTHER ORTHOPEDIC SURGERY      TONSILLECTOMY       FAMILY HISTORY  Family History   Problem Relation Age of Onset    Cancer Mother     Arthritis Father     Diabetes Father     Heart Disease Father     Hypertension Father     Hyperlipidemia Father     Psychiatric Illness Brother     Drug abuse Brother     Alcohol abuse Brother     Lung Disease Neg Hx      SOCIAL HISTORY   reports that she has never smoked. She has never used smokeless tobacco. She reports current alcohol use of about 4.8 oz of alcohol per week. She reports that she does not use drugs.    CURRENT MEDICATIONS  Previous Medications    OXYBUTYNIN (DITROPAN XL) 15 MG CR TABLET    TAKE 2 TABLETS BY MOUTH EVERY DAY    PRAMIPEXOLE (MIRAPEX) 1 MG TAB    TAKE 3 TABLETS BY MOUTH EVERY EVENING     ALLERGIES  Patient has no known allergies.    PHYSICAL EXAM  /57   Pulse 74   Temp 36.7 °C (98.1 °F) (Temporal)   Resp 16   Wt 101 kg (223 lb 12.3 oz)   SpO2 94%      Constitutional: Mild distress due to pain, Alert in no apparent distress.  HENT: Normocephalic, Bilateral external ears normal. Nose normal.  No trismus.  No sublingual or submental swelling.  No facial swelling.  Patient has poor dentition throughout with multiple rotted and decayed and missing teeth.  Patient has a sharp shard of tooth #22 which is tender to percussion and which is abrading the lower aspect of her left anterior tongue.  There is no associated gingival swelling.  No other dental tenderness.    Eyes: Pupils are equal and reactive. Conjunctiva normal, non-icteric.   Thorax & Lungs: Easy unlabored respirations  Skin: Visualized skin is  Dry and warm without obvious rash  Extremities: FROM to all extremities   Neurologic: Alert, Clear speech.   Psychiatric: Affect and Mood normal    COURSE & MEDICAL DECISION MAKING     ED Observation Status? No; Patient does not meet  criteria for ED Observation.     INITIAL ASSESSMENT AND PLAN    Care Narrative: Patient presents to the ER complaining of a sharp piece of tooth poking the undersurface of her tongue.  She also says the tooth is tender.  She says she was missing a large part of that tooth for a while.  However, 4 days ago she broke off more of the tooth and since then a shard of tooth has been poking her in the left tongue.  The tooth itself is also painful.  She does not have any facial swelling.  No fevers or chills.  No sublingual or submental swelling.  No concern for facial cellulitis, deep space abscess, or Liz's angina.  She went to a dentist 2 days ago but they did not take her [facility] [employer] insurance.  I have instructed her to keep calling around to local dentist to find someone that will take her insurance.  She will likely need to have this shard of tooth drilled down.  She also has multiple rotted and decayed teeth.  She told me that 2 years ago an oral surgeon wanted to take out 18 of her teeth but she decided to not do that at the time.  I think it is probably worth talking to oral surgery again about possible full mouth extraction as patient has multiple decayed and rotting teeth.  I referred her to Dr. Sierra.  In the meantime she will go home on [employer].  She has no complaints of chest pain, shortness of breath, nausea, vomiting or dizziness.  I do not think this is cardiac.  She has a distinct sharp piece of tooth which is abrading her tongue and causing her pain in the tooth itself is tender to percussion.  I think the patient is safe and stable for outpatient management and discharged home.  She has been given strict return precautions and discharge instructions and she understands treatment plan and follow-up.    1:02 PM - Patient was seen and evaluated at bedside. I discussed giving a referral to a dentist and oral surgery with the patient and stressed the importance of follow-up. I  informed the patient of the plan to start them on antibiotics. Patient was given an opportunity to ask questions. Patient verbalizes understanding and agreement to this plan of care.     ADDITIONAL PROBLEM LIST AND DISPOSITION    Problem #1: Worsening of a previously broken left lower tooth  Problem #2: Dental pain in the area of the broken left lower tooth  Problem #3: Tongue pain where the shard of tooth is sticking into the patient's lower left tongue.    I have discussed management of the patient with the following physicians and JAMES's: None    Discussion of management with other [facility] or appropriate source(s): None     Escalation of care considered, and ultimately not performed: diagnostic imaging.  Patient has no evidence of facial swelling.  No trismus.  No difficulty swallowing fluids or saliva.  No trouble breathing.  No fevers.  No signs of deep space abscess or infection.  No need for CT scan of the face.    Barriers to care at this time, including but not limited to:  None known .     Decision tools and prescription drugs considered including, but not limited to: Antibiotics patient will be placed on Pen-Vee K for dental pain/possible dental infection. .    The patient will return for new or worsening symptoms and is stable at the time of discharge.    The patient is referred to a primary physician for blood pressure management, diabetic screening, and for all other preventative health concerns.     DISPOSITION:  Patient will be discharged home in stable condition.    FOLLOW UP:  Jean-Pierre Sierra D.D.S.  [address] [place], [place] [address]y  [facility] [address]  [phone]    Schedule an appointment as soon as possible for a visit in 2 days  If symptoms worsen return to ER    OUTPATIENT MEDICATIONS:  New Prescriptions    PENICILLIN V POTASSIUM (VEETID) 500 MG TAB    Take 1 Tablet by mouth 4 times a day for 7 days.     FINAL DIAGNOSIS  1. Pain, dental Acute   2. Closed fracture of tooth, initial encounter Acute     The note  accurately reflects work and decisions made by me.  Alis Theodore M.D.  7/5/2023  1:51 PM    This dictation has been created using voice recognition software. The accuracy of the dictation is limited by the abilities of the software. I expect there may be some errors of grammar and possibly content. I made every attempt to manually correct the errors within my dictation. However, errors related to voice recognition software may still exist and should be interpreted within the appropriate context.     IWilber (Ashanti), am scribing for, and in the presence of, Alis Theodore M.D..    Electronically signed by: Wilber Krueger (Ashanti), 7/5/2023    Alis LEE M.D. personally performed the services described in this documentation, as scribed by Wilber Krueger in my presence, and it is both accurate and complete.

## 2023-07-05 NOTE — ED TRIAGE NOTES
Ambulates to triage  Chief Complaint   Patient presents with    Dental Pain     L lower side, chipped tooth 4 days ago and is in a lot of pain     Was here last week for her arm and is on her last day of abx.

## 2023-07-05 NOTE — ED NOTES
Pt cleared for d/c/ VSS. Pt in no distress.  DC home with written and verbal instructions regarding f/u, activity and RX to  at preferred pharmacy.  Verbalized understanding, no further questions, ambulated out of ED with a steady gait in no distress with all belongings

## 2023-07-29 ENCOUNTER — APPOINTMENT (OUTPATIENT)
Dept: RADIOLOGY | Facility: MEDICAL CENTER | Age: 73
End: 2023-07-29
Attending: EMERGENCY MEDICINE
Payer: COMMERCIAL

## 2023-07-29 ENCOUNTER — HOSPITAL ENCOUNTER (EMERGENCY)
Facility: MEDICAL CENTER | Age: 73
End: 2023-07-29
Attending: EMERGENCY MEDICINE
Payer: COMMERCIAL

## 2023-07-29 VITALS
OXYGEN SATURATION: 98 % | SYSTOLIC BLOOD PRESSURE: 162 MMHG | DIASTOLIC BLOOD PRESSURE: 72 MMHG | RESPIRATION RATE: 16 BRPM | BODY MASS INDEX: 43.02 KG/M2 | HEIGHT: 59 IN | HEART RATE: 53 BPM | WEIGHT: 213.41 LBS | TEMPERATURE: 97 F

## 2023-07-29 DIAGNOSIS — L03.116 CELLULITIS OF LEFT LOWER EXTREMITY: ICD-10-CM

## 2023-07-29 DIAGNOSIS — R60.9 PERIPHERAL EDEMA: ICD-10-CM

## 2023-07-29 PROCEDURE — 700102 HCHG RX REV CODE 250 W/ 637 OVERRIDE(OP): Performed by: EMERGENCY MEDICINE

## 2023-07-29 PROCEDURE — 99283 EMERGENCY DEPT VISIT LOW MDM: CPT

## 2023-07-29 PROCEDURE — A9270 NON-COVERED ITEM OR SERVICE: HCPCS | Performed by: EMERGENCY MEDICINE

## 2023-07-29 PROCEDURE — 73630 X-RAY EXAM OF FOOT: CPT | Mod: LT

## 2023-07-29 PROCEDURE — 93971 EXTREMITY STUDY: CPT | Mod: LT

## 2023-07-29 RX ORDER — AMOXICILLIN AND CLAVULANATE POTASSIUM 875; 125 MG/1; MG/1
1 TABLET, FILM COATED ORAL ONCE
Status: COMPLETED | OUTPATIENT
Start: 2023-07-29 | End: 2023-07-29

## 2023-07-29 RX ORDER — AMOXICILLIN AND CLAVULANATE POTASSIUM 875; 125 MG/1; MG/1
1 TABLET, FILM COATED ORAL 2 TIMES DAILY
Qty: 14 TABLET | Refills: 0 | Status: ACTIVE | OUTPATIENT
Start: 2023-07-29 | End: 2023-08-05

## 2023-07-29 RX ADMIN — AMOXICILLIN AND CLAVULANATE POTASSIUM 1 TABLET: 875; 125 TABLET, FILM COATED ORAL at 16:21

## 2023-07-29 ASSESSMENT — PAIN DESCRIPTION - PAIN TYPE: TYPE: ACUTE PAIN

## 2023-07-29 ASSESSMENT — FIBROSIS 4 INDEX: FIB4 SCORE: 1.11

## 2023-07-29 NOTE — ED TRIAGE NOTES
.  Chief Complaint   Patient presents with    Leg Swelling     Patient states that her leg is swollen, red and weeping for the last 3 days and feels like she has cellulitis, states symptoms are same as her previous infections, denies fever or chills     .Pt is alert and oriented, speaking in full sentences, follows commands and responds appropriately to questions Resp are even and unlabored.  Skin pink warm and dry     Pt placed in lobby. Pt educated on triage process. Pt encouraged to alert staff for any changes.

## 2023-07-29 NOTE — ED PROVIDER NOTES
"ED Provider Note    CHIEF COMPLAINT  Chief Complaint   Patient presents with    Leg Swelling     Patient states that her leg is swollen, red and weeping for the last 3 days and feels like she has cellulitis, states symptoms are same as her previous infections, denies fever or chills     EXTERNAL RECORDS REVIEWED  Review of records show that the patient was last admitted for cellulitis to the left posterior thigh June 1, 2023. She was seen following this a distal radius fracture after a fall and dental pain.     HPI/ROS  LIMITATION TO HISTORY   Select: : None  OUTSIDE HISTORIAN(S):  None    Ariadna Sepulveda is a 72 y.o. female who presents to the Emergency Department with bilateral leg swelling onset three days ago. Patient reports that she noticed a bubble on the back of her leg that looked like there was fluid in it. Over the last three days her legs have increased in \"redness and has been weeping.\" She describes her left leg to be more swollen that her right. She explains that when she woke up this morning, she was not feeling well and it prompted her to report to the ED. She has associated symptoms of lateral foot pain, but denies fever or chills. Denies any trauma to her lower extremities. The patient states that her symptoms are similar to when she had cellulitis in the past but her symptoms are worse this time. She also explains that she is on her feet a lot and walks a lot. She has also tried compression socks but has not been able to find socks that fit her and are able to go over her ankles.     PAST MEDICAL HISTORY  Past Medical History:   Diagnosis Date    Arthritis     At risk for sleep apnea     CPAP    Hypertension     RLS (restless legs syndrome) 10/5/2011        SURGICAL HISTORY  Past Surgical History:   Procedure Laterality Date    NAILA BY LAPAROSCOPY N/A 12/4/2021    Procedure: CHOLECYSTECTOMY, LAPAROSCOPIC;  Surgeon: Jos Rodriguez M.D.;  Location: SURGERY [facility];  Service: General    " "KNEE REPLACEMENT, TOTAL  left    OTHER ORTHOPEDIC SURGERY      TONSILLECTOMY          FAMILY HISTORY  Family History   Problem Relation Age of Onset    Cancer Mother     Arthritis Father     Diabetes Father     Heart Disease Father     Hypertension Father     Hyperlipidemia Father     Psychiatric Illness Brother     Drug abuse Brother     Alcohol abuse Brother     Lung Disease Neg Hx        SOCIAL HISTORY   reports that she has never smoked. She has never used smokeless tobacco. She reports current alcohol use of about 4.8 oz of alcohol per week. She reports that she does not use drugs.    CURRENT MEDICATIONS  Previous Medications    AMOXICILLIN-CLAVULANATE (AUGMENTIN) 875-125 MG TAB    Take 1 Tablet by mouth 2 times a day.    OXYBUTYNIN (DITROPAN XL) 15 MG CR TABLET    TAKE 2 TABLETS BY MOUTH EVERY DAY    PRAMIPEXOLE (MIRAPEX) 1 MG TAB    TAKE 3 TABLETS BY MOUTH EVERY EVENING       ALLERGIES  Patient has no known allergies.    PHYSICAL EXAM  /54   Pulse 67   Temp 37.2 °C (98.9 °F) (Temporal)   Resp 16   Ht 1.499 m (4' 11\")   Wt 96.8 kg (213 lb 6.5 oz)   SpO2 96%      Constitutional: Nontoxic appearing. Alert in no apparent distress.  HENT: Normocephalic, Atraumatic.  Moist mucous membranes.    Neck: Supple, full range of motion  Musculoskeletal: Atraumatic.   Extremities: Severe lymphedema to bilateral lower extremities that is slightly worse on the left than the right, Small area approximately 6 cm of erythema to the posterior calf with some mild blistering, Good range of motion of the ankle without pain, Palpable DP pulses  Skin: Warm, Dry.    Neurologic: Alert and oriented x3. Moving all extremities spontaneously without focal deficits.  Psychiatric: Affect normal, Mood normal, Appears appropriate and not intoxicated.     DIAGNOSTIC STUDIES / PROCEDURES    RADIOLOGY  I have independently interpreted the diagnostic imaging associated with this visit and am waiting the final reading from the " radiologist.   My preliminary interpretation is a follows: no fracture  Radiologist interpretation:   DX-FOOT-COMPLETE 3+ LEFT   Final Result      1.  There is diffuse swelling. No soft tissue gas or radiographic evidence of osteomyelitis.   2.  There is osteopenia.   3.  There is multifocal osteoarthritis.      US-EXTREMITY VENOUS LOWER UNILAT LEFT    (Results Pending)        COURSE & MEDICAL DECISION MAKING  3:51 PM - Patient seen and examined at bedside. Discussed plan of care, including imaging. Patient agrees to the plan of care. The patient will be medicated with Augmentin 875-125 mg. Ordered for DX-Foot Left, US-Extremity Venous Lower Extremity Left to evaluate her symptoms.      ED Observation Status? No; Patient does not meet criteria for ED Observation.     INITIAL ASSESSMENT, COURSE AND PLAN  Care Narrative: Patient with history of chronic venous stasis/lymphedema who presents with small area of cellulitis on the back of the left calf.  She is afebrile with reassuring vital signs on arrival.  She is no signs of systemic illness.  No evidence of neurovascular compromise or septic joint.  Ultrasound is negative for DVT.  There is no signs of drainable fluid collection or abscess.  The patient was also complaining of some ongoing foot pain therefore x-rays were performed showing some degenerative changes and osteopenia however no fracture.  Considered blood work however due to a small area of cellulitis, I do not feel this is necessary at this time and patient can be discharged home on a trial of oral antibiotics.    5:59 PM - Upon reassessment, patient is resting comfortably with normal vital signs.  No new complaints at this time.  Discussed results with patient and/or family as well as importance of primary care follow up.  Patient understands plan of care and strict return precautions for new or changing symptoms.     ADDITIONAL PROBLEM LIST  Problem #1: Acute left lower extremity cellulitis - discharge  home on Augmentin, given return precautions for worsening symptoms    Problem #2: Chronic peripheral edema -discussed elevation, compression stockings, follow-up with primary care physician      DISPOSITION AND DISCUSSIONS  Escalation of care considered, and ultimately not performed:IV fluids and blood analysis    Decision tools and prescription drugs considered including, but not limited to: Pain Medications over-the-counter medication should be sufficient .      The patient is referred to a primary physician for blood pressure management, diabetic screening, and for all other preventative health concerns.      DISPOSITION:  Patient will be discharged home in stable condition.    FOLLOW UP:  Lj Marion III, M.D.  [address] [facility] [id number] [phone]    Schedule an appointment as soon as possible for a visit       [facility], Emergency Dept  [address] [place] [id number] [phone]    If symptoms worsen      OUTPATIENT MEDICATIONS:  New Prescriptions    AMOXICILLIN-CLAVULANATE (AUGMENTIN) 875-125 MG TAB    Take 1 Tablet by mouth 2 times a day for 7 days.        FINAL DIAGNOSIS  1. Cellulitis of left lower extremity    2. Peripheral edema        The note accurately reflects work and decisions made by me.  Zoie Stanford M.D.  7/29/2023  6:00 PM     Nabila LEE (Ashanti), am scribing for, and in the presence of, Zoie Stanford M.D..    Electronically signed by: Nabila Figueroa), 7/29/2023    Zoie LEE M.D. personally performed the services described in this documentation, as scribed by Nabila Mclaughlin in my presence, and it is both accurate and complete.

## 2023-07-30 NOTE — DISCHARGE INSTRUCTIONS
You were seen in the Emergency Department for cellulitis.    XRay of foot and ultrasound were completed without significant acute abnormalities.    Please use 1,000mg of tylenol or 600mg of ibuprofen every 6 hours as needed for pain.  Take antibiotics as directed.    Please try to elevate your legs is much as possible and get any type of compression on your lower extremities to help with further swelling    Please follow up with your primary care physician.    Return to the Emergency Department with increasing swelling or redness, fevers, chest pain or trouble breathing, or other concerns.

## 2023-07-30 NOTE — ED NOTES
Pt discharged, all appropriate hospital equipment removed (IV, monitor, pulse ox, etc.). Pt left unit via walking for home. Personal belongings with pt when leaving unit. Pt given discharge instructions prior to leaving unit including where to  prescriptions and when to follow-up; verbalizes understanding. Pt informed to return to ED if symptoms worsen/return or altered status develop. Copy of discharge instructions signed and turned into DC basket and copy sent with pt. F/u with PCP

## 2023-08-12 ENCOUNTER — HOSPITAL ENCOUNTER (EMERGENCY)
Facility: MEDICAL CENTER | Age: 73
End: 2023-08-12
Attending: EMERGENCY MEDICINE
Payer: COMMERCIAL

## 2023-08-12 VITALS
RESPIRATION RATE: 20 BRPM | BODY MASS INDEX: 41.16 KG/M2 | WEIGHT: 204.15 LBS | TEMPERATURE: 98.8 F | HEART RATE: 80 BPM | OXYGEN SATURATION: 97 % | DIASTOLIC BLOOD PRESSURE: 84 MMHG | HEIGHT: 59 IN | SYSTOLIC BLOOD PRESSURE: 169 MMHG

## 2023-08-12 DIAGNOSIS — L97.221 VENOUS STASIS ULCER OF LEFT CALF LIMITED TO BREAKDOWN OF SKIN WITHOUT VARICOSE VEINS (HCC): ICD-10-CM

## 2023-08-12 DIAGNOSIS — I89.0 LYMPHEDEMA: ICD-10-CM

## 2023-08-12 DIAGNOSIS — I87.2 VENOUS STASIS ULCER OF LEFT CALF LIMITED TO BREAKDOWN OF SKIN WITHOUT VARICOSE VEINS (HCC): ICD-10-CM

## 2023-08-12 PROCEDURE — 99283 EMERGENCY DEPT VISIT LOW MDM: CPT

## 2023-08-12 PROCEDURE — 306318 STOCKING THIGH HI XXLRG REG: Performed by: EMERGENCY MEDICINE

## 2023-08-12 PROCEDURE — 700102 HCHG RX REV CODE 250 W/ 637 OVERRIDE(OP): Performed by: EMERGENCY MEDICINE

## 2023-08-12 PROCEDURE — A9270 NON-COVERED ITEM OR SERVICE: HCPCS | Performed by: EMERGENCY MEDICINE

## 2023-08-12 RX ORDER — PRAMIPEXOLE DIHYDROCHLORIDE 0.5 MG/1
3 TABLET ORAL ONCE
Status: COMPLETED | OUTPATIENT
Start: 2023-08-12 | End: 2023-08-12

## 2023-08-12 RX ADMIN — PRAMIPEXOLE DIHYDROCHLORIDE 3 MG: 0.5 TABLET ORAL at 19:28

## 2023-08-12 ASSESSMENT — FIBROSIS 4 INDEX: FIB4 SCORE: 1.11

## 2023-08-12 ASSESSMENT — PAIN DESCRIPTION - DESCRIPTORS: DESCRIPTORS: BURNING

## 2023-08-13 NOTE — ED NOTES
Pt discharged home, discharge and follow up care instructions given, pt verbalized understanding. pt ambulated out of ED independently.

## 2023-08-13 NOTE — DISCHARGE INSTRUCTIONS
You can keep the dressing placed here over your leg ulcer for 1 week.  We have referred you to wound care.  You can call to schedule an appointment.  You should also schedule a follow-up appointment with your primary care provider so that he knows what is going on.  Return here for worsening symptoms instead of gradual improvement, and at any time for fevers or chills or body aches or symptoms of infection.

## 2023-08-13 NOTE — ED TRIAGE NOTES
"Chief Complaint   Patient presents with    Leg Pain     Pt here for LLE swelling, pain, and a small weeping wound. Pt was here on 7/29 for same cc and d/c w/ abx. Pt states she has finished full course of abx and there has been no improvement to the cellulitis to LLE     /61   Pulse 90   Temp 36.1 °C (97 °F) (Temporal)   Resp 18   Ht 1.499 m (4' 11\")   Wt 92.6 kg (204 lb 2.3 oz)   LMP  (LMP Unknown)   SpO2 96%   BMI 41.23 kg/m²     Pt returning to ER for re-evaluation for above cc  LLE swelling, redness, pain, w/ weeping wound on back calf started several weeks ago and was treated here on 7/29. Pt diagnosed w/ LLE cellulitis and sent w/ abx. Pt finished full course and has had no improvement    Pt still has noted redness, pitting edema to LLE and weeping/scabbed wounds on the back calf    Pt to gianni, educated on rooming process   "

## 2023-08-13 NOTE — ED PROVIDER NOTES
ER Provider Note    Scribed for Duy Orlando M.d. by Patricia Wu. 8/12/2023  6:16 PM    Primary Care Provider: Lj Marion III, M.D.    CHIEF COMPLAINT  Chief Complaint   Patient presents with    Leg Pain     Pt here for LLE swelling, pain, and a small weeping wound. Pt was here on 7/29 for same cc and d/c w/ abx. Pt states she has finished full course of abx and there has been no improvement to the cellulitis to LLE     EXTERNAL RECORDS REVIEWED  Outpatient Notes    Outpatient records show a long history of chronic bilateral lower extremity lymphedema. She was prescribed a course pf Augmentin on 7/29 for possible left leg cellulitis. 7/29 US negative for DVT.    HPI/ROS  LIMITATION TO HISTORY   Select: : None    OUTSIDE HISTORIAN(S):  None    Ariadna Sepulveda is a 72 y.o. female who presents to the ED complaining of lower extremity swelling onset over two weeks ago. The patient notes a long history of chronic lower extremity swelling. She notes that she has a small wound on the back of her legs also, that she was told is ulcers. She notes that she was seen on 7/29 and was noted to have possible cellulitis and was prescribed antibiotics. She notes she finished her course of antibiotics but has not noticed any improved to the swelling of her legs. She notes she also had an ultrasound done at this time which showed no sign of DVT. There are no known alleviating or exacerbating factors.      PAST MEDICAL HISTORY  Past Medical History:   Diagnosis Date    Arthritis     At risk for sleep apnea     CPAP    Hypertension     RLS (restless legs syndrome) 10/5/2011       SURGICAL HISTORY  Past Surgical History:   Procedure Laterality Date    NAILA BY LAPAROSCOPY N/A 12/4/2021    Procedure: CHOLECYSTECTOMY, LAPAROSCOPIC;  Surgeon: Jos Rodriguez M.D.;  Location: SURGERY [facility];  Service: General    KNEE REPLACEMENT, TOTAL  left    OTHER ORTHOPEDIC SURGERY      TONSILLECTOMY         FAMILY HISTORY  Family  "History   Problem Relation Age of Onset    Cancer Mother     Arthritis Father     Diabetes Father     Heart Disease Father     Hypertension Father     Hyperlipidemia Father     Psychiatric Illness Brother     Drug abuse Brother     Alcohol abuse Brother     Lung Disease Neg Hx        SOCIAL HISTORY   reports that she has never smoked. She has never used smokeless tobacco. She reports current alcohol use of about 4.8 oz of alcohol per week. She reports that she does not use drugs.    CURRENT MEDICATIONS  Previous Medications    AMOXICILLIN-CLAVULANATE (AUGMENTIN) 875-125 MG TAB    Take 1 Tablet by mouth 2 times a day.    OXYBUTYNIN (DITROPAN XL) 15 MG CR TABLET    TAKE 2 TABLETS BY MOUTH EVERY DAY    PRAMIPEXOLE (MIRAPEX) 1 MG TAB    TAKE 3 TABLETS BY MOUTH EVERY EVENING       ALLERGIES  Patient has no known allergies.    PHYSICAL EXAM  VITAL SIGNS: /61   Pulse 90   Temp 36.1 °C (97 °F) (Temporal)   Resp 18   Ht 1.499 m (4' 11\")   Wt 92.6 kg (204 lb 2.3 oz)   LMP  (LMP Unknown)   SpO2 96%   BMI 41.23 kg/m²   Pulse ox interpretation: I interpret this pulse ox as normal.  Constitutional: Alert in no apparent distress.  HENT: No signs of trauma, Bilateral external ears normal, Nose normal.   Eyes: Pupils are equal and reactive, Conjunctiva normal, Non-icteric.   Neck: Normal range of motion, Supple, No stridor.    Cardiovascular: Normal peripheral perfusion  Thorax & Lungs: Unlabored respirations, equal chest expansion, no accessory muscle use  Abdomen: Non-distended  Skin:   No rash.   Back: Normal alignment and ROM  Extremities: Bilateral lower extremity lymphedema appears chronic, left greater than right (patient reports left leg swelling has always been worse since left knee replacement) left posterior calf shows two chronic appearing ulcers measuring approximately 4 cm in diameter, no sign of abscess, mild surrounding erythema,   Musculoskeletal: Good range of motion in all major joints.   Neurologic: " Alert, Normal motor function, No focal deficits noted.   Psychiatric: Affect normal, Judgment normal, Mood normal.       COURSE & MEDICAL DECISION MAKING     ED Observation Status? No; Patient does not meet criteria for ED Observation.     INITIAL ASSESSMENT, COURSE AND PLAN  Care Narrative:     6:16 PM Patient presents to the ED with chronic bilateral lower extremity swelling.  Her records also show she has had ulcers to this same portion of her left extremity as far back as 2021.  Patient evaluated at bedside and discussed plan of care, including medication and referral to [facility] wound care.  There is some mild surrounding erythema, but this did not improve with a recent course of antibiotics, she has no systemic symptoms, so I do not think this represents true cellulitis.  Patient will be treated with Mirapex 3 mg, per her request, since she says she usually takes for restless leg syndrome medications at this time of night, and we are waiting for a Mepilex dressing which can be left in place for a week, while she schedules follow-up with primary care and schedules the referral I placed for wound care.. The plan for discharge was discussed. Patient and/or family was given the opportunity to ask any questions. Patient and/or family verbalizes understanding and agreement to this plan of care.    Differential diagnoses include but not limited to: chronic bilateral lower extremity lymphedema, chronic venous stasis ulcer, with consideration for superimposed infection, but, after bedside evaluation, I believe this is a chronic ulcer without superimposed infection.    ADDITIONAL PROBLEM LIST  Chronic lymphedema, chronic venous stasis ulcer.    DISPOSITION AND DISCUSSIONS  I have discussed management of the patient with the following physicians and JAMES's:  None    Discussion of management with other QHP or appropriate source(s): None     Escalation of care considered, and ultimately not performed: IV fluids and blood  analysis.  Considering the patient's evaluation today and the chronicity of these issues, I do not think that there is evidence of systemic illness or need for evaluation beyond physical exam.    Barriers to care at this time, including but not limited to:  None .     Decision tools and prescription drugs considered including, but not limited to: Medication modification considered, but patient will be treated with appropriate wound dressing, compression tights, and wound care referral, with close ED return precautions. .    The patient will return for new or worsening symptoms and is stable at the time of discharge.    The patient is referred to a primary physician for blood pressure management, diabetic screening, and for all other preventative health concerns.    DISPOSITION:  Patient will be discharged home in stable condition.    FOLLOW UP:  [facility]  [address]  [facility] [address]  [phone]  Schedule an appointment as soon as possible for a visit       Lj Marion III, M.D.  [address] [facility] [id number] [phone]    Schedule an appointment as soon as possible for a visit       FINAL DIAGNOSIS  1. Venous stasis ulcer of left calf limited to breakdown of skin without varicose veins (HCC)    2. Lymphedema Chronic       Patricia LEE (Ashanti), am scribing for, and in the presence of, Duy Orlando M.D..    Electronically signed by: Patricia Wu (Ashanti), 8/12/2023    Duy LEE M.D. personally performed the services described in this documentation, as scribed by Patricia Wu in my presence, and it is both accurate and complete.

## 2023-08-13 NOTE — ED NOTES
Taiwo hose stocking placed onto the left leg, pt educated on application and removal, pt verbalized understanding.

## 2023-08-13 NOTE — ED NOTES
Wheeled to room by this RN but noted able to transfer from wheelchair/able to ambulate from wheelchair to [place] w/steady gait

## 2023-08-22 ENCOUNTER — OFFICE VISIT (OUTPATIENT)
Dept: WOUND CARE | Facility: MEDICAL CENTER | Age: 73
End: 2023-08-22
Attending: EMERGENCY MEDICINE
Payer: COMMERCIAL

## 2023-08-22 VITALS
OXYGEN SATURATION: 94 % | TEMPERATURE: 97.7 F | RESPIRATION RATE: 20 BRPM | SYSTOLIC BLOOD PRESSURE: 138 MMHG | DIASTOLIC BLOOD PRESSURE: 63 MMHG | HEART RATE: 64 BPM

## 2023-08-22 DIAGNOSIS — I89.0 LYMPHEDEMA: ICD-10-CM

## 2023-08-22 DIAGNOSIS — I83.029 VENOUS ULCER OF LEFT LEG (HCC): ICD-10-CM

## 2023-08-22 DIAGNOSIS — E66.9 OBESITY WITHOUT SERIOUS COMORBIDITY, UNSPECIFIED CLASSIFICATION, UNSPECIFIED OBESITY TYPE: ICD-10-CM

## 2023-08-22 DIAGNOSIS — L97.929 VENOUS ULCER OF LEFT LEG (HCC): ICD-10-CM

## 2023-08-22 PROCEDURE — 3075F SYST BP GE 130 - 139MM HG: CPT | Performed by: STUDENT IN AN ORGANIZED HEALTH CARE EDUCATION/TRAINING PROGRAM

## 2023-08-22 PROCEDURE — 1126F AMNT PAIN NOTED NONE PRSNT: CPT | Performed by: STUDENT IN AN ORGANIZED HEALTH CARE EDUCATION/TRAINING PROGRAM

## 2023-08-22 PROCEDURE — 99214 OFFICE O/P EST MOD 30 MIN: CPT | Mod: 25 | Performed by: STUDENT IN AN ORGANIZED HEALTH CARE EDUCATION/TRAINING PROGRAM

## 2023-08-22 PROCEDURE — 11042 DBRDMT SUBQ TIS 1ST 20SQCM/<: CPT

## 2023-08-22 PROCEDURE — 11042 DBRDMT SUBQ TIS 1ST 20SQCM/<: CPT | Performed by: STUDENT IN AN ORGANIZED HEALTH CARE EDUCATION/TRAINING PROGRAM

## 2023-08-22 PROCEDURE — 99214 OFFICE O/P EST MOD 30 MIN: CPT

## 2023-08-22 PROCEDURE — 3078F DIAST BP <80 MM HG: CPT | Performed by: STUDENT IN AN ORGANIZED HEALTH CARE EDUCATION/TRAINING PROGRAM

## 2023-08-22 ASSESSMENT — ENCOUNTER SYMPTOMS
CHILLS: 0
CLAUDICATION: 0
MYALGIAS: 1
FEVER: 0

## 2023-08-22 ASSESSMENT — PAIN SCALES - GENERAL: PAINLEVEL: NO PAIN

## 2023-08-22 NOTE — PROGRESS NOTES
Provider Encounter- Lower Extremity Ulcer      HISTORY OF PRESENT ILLNESS  Wound History:    START OF CARE IN CLINIC: 8/22/2023    REFERRING PROVIDER: Duy Orlando      WOUND ETIOLOGY: Venous / Lymphedema   LOCATION: Left posterior lower extremity   HISTORY:  72F with PMHx of chronic lower extremity edema, restless leg syndrome, HTN, Obesity. Patient reports that she developed chronic lower extremity edema L>R following left knee replacement 20+ years ago. She has never sought treatment. Never has been compressed. Patient spends majority of day at [facility] with legs dependent. Patient was admitted 6/2023 for cellulitis and noted to have wound. Wound has not healed and she has been to ED multiple times in since end of July and occasionally treated with Ab. Patient does not wear any compression. She was referred to [facility] for further care.    Pertinent Medical History: chronic lower extremity edema, restless leg syndrome, HTN, Obesity    ETIOLOGY HISTORY: Diagnosed never. Vascular Surgeon: None. Previous vascular procedures None. Compression None. Varicose Veins None    TOBACCO USE:  Denies ever using    Patient's problem list, allergies, and current medications reviewed and updated in Epic    Interval History:  8/22/2023: Clinic visit with Jacky Ballesteros MD. Patient reports that she has long history of lower extremity edema, L>R which started following left total knee arthroplasty 20+ years ago. She has denies being formally diagnosed with venous insufficiency or lymphedema in past. Patient does not wear compression, tried compression socks but do not fit her due to champagne shaped legs. Patient spends majority of her time at [facility] with legs dependent. She does elevate when able at home and at night. Patient reports that she has had wounds come and go over the years. Current left posterior leg ulceration started 6/2023 was hospitalized for cellulitis and discharged. Reports previous ulcers and wounds in past  have resolved spontaneously. Patient has new bullae anterior lower extremity which spontaneously draining serous fluid.      REVIEW OF SYSTEMS:   Review of Systems   Constitutional:  Negative for chills and fever.   Cardiovascular:  Positive for leg swelling. Negative for claudication.   Musculoskeletal:  Positive for myalgias.   Skin:         Open wound left lower extremity posterior         PHYSICAL EXAMINATION:   /63   Pulse 64   Temp 36.5 °C (97.7 °F)   Resp 20   LMP  (LMP Unknown)   SpO2 94%     Physical Exam  Constitutional:       General: She is not in acute distress.     Appearance: She is obese.   Cardiovascular:      Rate and Rhythm: Normal rate.      Comments: Palpable pedal pulses  Pulmonary:      Effort: Pulmonary effort is normal. No respiratory distress.   Musculoskeletal:      Right lower leg: Edema present.      Left lower leg: Edema present.   Skin:     Comments: Left lower extremity ulcer - Wound is constellation and irregular, thin layer of slough. Weeping serous fluid. Mild periwound erythema, appears to be from chronic wound rather than infection.    Left anterior lower extremity - Ruptured bullae, epithelium intact. Leaking serous fluid    Bilateral lower extremity lymphedema with classic champagne shaped leg.   Neurological:      Mental Status: She is alert.         WOUND ASSESSMENT  Wound 08/22/23 Venous Ulcer Leg Posterior Left (Active)   Wound Image    08/22/23 1430   Site Assessment Red;Yellow 08/22/23 1430   Periwound Assessment Edema;Scar tissue;Blanchable erythema 08/22/23 1430   Margins Attached edges 08/22/23 1430   Drainage Amount Large 08/22/23 1430   Drainage Description Serosanguineous 08/22/23 1430   Treatments Cleansed;Topical Lidocaine;Provider debridement 08/22/23 1430   Wound Cleansing Hypochlorus Acid 08/22/23 1430   Periwound Protectant Barrier Paste;Skin Moisturizer 08/22/23 1430   Dressing Changed New 08/22/23 1430   Dressing Cleansing/Solutions Not Applicable  08/22/23 1430   Dressing Options Hydrofiber Silver;Super Absorbent Pad;Soft Conforming Roll;Compression Wrap Two Layer 08/22/23 1430   Dressing Change/Treatment Frequency Weekly, and As Needed 08/22/23 1430   Wound Team Following Weekly 08/22/23 1430   Non-staged Wound Description Full thickness 08/22/23 1430   Wound Length (cm) 1.1 cm 08/22/23 1430   Wound Width (cm) 1.6 cm 08/22/23 1430   Wound Depth (cm) 0.1 cm 08/22/23 1430   Wound Surface Area (cm^2) 1.76 cm^2 08/22/23 1430   Wound Volume (cm^3) 0.176 cm^3 08/22/23 1430   Post-Procedure Length (cm) 1.4 cm 08/22/23 1430   Post-Procedure Width (cm) 2 cm 08/22/23 1430   Post-Procedure Depth (cm) 0.1 cm 08/22/23 1430   Post-Procedure Surface Area (cm^2) 2.8 cm^2 08/22/23 1430   Post-Procedure Volume (cm^3) 0.28 cm^3 08/22/23 1430   Tunneling (cm) 0 cm 08/22/23 1430   Undermining (cm) 0 cm 08/22/23 1430   Wound Odor None 08/22/23 1430   Pulses Left;2+;DP;PT 08/22/23 1430   Exposed Structures None 08/22/23 1430   Number of days: 0       Wound 08/22/23 Venous Ulcer Leg Posterior;Medial Left (Active)   Wound Image    08/22/23 1430   Site Assessment Red;Yellow 08/22/23 1430   Periwound Assessment Edema;Scar tissue;Blanchable erythema 08/22/23 1430   Margins Attached edges 08/22/23 1430   Drainage Amount Large 08/22/23 1430   Drainage Description Serosanguineous 08/22/23 1430   Treatments Cleansed;Topical Lidocaine;Provider debridement 08/22/23 1430   Wound Cleansing Hypochlorus Acid 08/22/23 1430   Periwound Protectant Barrier Paste;Skin Moisturizer 08/22/23 1430   Dressing Changed New 08/22/23 1430   Dressing Cleansing/Solutions Not Applicable 08/22/23 1430   Dressing Options Hydrofiber Silver;Super Absorbent Pad;Soft Conforming Roll;Compression Wrap Two Layer 08/22/23 1430   Dressing Change/Treatment Frequency Weekly, and As Needed 08/22/23 1430   Wound Team Following Weekly 08/22/23 1430   Non-staged Wound Description Full thickness 08/22/23 1430   Wound Length (cm)  1.7 cm 08/22/23 1430   Wound Width (cm) 2 cm 08/22/23 1430   Wound Depth (cm) 0.1 cm 08/22/23 1430   Wound Surface Area (cm^2) 3.4 cm^2 08/22/23 1430   Wound Volume (cm^3) 0.34 cm^3 08/22/23 1430   Post-Procedure Length (cm) 1.7 cm 08/22/23 1430   Post-Procedure Width (cm) 2.2 cm 08/22/23 1430   Post-Procedure Depth (cm) 0.1 cm 08/22/23 1430   Post-Procedure Surface Area (cm^2) 3.74 cm^2 08/22/23 1430   Post-Procedure Volume (cm^3) 0.374 cm^3 08/22/23 1430   Tunneling (cm) 0 cm 08/22/23 1430   Undermining (cm) 0 cm 08/22/23 1430   Wound Odor None 08/22/23 1430   Pulses Left;2+;DP;PT 08/22/23 1430   Exposed Structures None 08/22/23 1430   Number of days: 0       PROCEDURE: Sharp debridement left medial and posterior lower extremity wounds  -2% viscous lidocaine applied topically to wound bed for approximately 5 minutes prior to debridement  -Curette used to excise nonviable tissue from wound bed.  Excisional debridement was performed to remove devitalized tissue until healthy, bleeding tissue was visualized.   Entire surface of wound, 6.54 cm2 debrided.  Tissue debrided into the subcutaneous layer.    -Bleeding controlled with manual pressure.   -Wound care completed by wound RN, refer to wound flowsheet   -Patient tolerated the procedure well, without c/o of significant pain or discomfort.       Pertinent Labs and Diagnostics:    Labs:   A1c:   Lab Results   Component Value Date/Time    HBA1C 5.5 05/25/2022 02:28 AM            IMAGING: Xray left foot 7/29/2023  IMPRESSION:     1.  There is diffuse swelling. No soft tissue gas or radiographic evidence of osteomyelitis.  2.  There is osteopenia.  3.  There is multifocal osteoarthritis.    VASCULAR STUDIES: 7/29/2023 Venous US - DVT study   CONCLUSIONS   1.  No evidence of Left lower extremity DVT.    LAST  WOUND CULTURE:  DATE :    Lab Results   Component Value Date/Time    CULTRSULT Usual urogenital roxanna <10,000 cfu/mL 06/01/2023 09:45 AM              ASSESSMENT AND  PLAN:     1. Venous ulcer of left leg (HCC)    8/22/2023  - Patient with spontaneous left posterior and medial lower extremity wounds related to venous insufficiency and lymphedema  - Excisional debridement was performed in clinic, medically necessary to promote wound healing.  - Patient counseled that she will need compression to heal wounds. May be challenging due to classic champagne shaped lower extremity.  - Return to clinic weekly for assessment and debridement as needed   Wound care: Hydrofiber silver, Super absorbant pad, soft roll, compression wrap two layer compression    2. Lymphedema    8/22/2023  - Left leg diameter larger than right. Reports swelling for 20+ years following left TKA. Classic lymphedema champagne shaped extremity.  - Patient counseled about pathology of disease process  - Counseled that she will need lifelong compression  - Will place order for pneumatic compression device to help manage edema. Will also place referral to lymphedema clinic  - Counseled that she should elevate extremities as much as possible. [place] Counseled that this will increase edema and make treatment challenging.    3. Obesity without serious comorbidity, unspecified classification, unspecified obesity type  - Risk factor for impaired wound healing. Risk for increasing venous hypertension. Recommend weight loss, she reports loosing 40 lbs intentionally over the past year.      PATIENT EDUCATION  - Etiology of venous stasis ulceration discussed with patient  - Importance of managing edema for healing of ulcer, and for prevention of new ulcer development  -Need for lifelong compression of lower legs   -Elevate legs above the level of the heart periodically throughout the day.  - Importance of adequate nutrition for wound healing  -Advised to go to ER for any increased redness, swelling, drainage or odor, or if patient develops fever, chills, nausea or  vomiting.      My total time spent caring for the patient on the day of the encounter was 30 minutes.   This does not include time spent on separately billable procedures/tests.       Please note that this note may have been created using voice recognition software. I have worked with technical experts from [facility] to optimize the interface.  I have made every reasonable attempt to correct obvious errors, but there may be errors of grammar and possibly     N

## 2023-08-22 NOTE — PATIENT INSTRUCTIONS
-Keep your wound dressing clean, dry, and intact.    -Change your dressing if it becomes soiled, soaked, or falls off.    -Remove your compression wrap if you have severe pain, severe swelling, numbness, color change, or temperature change in your toes. If you need to remove your compression wrap, do so by unrolling it. Do not cut the compression wrap off to prevent cutting yourself on accident.    -Should you experience any significant changes in your wound(s), such as infection (redness, swelling, localized heat, increased pain, fever > 101 F, chills) or have any questions regarding your home care instructions, please contact the wound center at [phone]. If after hours, contact your primary care physician or go to the hospital emergency room.

## 2023-08-25 RX ORDER — PRAMIPEXOLE DIHYDROCHLORIDE 1 MG/1
3 TABLET ORAL NIGHTLY
Qty: 90 TABLET | Refills: 0 | Status: SHIPPED | OUTPATIENT
Start: 2023-08-25 | End: 2023-09-22

## 2023-09-06 ENCOUNTER — OFFICE VISIT (OUTPATIENT)
Dept: MEDICAL GROUP | Facility: PHYSICIAN GROUP | Age: 73
End: 2023-09-06
Payer: COMMERCIAL

## 2023-09-06 VITALS
DIASTOLIC BLOOD PRESSURE: 74 MMHG | WEIGHT: 203 LBS | OXYGEN SATURATION: 99 % | BODY MASS INDEX: 40.92 KG/M2 | RESPIRATION RATE: 18 BRPM | HEIGHT: 59 IN | SYSTOLIC BLOOD PRESSURE: 132 MMHG | TEMPERATURE: 98 F | HEART RATE: 68 BPM

## 2023-09-06 DIAGNOSIS — E66.01 MORBID OBESITY (HCC): ICD-10-CM

## 2023-09-06 DIAGNOSIS — D64.9 ANEMIA, UNSPECIFIED TYPE: ICD-10-CM

## 2023-09-06 DIAGNOSIS — Z12.11 COLON CANCER SCREENING: ICD-10-CM

## 2023-09-06 DIAGNOSIS — R60.0 BILATERAL LOWER EXTREMITY EDEMA: ICD-10-CM

## 2023-09-06 DIAGNOSIS — G25.81 RLS (RESTLESS LEGS SYNDROME): Chronic | ICD-10-CM

## 2023-09-06 DIAGNOSIS — N32.81 OVERACTIVE BLADDER: ICD-10-CM

## 2023-09-06 DIAGNOSIS — Z12.31 ENCOUNTER FOR SCREENING MAMMOGRAM FOR MALIGNANT NEOPLASM OF BREAST: ICD-10-CM

## 2023-09-06 PROBLEM — L03.116 CELLULITIS OF LEFT LOWER EXTREMITY: Status: RESOLVED | Noted: 2023-06-01 | Resolved: 2023-09-06

## 2023-09-06 PROBLEM — E66.9 OBESITY: Status: RESOLVED | Noted: 2022-05-25 | Resolved: 2023-09-06

## 2023-09-06 PROCEDURE — 3075F SYST BP GE 130 - 139MM HG: CPT | Performed by: FAMILY MEDICINE

## 2023-09-06 PROCEDURE — 3078F DIAST BP <80 MM HG: CPT | Performed by: FAMILY MEDICINE

## 2023-09-06 PROCEDURE — 99214 OFFICE O/P EST MOD 30 MIN: CPT | Performed by: FAMILY MEDICINE

## 2023-09-06 RX ORDER — OXYBUTYNIN CHLORIDE 15 MG/1
30 TABLET, EXTENDED RELEASE ORAL DAILY
Qty: 180 TABLET | Refills: 3 | Status: SHIPPED | OUTPATIENT
Start: 2023-09-06 | End: 2024-01-25

## 2023-09-06 ASSESSMENT — FIBROSIS 4 INDEX: FIB4 SCORE: 1.11

## 2023-09-06 NOTE — ASSESSMENT & PLAN NOTE
This is a chronic problem.  Patient is followed at the wound clinic.  She also was referred to the lymphedema clinic but she had lost her phone.  I looked up the phone number for her and she is going to call them today about setting up her appointment.  She also was supposedly having an order sent for compression equipment to help with her leg swelling.  She has difficulty putting on compression stockings.  She does state last week when she was ill and having to stay in bed for 3 days the swelling in her legs went down considerably.  But she does spend most of the [place]

## 2023-09-06 NOTE — ASSESSMENT & PLAN NOTE
This is a chronic long-term problem.  Patient's symptoms are well controlled on current medications.

## 2023-09-06 NOTE — ASSESSMENT & PLAN NOTE
This is a chronic problem.  Patient has had low-grade anemia for quite some time.  We will continue to monitor for trend.

## 2023-09-06 NOTE — PROGRESS NOTES
Subjective:     CC: Here for follow-up and several issues.    HPI:   Ariadna presents today with the following medical concerns:    Bilateral lower extremity edema  This is a chronic problem.  Patient is followed at the wound clinic.  She also was referred to the lymphedema clinic but she had lost her phone.  I looked up the phone number for her and she is going to call them today about setting up her appointment.  She also was supposedly having an order sent for compression equipment to help with her leg swelling.  She has difficulty putting on compression stockings.  She does state last week when she was ill and having to stay in bed for 3 days the swelling in her legs went down considerably.  But she does spend most of the day running around doing things and sitting at the "[employer].    Anemia  This is a chronic problem.  Patient has had low-grade anemia for quite some time.  We will continue to monitor for trend.    Morbid obesity (HCC)  This is a chronic problem.  Patient has lost a little bit of weight since last seen.    Overactive bladder  This is a chronic problem.  Patient is doing well on current medications and needs a refill.    RLS (restless legs syndrome)  This is a chronic long-term problem.  Patient's symptoms are well controlled on current medications.    Past Medical History:   Diagnosis Date    Arthritis     At risk for sleep apnea     CPAP    Hypertension     RLS (restless legs syndrome) 10/5/2011       Social History     Tobacco Use    Smoking status: Never    Smokeless tobacco: Never   Vaping Use    Vaping Use: Never used   Substance Use Topics    Alcohol use: Yes     Alcohol/week: 4.8 oz     Types: 8 Cans of beer per week     Comment: 1-2 every other day or when goes to "[employer]    Drug use: No       Current Outpatient Medications Ordered in Epic   Medication Sig Dispense Refill    oxybutynin (DITROPAN-XL) 15 MG CR tablet Take 2 Tablets by mouth every day. 180 Tablet 3    pramipexole (MIRAPEX) 1 MG  "Tab TAKE 3 TABLETS BY MOUTH EVERY EVENING 90 Tablet 0     No current Epic-ordered facility-administered medications on file.       Allergies:  Patient has no known allergies.    Health Maintenance: Completed    ROS:  Gen: no fevers/chills,   Eyes: no changes in vision  ENT: no sore throat, no hearing loss, no bloody nose  Pulm: no sob, no cough  CV: no chest pain, no palpitations  GI: no nausea/vomiting, no diarrhea  : no dysuria  MSk: no myalgias  Skin: no rash  Neuro: no headaches, no numbness/tingling  Heme/Lymph: no easy bruising      Objective:       Exam:  /74 (BP Location: Right arm, Patient Position: Sitting, BP Cuff Size: Adult)   Pulse 68   Temp 36.7 °C (98 °F) (Axillary)   Resp 18   Ht 1.499 m (4' 11\")   Wt 92.1 kg (203 lb)   LMP  (LMP Unknown)   SpO2 99%   BMI 41.00 kg/m²  Body mass index is 41 kg/m².    Gen: Alert and oriented, No apparent distress.  Eyes:   Extraocular motions intact.  No scleral icterus seen.  Ears:    Ear canals and TMs are clear.  Neck: Neck is supple without lymphadenopathy.  Lungs: Normal effort, CTA bilaterally, no wheezes, rhonchi, or rales  CV: Regular rate and rhythm. No murmurs, rubs, or gallops.  Abdomen: Soft, nontender, no organomegaly or masses.  Normal bowel sounds.  Ext: No clubbing, cyanosis.  Patient has edema to both legs with the left being a little larger than the right.  It is nonpitting and from the knee distally.  Neuro: Cranial nerves II through VIII are grossly intact.  No lateralized signs are seen.  Gait is normal.      Labs: Reviewed    Assessment & Plan:     72 y.o. female with the following -     1. Overactive bladder  This is a chronic stable condition.  Medication renewed.  - oxybutynin (DITROPAN-XL) 15 MG CR tablet; Take 2 Tablets by mouth every day.  Dispense: 180 Tablet; Refill: 3    2. Encounter for screening mammogram for malignant neoplasm of breast  This is a screening issue.  Mammogram ordered.  - MA-SCREENING MAMMO BILAT " W/TOMOSYNTHESIS W/CAD; Future    3. Colon cancer screening  This is a screening issue.  Patient states she has a history of polyps in the past.  She is way past due for colonoscopy and that will be ordered.  - Referral to GI for Colonoscopy    4. Bilateral lower extremity edema  This is a chronic problem.  She was given the phone number to the lymphedema clinic to call and encouraged to keep her legs elevated is much as possible.  Also she is to check on the compression equipment you supposed to .  - Patient identified as fall risk.  Appropriate orders and counseling given.    5. RLS (restless legs syndrome)  This is a chronic condition.  Continue current medications.    6. Morbid obesity (HCC)  This is a chronic problem.  Continue to work on weight reduction.  - Patient identified as having weight management issue.  Appropriate orders and counseling given.    7. Anemia, unspecified type  This is a chronic stable condition.  Continue to follow.      Return in about 1 year (around 9/6/2024) for Long.    Please note that this dictation was created using voice recognition software. I have made every reasonable attempt to correct obvious errors, but I expect that there are errors of grammar and possibly content that I did not discover before finalizing the note.

## 2023-09-14 ENCOUNTER — HOSPITAL ENCOUNTER (OUTPATIENT)
Dept: RADIOLOGY | Facility: MEDICAL CENTER | Age: 73
End: 2023-09-14
Attending: FAMILY MEDICINE
Payer: COMMERCIAL

## 2023-09-14 DIAGNOSIS — Z12.31 ENCOUNTER FOR SCREENING MAMMOGRAM FOR MALIGNANT NEOPLASM OF BREAST: ICD-10-CM

## 2023-09-14 PROCEDURE — 77063 BREAST TOMOSYNTHESIS BI: CPT

## 2023-09-22 RX ORDER — PRAMIPEXOLE DIHYDROCHLORIDE 1 MG/1
3 TABLET ORAL NIGHTLY
Qty: 270 TABLET | Refills: 3 | Status: SHIPPED | OUTPATIENT
Start: 2023-09-22 | End: 2024-01-25

## 2023-09-22 NOTE — TELEPHONE ENCOUNTER
Received request via: Pharmacy    Was the patient seen in the last year in this department? Yes    Does the patient have an active prescription (recently filled or refills available) for medication(s) requested? No    Does the patient have skilled nursing Plus and need 100 day supply (blood pressure, diabetes and cholesterol meds only)? Patient does not have SCP  
100

## 2024-01-08 ENCOUNTER — TELEPHONE (OUTPATIENT)
Dept: MEDICAL GROUP | Facility: PHYSICIAN GROUP | Age: 74
End: 2024-01-08
Payer: COMMERCIAL
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
## 2024-01-12 ENCOUNTER — TELEPHONE (OUTPATIENT)
Dept: MEDICAL GROUP | Facility: PHYSICIAN GROUP | Age: 74
End: 2024-01-12
Payer: COMMERCIAL

## 2024-01-12 DIAGNOSIS — N32.81 OVERACTIVE BLADDER: ICD-10-CM
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
## 2024-01-16 ENCOUNTER — HOSPITAL ENCOUNTER (EMERGENCY)
Facility: MEDICAL CENTER | Age: 74
End: 2024-01-17
Attending: STUDENT IN AN ORGANIZED HEALTH CARE EDUCATION/TRAINING PROGRAM
Payer: COMMERCIAL

## 2024-01-16 DIAGNOSIS — M79.10 MYALGIA: ICD-10-CM

## 2024-01-16 DIAGNOSIS — D64.9 ANEMIA, UNSPECIFIED TYPE: ICD-10-CM

## 2024-01-16 DIAGNOSIS — M79.671 FOOT PAIN, RIGHT: ICD-10-CM

## 2024-01-16 LAB
BASOPHILS # BLD AUTO: 0.5 % (ref 0–1.8)
BASOPHILS # BLD: 0.03 K/UL (ref 0–0.12)
EOSINOPHIL # BLD AUTO: 0.26 K/UL (ref 0–0.51)
EOSINOPHIL NFR BLD: 4.1 % (ref 0–6.9)
ERYTHROCYTE [DISTWIDTH] IN BLOOD BY AUTOMATED COUNT: 44.6 FL (ref 35.9–50)
HCT VFR BLD AUTO: 35.2 % (ref 37–47)
HGB BLD-MCNC: 11.8 G/DL (ref 12–16)
IMM GRANULOCYTES # BLD AUTO: 0.01 K/UL (ref 0–0.11)
IMM GRANULOCYTES NFR BLD AUTO: 0.2 % (ref 0–0.9)
LYMPHOCYTES # BLD AUTO: 1.19 K/UL (ref 1–4.8)
LYMPHOCYTES NFR BLD: 18.9 % (ref 22–41)
MCH RBC QN AUTO: 29.9 PG (ref 27–33)
MCHC RBC AUTO-ENTMCNC: 33.5 G/DL (ref 32.2–35.5)
MCV RBC AUTO: 89.1 FL (ref 81.4–97.8)
MONOCYTES # BLD AUTO: 0.66 K/UL (ref 0–0.85)
MONOCYTES NFR BLD AUTO: 10.5 % (ref 0–13.4)
NEUTROPHILS # BLD AUTO: 4.13 K/UL (ref 1.82–7.42)
NEUTROPHILS NFR BLD: 65.8 % (ref 44–72)
NRBC # BLD AUTO: 0 K/UL
NRBC BLD-RTO: 0 /100 WBC (ref 0–0.2)
PLATELET # BLD AUTO: 281 K/UL (ref 164–446)
PMV BLD AUTO: 9.3 FL (ref 9–12.9)
RBC # BLD AUTO: 3.95 M/UL (ref 4.2–5.4)
WBC # BLD AUTO: 6.3 K/UL (ref 4.8–10.8)

## 2024-01-16 PROCEDURE — 85025 COMPLETE CBC W/AUTO DIFF WBC: CPT

## 2024-01-16 PROCEDURE — 99284 EMERGENCY DEPT VISIT MOD MDM: CPT

## 2024-01-16 PROCEDURE — 700102 HCHG RX REV CODE 250 W/ 637 OVERRIDE(OP): Performed by: STUDENT IN AN ORGANIZED HEALTH CARE EDUCATION/TRAINING PROGRAM

## 2024-01-16 PROCEDURE — 80053 COMPREHEN METABOLIC PANEL: CPT

## 2024-01-16 PROCEDURE — 36415 COLL VENOUS BLD VENIPUNCTURE: CPT

## 2024-01-16 PROCEDURE — A9270 NON-COVERED ITEM OR SERVICE: HCPCS | Performed by: STUDENT IN AN ORGANIZED HEALTH CARE EDUCATION/TRAINING PROGRAM

## 2024-01-16 RX ORDER — ACETAMINOPHEN 325 MG/1
650 TABLET ORAL ONCE
Status: COMPLETED | OUTPATIENT
Start: 2024-01-16 | End: 2024-01-16

## 2024-01-16 RX ORDER — IBUPROFEN 600 MG/1
600 TABLET ORAL ONCE
Status: COMPLETED | OUTPATIENT
Start: 2024-01-16 | End: 2024-01-16

## 2024-01-16 RX ADMIN — ACETAMINOPHEN 650 MG: 325 TABLET, FILM COATED ORAL at 23:29

## 2024-01-16 RX ADMIN — IBUPROFEN 600 MG: 600 TABLET, FILM COATED ORAL at 23:30

## 2024-01-16 ASSESSMENT — FIBROSIS 4 INDEX: FIB4 SCORE: 1.12

## 2024-01-17 VITALS
DIASTOLIC BLOOD PRESSURE: 77 MMHG | HEIGHT: 59 IN | SYSTOLIC BLOOD PRESSURE: 156 MMHG | TEMPERATURE: 97.9 F | OXYGEN SATURATION: 97 % | WEIGHT: 205.91 LBS | BODY MASS INDEX: 41.51 KG/M2 | HEART RATE: 78 BPM | RESPIRATION RATE: 17 BRPM

## 2024-01-17 LAB
ALBUMIN SERPL BCP-MCNC: 3.7 G/DL (ref 3.2–4.9)
ALBUMIN/GLOB SERPL: 1.3 G/DL
ALP SERPL-CCNC: 93 U/L (ref 30–99)
ALT SERPL-CCNC: 15 U/L (ref 2–50)
ANION GAP SERPL CALC-SCNC: 12 MMOL/L (ref 7–16)
AST SERPL-CCNC: 15 U/L (ref 12–45)
BILIRUB SERPL-MCNC: 0.2 MG/DL (ref 0.1–1.5)
BUN SERPL-MCNC: 17 MG/DL (ref 8–22)
CALCIUM ALBUM COR SERPL-MCNC: 8.5 MG/DL (ref 8.5–10.5)
CALCIUM SERPL-MCNC: 8.3 MG/DL (ref 8.5–10.5)
CHLORIDE SERPL-SCNC: 109 MMOL/L (ref 96–112)
CO2 SERPL-SCNC: 18 MMOL/L (ref 20–33)
CREAT SERPL-MCNC: 0.62 MG/DL (ref 0.5–1.4)
GFR SERPLBLD CREATININE-BSD FMLA CKD-EPI: 95 ML/MIN/1.73 M 2
GLOBULIN SER CALC-MCNC: 2.8 G/DL (ref 1.9–3.5)
GLUCOSE SERPL-MCNC: 118 MG/DL (ref 65–99)
POTASSIUM SERPL-SCNC: 3.8 MMOL/L (ref 3.6–5.5)
PROT SERPL-MCNC: 6.5 G/DL (ref 6–8.2)
SODIUM SERPL-SCNC: 139 MMOL/L (ref 135–145)

## 2024-01-17 ASSESSMENT — PAIN DESCRIPTION - PAIN TYPE: TYPE: ACUTE PAIN

## 2024-01-17 NOTE — DISCHARGE INSTRUCTIONS
Your labs today show that you are slightly dehydrated but were otherwise largely normal.  Please follow-up with your primary care doctor to discuss the your symptoms.

## 2024-01-17 NOTE — ED PROVIDER NOTES
ED Provider Note    CHIEF COMPLAINT  Chief Complaint   Patient presents with    Foot Pain     Patient complaining of right foot pain for the last few days. Cramping and pain with movement, believes its associated with a previous fracture. CMS intact,        EXTERNAL RECORDS REVIEWED  Outpatient Notes patient seen with family practitioner 9/6/2023 for follow-up of several issues including bilateral lower extremity edema which was noted to be a chronic issue, anemia which is chronic, morbid obesity, overactive bladder, restless leg syndrome.    HPI/ROS  LIMITATION TO HISTORY   Select: : None      Ariadna Sepulveda is a 73 y.o. female who presents to the emergency department for evaluation of bilateral intermittent cramping pain in her calves and feet, worse on the right side for the last 2 days.  She states the last night it was so severe that she could not sleep.  She reports history of restless leg syndrome and states this feels different.  She states that she injured her right foot previously but denies any recent falls injuries or trauma.  She states that she has chronic swelling of her legs and that it is not new today.  She does states she recently completed a course of antibiotics for cellulitis but states that this affects the redness.  She denies fevers chills or additional symptoms.    PAST MEDICAL HISTORY   has a past medical history of Arthritis, At risk for sleep apnea, Hypertension, and RLS (restless legs syndrome) (10/5/2011).    SURGICAL HISTORY   has a past surgical history that includes tonsillectomy; knee replacement, total (left); other orthopedic surgery; and isaiah by laparoscopy (N/A, 12/4/2021).    FAMILY HISTORY  Family History   Problem Relation Age of Onset    Cancer Mother     Arthritis Father     Diabetes Father     Heart Disease Father     Hypertension Father     Hyperlipidemia Father     Psychiatric Illness Brother     Drug abuse Brother     Alcohol abuse Brother     Lung Disease Neg Hx   "      SOCIAL HISTORY  Social History     Tobacco Use    Smoking status: Never    Smokeless tobacco: Never   Vaping Use    Vaping Use: Never used   Substance and Sexual Activity    Alcohol use: Yes     Alcohol/week: 4.8 oz     Types: 8 Cans of beer per week     Comment: 1-2 every other day or when goes to [employer]    Drug use: No    Sexual activity: Not Currently       CURRENT MEDICATIONS  Home Medications       Reviewed by Louise Robledo R.N. (Registered Nurse) on 01/16/24 at 2210  Med List Status: Not Addressed     Medication Last Dose Status   oxybutynin (DITROPAN-XL) 15 MG CR tablet  Active   pramipexole (MIRAPEX) 1 MG Tab  Active                    ALLERGIES  No Known Allergies    PHYSICAL EXAM  VITAL SIGNS: BP (!) 171/75   Pulse 66   Temp 36.7 °C (98 °F) (Temporal)   Resp 18   Ht 1.499 m (4' 11\")   Wt 93.4 kg (205 lb 14.6 oz)   LMP  (LMP Unknown)   SpO2 99%   BMI 41.59 kg/m²    Constitutional: Uncomfortable appearing  HEENT: Atraumatic, normocephalic, pupils are equal round reactive to light, nose normal, mouth shows moist mucous membranes  Neck: Supple, no JVD, no tracheal deviation  Cardiovascular: Regular rate and rhythm, no murmur, rub or gallop, 2+ pulses peripherally x4  Thorax & Lungs: No respiratory distress, no wheezes, rales or rhonchi, no chest wall tenderness.  GI: Soft, non-distended, non-tender, no rebound  Skin: Warm, dry, no acute rash or lesion  Musculoskeletal: Moving all extremities, 2+ bilateral lower extremity edema left greater than right.  No muscular tenderness, subcutaneous emphysema.  Neurologic: A&Ox3, at baseline mentation, 5 out of 5 strength and normal sensation distally in the lower extremities.  Psychiatric: Appropriate affect for situation at this time          DIAGNOSTIC STUDIES / PROCEDURES    LABS  Labs Reviewed   CBC WITH DIFFERENTIAL - Abnormal; Notable for the following components:       Result Value    RBC 3.95 (*)     Hemoglobin 11.8 (*)     Hematocrit 35.2 (*) "     Lymphocytes 18.90 (*)     All other components within normal limits   COMP METABOLIC PANEL - Abnormal; Notable for the following components:    Co2 18 (*)     Glucose 118 (*)     Calcium 8.3 (*)     All other components within normal limits   ESTIMATED GFR       COURSE & MEDICAL DECISION MAKING    ED Observation Status? No; Patient does not meet criteria for ED Observation.     INITIAL ASSESSMENT, COURSE AND PLAN  Care Narrative:     Patient with a history of chronic bilateral lower extremity edema, intermittent cellulitis, restless leg syndrome is presenting to the emergency department for evaluation of bilateral leg cramping.  Similar presentation in July of last year with left greater than right lower extremity edema at which time an ultrasound was performed demonstrating no evidence of DVT.  Examination appears consistent with lymphedema and patient states that her legs appear at baseline in terms of size today therefore I feel DVT is unlikely particularly given the patient's right leg is bothering her the most and it is chiefly right foot pain.  She has no tenderness on examination or findings consistent with cellulitis today however.  She has no report of any recent traumatic injury.  History of recurrent ulcers of the extremities but none present today.  At this point I am unclear as to what is causing her intermittent cramping pain which she best describes as muscle cramps.  Possible electrolyte abnormality for which CMP was obtained.  CBC to evaluate given patient with history of chronic anemia.  Will provide symptom control with Tylenol and ibuprofen as patient has taken nothing for pain tonight.  Will reassess following.    Patient's laboratory workup is largely unremarkable demonstrating a baseline anemia, slight acidosis likely reflective of dehydration but otherwise unremarkable with no significant significant electrolyte abnormality.  Discussed this with the patient who is feeling better after  Tylenol and ibuprofen.  Recommended she schedule a follow-up appointment with her primary care doctor and continue to take Tylenol and ibuprofen as needed.  Return precautions discussed and all questions answered and she was discharged in stable condition      ADDITIONAL PROBLEM LIST  Restless leg syndrome    DISPOSITION AND DISCUSSIONS  I have discussed management of the patient with the following physicians and JAMES's: None    Discussion of management with other [facility] or appropriate source(s): None     Escalation of care considered, and ultimately not performed:diagnostic imaging    Decision tools and prescription drugs considered including, but not limited to: Pain Medications Tylenol and ibuprofen for pain .    FINAL IMPRESSION  1. Foot pain, right    2. Myalgia    3. Anemia, unspecified type        PRESCRIPTIONS  New Prescriptions    No medications on file       FOLLOW UP  Lj Marion III, M.D.  [address] [facility] [id number] [phone]    Schedule an appointment as soon as possible for a visit       [facility], Emergency Dept  [address] [facility] [id number] [phone]    As needed, If symptoms worsen        -DISCHARGE-    Electronically signed by: Rajiv Rubio M.D., 1/16/2024 10:57 PM

## 2024-01-17 NOTE — ED TRIAGE NOTES
"Chief Complaint   Patient presents with    Foot Pain     Patient complaining of right foot pain for the last few days. Cramping and pain with movement, believes its associated with a previous fracture. CMS intact,        BP (!) 171/75   Pulse 66   Temp 36.7 °C (98 °F) (Temporal)   Resp 18   Ht 1.499 m (4' 11\")   Wt 93.4 kg (205 lb 14.6 oz)   SpO2 99%     Patient educated on ed triage process, instructed to notify staff of any new or worsening symptoms, verbalizes understanding. Patient returned to ed lobby, apologized for wait times.     "

## 2024-01-25 ENCOUNTER — PHARMACY VISIT (OUTPATIENT)
Dept: PHARMACY | Facility: MEDICAL CENTER | Age: 74
End: 2024-01-25
Payer: COMMERCIAL

## 2024-01-25 ENCOUNTER — APPOINTMENT (OUTPATIENT)
Dept: RADIOLOGY | Facility: MEDICAL CENTER | Age: 74
End: 2024-01-25
Attending: EMERGENCY MEDICINE
Payer: COMMERCIAL

## 2024-01-25 ENCOUNTER — HOSPITAL ENCOUNTER (EMERGENCY)
Facility: MEDICAL CENTER | Age: 74
End: 2024-01-25
Attending: EMERGENCY MEDICINE
Payer: COMMERCIAL

## 2024-01-25 VITALS
DIASTOLIC BLOOD PRESSURE: 85 MMHG | WEIGHT: 206.57 LBS | HEART RATE: 68 BPM | RESPIRATION RATE: 20 BRPM | BODY MASS INDEX: 41.72 KG/M2 | SYSTOLIC BLOOD PRESSURE: 163 MMHG | TEMPERATURE: 97.8 F | OXYGEN SATURATION: 98 %

## 2024-01-25 DIAGNOSIS — E83.51 HYPOCALCEMIA: ICD-10-CM

## 2024-01-25 DIAGNOSIS — R19.7 DIARRHEA, UNSPECIFIED TYPE: ICD-10-CM

## 2024-01-25 DIAGNOSIS — R10.9 ABDOMINAL PAIN, UNSPECIFIED ABDOMINAL LOCATION: ICD-10-CM

## 2024-01-25 DIAGNOSIS — L03.116 CELLULITIS OF LEFT LEG: ICD-10-CM

## 2024-01-25 DIAGNOSIS — E78.5 HYPERLIPIDEMIA, UNSPECIFIED HYPERLIPIDEMIA TYPE: ICD-10-CM

## 2024-01-25 DIAGNOSIS — N32.81 OVERACTIVE BLADDER: ICD-10-CM

## 2024-01-25 DIAGNOSIS — G25.81 RLS (RESTLESS LEGS SYNDROME): Chronic | ICD-10-CM

## 2024-01-25 LAB
ALBUMIN SERPL BCP-MCNC: 3.7 G/DL (ref 3.2–4.9)
ALBUMIN/GLOB SERPL: 1.2 G/DL
ALP SERPL-CCNC: 99 U/L (ref 30–99)
ALT SERPL-CCNC: 21 U/L (ref 2–50)
ANION GAP SERPL CALC-SCNC: 10 MMOL/L (ref 7–16)
APPEARANCE UR: ABNORMAL
AST SERPL-CCNC: 22 U/L (ref 12–45)
BACTERIA #/AREA URNS HPF: NEGATIVE /HPF
BASOPHILS # BLD AUTO: 0.5 % (ref 0–1.8)
BASOPHILS # BLD: 0.04 K/UL (ref 0–0.12)
BILIRUB SERPL-MCNC: 0.3 MG/DL (ref 0.1–1.5)
BILIRUB UR QL STRIP.AUTO: NEGATIVE
BUN SERPL-MCNC: 13 MG/DL (ref 8–22)
CALCIUM ALBUM COR SERPL-MCNC: 8.4 MG/DL (ref 8.5–10.5)
CALCIUM SERPL-MCNC: 8.2 MG/DL (ref 8.5–10.5)
CHLORIDE SERPL-SCNC: 106 MMOL/L (ref 96–112)
CO2 SERPL-SCNC: 22 MMOL/L (ref 20–33)
COLOR UR: YELLOW
CREAT SERPL-MCNC: 0.38 MG/DL (ref 0.5–1.4)
EOSINOPHIL # BLD AUTO: 0.27 K/UL (ref 0–0.51)
EOSINOPHIL NFR BLD: 3.6 % (ref 0–6.9)
EPI CELLS #/AREA URNS HPF: NORMAL /HPF
ERYTHROCYTE [DISTWIDTH] IN BLOOD BY AUTOMATED COUNT: 45.3 FL (ref 35.9–50)
GFR SERPLBLD CREATININE-BSD FMLA CKD-EPI: 107 ML/MIN/1.73 M 2
GLOBULIN SER CALC-MCNC: 3 G/DL (ref 1.9–3.5)
GLUCOSE SERPL-MCNC: 101 MG/DL (ref 65–99)
GLUCOSE UR STRIP.AUTO-MCNC: NEGATIVE MG/DL
HCT VFR BLD AUTO: 39.7 % (ref 37–47)
HGB BLD-MCNC: 13 G/DL (ref 12–16)
HYALINE CASTS #/AREA URNS LPF: NORMAL /LPF
IMM GRANULOCYTES # BLD AUTO: 0.02 K/UL (ref 0–0.11)
IMM GRANULOCYTES NFR BLD AUTO: 0.3 % (ref 0–0.9)
KETONES UR STRIP.AUTO-MCNC: ABNORMAL MG/DL
LEUKOCYTE ESTERASE UR QL STRIP.AUTO: NEGATIVE
LIPASE SERPL-CCNC: 20 U/L (ref 11–82)
LYMPHOCYTES # BLD AUTO: 1.18 K/UL (ref 1–4.8)
LYMPHOCYTES NFR BLD: 15.8 % (ref 22–41)
MCH RBC QN AUTO: 29.3 PG (ref 27–33)
MCHC RBC AUTO-ENTMCNC: 32.7 G/DL (ref 32.2–35.5)
MCV RBC AUTO: 89.6 FL (ref 81.4–97.8)
MICRO URNS: ABNORMAL
MONOCYTES # BLD AUTO: 0.56 K/UL (ref 0–0.85)
MONOCYTES NFR BLD AUTO: 7.5 % (ref 0–13.4)
NEUTROPHILS # BLD AUTO: 5.4 K/UL (ref 1.82–7.42)
NEUTROPHILS NFR BLD: 72.3 % (ref 44–72)
NITRITE UR QL STRIP.AUTO: NEGATIVE
NRBC # BLD AUTO: 0 K/UL
NRBC BLD-RTO: 0 /100 WBC (ref 0–0.2)
PH UR STRIP.AUTO: 6 [PH] (ref 5–8)
PLATELET # BLD AUTO: 343 K/UL (ref 164–446)
PMV BLD AUTO: 9.4 FL (ref 9–12.9)
POTASSIUM SERPL-SCNC: 3.8 MMOL/L (ref 3.6–5.5)
PROT SERPL-MCNC: 6.7 G/DL (ref 6–8.2)
PROT UR QL STRIP: NEGATIVE MG/DL
RBC # BLD AUTO: 4.43 M/UL (ref 4.2–5.4)
RBC # URNS HPF: NORMAL /HPF
RBC UR QL AUTO: NEGATIVE
SODIUM SERPL-SCNC: 138 MMOL/L (ref 135–145)
SP GR UR STRIP.AUTO: 1.03
UROBILINOGEN UR STRIP.AUTO-MCNC: 1 MG/DL
WBC # BLD AUTO: 7.5 K/UL (ref 4.8–10.8)
WBC #/AREA URNS HPF: NORMAL /HPF

## 2024-01-25 PROCEDURE — 700117 HCHG RX CONTRAST REV CODE 255: Performed by: EMERGENCY MEDICINE

## 2024-01-25 PROCEDURE — 36415 COLL VENOUS BLD VENIPUNCTURE: CPT

## 2024-01-25 PROCEDURE — RXMED WILLOW AMBULATORY MEDICATION CHARGE: Performed by: EMERGENCY MEDICINE

## 2024-01-25 PROCEDURE — 99285 EMERGENCY DEPT VISIT HI MDM: CPT

## 2024-01-25 PROCEDURE — 81001 URINALYSIS AUTO W/SCOPE: CPT

## 2024-01-25 PROCEDURE — 74177 CT ABD & PELVIS W/CONTRAST: CPT

## 2024-01-25 PROCEDURE — 85025 COMPLETE CBC W/AUTO DIFF WBC: CPT

## 2024-01-25 PROCEDURE — 83690 ASSAY OF LIPASE: CPT

## 2024-01-25 PROCEDURE — 80053 COMPREHEN METABOLIC PANEL: CPT

## 2024-01-25 RX ORDER — PRAMIPEXOLE DIHYDROCHLORIDE 1 MG/1
3 TABLET ORAL NIGHTLY
Qty: 90 TABLET | Refills: 0 | Status: SHIPPED | OUTPATIENT
Start: 2024-01-25 | End: 2024-03-06 | Stop reason: SDUPTHER

## 2024-01-25 RX ORDER — OXYBUTYNIN CHLORIDE 15 MG/1
30 TABLET, EXTENDED RELEASE ORAL DAILY
Qty: 90 TABLET | Refills: 3 | Status: SHIPPED | OUTPATIENT
Start: 2024-01-25 | End: 2024-03-06 | Stop reason: SDUPTHER

## 2024-01-25 RX ORDER — CEPHALEXIN 500 MG/1
500 CAPSULE ORAL 4 TIMES DAILY
Qty: 28 CAPSULE | Refills: 0 | Status: ACTIVE | OUTPATIENT
Start: 2024-01-25 | End: 2024-02-01

## 2024-01-25 RX ADMIN — IOHEXOL 100 ML: 350 INJECTION, SOLUTION INTRAVENOUS at 12:49

## 2024-01-25 ASSESSMENT — FIBROSIS 4 INDEX: FIB4 SCORE: 0.98

## 2024-01-25 NOTE — ED TRIAGE NOTES
Ambulates to triage  Chief Complaint   Patient presents with    Abdominal Pain     X1 month, worse after eating, hx of a isaiah    Diarrhea     X1 month     Has not seen a PCP for this in the last month. Denies any n/v. Protocol ordered.

## 2024-01-25 NOTE — DISCHARGE INSTRUCTIONS
A referral for a new primary care provider has been placed in the computer.  Someone should call you with an appointment.  Please explain to them that your primary care provider is too far for you to get to you would like someone closer to [facility].    A referral for urology for your urinary incontinence has been placed.    A referral for neurology for your restless leg syndrome has been placed.    A referral for gastroenterology for your ongoing diarrhea has been placed.    Return to the ER for fever, increasing pain, vomiting, or other concerns.    Take Keflex

## 2024-01-25 NOTE — ED PROVIDER NOTES
"ED Provider Note    CHIEF COMPLAINT  Chief Complaint   Patient presents with    Abdominal Pain     X1 month, worse after eating, hx of a isaiah    Diarrhea     X1 month       EXTERNAL RECORDS REVIEWED  Inpatient Notes discharge summary from 6/2/2023 reviewed.  Patient was treated for cellulitis.  Patient treated with Unasyn for leg cellulitis.    HPI/ROS  LIMITATION TO HISTORY   Select: : None    OUTSIDE HISTORIAN(S):  none    Ariadna Sepulveda is a 73 y.o. female with history of hypertension who presents for evaluation of abdominal pain and diarrhea.    Patient reports crampy, mild, generalized abdominal pain that is intermittent and associated with diarrhea that is been going on for more than 1 month.  Patient denies nausea, vomiting, fever, chills, mucus or blood in the stool.  She does not have a GI doctor.    Patient is also concerned she may be developing \"another cellulitis\" of her left leg.    Patient states she was measured for a machine to improve the circulation of her legs 1 month ago.  Her PCP had placed this referral.  She has not received the machine or heard anything since then.    Patient requests refills/assistance in obtaining her oxybutynin and her medication for restless leg syndrome.  She states her doctor refused to fill these.  She states she needs a referral for neurology as well as urology.  She states she has trouble getting to her doctors appointments because he is far away from her and she needs to pay $35 each time she goes to see him.    Patient has had a cholecystectomy.    PAST MEDICAL HISTORY   has a past medical history of Arthritis, At risk for sleep apnea, Hypertension, and RLS (restless legs syndrome) (10/5/2011).    SURGICAL HISTORY   has a past surgical history that includes tonsillectomy; knee replacement, total (left); other orthopedic surgery; and isaiah by laparoscopy (N/A, 12/4/2021).    FAMILY HISTORY  Family History   Problem Relation Age of Onset    Cancer Mother     " Arthritis Father     Diabetes Father     Heart Disease Father     Hypertension Father     Hyperlipidemia Father     Psychiatric Illness Brother     Drug abuse Brother     Alcohol abuse Brother     Lung Disease Neg Hx        SOCIAL HISTORY  Social History     Tobacco Use    Smoking status: Never    Smokeless tobacco: Never   Vaping Use    Vaping Use: Never used   Substance and Sexual Activity    Alcohol use: Yes     Alcohol/week: 4.8 oz     Types: 8 Cans of beer per week     Comment: 1-2 every other day or when goes to casino    Drug use: No    Sexual activity: Not Currently       CURRENT MEDICATIONS  Home Medications       Reviewed by Milena Nobles R.N. (Registered Nurse) on 01/25/24 at 0930  Med List Status: Partial     Medication Last Dose Status   oxybutynin (DITROPAN-XL) 15 MG CR tablet 1/23/2024 Active   pramipexole (MIRAPEX) 1 MG Tab 1/23/2024 Active                    ALLERGIES  No Known Allergies    PHYSICAL EXAM  VITAL SIGNS: BP (!) 163/85   Pulse 71   Temp 36.1 °C (97 °F) (Temporal)   Resp (!) 22   Wt 93.7 kg (206 lb 9.1 oz)   LMP  (LMP Unknown)   SpO2 96%   BMI 41.72 kg/m²    General:  WD female with elevated BMI, nontoxic appearing in NAD; A+Ox3; V/S as above; afebrile, not tachycardic or hypoxic or hypotensive  Skin: warm and dry; good color; no rash  HEENT: NCAT; EOMs intact; PERRL; no scleral icterus   Neck: FROM  Cardiovascular: Regular heart rate and rhythm.  No murmurs, rubs, or gallops; pulses 2+ bilaterally radially and DP areas  Lungs: No respiratory distress or tachypnea; Clear to auscultation with good air movement bilaterally.  No wheezes, rhonchi, or rales.   Abdomen: soft; NTND; no rebound, guarding, or rigidity.  No organomegaly or pulsatile mass  Extremities: HERNANDEZ x 4; 2+ edema of the bilateral lower extremities; no streaking, no crepitus, no induration; neg Cyndy's; no palpable cords; faint erythema over the anterior aspect of the left lower leg; there is some faint erythema  also of the bilateral medial thighs  Neurologic: CNs III-XII grossly intact; speech clear; distal sensation intact; strength 5/5 UE/LEs  Psychiatric: Appropriate affect, normal mood      DIAGNOSTIC STUDIES / PROCEDURES  LABS  Results for orders placed or performed during the hospital encounter of 01/25/24   CBC with Differential   Result Value Ref Range    WBC 7.5 4.8 - 10.8 K/uL    RBC 4.43 4.20 - 5.40 M/uL    Hemoglobin 13.0 12.0 - 16.0 g/dL    Hematocrit 39.7 37.0 - 47.0 %    MCV 89.6 81.4 - 97.8 fL    MCH 29.3 27.0 - 33.0 pg    MCHC 32.7 32.2 - 35.5 g/dL    RDW 45.3 35.9 - 50.0 fL    Platelet Count 343 164 - 446 K/uL    MPV 9.4 9.0 - 12.9 fL    Neutrophils-Polys 72.30 (H) 44.00 - 72.00 %    Lymphocytes 15.80 (L) 22.00 - 41.00 %    Monocytes 7.50 0.00 - 13.40 %    Eosinophils 3.60 0.00 - 6.90 %    Basophils 0.50 0.00 - 1.80 %    Immature Granulocytes 0.30 0.00 - 0.90 %    Nucleated RBC 0.00 0.00 - 0.20 /100 WBC    Neutrophils (Absolute) 5.40 1.82 - 7.42 K/uL    Lymphs (Absolute) 1.18 1.00 - 4.80 K/uL    Monos (Absolute) 0.56 0.00 - 0.85 K/uL    Eos (Absolute) 0.27 0.00 - 0.51 K/uL    Baso (Absolute) 0.04 0.00 - 0.12 K/uL    Immature Granulocytes (abs) 0.02 0.00 - 0.11 K/uL    NRBC (Absolute) 0.00 K/uL   Complete Metabolic Panel   Result Value Ref Range    Sodium 138 135 - 145 mmol/L    Potassium 3.8 3.6 - 5.5 mmol/L    Chloride 106 96 - 112 mmol/L    Co2 22 20 - 33 mmol/L    Anion Gap 10.0 7.0 - 16.0    Glucose 101 (H) 65 - 99 mg/dL    Bun 13 8 - 22 mg/dL    Creatinine 0.38 (L) 0.50 - 1.40 mg/dL    Calcium 8.2 (L) 8.5 - 10.5 mg/dL    Correct Calcium 8.4 (L) 8.5 - 10.5 mg/dL    AST(SGOT) 22 12 - 45 U/L    ALT(SGPT) 21 2 - 50 U/L    Alkaline Phosphatase 99 30 - 99 U/L    Total Bilirubin 0.3 0.1 - 1.5 mg/dL    Albumin 3.7 3.2 - 4.9 g/dL    Total Protein 6.7 6.0 - 8.2 g/dL    Globulin 3.0 1.9 - 3.5 g/dL    A-G Ratio 1.2 g/dL   Lipase   Result Value Ref Range    Lipase 20 11 - 82 U/L   Urinalysis    Specimen: Urine    Result Value Ref Range    Color Yellow     Character Cloudy (A)     Specific Gravity 1.027 <1.035    Ph 6.0 5.0 - 8.0    Glucose Negative Negative mg/dL    Ketones Trace (A) Negative mg/dL    Protein Negative Negative mg/dL    Bilirubin Negative Negative    Urobilinogen, Urine 1.0 Negative    Nitrite Negative Negative    Leukocyte Esterase Negative Negative    Occult Blood Negative Negative    Micro Urine Req Microscopic    ESTIMATED GFR   Result Value Ref Range    GFR (CKD-EPI) 107 >60 mL/min/1.73 m 2   URINE MICROSCOPIC (W/UA)   Result Value Ref Range    WBC 0-2 /hpf    RBC 0-2 /hpf    Bacteria Negative None /hpf    Epithelial Cells Few /hpf    Hyaline Cast 0-2 /lpf         RADIOLOGY  Radiologist interpretation:   CT-ABDOMEN-PELVIS WITH   Final Result      1.  No acute abnormalities are identified in the abdomen or pelvis.            COURSE & MEDICAL DECISION MAKING    ED Observation Status? No; Patient does not meet criteria for ED Observation.     INITIAL ASSESSMENT, COURSE AND PLAN  Care Narrative: This is a 73-year-old female who presents for abdominal pain and diarrhea for 1 month.      Abdominal pain protocol labs were ordered at triage.  These revealed normal white blood cell count with lymphopenia and neutrophilia, no anemia, hypocalcemia corrected at 8.4.    Labs demonstrate a normal white blood cell count, normal hemoglobin, lymphopenia, mild hypocalcemia and trace ketones.  No evidence of UTI.  Normal lipase and transaminases.    CT abdomen/pelvis demonstrate no acute abnormalities.    1:51 PM  Patient's abdomen is soft, nontender, nonsurgical.  She was advised of her imaging results.  She is concerned that she may have cellulitis on her left leg.    I called case management and left a message regarding patient's issues/requests.  Patient requires assistance in filling medications until she is paid on Monday.  These medications include oxybutynin, Mirapex, and Keflex.  Patient also inquired about the  delay in receiving a machine to improve the circulation in her lower extremities that she was measured for 1 month ago.  No one at her PCPs office is returning her calls.  Patient will be discharged once case management weighs in on these issues.    2:11 PM  I was able to speak to case management.  They feel meds to beds will be able to fill the 3 prescriptions from today.  They will check on the status of the machine that her PCP ordered for the lymphedema.      HYDRATION: Based on the patient's presentation of Acute Diarrhea and Dehydration the patient was given IV fluids. IV Hydration was used because oral hydration was not adequate alone. Upon recheck following hydration, the patient was improved.        DISPOSITION AND DISCUSSIONS  Discussion of management with other QHP or appropriate source(s): Case Management as per my note above      Escalation of care considered, and ultimately not performed:acute inpatient care management, however at this time, the patient is most appropriate for outpatient management    Barriers to care at this time, including but not limited to: Patient lacks transportation  and Patient had difficult affording medications.     Decision tools and prescription drugs considered including, but not limited to: Antibiotics Keflex .    FINAL DIAGNOSIS  1. Abdominal pain, unspecified abdominal location    2. Diarrhea, unspecified type    3. Hypocalcemia    4. RLS (restless legs syndrome)    5. Overactive bladder    6. Hyperlipidemia, unspecified hyperlipidemia type    7. Cellulitis of left leg           Electronically signed by: Tasha Chávez M.D., 1/25/2024 10:51 AM

## 2024-01-25 NOTE — DISCHARGE PLANNING
ER  Note:  Received call from ERP regarding assistance with pt medications, prescriptions were sent to [facility], pt is concerned about payment. RN CM called [facility], they are able to add payment to hospital bill and pt will receive bill in the mail. [facility] also said they do not have pramipexole but can order med and it will be available around noon tomorrow. RN CM spoke to pt at bedside. Per pt, she doesn't get paid until next week. Pt agreed with adding pharmacy bill to hospital bill as described above. Informed pt about pramipexole not available today, can be picked up tomorrow or option to send prescription to another pharmacy. Pt opted to keep prescription at [facility] and pt will  medication tomorrow. RN CM informed [facility]. Medications will be ready for  in 15-20 minutes. Pt reported someone went to her house to measure her but she has not received the machine to help improve her circulation. Pt to call her PCP to follow up.  Pt also reported her current PCP is too far away from where she lives, requested for a PCP office closer to her home. RN CM called hospital , new appointment in AVS. Copy given to pt, highlighted new PCP appointment. Pt verbalized understanding and denies other needs at this time.   Addendum:  RN CM assisted with picking up medications from pharmacy. Two medications delivered to pt. Highlighted [facility] contact information in AVS and PCP appointment. Reminded pt 3rd medication can be picked up tomorrow around noon per [facility]. Pt verbalized understanding and denies other needs at this time. ED RN updated via [employer].

## 2024-01-25 NOTE — ED NOTES
Patient given discharge instructions, patient verbalized understanding, signed.  Awaiting meds to be delivered by case management.

## 2024-01-28 ENCOUNTER — PHARMACY VISIT (OUTPATIENT)
Dept: PHARMACY | Facility: MEDICAL CENTER | Age: 74
End: 2024-01-28

## 2024-02-01 ENCOUNTER — TELEPHONE (OUTPATIENT)
Dept: HEALTH INFORMATION MANAGEMENT | Facility: OTHER | Age: 74
End: 2024-02-01

## 2024-03-03 ENCOUNTER — HOSPITAL ENCOUNTER (EMERGENCY)
Facility: MEDICAL CENTER | Age: 74
End: 2024-03-03
Attending: EMERGENCY MEDICINE
Payer: COMMERCIAL

## 2024-03-03 VITALS
WEIGHT: 211.86 LBS | RESPIRATION RATE: 18 BRPM | BODY MASS INDEX: 38.99 KG/M2 | HEIGHT: 62 IN | DIASTOLIC BLOOD PRESSURE: 86 MMHG | TEMPERATURE: 97.9 F | HEART RATE: 78 BPM | SYSTOLIC BLOOD PRESSURE: 182 MMHG | OXYGEN SATURATION: 95 %

## 2024-03-03 PROCEDURE — 302449 STATCHG TRIAGE ONLY (STATISTIC)

## 2024-03-03 ASSESSMENT — FIBROSIS 4 INDEX: FIB4 SCORE: 0.99

## 2024-03-03 NOTE — ED TRIAGE NOTES
Patient ambulatory to triage w/ a request for a med refill for her Oxybutynin and Pramipexole.  Patient ran out 2 days ago.  Patient states she was told by the pharmacy that her prescription was cancelled.  Patient has a appointment with her PMD 3/18.

## 2024-03-04 NOTE — ED NOTES
Pt removed BP cuff, taken off pulse ox, looked for pt and pt not found in room. No pt belongings in room. Pt left room without being seen by ERP

## 2024-03-04 NOTE — ED PROVIDER NOTES
ED Provider Note    Patient not in the room, apparently left prior to evaluation without notifying anyone

## 2024-03-06 ENCOUNTER — OFFICE VISIT (OUTPATIENT)
Dept: URGENT CARE | Facility: PHYSICIAN GROUP | Age: 74
End: 2024-03-06
Payer: COMMERCIAL

## 2024-03-06 VITALS
TEMPERATURE: 98.2 F | HEIGHT: 62 IN | SYSTOLIC BLOOD PRESSURE: 130 MMHG | BODY MASS INDEX: 38.64 KG/M2 | RESPIRATION RATE: 14 BRPM | WEIGHT: 210 LBS | HEART RATE: 62 BPM | DIASTOLIC BLOOD PRESSURE: 84 MMHG | OXYGEN SATURATION: 96 %

## 2024-03-06 DIAGNOSIS — N32.81 OVERACTIVE BLADDER: ICD-10-CM

## 2024-03-06 DIAGNOSIS — G25.81 RLS (RESTLESS LEGS SYNDROME): Chronic | ICD-10-CM

## 2024-03-06 PROCEDURE — 3075F SYST BP GE 130 - 139MM HG: CPT | Performed by: PHYSICIAN ASSISTANT

## 2024-03-06 PROCEDURE — 3079F DIAST BP 80-89 MM HG: CPT | Performed by: PHYSICIAN ASSISTANT

## 2024-03-06 PROCEDURE — 99214 OFFICE O/P EST MOD 30 MIN: CPT | Performed by: PHYSICIAN ASSISTANT

## 2024-03-06 RX ORDER — PRAMIPEXOLE DIHYDROCHLORIDE 1 MG/1
3 TABLET ORAL NIGHTLY
Qty: 90 TABLET | Refills: 0 | Status: SHIPPED | OUTPATIENT
Start: 2024-03-06

## 2024-03-06 RX ORDER — OXYBUTYNIN CHLORIDE 15 MG/1
30 TABLET, EXTENDED RELEASE ORAL DAILY
Qty: 60 TABLET | Refills: 0 | Status: SHIPPED | OUTPATIENT
Start: 2024-03-06

## 2024-03-06 ASSESSMENT — ENCOUNTER SYMPTOMS
SHORTNESS OF BREATH: 0
CONSTIPATION: 0
WHEEZING: 0
HEADACHES: 0
DIZZINESS: 0
CHILLS: 0
EYE PAIN: 0
EYE DISCHARGE: 0
FEVER: 0
SINUS PAIN: 0
COUGH: 0
EYE REDNESS: 0
SORE THROAT: 0
VOMITING: 0
NAUSEA: 0
DIARRHEA: 0
DIAPHORESIS: 0
ABDOMINAL PAIN: 0

## 2024-03-06 ASSESSMENT — FIBROSIS 4 INDEX: FIB4 SCORE: 0.99

## 2024-03-06 NOTE — PROGRESS NOTES
"  Subjective:     Ariadna Sepulveda  is a 73 y.o. female who presents for Medication Refill (Oxybutynin and pramipexole)       She presents today requesting a refill of her oxybutynin for overactive bladder and pramipexole for her restless leg syndrome.  She was scheduled with her primary care provider but her appointment was canceled and rescheduled for 18 March.  She has been without her medications over the past several days and states she is unable to sleep due to the symptoms.  Has been using these medications for many years without any adverse effects.  She did present to the prescription dose and frequency in office today.  Denies fever/chills/sweats, chest pain, shortness of breath, nausea/vomiting, abdominal pain, diarrhea.       Review of Systems   Constitutional:  Negative for chills, diaphoresis, fever and malaise/fatigue.   HENT:  Negative for congestion, ear discharge, sinus pain and sore throat.    Eyes:  Negative for pain, discharge and redness.   Respiratory:  Negative for cough, shortness of breath and wheezing.    Cardiovascular:  Negative for chest pain.   Gastrointestinal:  Negative for abdominal pain, constipation, diarrhea, nausea and vomiting.   Genitourinary:         Overactive bladder   Musculoskeletal:         Restless leg syndrome   Neurological:  Negative for dizziness and headaches.      No Known Allergies  Past Medical History:   Diagnosis Date    Arthritis     At risk for sleep apnea     CPAP    Hypertension     RLS (restless legs syndrome) 10/5/2011        Objective:   /84   Pulse 62   Temp 36.8 °C (98.2 °F) (Temporal)   Resp 14   Ht 1.575 m (5' 2\")   Wt 95.3 kg (210 lb)   LMP  (LMP Unknown)   SpO2 96%   BMI 38.41 kg/m²   Physical Exam  Vitals and nursing note reviewed.   Constitutional:       General: She is not in acute distress.     Appearance: She is not ill-appearing or toxic-appearing.   HENT:      Head: Normocephalic.      Nose: No rhinorrhea.   Eyes:      " General: No scleral icterus.     Conjunctiva/sclera: Conjunctivae normal.   Pulmonary:      Effort: Pulmonary effort is normal. No respiratory distress.      Breath sounds: No stridor.   Musculoskeletal:      Cervical back: Neck supple.   Neurological:      Mental Status: She is alert and oriented to person, place, and time.   Psychiatric:         Mood and Affect: Mood normal.         Behavior: Behavior normal.         Thought Content: Thought content normal.         Judgment: Judgment normal.             Diagnostic testing: None    Assessment/Plan:     Encounter Diagnoses   Name Primary?    RLS (restless legs syndrome)     Overactive bladder           Plan for care for today's complaint includes refilling patient's oxybutynin and pramipexole medications for overactive bladder and restless leg syndrome.  Have patient follow-up with her primary care as scheduled on her appointment on 3/18/2024.  Continue to monitor symptoms and return to urgent care or follow-up with primary care provider if symptoms remain ongoing.  Follow-up in the emergency department if symptoms become severe, ER precautions discussed in office today..  Prescription for oxybutynin, pramipexole provided.    See AVS Instructions below for written guidance provided to patient on after-visit management and care in addition to our verbal discussion during the visit.    Please note that this dictation was created using voice recognition software. I have attempted to correct all errors, but there may be sound-alike, spelling, grammar and possibly content errors that I did not discover before finalizing the note.    Semaj Wyatt PA-C

## 2024-03-11 ENCOUNTER — HOSPITAL ENCOUNTER (EMERGENCY)
Facility: MEDICAL CENTER | Age: 74
End: 2024-03-12
Attending: EMERGENCY MEDICINE
Payer: COMMERCIAL

## 2024-03-11 DIAGNOSIS — R60.9 PERIPHERAL EDEMA: ICD-10-CM

## 2024-03-11 DIAGNOSIS — L03.119 CELLULITIS OF LOWER EXTREMITY, UNSPECIFIED LATERALITY: ICD-10-CM

## 2024-03-11 PROCEDURE — 36415 COLL VENOUS BLD VENIPUNCTURE: CPT

## 2024-03-11 PROCEDURE — 99284 EMERGENCY DEPT VISIT MOD MDM: CPT

## 2024-03-11 ASSESSMENT — PAIN DESCRIPTION - PAIN TYPE: TYPE: ACUTE PAIN

## 2024-03-11 ASSESSMENT — FIBROSIS 4 INDEX: FIB4 SCORE: 0.99

## 2024-03-12 ENCOUNTER — APPOINTMENT (OUTPATIENT)
Dept: RADIOLOGY | Facility: MEDICAL CENTER | Age: 74
End: 2024-03-12
Attending: EMERGENCY MEDICINE
Payer: COMMERCIAL

## 2024-03-12 VITALS
BODY MASS INDEX: 40.25 KG/M2 | TEMPERATURE: 98 F | WEIGHT: 218.7 LBS | HEIGHT: 62 IN | DIASTOLIC BLOOD PRESSURE: 77 MMHG | RESPIRATION RATE: 19 BRPM | SYSTOLIC BLOOD PRESSURE: 167 MMHG | OXYGEN SATURATION: 94 % | HEART RATE: 72 BPM

## 2024-03-12 LAB
ALBUMIN SERPL BCP-MCNC: 3.7 G/DL (ref 3.2–4.9)
ALBUMIN/GLOB SERPL: 1.2 G/DL
ALP SERPL-CCNC: 94 U/L (ref 30–99)
ALT SERPL-CCNC: 14 U/L (ref 2–50)
ANION GAP SERPL CALC-SCNC: 13 MMOL/L (ref 7–16)
AST SERPL-CCNC: 18 U/L (ref 12–45)
BASOPHILS # BLD AUTO: 0.5 % (ref 0–1.8)
BASOPHILS # BLD: 0.04 K/UL (ref 0–0.12)
BILIRUB SERPL-MCNC: 0.3 MG/DL (ref 0.1–1.5)
BUN SERPL-MCNC: 24 MG/DL (ref 8–22)
CALCIUM ALBUM COR SERPL-MCNC: 8.4 MG/DL (ref 8.5–10.5)
CALCIUM SERPL-MCNC: 8.2 MG/DL (ref 8.5–10.5)
CHLORIDE SERPL-SCNC: 104 MMOL/L (ref 96–112)
CO2 SERPL-SCNC: 21 MMOL/L (ref 20–33)
CREAT SERPL-MCNC: 0.5 MG/DL (ref 0.5–1.4)
EOSINOPHIL # BLD AUTO: 0.36 K/UL (ref 0–0.51)
EOSINOPHIL NFR BLD: 4.3 % (ref 0–6.9)
ERYTHROCYTE [DISTWIDTH] IN BLOOD BY AUTOMATED COUNT: 47 FL (ref 35.9–50)
GFR SERPLBLD CREATININE-BSD FMLA CKD-EPI: 100 ML/MIN/1.73 M 2
GLOBULIN SER CALC-MCNC: 3.1 G/DL (ref 1.9–3.5)
GLUCOSE SERPL-MCNC: 108 MG/DL (ref 65–99)
HCT VFR BLD AUTO: 33.8 % (ref 37–47)
HGB BLD-MCNC: 11.5 G/DL (ref 12–16)
IMM GRANULOCYTES # BLD AUTO: 0.02 K/UL (ref 0–0.11)
IMM GRANULOCYTES NFR BLD AUTO: 0.2 % (ref 0–0.9)
LACTATE SERPL-SCNC: 0.8 MMOL/L (ref 0.5–2)
LYMPHOCYTES # BLD AUTO: 1.74 K/UL (ref 1–4.8)
LYMPHOCYTES NFR BLD: 20.6 % (ref 22–41)
MCH RBC QN AUTO: 29.9 PG (ref 27–33)
MCHC RBC AUTO-ENTMCNC: 34 G/DL (ref 32.2–35.5)
MCV RBC AUTO: 87.8 FL (ref 81.4–97.8)
MONOCYTES # BLD AUTO: 0.82 K/UL (ref 0–0.85)
MONOCYTES NFR BLD AUTO: 9.7 % (ref 0–13.4)
NEUTROPHILS # BLD AUTO: 5.48 K/UL (ref 1.82–7.42)
NEUTROPHILS NFR BLD: 64.7 % (ref 44–72)
NRBC # BLD AUTO: 0 K/UL
NRBC BLD-RTO: 0 /100 WBC (ref 0–0.2)
PLATELET # BLD AUTO: 290 K/UL (ref 164–446)
PMV BLD AUTO: 9.1 FL (ref 9–12.9)
POTASSIUM SERPL-SCNC: 3.8 MMOL/L (ref 3.6–5.5)
PROT SERPL-MCNC: 6.8 G/DL (ref 6–8.2)
RBC # BLD AUTO: 3.85 M/UL (ref 4.2–5.4)
SODIUM SERPL-SCNC: 138 MMOL/L (ref 135–145)
TROPONIN T SERPL-MCNC: 18 NG/L (ref 6–19)
WBC # BLD AUTO: 8.5 K/UL (ref 4.8–10.8)

## 2024-03-12 PROCEDURE — 80053 COMPREHEN METABOLIC PANEL: CPT

## 2024-03-12 PROCEDURE — 87040 BLOOD CULTURE FOR BACTERIA: CPT | Mod: 91

## 2024-03-12 PROCEDURE — 83605 ASSAY OF LACTIC ACID: CPT

## 2024-03-12 PROCEDURE — 84484 ASSAY OF TROPONIN QUANT: CPT

## 2024-03-12 PROCEDURE — 306311 ANTI-EMBOLISM STOCKINGS XXLRG LNG: Performed by: EMERGENCY MEDICINE

## 2024-03-12 PROCEDURE — 700102 HCHG RX REV CODE 250 W/ 637 OVERRIDE(OP): Performed by: EMERGENCY MEDICINE

## 2024-03-12 PROCEDURE — 85025 COMPLETE CBC W/AUTO DIFF WBC: CPT

## 2024-03-12 PROCEDURE — A9270 NON-COVERED ITEM OR SERVICE: HCPCS | Performed by: EMERGENCY MEDICINE

## 2024-03-12 PROCEDURE — 71045 X-RAY EXAM CHEST 1 VIEW: CPT

## 2024-03-12 RX ORDER — PRAMIPEXOLE DIHYDROCHLORIDE 0.5 MG/1
1 TABLET ORAL ONCE
Status: COMPLETED | OUTPATIENT
Start: 2024-03-12 | End: 2024-03-12

## 2024-03-12 RX ORDER — CEPHALEXIN 500 MG/1
500 CAPSULE ORAL 4 TIMES DAILY
Qty: 28 CAPSULE | Refills: 0 | Status: ACTIVE | OUTPATIENT
Start: 2024-03-12 | End: 2024-03-19

## 2024-03-12 RX ORDER — CEPHALEXIN 500 MG/1
500 CAPSULE ORAL ONCE
Status: COMPLETED | OUTPATIENT
Start: 2024-03-12 | End: 2024-03-12

## 2024-03-12 RX ADMIN — CEPHALEXIN 500 MG: 500 CAPSULE ORAL at 01:55

## 2024-03-12 RX ADMIN — PRAMIPEXOLE DIHYDROCHLORIDE 1 MG: 0.5 TABLET ORAL at 02:02

## 2024-03-12 ASSESSMENT — PAIN DESCRIPTION - PAIN TYPE: TYPE: ACUTE PAIN

## 2024-03-12 NOTE — ED TRIAGE NOTES
Chief Complaint   Patient presents with    N/V     Bilateral lower limbs swelling  Chills      Pt presented with above mentioned complaints. Pt stated she started having bilateral legs swelling, redness and pain for the past couple of days. Pt also reported being nauseous and having chills now. Pt stated hx of cellulitis.

## 2024-03-12 NOTE — ED PROVIDER NOTES
ER Provider Note    Scribed for Dr. Javon Cano M.D. by Penny Aguayo. 3/12/2024  12:11 AM    Primary Care Provider: Lj Marion III, M.D.    CHIEF COMPLAINT  Chief Complaint   Patient presents with    N/V     Bilateral lower limbs swelling  Chills        EXTERNAL RECORDS REVIEWED  Outpatient Notes The patient was seen for restless legs syndrome on March 6th.     HPI/ROS    Ariadna Sepulveda is a 73 y.o. female who presents to the ED for evaluation of bilateral leg swelling. She describes that her legs have been swollen for some time now, but has recently worsened. The patient states she also has nausea, vomiting and chills. The patient's legs also have redness and the patient notes that her legs are painful to the touch. No alleviating or exacerbating factors noted. The patient denies any cardiac history. She does note that she has had cellulitis before, but states that it has never been to this degree.     PAST MEDICAL HISTORY  Past Medical History:   Diagnosis Date    Arthritis     At risk for sleep apnea     CPAP    Hypertension     RLS (restless legs syndrome) 10/5/2011     SURGICAL HISTORY  Past Surgical History:   Procedure Laterality Date    NAILA BY LAPAROSCOPY N/A 12/4/2021    Procedure: CHOLECYSTECTOMY, LAPAROSCOPIC;  Surgeon: Jos Rodriguez M.D.;  Location: SURGERY [facility];  Service: General    KNEE REPLACEMENT, TOTAL  left    OTHER ORTHOPEDIC SURGERY      TONSILLECTOMY       FAMILY HISTORY  Family History   Problem Relation Age of Onset    Cancer Mother     Arthritis Father     Diabetes Father     Heart Disease Father     Hypertension Father     Hyperlipidemia Father     Psychiatric Illness Brother     Drug abuse Brother     Alcohol abuse Brother     Lung Disease Neg Hx      SOCIAL HISTORY   reports that she has never smoked. She has never used smokeless tobacco. She reports current alcohol use of about 4.8 oz of alcohol per week. She reports that she does not use  "drugs.    CURRENT MEDICATIONS  Discharge Medication List as of 3/12/2024  2:36 AM        CONTINUE these medications which have NOT CHANGED    Details   pramipexole (MIRAPEX) 1 MG Tab Take 3 Tablets by mouth every evening., Disp-90 Tablet, R-0, Normal      oxybutynin (DITROPAN-XL) 15 MG CR tablet Take 2 Tablets by mouth every day.Diagnoses:N32.81    Overactive bladderDisp-60 Tablet, R-0, Normal           ALLERGIES  Patient has no known allergies.    PHYSICAL EXAM  BP (!) 191/95   Pulse 75   Temp 36.8 °C (98.2 °F) (Temporal)   Resp 18   Ht 1.575 m (5' 2\")   Wt 99.2 kg (218 lb 11.1 oz)   LMP  (LMP Unknown)   SpO2 98%   BMI 40.00 kg/m²     Constitutional: Alert in no apparent distress.  HENT: No signs of trauma, Bilateral external ears normal, Nose normal.   Eyes: Pupils are equal and reactive, Conjunctiva normal, Non-icteric.   Neck: Normal range of motion, No tenderness, Supple,   Cardiovascular: Regular rate and rhythm, no murmurs.   Thorax & Lungs: Normal breath sounds, No respiratory distress, No wheezing, No chest tenderness.   Abdomen: Bowel sounds normal, Soft, No tenderness, No masses, No pulsatile masses. No peritoneal signs.  Skin: Warm, Dry, No erythema, No rash.   Back: No bony tenderness, No CVA tenderness.   Extremities: No cyanosis, Redness to bilateral lower extremities. Mild warmth. No crepitus.   Neurologic: Alert and awake  Psychiatric: Affect normal       DIAGNOSTIC STUDIES & PROCEDURES    Labs:   Labs Reviewed   CBC WITH DIFFERENTIAL - Abnormal; Notable for the following components:       Result Value    RBC 3.85 (*)     Hemoglobin 11.5 (*)     Hematocrit 33.8 (*)     Lymphocytes 20.60 (*)     All other components within normal limits   COMP METABOLIC PANEL - Abnormal; Notable for the following components:    Glucose 108 (*)     Bun 24 (*)     Calcium 8.2 (*)     Correct Calcium 8.4 (*)     All other components within normal limits   LACTIC ACID   ESTIMATED GFR   TROPONIN   URINALYSIS "   URINE CULTURE(NEW)   BLOOD CULTURE    Narrative:     Blood Cultures X2. Draw one blood culture from central line  and one blood culture peripherally. If no line present, draw  blood cultures times two peripherally from different sites.   BLOOD CULTURE    Narrative:     Blood Cultures X2. Blood Cultures X2. Draw one blood culture  from central line and one blood culture peripherally. If no  line present, draw blood cultures times two peripherally from  different sites.   All labs reviewed by me.      Radiology:   The attending Emergency Physician has independently interpreted the diagnostic imaging associated with this visit and is awaiting the final reading from the radiologist, which will be displayed below.    Preliminary interpretation is a follows: No volume overload noted.  Radiologist interpretation:      DX-CHEST-PORTABLE (1 VIEW)   Final Result         1.  No acute cardiopulmonary disease.   2.  Atherosclerosis           COURSE & MEDICAL DECISION MAKING    ED Observation Status? No; Patient does not meet criteria for ED Observation.     INITIAL ASSESSMENT AND PLAN  Care Narrative:       12:11 AM - Patient seen and evaluated at bedside. This is a 73 year old woman who presents with bilateral leg swelling and associated nausea, vomiting and chills. Discussed plan of care, including performing lab work and imaging. Patient agrees to plan of care. Ordered Blood Culture x2, Urine Culture, UA, CMP, CBC w/ Diff., Lactic Acid, proBNP, Troponin, and DX-Chest to evaluate.    1:19 AM - The patient will be treated with Keflex 500 mg.    1:46 AM - The patient will be medicated with Mirapex 1 mg.     3:20 AM - The patient was reevaluated at bedside. The patient was informed that she has cellulitis and she will be prescribed Keflex. Discussed discharge instructions and return precautions with the patient and they were cleared for discharge. Patient was given the opportunity to ask any further questions. She is  comfortable with discharge at this time.       ADDITIONAL PROBLEM LIST AND DISPOSITION   Patient with edema in bilateral lower extremities.  This does appear to be chronic.  I am concerned that this might represent a mild cellulitis.  There is no leukocytosis.  The patient has a mildly elevated BUN.  She is tolerating oral intake at this time.  Chest x-ray does not show volume overload.  She does have restless leg medications were given.  Keflex will be given to the patient for cellulitis and strict return precautions will be given and follow-up will be arranged.               DISPOSITION AND DISCUSSIONS  I have discussed management of the patient with the following physicians and JAMES's: None.    Discussion of management with other [facility] or appropriate source(s): None     Escalation of care considered, and ultimately not performed: acute inpatient care management, however at this time, the patient is most appropriate for outpatient management.    Barriers to care at this time, including but not limited to:  None known .     Decision tools and prescription drugs considered including, but not limited to: Antibiotics oral antibiotics .    The patient will return for new or worsening symptoms and is stable at the time of discharge.    DISPOSITION:  Patient will be discharged home in stable condition.    FOLLOW UP:  Lj Marion III, M.D.  [address] [facility] [address]  [phone]    In 2 days      OUTPATIENT MEDICATIONS:  Discharge Medication List as of 3/12/2024  2:36 AM        START taking these medications    Details   cephALEXin (KEFLEX) 500 MG Cap Take 1 Capsule by mouth 4 times a day for 7 days., Disp-28 Capsule, R-0, Normal            FINAL IMPRESSION   1. Cellulitis of lower extremity, unspecified laterality    2. Peripheral edema      I, Penny Aguayo (Scribe), am scribing for, and in the presence of, Javon Cano M.D..    Electronically signed by: Penny Aguayo  (Scribe), 3/12/2024    IJavon M.D. personally performed the services described in this documentation, as scribed by Penny Aguayo in my presence, and it is both accurate and complete.    The note accurately reflects work and decisions made by me.  Javon Cano M.D.  3/12/2024  5:43 AM

## 2024-03-12 NOTE — ED NOTES
Pt understands DC instructions and prescription pick-up, DC paperwork provided, IV removed, pt wheeled to ER lobby for DC

## 2024-03-19 ENCOUNTER — HOSPITAL ENCOUNTER (EMERGENCY)
Facility: MEDICAL CENTER | Age: 74
End: 2024-03-19
Attending: EMERGENCY MEDICINE
Payer: COMMERCIAL

## 2024-03-19 ENCOUNTER — APPOINTMENT (OUTPATIENT)
Dept: RADIOLOGY | Facility: MEDICAL CENTER | Age: 74
End: 2024-03-19
Attending: EMERGENCY MEDICINE
Payer: COMMERCIAL

## 2024-03-19 VITALS
RESPIRATION RATE: 20 BRPM | TEMPERATURE: 97.5 F | OXYGEN SATURATION: 95 % | SYSTOLIC BLOOD PRESSURE: 150 MMHG | WEIGHT: 209.22 LBS | DIASTOLIC BLOOD PRESSURE: 72 MMHG | BODY MASS INDEX: 38.5 KG/M2 | HEIGHT: 62 IN | HEART RATE: 75 BPM

## 2024-03-19 DIAGNOSIS — R60.9 PERIPHERAL EDEMA: ICD-10-CM

## 2024-03-19 LAB
ALBUMIN SERPL BCP-MCNC: 4.1 G/DL (ref 3.2–4.9)
ALBUMIN/GLOB SERPL: 1.3 G/DL
ALP SERPL-CCNC: 94 U/L (ref 30–99)
ALT SERPL-CCNC: 16 U/L (ref 2–50)
ANION GAP SERPL CALC-SCNC: 11 MMOL/L (ref 7–16)
AST SERPL-CCNC: 19 U/L (ref 12–45)
BASOPHILS # BLD AUTO: 0.4 % (ref 0–1.8)
BASOPHILS # BLD: 0.03 K/UL (ref 0–0.12)
BILIRUB SERPL-MCNC: 0.4 MG/DL (ref 0.1–1.5)
BUN SERPL-MCNC: 12 MG/DL (ref 8–22)
CALCIUM ALBUM COR SERPL-MCNC: 8.6 MG/DL (ref 8.5–10.5)
CALCIUM SERPL-MCNC: 8.7 MG/DL (ref 8.5–10.5)
CHLORIDE SERPL-SCNC: 106 MMOL/L (ref 96–112)
CO2 SERPL-SCNC: 21 MMOL/L (ref 20–33)
CREAT SERPL-MCNC: 0.5 MG/DL (ref 0.5–1.4)
EKG IMPRESSION: NORMAL
EOSINOPHIL # BLD AUTO: 0.26 K/UL (ref 0–0.51)
EOSINOPHIL NFR BLD: 3.9 % (ref 0–6.9)
ERYTHROCYTE [DISTWIDTH] IN BLOOD BY AUTOMATED COUNT: 47.2 FL (ref 35.9–50)
GFR SERPLBLD CREATININE-BSD FMLA CKD-EPI: 100 ML/MIN/1.73 M 2
GLOBULIN SER CALC-MCNC: 3.2 G/DL (ref 1.9–3.5)
GLUCOSE SERPL-MCNC: 104 MG/DL (ref 65–99)
HCT VFR BLD AUTO: 37.9 % (ref 37–47)
HGB BLD-MCNC: 12.7 G/DL (ref 12–16)
IMM GRANULOCYTES # BLD AUTO: 0.02 K/UL (ref 0–0.11)
IMM GRANULOCYTES NFR BLD AUTO: 0.3 % (ref 0–0.9)
LYMPHOCYTES # BLD AUTO: 1.38 K/UL (ref 1–4.8)
LYMPHOCYTES NFR BLD: 20.5 % (ref 22–41)
MCH RBC QN AUTO: 29.7 PG (ref 27–33)
MCHC RBC AUTO-ENTMCNC: 33.5 G/DL (ref 32.2–35.5)
MCV RBC AUTO: 88.8 FL (ref 81.4–97.8)
MONOCYTES # BLD AUTO: 0.67 K/UL (ref 0–0.85)
MONOCYTES NFR BLD AUTO: 9.9 % (ref 0–13.4)
NEUTROPHILS # BLD AUTO: 4.38 K/UL (ref 1.82–7.42)
NEUTROPHILS NFR BLD: 65 % (ref 44–72)
NRBC # BLD AUTO: 0 K/UL
NRBC BLD-RTO: 0 /100 WBC (ref 0–0.2)
PLATELET # BLD AUTO: 307 K/UL (ref 164–446)
PMV BLD AUTO: 9.5 FL (ref 9–12.9)
POTASSIUM SERPL-SCNC: 4.2 MMOL/L (ref 3.6–5.5)
PROT SERPL-MCNC: 7.3 G/DL (ref 6–8.2)
RBC # BLD AUTO: 4.27 M/UL (ref 4.2–5.4)
SODIUM SERPL-SCNC: 138 MMOL/L (ref 135–145)
WBC # BLD AUTO: 6.7 K/UL (ref 4.8–10.8)

## 2024-03-19 PROCEDURE — 80053 COMPREHEN METABOLIC PANEL: CPT

## 2024-03-19 PROCEDURE — 700111 HCHG RX REV CODE 636 W/ 250 OVERRIDE (IP): Performed by: EMERGENCY MEDICINE

## 2024-03-19 PROCEDURE — 93005 ELECTROCARDIOGRAM TRACING: CPT | Performed by: EMERGENCY MEDICINE

## 2024-03-19 PROCEDURE — 700102 HCHG RX REV CODE 250 W/ 637 OVERRIDE(OP): Performed by: EMERGENCY MEDICINE

## 2024-03-19 PROCEDURE — 99285 EMERGENCY DEPT VISIT HI MDM: CPT

## 2024-03-19 PROCEDURE — 93005 ELECTROCARDIOGRAM TRACING: CPT

## 2024-03-19 PROCEDURE — 85025 COMPLETE CBC W/AUTO DIFF WBC: CPT

## 2024-03-19 PROCEDURE — 96374 THER/PROPH/DIAG INJ IV PUSH: CPT

## 2024-03-19 PROCEDURE — A9270 NON-COVERED ITEM OR SERVICE: HCPCS | Performed by: EMERGENCY MEDICINE

## 2024-03-19 PROCEDURE — 36415 COLL VENOUS BLD VENIPUNCTURE: CPT

## 2024-03-19 PROCEDURE — 71045 X-RAY EXAM CHEST 1 VIEW: CPT

## 2024-03-19 RX ORDER — FUROSEMIDE 20 MG/1
20 TABLET ORAL DAILY
Qty: 30 TABLET | Refills: 0 | Status: SHIPPED | OUTPATIENT
Start: 2024-03-19 | End: 2024-04-18

## 2024-03-19 RX ORDER — PRAMIPEXOLE DIHYDROCHLORIDE 0.5 MG/1
3 TABLET ORAL ONCE
Status: COMPLETED | OUTPATIENT
Start: 2024-03-19 | End: 2024-03-19

## 2024-03-19 RX ORDER — FUROSEMIDE 10 MG/ML
20 INJECTION INTRAMUSCULAR; INTRAVENOUS ONCE
Status: COMPLETED | OUTPATIENT
Start: 2024-03-19 | End: 2024-03-19

## 2024-03-19 RX ADMIN — PRAMIPEXOLE DIHYDROCHLORIDE 3 MG: 0.5 TABLET ORAL at 03:45

## 2024-03-19 RX ADMIN — FUROSEMIDE 20 MG: 10 INJECTION INTRAMUSCULAR; INTRAVENOUS at 03:31

## 2024-03-19 ASSESSMENT — PAIN DESCRIPTION - PAIN TYPE: TYPE: ACUTE PAIN

## 2024-03-19 ASSESSMENT — FIBROSIS 4 INDEX: FIB4 SCORE: 1.18

## 2024-03-19 NOTE — ED PROVIDER NOTES
ED Provider Note    CHIEF COMPLAINT  Chief Complaint   Patient presents with    Leg Swelling     Pt diagnosed with cellulitis last week but believes it is getting worse. Pt has pitting edema to bilateral lower extremities. Pt is currently taking abx and has not finished the course. +fevers     Shortness of Breath     Since this afternoon. Denies CP. No cardiac hx       EXTERNAL RECORDS REVIEWED  Outpatient Notes patient with recent visit for bilateral leg pain and swelling had negative troponin, workup was unremarkable was diagnosed with cellulitis.  Patient also with outpatient visits on March 6 and history of restless leg syndrome    HPI/ROS  LIMITATION TO HISTORY   Select: : None  OUTSIDE HISTORIAN(S):  none    Ariadna Sepulveda is a 73 y.o. female who presents with bilateral swelling to her legs.  Patient reports that she has had swelling to her legs over the last few weeks.  She reports she has had prior similar episodes as well.  She was seen here, diagnosed with cellulitis and taking antibiotics but the swelling has continued.  At that time she does states she was having some fevers and chills although none recently.  She reports no chest pain.  She does occasionally get some shortness of breath primarily with walking.  No cough.  No persistent shortness of breath.  No recent travel she reports no abdominal pain, nausea or vomiting.  She also reports that her legs are feeling very restless because she has not taken her medications for tonight    PAST MEDICAL HISTORY   has a past medical history of Arthritis, At risk for sleep apnea, Hypertension, and RLS (restless legs syndrome) (10/5/2011).    SURGICAL HISTORY   has a past surgical history that includes tonsillectomy; knee replacement, total (left); other orthopedic surgery; and isaiah by laparoscopy (N/A, 12/4/2021).    FAMILY HISTORY  Family History   Problem Relation Age of Onset    Cancer Mother     Arthritis Father     Diabetes Father     Heart Disease  "Father     Hypertension Father     Hyperlipidemia Father     Psychiatric Illness Brother     Drug abuse Brother     Alcohol abuse Brother     Lung Disease Neg Hx        SOCIAL HISTORY  Social History     Tobacco Use    Smoking status: Never    Smokeless tobacco: Never   Vaping Use    Vaping Use: Never used   Substance and Sexual Activity    Alcohol use: Yes     Alcohol/week: 4.8 oz     Types: 8 Cans of beer per week     Comment: 1-2 every other day or when goes to [employer]    Drug use: No    Sexual activity: Not Currently       CURRENT MEDICATIONS  Home Medications       Reviewed by Blanka Henry R.N. (Registered Nurse) on 03/19/24 at 0216  Med List Status: Partial     Medication Last Dose Status   cephALEXin (KEFLEX) 500 MG Cap  Active   oxybutynin (DITROPAN-XL) 15 MG CR tablet  Active   pramipexole (MIRAPEX) 1 MG Tab  Active                    ALLERGIES  No Known Allergies    PHYSICAL EXAM  VITAL SIGNS: BP (!) 150/72   Pulse 75   Temp 36.4 °C (97.5 °F) (Temporal)   Resp 20   Ht 1.575 m (5' 2\")   Wt 94.9 kg (209 lb 3.5 oz)   LMP  (LMP Unknown)   SpO2 95%   BMI 38.27 kg/m²      Pulse ox interpretation: I interpret this pulse ox as normal.  Constitutional: Alert in no apparent distress.  HENT: No signs of trauma, Bilateral external ears normal, Nose normal.   Eyes: Pupils are equal and reactive, Conjunctiva normal, Non-icteric.   Neck: Normal range of motion, No tenderness, Supple, No stridor.   Cardiovascular: Regular rate and rhythm, no murmurs.   Thorax & Lungs: Normal breath sounds, No respiratory distress, No wheezing, No chest tenderness.   Abdomen: Bowel sounds normal, Soft, No tenderness, No masses, No pulsatile masses. No peritoneal signs.  Skin: Warm, Dry, No erythema, No rash.   Back: No bony tenderness, No CVA tenderness.   Extremities: Intact distal pulses, 2+ bilateral lower extremity No tenderness, No cyanosis,  Negative Cyndy's sign.   Musculoskeletal: Good range of motion in all major joints. " No tenderness to palpation or major deformities noted.   Neurologic: Alert , Normal motor function, Normal sensory function, No focal deficits noted.   Psychiatric: Affect normal, Judgment normal, Mood normal.               DIAGNOSTIC STUDIES / PROCEDURES  Results for orders placed or performed during the hospital encounter of 03/19/24   CBC with Differential   Result Value Ref Range    WBC 6.7 4.8 - 10.8 K/uL    RBC 4.27 4.20 - 5.40 M/uL    Hemoglobin 12.7 12.0 - 16.0 g/dL    Hematocrit 37.9 37.0 - 47.0 %    MCV 88.8 81.4 - 97.8 fL    MCH 29.7 27.0 - 33.0 pg    MCHC 33.5 32.2 - 35.5 g/dL    RDW 47.2 35.9 - 50.0 fL    Platelet Count 307 164 - 446 K/uL    MPV 9.5 9.0 - 12.9 fL    Neutrophils-Polys 65.00 44.00 - 72.00 %    Lymphocytes 20.50 (L) 22.00 - 41.00 %    Monocytes 9.90 0.00 - 13.40 %    Eosinophils 3.90 0.00 - 6.90 %    Basophils 0.40 0.00 - 1.80 %    Immature Granulocytes 0.30 0.00 - 0.90 %    Nucleated RBC 0.00 0.00 - 0.20 /100 WBC    Neutrophils (Absolute) 4.38 1.82 - 7.42 K/uL    Lymphs (Absolute) 1.38 1.00 - 4.80 K/uL    Monos (Absolute) 0.67 0.00 - 0.85 K/uL    Eos (Absolute) 0.26 0.00 - 0.51 K/uL    Baso (Absolute) 0.03 0.00 - 0.12 K/uL    Immature Granulocytes (abs) 0.02 0.00 - 0.11 K/uL    NRBC (Absolute) 0.00 K/uL   Comp Metabolic Panel   Result Value Ref Range    Sodium 138 135 - 145 mmol/L    Potassium 4.2 3.6 - 5.5 mmol/L    Chloride 106 96 - 112 mmol/L    Co2 21 20 - 33 mmol/L    Anion Gap 11.0 7.0 - 16.0    Glucose 104 (H) 65 - 99 mg/dL    Bun 12 8 - 22 mg/dL    Creatinine 0.50 0.50 - 1.40 mg/dL    Calcium 8.7 8.5 - 10.5 mg/dL    Correct Calcium 8.6 8.5 - 10.5 mg/dL    AST(SGOT) 19 12 - 45 U/L    ALT(SGPT) 16 2 - 50 U/L    Alkaline Phosphatase 94 30 - 99 U/L    Total Bilirubin 0.4 0.1 - 1.5 mg/dL    Albumin 4.1 3.2 - 4.9 g/dL    Total Protein 7.3 6.0 - 8.2 g/dL    Globulin 3.2 1.9 - 3.5 g/dL    A-G Ratio 1.3 g/dL   ESTIMATED GFR   Result Value Ref Range    GFR (CKD-EPI) 100 >60 mL/min/1.73 m 2    EKG (NOW)   Result Value Ref Range    Report       [facility] Emergency Dept[facility]    Test Date:  2024  Pt Name:    KARON STEVENS             Department: ER  MRN:        [id number]                      Room:  Gender:     Female                       Technician: [id number]  :        1950                   Requested By:ER TRIAGE PROTOCOL  Order #:    [id number]                    Reading MD: MYCHAL APODACA MD    Measurements  Intervals                                Axis  Rate:       60                           P:          15  LA:         172                          QRS:        -4  QRSD:       104                          T:          34  QT:         445  QTc:        445    Interpretive Statements  Sinus rhythm  Atrial premature complex  Compared to ECG 2021 22:48:15  Atrial premature complex(es) now present  ST (T wave) deviation no longer present  Electronically Signed On 2024 03:11:18 PDT by MYCHAL APODACA MD           RADIOLOGY  I have independently interpreted the diagnostic imaging associated with this visit and am waiting the final reading from the radiologist.   My preliminary interpretation is as follows: no effusion  Radiologist interpretation:   DX-CHEST-PORTABLE (1 VIEW)   Final Result         1.  Pulmonary edema and/or infiltrates are identified, which appear somewhat increased since the prior exam.   2.  Cardiomegaly   3.  Atherosclerosis            COURSE & MEDICAL DECISION MAKING      INITIAL ASSESSMENT, COURSE AND PLAN  Care Narrative: 2:54 AM  Patient is evaluated the bedside and chart is reviewed, differential diagnosis is considered as below.  Order for her home dose of pramipexole, diagnostic labs, ECG, x-ray    Patient is reevaluated, updated on all results and plan for discharge which she is comfortable          PROBLEM LIST  # Peripheral edema.  This does appear to be more acute on chronic process for the patient and review of her records.  No findings  of electrolyte derangement or renal dysfunction.  She has no chest pain, is not hypoxemic She may have some mild vascular congestion, no indications for admission at this point.  I will start her on diuretic, she is naïve to this we will start Lasix at 20 mg and she will follow-up with primary care for further evaluation and treatment.  I do not think this represents a significant cellulitis or worsening cellulitis with regard to her exam, her diagnostics here as well as the chronicity of her symptoms.  Additionally she does report issues with swelling for years seems less likely DVT as well as given the bilateral nature of the    # Restless legs.  Chronic,  DISPOSITION AND DISCUSSIONS      Barriers to care at this time, including but not limited to:  none .     Decision tools and prescription drugs considered including, but not limited to:  New prescription for Lasix  .    The patient will return for new or worsening symptoms and is stable at the time of discharge.    The patient is referred to a primary physician for blood pressure management, diabetic screening, and for all other preventative health concerns.      DISPOSITION:  Patient will be discharged home in stable condition.    FOLLOW UP:  Lj Marion III, M.D.  [address] [facility] [id number] [phone]    Schedule an appointment as soon as possible for a visit         OUTPATIENT MEDICATIONS:  New Prescriptions    FUROSEMIDE (LASIX) 20 MG TAB    Take 1 Tablet by mouth every day for 30 days.         FINAL DIAGNOSIS  1. Peripheral edema           Electronically signed by: Martin Smith M.D., 3/19/2024 2:54 AM

## 2024-03-19 NOTE — ED TRIAGE NOTES
"Chief Complaint   Patient presents with    Leg Swelling     Pt diagnosed with cellulitis last week but believes it is getting worse. Pt has pitting edema to bilateral lower extremities. Pt is currently taking abx and has not finished the course. +fevers     Shortness of Breath     Since this afternoon. Denies CP. No cardiac hx     SOB protocol ordered  Pt ambulatory to triage for above complaint.      Pt is alert/oriented and follows commands. Pt speaking in full sentences and responds appropriately to questions. No acute distress noted in triage and respirations are even and unlabored.     /65   Pulse 72   Temp 35.9 °C (96.6 °F) (Temporal)   Resp 19   Ht 1.575 m (5' 2\")   Wt 94.9 kg (209 lb 3.5 oz)   LMP  (LMP Unknown)   SpO2 98%   BMI 38.27 kg/m²     "

## 2024-05-11 ENCOUNTER — APPOINTMENT (OUTPATIENT)
Dept: RADIOLOGY | Facility: MEDICAL CENTER | Age: 74
DRG: 303 | End: 2024-05-11
Attending: EMERGENCY MEDICINE
Payer: MEDICARE

## 2024-05-11 ENCOUNTER — APPOINTMENT (OUTPATIENT)
Dept: RADIOLOGY | Facility: MEDICAL CENTER | Age: 74
DRG: 303 | End: 2024-05-11
Attending: STUDENT IN AN ORGANIZED HEALTH CARE EDUCATION/TRAINING PROGRAM
Payer: MEDICARE

## 2024-05-11 ENCOUNTER — APPOINTMENT (OUTPATIENT)
Dept: RADIOLOGY | Facility: MEDICAL CENTER | Age: 74
DRG: 303 | End: 2024-05-11
Attending: INTERNAL MEDICINE
Payer: MEDICARE

## 2024-05-11 ENCOUNTER — HOSPITAL ENCOUNTER (INPATIENT)
Facility: MEDICAL CENTER | Age: 74
LOS: 2 days | DRG: 303 | End: 2024-05-13
Attending: EMERGENCY MEDICINE | Admitting: STUDENT IN AN ORGANIZED HEALTH CARE EDUCATION/TRAINING PROGRAM
Payer: MEDICARE

## 2024-05-11 DIAGNOSIS — G47.33 OSA (OBSTRUCTIVE SLEEP APNEA): ICD-10-CM

## 2024-05-11 DIAGNOSIS — R60.0 BILATERAL LEG EDEMA: ICD-10-CM

## 2024-05-11 DIAGNOSIS — L03.119 CELLULITIS OF LOWER EXTREMITY, UNSPECIFIED LATERALITY: ICD-10-CM

## 2024-05-11 DIAGNOSIS — E66.01 MORBID OBESITY (HCC): ICD-10-CM

## 2024-05-11 PROBLEM — L03.90 CELLULITIS: Status: ACTIVE | Noted: 2024-05-11

## 2024-05-11 LAB
ALBUMIN SERPL BCP-MCNC: 3.8 G/DL (ref 3.2–4.9)
ALBUMIN/GLOB SERPL: 1.4 G/DL
ALP SERPL-CCNC: 94 U/L (ref 30–99)
ALT SERPL-CCNC: 12 U/L (ref 2–50)
AMPHET UR QL SCN: NEGATIVE
ANION GAP SERPL CALC-SCNC: 11 MMOL/L (ref 7–16)
APPEARANCE UR: CLEAR
AST SERPL-CCNC: 14 U/L (ref 12–45)
BARBITURATES UR QL SCN: NEGATIVE
BASOPHILS # BLD AUTO: 0.4 % (ref 0–1.8)
BASOPHILS # BLD: 0.03 K/UL (ref 0–0.12)
BENZODIAZ UR QL SCN: NEGATIVE
BILIRUB SERPL-MCNC: 0.5 MG/DL (ref 0.1–1.5)
BILIRUB UR QL STRIP.AUTO: NEGATIVE
BUN SERPL-MCNC: 18 MG/DL (ref 8–22)
BZE UR QL SCN: NEGATIVE
CALCIUM ALBUM COR SERPL-MCNC: 8.6 MG/DL (ref 8.5–10.5)
CALCIUM SERPL-MCNC: 8.4 MG/DL (ref 8.5–10.5)
CANNABINOIDS UR QL SCN: NEGATIVE
CHLORIDE SERPL-SCNC: 108 MMOL/L (ref 96–112)
CO2 SERPL-SCNC: 21 MMOL/L (ref 20–33)
COLOR UR: YELLOW
CREAT SERPL-MCNC: 0.47 MG/DL (ref 0.5–1.4)
CRP SERPL HS-MCNC: 2.19 MG/DL (ref 0–0.75)
EOSINOPHIL # BLD AUTO: 0.26 K/UL (ref 0–0.51)
EOSINOPHIL NFR BLD: 3.2 % (ref 0–6.9)
ERYTHROCYTE [DISTWIDTH] IN BLOOD BY AUTOMATED COUNT: 46.9 FL (ref 35.9–50)
ERYTHROCYTE [SEDIMENTATION RATE] IN BLOOD BY WESTERGREN METHOD: 33 MM/HOUR (ref 0–25)
FENTANYL UR QL: NEGATIVE
GFR SERPLBLD CREATININE-BSD FMLA CKD-EPI: 101 ML/MIN/1.73 M 2
GLOBULIN SER CALC-MCNC: 2.7 G/DL (ref 1.9–3.5)
GLUCOSE SERPL-MCNC: 120 MG/DL (ref 65–99)
GLUCOSE UR STRIP.AUTO-MCNC: NEGATIVE MG/DL
HCT VFR BLD AUTO: 33 % (ref 37–47)
HGB BLD-MCNC: 11.1 G/DL (ref 12–16)
IMM GRANULOCYTES # BLD AUTO: 0.02 K/UL (ref 0–0.11)
IMM GRANULOCYTES NFR BLD AUTO: 0.2 % (ref 0–0.9)
INR PPP: 1.13 (ref 0.87–1.13)
KETONES UR STRIP.AUTO-MCNC: NEGATIVE MG/DL
LACTATE SERPL-SCNC: 0.6 MMOL/L (ref 0.5–2)
LEUKOCYTE ESTERASE UR QL STRIP.AUTO: NEGATIVE
LYMPHOCYTES # BLD AUTO: 1.24 K/UL (ref 1–4.8)
LYMPHOCYTES NFR BLD: 15.5 % (ref 22–41)
MCH RBC QN AUTO: 29.4 PG (ref 27–33)
MCHC RBC AUTO-ENTMCNC: 33.6 G/DL (ref 32.2–35.5)
MCV RBC AUTO: 87.5 FL (ref 81.4–97.8)
METHADONE UR QL SCN: NEGATIVE
MICRO URNS: NORMAL
MONOCYTES # BLD AUTO: 0.82 K/UL (ref 0–0.85)
MONOCYTES NFR BLD AUTO: 10.2 % (ref 0–13.4)
NEUTROPHILS # BLD AUTO: 5.65 K/UL (ref 1.82–7.42)
NEUTROPHILS NFR BLD: 70.5 % (ref 44–72)
NITRITE UR QL STRIP.AUTO: NEGATIVE
NRBC # BLD AUTO: 0 K/UL
NRBC BLD-RTO: 0 /100 WBC (ref 0–0.2)
NT-PROBNP SERPL IA-MCNC: 338 PG/ML (ref 0–125)
OPIATES UR QL SCN: NEGATIVE
OXYCODONE UR QL SCN: NEGATIVE
PCP UR QL SCN: NEGATIVE
PH UR STRIP.AUTO: 6 [PH] (ref 5–8)
PLATELET # BLD AUTO: 252 K/UL (ref 164–446)
PMV BLD AUTO: 9.1 FL (ref 9–12.9)
POTASSIUM SERPL-SCNC: 3.7 MMOL/L (ref 3.6–5.5)
PROPOXYPH UR QL SCN: NEGATIVE
PROT SERPL-MCNC: 6.5 G/DL (ref 6–8.2)
PROT UR QL STRIP: NEGATIVE MG/DL
PROTHROMBIN TIME: 14.7 SEC (ref 12–14.6)
RBC # BLD AUTO: 3.77 M/UL (ref 4.2–5.4)
RBC UR QL AUTO: NEGATIVE
SCCMEC + MECA PNL NOSE NAA+PROBE: NEGATIVE
SODIUM SERPL-SCNC: 140 MMOL/L (ref 135–145)
SP GR UR STRIP.AUTO: 1.01
UROBILINOGEN UR STRIP.AUTO-MCNC: 0.2 MG/DL
WBC # BLD AUTO: 8 K/UL (ref 4.8–10.8)

## 2024-05-11 PROCEDURE — 99223 1ST HOSP IP/OBS HIGH 75: CPT | Performed by: STUDENT IN AN ORGANIZED HEALTH CARE EDUCATION/TRAINING PROGRAM

## 2024-05-11 RX ORDER — IBUPROFEN 600 MG/1
600 TABLET ORAL ONCE
Status: COMPLETED | OUTPATIENT
Start: 2024-05-11 | End: 2024-05-11

## 2024-05-11 RX ORDER — ONDANSETRON 4 MG/1
4 TABLET, ORALLY DISINTEGRATING ORAL EVERY 4 HOURS PRN
Status: DISCONTINUED | OUTPATIENT
Start: 2024-05-11 | End: 2024-05-13 | Stop reason: HOSPADM

## 2024-05-11 RX ORDER — PRAMIPEXOLE DIHYDROCHLORIDE 0.5 MG/1
3 TABLET ORAL NIGHTLY
Status: DISCONTINUED | OUTPATIENT
Start: 2024-05-11 | End: 2024-05-13 | Stop reason: HOSPADM

## 2024-05-11 RX ORDER — SODIUM CHLORIDE, SODIUM LACTATE, POTASSIUM CHLORIDE, CALCIUM CHLORIDE 600; 310; 30; 20 MG/100ML; MG/100ML; MG/100ML; MG/100ML
INJECTION, SOLUTION INTRAVENOUS CONTINUOUS
Status: DISCONTINUED | OUTPATIENT
Start: 2024-05-11 | End: 2024-05-11

## 2024-05-11 RX ORDER — HEPARIN SODIUM 5000 [USP'U]/ML
5000 INJECTION, SOLUTION INTRAVENOUS; SUBCUTANEOUS EVERY 8 HOURS
Status: DISCONTINUED | OUTPATIENT
Start: 2024-05-11 | End: 2024-05-11

## 2024-05-11 RX ORDER — SODIUM CHLORIDE, SODIUM LACTATE, POTASSIUM CHLORIDE, AND CALCIUM CHLORIDE .6; .31; .03; .02 G/100ML; G/100ML; G/100ML; G/100ML
500 INJECTION, SOLUTION INTRAVENOUS
Status: DISCONTINUED | OUTPATIENT
Start: 2024-05-11 | End: 2024-05-13 | Stop reason: HOSPADM

## 2024-05-11 RX ORDER — IPRATROPIUM BROMIDE AND ALBUTEROL SULFATE 2.5; .5 MG/3ML; MG/3ML
3 SOLUTION RESPIRATORY (INHALATION)
Status: DISCONTINUED | OUTPATIENT
Start: 2024-05-11 | End: 2024-05-13 | Stop reason: HOSPADM

## 2024-05-11 RX ORDER — PRAMIPEXOLE DIHYDROCHLORIDE 0.5 MG/1
3 TABLET ORAL NIGHTLY
Status: DISCONTINUED | OUTPATIENT
Start: 2024-05-11 | End: 2024-05-11

## 2024-05-11 RX ORDER — MAGNESIUM SULFATE 1 G/100ML
1 INJECTION INTRAVENOUS ONCE
Status: COMPLETED | OUTPATIENT
Start: 2024-05-11 | End: 2024-05-11

## 2024-05-11 RX ORDER — HYDROMORPHONE HYDROCHLORIDE 1 MG/ML
0.5 INJECTION, SOLUTION INTRAMUSCULAR; INTRAVENOUS; SUBCUTANEOUS
Status: COMPLETED | OUTPATIENT
Start: 2024-05-11 | End: 2024-05-11

## 2024-05-11 RX ORDER — ONDANSETRON 2 MG/ML
4 INJECTION INTRAMUSCULAR; INTRAVENOUS EVERY 4 HOURS PRN
Status: DISCONTINUED | OUTPATIENT
Start: 2024-05-11 | End: 2024-05-13 | Stop reason: HOSPADM

## 2024-05-11 RX ORDER — AMOXICILLIN 250 MG
2 CAPSULE ORAL EVERY EVENING
Status: DISCONTINUED | OUTPATIENT
Start: 2024-05-11 | End: 2024-05-13 | Stop reason: HOSPADM

## 2024-05-11 RX ORDER — FUROSEMIDE 10 MG/ML
40 INJECTION INTRAMUSCULAR; INTRAVENOUS
Status: DISCONTINUED | OUTPATIENT
Start: 2024-05-11 | End: 2024-05-13 | Stop reason: HOSPADM

## 2024-05-11 RX ORDER — ASPIRIN 81 MG/1
81 TABLET ORAL DAILY
Status: DISCONTINUED | OUTPATIENT
Start: 2024-05-11 | End: 2024-05-13 | Stop reason: HOSPADM

## 2024-05-11 RX ORDER — POTASSIUM CHLORIDE 20 MEQ/1
40 TABLET, EXTENDED RELEASE ORAL ONCE
Status: COMPLETED | OUTPATIENT
Start: 2024-05-11 | End: 2024-05-11

## 2024-05-11 RX ORDER — ATORVASTATIN CALCIUM 40 MG/1
40 TABLET, FILM COATED ORAL EVERY EVENING
Status: DISCONTINUED | OUTPATIENT
Start: 2024-05-11 | End: 2024-05-13 | Stop reason: HOSPADM

## 2024-05-11 RX ORDER — LABETALOL HYDROCHLORIDE 5 MG/ML
10 INJECTION, SOLUTION INTRAVENOUS EVERY 4 HOURS PRN
Status: DISCONTINUED | OUTPATIENT
Start: 2024-05-11 | End: 2024-05-13 | Stop reason: HOSPADM

## 2024-05-11 RX ORDER — OXYBUTYNIN CHLORIDE 5 MG/1
15 TABLET ORAL 2 TIMES DAILY
Status: DISCONTINUED | OUTPATIENT
Start: 2024-05-11 | End: 2024-05-13 | Stop reason: HOSPADM

## 2024-05-11 RX ORDER — FUROSEMIDE 10 MG/ML
40 INJECTION INTRAMUSCULAR; INTRAVENOUS
Status: DISCONTINUED | OUTPATIENT
Start: 2024-05-11 | End: 2024-05-11

## 2024-05-11 RX ORDER — POLYETHYLENE GLYCOL 3350 17 G/17G
1 POWDER, FOR SOLUTION ORAL
Status: DISCONTINUED | OUTPATIENT
Start: 2024-05-11 | End: 2024-05-13 | Stop reason: HOSPADM

## 2024-05-11 RX ORDER — CEFAZOLIN 2 G/1
2 INJECTION, POWDER, FOR SOLUTION INTRAMUSCULAR; INTRAVENOUS ONCE
Status: COMPLETED | OUTPATIENT
Start: 2024-05-11 | End: 2024-05-11

## 2024-05-11 RX ORDER — ACETAMINOPHEN 325 MG/1
650 TABLET ORAL EVERY 6 HOURS PRN
Status: DISCONTINUED | OUTPATIENT
Start: 2024-05-11 | End: 2024-05-13 | Stop reason: HOSPADM

## 2024-05-11 RX ORDER — NYSTATIN 100000 [USP'U]/G
POWDER TOPICAL 3 TIMES DAILY
Status: DISCONTINUED | OUTPATIENT
Start: 2024-05-11 | End: 2024-05-13 | Stop reason: HOSPADM

## 2024-05-11 RX ADMIN — ASPIRIN 81 MG: 81 TABLET, COATED ORAL at 05:33

## 2024-05-11 RX ADMIN — CEFAZOLIN 2 G: 2 INJECTION, POWDER, FOR SOLUTION INTRAMUSCULAR; INTRAVENOUS at 02:50

## 2024-05-11 RX ADMIN — NYSTATIN: 100000 POWDER TOPICAL at 16:44

## 2024-05-11 RX ADMIN — ATORVASTATIN CALCIUM 40 MG: 40 TABLET, FILM COATED ORAL at 17:15

## 2024-05-11 RX ADMIN — POTASSIUM CHLORIDE 40 MEQ: 1500 TABLET, EXTENDED RELEASE ORAL at 04:00

## 2024-05-11 RX ADMIN — PRAMIPEXOLE DIHYDROCHLORIDE 3 MG: 0.5 TABLET ORAL at 20:58

## 2024-05-11 RX ADMIN — HEPARIN SODIUM 5000 UNITS: 5000 INJECTION, SOLUTION INTRAVENOUS; SUBCUTANEOUS at 05:33

## 2024-05-11 RX ADMIN — SENNOSIDES AND DOCUSATE SODIUM 2 TABLET: 50; 8.6 TABLET ORAL at 17:15

## 2024-05-11 RX ADMIN — FUROSEMIDE 40 MG: 10 INJECTION, SOLUTION INTRAMUSCULAR; INTRAVENOUS at 05:33

## 2024-05-11 RX ADMIN — PRAMIPEXOLE DIHYDROCHLORIDE 3 MG: 0.5 TABLET ORAL at 03:59

## 2024-05-11 RX ADMIN — OXYBUTYNIN CHLORIDE 15 MG: 5 TABLET ORAL at 05:33

## 2024-05-11 RX ADMIN — IBUPROFEN 600 MG: 600 TABLET, FILM COATED ORAL at 02:50

## 2024-05-11 RX ADMIN — NYSTATIN: 100000 POWDER TOPICAL at 11:58

## 2024-05-11 RX ADMIN — FUROSEMIDE 40 MG: 10 INJECTION INTRAMUSCULAR; INTRAVENOUS at 16:45

## 2024-05-11 RX ADMIN — HYDROMORPHONE HYDROCHLORIDE 0.5 MG: 1 INJECTION, SOLUTION INTRAMUSCULAR; INTRAVENOUS; SUBCUTANEOUS at 14:33

## 2024-05-11 RX ADMIN — MAGNESIUM SULFATE IN DEXTROSE 1 G: 10 INJECTION, SOLUTION INTRAVENOUS at 04:04

## 2024-05-11 RX ADMIN — OXYBUTYNIN CHLORIDE 15 MG: 5 TABLET ORAL at 16:44

## 2024-05-11 RX ADMIN — SODIUM CHLORIDE, POTASSIUM CHLORIDE, SODIUM LACTATE AND CALCIUM CHLORIDE: 600; 310; 30; 20 INJECTION, SOLUTION INTRAVENOUS at 05:34

## 2024-05-11 ASSESSMENT — COGNITIVE AND FUNCTIONAL STATUS - GENERAL
DRESSING REGULAR LOWER BODY CLOTHING: A LITTLE
SUGGESTED CMS G CODE MODIFIER DAILY ACTIVITY: CJ
MOVING TO AND FROM BED TO CHAIR: A LITTLE
DAILY ACTIVITIY SCORE: 22
TOILETING: A LITTLE
CLIMB 3 TO 5 STEPS WITH RAILING: A LITTLE
WALKING IN HOSPITAL ROOM: A LITTLE
STANDING UP FROM CHAIR USING ARMS: A LITTLE
SUGGESTED CMS G CODE MODIFIER MOBILITY: CJ
MOBILITY SCORE: 20

## 2024-05-11 ASSESSMENT — ENCOUNTER SYMPTOMS
VOMITING: 0
HEADACHES: 0
CHILLS: 1
WEAKNESS: 1
MYALGIAS: 1
FOCAL WEAKNESS: 0
DEPRESSION: 0
DOUBLE VISION: 0
BRUISES/BLEEDS EASILY: 0
BLURRED VISION: 0
PALPITATIONS: 0
HEARTBURN: 0
NAUSEA: 0
ABDOMINAL PAIN: 0
DIZZINESS: 0
FEVER: 0

## 2024-05-11 ASSESSMENT — PATIENT HEALTH QUESTIONNAIRE - PHQ9
2. FEELING DOWN, DEPRESSED, IRRITABLE, OR HOPELESS: NOT AT ALL
SUM OF ALL RESPONSES TO PHQ9 QUESTIONS 1 AND 2: 0
1. LITTLE INTEREST OR PLEASURE IN DOING THINGS: NOT AT ALL

## 2024-05-11 ASSESSMENT — LIFESTYLE VARIABLES
EVER HAD A DRINK FIRST THING IN THE MORNING TO STEADY YOUR NERVES TO GET RID OF A HANGOVER: NO
TOTAL SCORE: 0
HOW MANY TIMES IN THE PAST YEAR HAVE YOU HAD 5 OR MORE DRINKS IN A DAY: 0
DOES PATIENT WANT TO STOP DRINKING: NO
HAVE YOU EVER FELT YOU SHOULD CUT DOWN ON YOUR DRINKING: NO
ALCOHOL_USE: YES
HAVE PEOPLE ANNOYED YOU BY CRITICIZING YOUR DRINKING: NO
AVERAGE NUMBER OF DAYS PER WEEK YOU HAVE A DRINK CONTAINING ALCOHOL: 2
SUBSTANCE_ABUSE: 0
TOTAL SCORE: 0
ON A TYPICAL DAY WHEN YOU DRINK ALCOHOL HOW MANY DRINKS DO YOU HAVE: 2
TOTAL SCORE: 0
CONSUMPTION TOTAL: NEGATIVE
EVER FELT BAD OR GUILTY ABOUT YOUR DRINKING: NO

## 2024-05-11 ASSESSMENT — PAIN DESCRIPTION - PAIN TYPE
TYPE: ACUTE PAIN

## 2024-05-11 ASSESSMENT — FIBROSIS 4 INDEX: FIB4 SCORE: 1.1

## 2024-05-11 NOTE — ASSESSMENT & PLAN NOTE
Diuresis  Check Doppler  5/12 she has severe lower extremity lymphedema and swelling/2024-.  She needs IV Lasix in the 20 show remains inpatient for close monitoring of electrolytes, kidney function while she is on IV Lasix.  Also she has been monitor off IV antibiotic or any signs of worsening   RUQ Pain

## 2024-05-11 NOTE — ED PROVIDER NOTES
ED Provider Note    CHIEF COMPLAINT  Chief Complaint   Patient presents with    Leg Swelling    Leg Pain       EXTERNAL RECORDS REVIEWED  Inpatient Notes ER note 3/19/2024 reviewed.  Patient here for peripheral edema.    HPI/ROS  LIMITATION TO HISTORY   Select: : None  OUTSIDE HISTORIAN(S):  None    Ariadna Sepulveda is a 73 y.o. female who presents to the ER with bilateral lower extremity swelling which is acute on chronic.  More concerning to her is the worsening redness.  She states that she deals with her leg swelling chronically however the redness is new and she does have concern for possible cellulitis.  Past medical history significant primarily for hypertension and RLS.  On medications for these.  Currently asking for ibuprofen for chronic leg discomfort.  Not currently on diuretics.    PAST MEDICAL HISTORY   has a past medical history of Arthritis, At risk for sleep apnea, Hypertension, and RLS (restless legs syndrome) (10/5/2011).    SURGICAL HISTORY   has a past surgical history that includes tonsillectomy; knee replacement, total (left); other orthopedic surgery; and isaiah by laparoscopy (N/A, 12/4/2021).    FAMILY HISTORY  Family History   Problem Relation Age of Onset    Cancer Mother     Arthritis Father     Diabetes Father     Heart Disease Father     Hypertension Father     Hyperlipidemia Father     Psychiatric Illness Brother     Drug abuse Brother     Alcohol abuse Brother     Lung Disease Neg Hx        SOCIAL HISTORY  Social History     Tobacco Use    Smoking status: Never    Smokeless tobacco: Never   Vaping Use    Vaping Use: Never used   Substance and Sexual Activity    Alcohol use: Yes     Alcohol/week: 4.8 oz     Types: 8 Cans of beer per week     Comment: 1-2 every other day or when [place] to [place]    Drug use: No    Sexual activity: Not Currently       CURRENT MEDICATIONS  Home Medications       Reviewed by Lesia Clinton (Pharmacy Tech) on 05/11/24 at 0218  Med List Status: Complete      Medication Last Dose Status   oxybutynin (DITROPAN-XL) 15 MG CR tablet 5/9/2024 Active   pramipexole (MIRAPEX) 1 MG Tab 5/9/2024 Active                    ALLERGIES  No Known Allergies    PHYSICAL EXAM  VITAL SIGNS: /55   Pulse 69   Temp 36.2 °C (97.1 °F) (Temporal)   Resp 20   Ht 1.524 m (5')   Wt 99.8 kg (220 lb)   LMP  (LMP Unknown)   SpO2 91%   BMI 42.97 kg/m²          Pulse ox interpretation: I interpret this pulse ox as normal.  Constitutional: Alert in no apparent distress.  HENT: No signs of trauma, Bilateral external ears normal, Nose normal.   Cardiovascular: Regular rate and rhythm, no murmurs.   Thorax & Lungs: Normal breath sounds, No respiratory distress, No wheezing, No chest tenderness.   Abdomen: Bowel sounds normal, Soft, No tenderness  Skin: Warm, Dry  BLE:   3+ edema     Musculoskeletal: Good range of motion in all major joints  Neurologic: Alert , Normal motor function, Normal sensory function, No focal deficits noted.   Psychiatric: Affect normal, Judgment normal, Mood normal.         EKG/LABS  Results for orders placed or performed during the hospital encounter of 05/11/24   Lactic Acid   Result Value Ref Range    Lactic Acid 0.6 0.5 - 2.0 mmol/L   CBC with Differential   Result Value Ref Range    WBC 8.0 4.8 - 10.8 K/uL    RBC 3.77 (L) 4.20 - 5.40 M/uL    Hemoglobin 11.1 (L) 12.0 - 16.0 g/dL    Hematocrit 33.0 (L) 37.0 - 47.0 %    MCV 87.5 81.4 - 97.8 fL    MCH 29.4 27.0 - 33.0 pg    MCHC 33.6 32.2 - 35.5 g/dL    RDW 46.9 35.9 - 50.0 fL    Platelet Count 252 164 - 446 K/uL    MPV 9.1 9.0 - 12.9 fL    Neutrophils-Polys 70.50 44.00 - 72.00 %    Lymphocytes 15.50 (L) 22.00 - 41.00 %    Monocytes 10.20 0.00 - 13.40 %    Eosinophils 3.20 0.00 - 6.90 %    Basophils 0.40 0.00 - 1.80 %    Immature Granulocytes 0.20 0.00 - 0.90 %    Nucleated RBC 0.00 0.00 - 0.20 /100 WBC    Neutrophils (Absolute) 5.65 1.82 - 7.42 K/uL    Lymphs (Absolute) 1.24 1.00 - 4.80 K/uL    Monos (Absolute)  0.82 0.00 - 0.85 K/uL    Eos (Absolute) 0.26 0.00 - 0.51 K/uL    Baso (Absolute) 0.03 0.00 - 0.12 K/uL    Immature Granulocytes (abs) 0.02 0.00 - 0.11 K/uL    NRBC (Absolute) 0.00 K/uL   Complete Metabolic Panel   Result Value Ref Range    Sodium 140 135 - 145 mmol/L    Potassium 3.7 3.6 - 5.5 mmol/L    Chloride 108 96 - 112 mmol/L    Co2 21 20 - 33 mmol/L    Anion Gap 11.0 7.0 - 16.0    Glucose 120 (H) 65 - 99 mg/dL    Bun 18 8 - 22 mg/dL    Creatinine 0.47 (L) 0.50 - 1.40 mg/dL    Calcium 8.4 (L) 8.5 - 10.5 mg/dL    Correct Calcium 8.6 8.5 - 10.5 mg/dL    AST(SGOT) 14 12 - 45 U/L    ALT(SGPT) 12 2 - 50 U/L    Alkaline Phosphatase 94 30 - 99 U/L    Total Bilirubin 0.5 0.1 - 1.5 mg/dL    Albumin 3.8 3.2 - 4.9 g/dL    Total Protein 6.5 6.0 - 8.2 g/dL    Globulin 2.7 1.9 - 3.5 g/dL    A-G Ratio 1.4 g/dL   ESTIMATED GFR   Result Value Ref Range    GFR (CKD-EPI) 101 >60 mL/min/1.73 m 2   Prothrombin time (INR)   Result Value Ref Range    PT 14.7 (H) 12.0 - 14.6 sec    INR 1.13 0.87 - 1.13   CRP QUANTITIVE (NON-CARDIAC)   Result Value Ref Range    Stat C-Reactive Protein 2.19 (H) 0.00 - 0.75 mg/dL   proBrain Natriuretic Peptide, NT   Result Value Ref Range    NT-proBNP 338 (H) 0 - 125 pg/mL       I have independently interpreted this EKG    RADIOLOGY/PROCEDURES   I have independently interpreted the diagnostic imaging associated with this visit and am waiting the final reading from the radiologist.   My preliminary interpretation is as follows: Chest x-ray without acute consolidative changes or effusions    Radiologist interpretation:  DX-CHEST-PORTABLE (1 VIEW)   Final Result      No acute cardiopulmonary disease.      US-EXTREMITY VENOUS LOWER BILAT    (Results Pending)       COURSE & MEDICAL DECISION MAKING    ASSESSMENT, COURSE AND PLAN  Care Narrative: Patient presenting with lower extremity swelling and redness.  High concern for cellulitis.  Will get hematologic evaluation and started on empiric antibiotics for  cellulitis.  Given symmetry bilaterally I have a lower suspicion for DVT.    DISPOSITION AND DISCUSSIONS  I have discussed management of the patient with the following physicians and JAMES's: Hospitalist    Discussion of management with other Q or appropriate source(s): None     Patient presenting with above presentation.  Bilateral cellulitis.  I do believe that she will benefit from empiric IV antibiotic therapy rather than oral therapy given the extent of presentation.  Patient reports chronic lower extremity swelling.  It is symmetric and again I have lower suspicion for DVT at this time.  No chest pain or shortness of breath.  No hypoxia.  No tachycardia.  Patient does have chronic history of urinary difficulty as well and states that she has been poorly tolerant to diuretics previously secondary to this.  Will refrain from diuretics at this point but ultimately may assist with her peripheral edema as is presented today    Hospitalist agreeable to ongoing inpatient care at this time    FINAL DIAGNOSIS  1. Cellulitis of lower extremity, unspecified laterality           Electronically signed by: Tirso Doherty M.D., 5/11/2024 2:19 AM

## 2024-05-11 NOTE — ED NOTES
Admission report given to the assigned RN in  S612/02   for continuity of care and management.  Provided opportunity to asks questions.  All pt belongings at bedside    Awaiting for transport

## 2024-05-11 NOTE — H&P
Hospital Medicine History & Physical Note    Date of Service  5/11/2024    Primary Care Physician  Lj Marion III, M.D.    Consultants  None    Code Status  Full Code    Chief Complaint  Chief Complaint   Patient presents with    Leg Swelling    Leg Pain       History of Presenting Illness  Ariadna Sepulveda is a 73 y.o. morbidly obese female with a history of restless leg syndrome, urinary retention due to overactive bladder, chronic lymphedema of lower extremities, DANIEL who presented 5/11/2024 with evaluation for increased leg swelling, erythema.  She has chronic leg swelling, however concern for increased pain, as well as redness of bilateral lower extremity.  Due to high clinical suspicion of cellulitis, admission requested by ERP.  Therefore, admitted to medicine service for further evaluation and treatment.    I discussed the plan of care with patient, bedside RN, and pharmacy.    Review of Systems  Review of Systems   Constitutional:  Positive for chills and malaise/fatigue. Negative for fever.   HENT:  Negative for hearing loss and tinnitus.    Eyes:  Negative for blurred vision and double vision.   Cardiovascular:  Positive for leg swelling. Negative for chest pain and palpitations.   Gastrointestinal:  Negative for abdominal pain, heartburn, nausea and vomiting.   Genitourinary:  Negative for dysuria and urgency.   Musculoskeletal:  Positive for joint pain and myalgias.   Skin:  Positive for rash. Negative for itching.   Neurological:  Positive for weakness. Negative for dizziness, focal weakness and headaches.   Endo/Heme/Allergies:  Negative for environmental allergies. Does not bruise/bleed easily.   Psychiatric/Behavioral:  Negative for depression and substance abuse.    All other systems reviewed and are negative.      Past Medical History   has a past medical history of Arthritis, At risk for sleep apnea, Hypertension, and RLS (restless legs syndrome) (10/5/2011).    Surgical History   has a past  surgical history that includes tonsillectomy; knee replacement, total (left); other orthopedic surgery; and isaiah by laparoscopy (N/A, 12/4/2021).     Family History  family history includes Alcohol abuse in her brother; Arthritis in her father; Cancer in her mother; Diabetes in her father; Drug abuse in her brother; Heart Disease in her father; Hyperlipidemia in her father; Hypertension in her father; Psychiatric Illness in her brother.   Family history reviewed with patient. There is family history that is pertinent to the chief complaint.     Social History   reports that she has never smoked. She has never used smokeless tobacco. She reports current alcohol use of about 4.8 oz of alcohol per week. She reports that she does not use drugs.    Allergies  No Known Allergies    Medications  Prior to Admission Medications   Prescriptions Last Dose Informant Patient Reported? Taking?   oxybutynin (DITROPAN-XL) 15 MG CR tablet 5/9/2024 at PM Patient No No   Sig: Take 2 Tablets by mouth every day.   Patient taking differently: Take 15 mg by mouth 2 times a day.   pramipexole (MIRAPEX) 1 MG Tab 5/9/2024 at PM Patient No No   Sig: Take 3 Tablets by mouth every evening.      Facility-Administered Medications: None       Physical Exam  Temp:  [36.2 °C (97.1 °F)-36.7 °C (98 °F)] 36.2 °C (97.1 °F)  Pulse:  [69-78] 69  Resp:  [18-21] 20  BP: (114-141)/(55-65) 114/55  SpO2:  [91 %-98 %] 91 %  Blood Pressure : 137/64   Temperature: 36.7 °C (98 °F)   Pulse: 77   Respiration: 20   Pulse Oximetry: 95 %       Physical Exam  Vitals and nursing note reviewed.   Constitutional:       General: She is not in acute distress.     Appearance: She is obese.   HENT:      Head: Normocephalic and atraumatic.      Nose: Nose normal.      Mouth/Throat:      Mouth: Mucous membranes are moist.      Pharynx: Oropharynx is clear.   Eyes:      General: No scleral icterus.     Extraocular Movements: Extraocular movements intact.   Cardiovascular:       "Rate and Rhythm: Normal rate and regular rhythm.      Pulses: Normal pulses.      Heart sounds:      No friction rub.   Pulmonary:      Effort: No respiratory distress.      Breath sounds: No stridor. Rales present. No wheezing.   Chest:      Chest wall: No tenderness.   Abdominal:      General: There is no distension.      Tenderness: There is no abdominal tenderness. There is no guarding or rebound.   Musculoskeletal:         General: Normal range of motion.      Cervical back: Neck supple. No tenderness.      Right lower leg: Edema present.      Left lower leg: Edema present.      Comments: LE -- erythema, swollen, tender   Skin:     General: Skin is warm and dry.      Capillary Refill: Capillary refill takes less than 2 seconds.   Neurological:      General: No focal deficit present.      Mental Status: She is alert and oriented to person, place, and time.   Psychiatric:         Mood and Affect: Mood normal.         Laboratory:  Recent Labs     05/11/24 0227   WBC 8.0   RBC 3.77*   HEMOGLOBIN 11.1*   HEMATOCRIT 33.0*   MCV 87.5   MCH 29.4   MCHC 33.6   RDW 46.9   PLATELETCT 252   MPV 9.1     Recent Labs     05/11/24 0227   SODIUM 140   POTASSIUM 3.7   CHLORIDE 108   CO2 21   GLUCOSE 120*   BUN 18   CREATININE 0.47*   CALCIUM 8.4*     Recent Labs     05/11/24 0227   ALTSGPT 12   ASTSGOT 14   ALKPHOSPHAT 94   TBILIRUBIN 0.5   GLUCOSE 120*     Recent Labs     05/11/24 0227   INR 1.13     Recent Labs     05/11/24 0227   NTPROBNP 338*         No results for input(s): \"TROPONINT\" in the last 72 hours.    Imaging:  DX-CHEST-PORTABLE (1 VIEW)   Final Result      No acute cardiopulmonary disease.      US-EXTREMITY VENOUS LOWER BILAT    (Results Pending)   US-EXTREMITY ARTERY LOWER BILAT W/ISABEL (COMBO)    (Results Pending)       X-Ray:  I have personally reviewed the images and compared with prior images.    Assessment/Plan:  Justification for Admission Status  I anticipate this patient will require at least 2 " midnights hospitalization, therefore appropriate for inpatient status.        * Cellulitis- (present on admission)  Assessment & Plan  Of b/l LE extremities  Possible superimposed venous stasis dermatitis    Check LE doppler  Supportive wound care  Supportive pain control  Antibiotic: Ancef  Follow cultures    Hyperlipidemia- (present on admission)  Assessment & Plan  Statin    Bilateral leg edema- (present on admission)  Assessment & Plan  Diuresis  Check Doppler    Overactive bladder- (present on admission)  Assessment & Plan  Continue oxybutynin    RLS (restless legs syndrome)- (present on admission)  Assessment & Plan  Continue home Mirapex    DANIEL (obstructive sleep apnea)  Assessment & Plan  CPAP if able to tolerate    Morbid obesity (HCC)- (present on admission)  Assessment & Plan  Diet and lifestyle modification  Body mass index is 42.97 kg/m².          VTE prophylaxis: heparin ppx

## 2024-05-11 NOTE — WOUND TEAM
[facility]  Inpatient Services  Initial Wound and Skin Care Evaluation    Admission Date: 5/11/2024     Last order of IP CONSULT TO WOUND CARE was found on 5/11/2024 from Hospital Encounter on 5/11/2024     HPI, PMH, SH: Reviewed    Past Surgical History:   Procedure Laterality Date    NAILA BY LAPAROSCOPY N/A 12/4/2021    Procedure: CHOLECYSTECTOMY, LAPAROSCOPIC;  Surgeon: Jos Rodriguez M.D.;  Location: SURGERY [facility];  Service: General    KNEE REPLACEMENT, TOTAL  left    OTHER ORTHOPEDIC SURGERY      TONSILLECTOMY       Social History     Tobacco Use    Smoking status: Never    Smokeless tobacco: Never   Substance Use Topics    Alcohol use: Yes     Alcohol/week: 4.8 oz     Types: 8 Cans of beer per week     Comment: 1-2 every other day or when goes to [facility]     Chief Complaint   Patient presents with    Leg Swelling    Leg Pain     Diagnosis: Cellulitis [L03.90]    Unit where seen by Wound Team: S612/02     WOUND CONSULT RELATED TO:  BLE, pannus, breasts    WOUND TEAM PLAN OF CARE - Frequency of Follow-up:   Nursing to follow dressing orders written for wound care. Contact wound team if area fails to progress, deteriorates or with any questions/concerns if something comes up before next scheduled follow up (See below as to whether wound is following and frequency of wound follow up)  Not following, consult as needed  - BLE -cellulitis (no open wounds);   pannus, breasts, groin - MASD; POA Stage 1 Buttocks    WOUND HISTORY:   Ariadna Sepulveda is a 73 y.o. morbidly obese female with a history of restless leg syndrome, urinary retention due to overactive bladder, chronic lymphedema of lower extremities, DANIEL who presented 5/11/2024 with evaluation for increased leg swelling, erythema.  She has chronic leg swelling, however concern for increased pain, as well as redness of bilateral lower extremity.  Due to high clinical suspicion of cellulitis, admission requested by ERP.  Therefore, admitted  to medicine service for further evaluation and treatment.        WOUND ASSESSMENT/LDA  Wound Other (comment) Pretibial Bilateral cellulitis (Active)   No Date First Assessed or Time First Assessed found.   Present on Original Admission: Yes  Primary Wound Type: Other (comment)  Location: Pretibial  Laterality: Bilateral  Wound Description (Comments): cellulitis      Assessments 5/11/2024 12:00 PM   Wound Image     Site Assessment Dry;Intact;Red;Edema   Periwound Assessment Clean;Dry;Intact;Blanchable erythema;Edema;Warm   Closure Open to air   Drainage Amount None   Treatments Cleansed;Site care   Wound Cleansing Foam Cleanser/Washcloth   Periwound Protectant Skin Moisturizer   Dressing Status Open to Air   NEXT Weekly Photo (Inpatient Only) 05/15/24   Wound Team Following Not following   Non-staged Wound Description Not applicable       Wound 05/11/24 Pressure Injury Buttocks Bilateral POA STAGE 1 (Active)   Date First Assessed: 05/11/24   Present on Original Admission: Yes  Hand Hygiene Completed: Yes  Primary Wound Type: Pressure Injury  Location: Buttocks  Laterality: Bilateral  Wound Description (Comments): POA STAGE 1      Assessments 5/11/2024 12:00 PM   Wound Image     Site Assessment Dry;Intact;Clean;Red   Periwound Assessment Non-blanchable erythema   Margins Attached edges;Defined edges   Closure Open to air   Drainage Amount None   Treatments Cleansed;Site care;Offloading   Wound Cleansing Foam Cleanser/Washcloth   Dressing Status Open to Air   NEXT Weekly Photo (Inpatient Only) 05/15/24   Wound Team Following Not following   Pressure Injury Stage Stage 1   Wound Length (cm) 9 cm   Wound Width (cm) 3 cm   Wound Depth (cm) 0 cm   Wound Surface Area (cm^2) 27 cm^2   Wound Volume (cm^3) 0 cm^3   Wound Bed Epithelium (%) 100 %   Shape oval   Wound Odor None             Vascular:    ISABEL:   No results found.    Lab Values:    Lab Results   Component Value Date/Time    WBC 8.0 05/11/2024 02:27 AM    RBC 3.77  (L) 05/11/2024 02:27 AM    HEMOGLOBIN 11.1 (L) 05/11/2024 02:27 AM    HEMATOCRIT 33.0 (L) 05/11/2024 02:27 AM    CREACTPROT 2.19 (H) 05/11/2024 02:27 AM    SEDRATEWES 33 (H) 05/11/2024 02:27 AM    HBA1C 5.5 05/25/2022 02:28 AM         Culture Results show:  No results found for this or any previous visit (from the past 720 hour(s)).    Pain Level/Medicated:  None, Tolerated without pain medication       INTERVENTIONS BY WOUND TEAM:  Chart and images reviewed. Discussed with bedside RN. All areas of concern (based on picture review, LDA review and discussion with bedside RN) have been thoroughly assessed. Documentation of areas based on significant findings. This RN in to assess patient. Performed standard wound care which includes appropriate positioning, dressing removal and non-selective debridement. Pictures and measurements obtained weekly if/when required.    Wound:  Skin folds - suspected moisture dermatitis  Nystatin powder and interdy cloths ordered for bedside nursing     Wound:  POA Stage 1 - Buttocks  Preparation for Dressing removal: Open to air  Cleansed/Non-selectively Debrided with:  No rinse foam soap and Moist warm washcloth  Tiana wound: Cleansed with No rinse foam soap and Moist warm washcloth, Recommend Barrier paste and leave open to air.     Advanced Wound Care Discharge Planning  Number of Clinicians necessary to complete wound care: 1  Is patient requiring IV pain medications for dressing changes:  No   Length of time for dressing change 30 min. (This does not include chart review, pre-medication time, set up, clean up or time spent charting.)    Interdisciplinary consultation: Patient, Bedside RN (Nirmal)    EVALUATION / RATIONALE FOR TREATMENT:     Date:  05/11/24  Wound Status:  Initial evaluation    The patient has an area of non-blanching erythema to bilateral buttocks with suspected moisture dermatitis. Cleansed area and ordered barrier paste. Patient is able reposition herself  independently. Suspected moisture dermatitis to pannus, groin, and breast folds - nystatin powder and Interdry cloth ordered for bedside nursing per protocol.     Bilateral lower legs are erythemic and edematous with no open wounds and no drainage at this time, confirmed cellulitis. Left open to air.       Goals: Steady decrease in wound area and depth weekly.    NURSING PLAN OF CARE ORDERS:  Dressing changes: See Dressing Care orders  Skin care: See Skin Care orders    NUTRITION RECOMMENDATIONS   Wound Team Recommendations:  N/A    DIET ORDERS (From admission to next 24h)       Start     Ordered    05/11/24 0333  Diet Order Diet: Regular  ALL MEALS        Question:  Diet:  Answer:  Regular    05/11/24 0333                    PREVENTATIVE INTERVENTIONS:    Q shift Los - performed per nursing policy  Q shift pressure point assessments - performed per nursing policy    Surface/Positioning  Standard/trauma mattress - Currently in Place    Offloading/Redistribution  Sacral offloading dressing (Silicone dressing) - Ordered  Heel offloading dressing (Silicone dressing) - Ordered  Float Heels off Bed with Pillows - Currently in Place           Containment/Moisture Prevention    Barrier paste - Ordered  Antifungal treatment - Ordered    Anticipated discharge plans:  TBD        Vac Discharge Needs:  Vac Discharge plan is purely a recommendation from wound team and not a requirement for discharge unless otherwise stated by physician.  Not Applicable Pt not on a wound vac

## 2024-05-11 NOTE — ED TRIAGE NOTES
Chief Complaint   Patient presents with    Leg Swelling    Leg Pain   /64   Pulse 78   Temp 36.7 °C (98 °F) (Temporal)   Resp 18   Ht 1.524 m (5')   Wt 99.8 kg (220 lb)   LMP  (LMP Unknown)   SpO2 98%   BMI 42.97 kg/m²     Pt reports L leg swelling x1 day. Pt states that her legs are always swollen but starting yesterday she noticed her leg was more swollen with the skin being hot to touch. Denies fever, cough, chills, or recent trauma. Denies Hx of CHF.       Pt is AAOx4 GCS 15. BL legs noted to be markedly swollen.

## 2024-05-11 NOTE — ASSESSMENT & PLAN NOTE
Of b/l LE extremities  Possible superimposed venous stasis dermatitis    Check LE doppler  Supportive wound care  Supportive pain control  Antibiotic: Ancef  Follow cultures  8/12/24-cultures remain no growth to date.  Actually are improving with antibiotic.  Most likely venous stasis.  No need for antibiotics.  Continue IV Lasix and monitor daily labs for now

## 2024-05-11 NOTE — ED NOTES
Med Rec complete per patient   Allergies reviewed  Antibiotics in the past 30 days:no  Anticoagulant in past 14 days:no  Pharmacy patient utilizes:[facility] [address]

## 2024-05-11 NOTE — PROGRESS NOTES
4 Eyes Skin Assessment Completed by YOANA Hamm and YOANA Nelson.    Head WDL  Ears Redness and Blanching  Nose WDL  Mouth WDL  Neck WDL  Breast/Chest Redness, Blanching, and Rash  Shoulder Blades WDL  Spine WDL  (R) Arm/Elbow/Hand WDL  (L) Arm/Elbow/Hand WDL  Abdomen redness rash under right panus  Groin Redness and Rash  Scrotum/Coccyx/Buttocks WDL  (R) Leg Redness, Rash, and Swelling  (L) Leg Redness, Rash, and Swelling  (R) Heel/Foot/Toe Redness, Swelling, and Rash  (L) Heel/Foot/Toe Redness, Swelling, and Rash          Devices In Places IV      Interventions In Place Pillows    Possible Skin Injury Yes    Pictures Uploaded Into Epic Yes  Wound Consult Placed N/A  RN Wound Prevention Protocol Ordered No

## 2024-05-12 ENCOUNTER — APPOINTMENT (OUTPATIENT)
Dept: CARDIOLOGY | Facility: MEDICAL CENTER | Age: 74
DRG: 303 | End: 2024-05-12
Attending: INTERNAL MEDICINE
Payer: MEDICARE

## 2024-05-12 LAB
ALBUMIN SERPL BCP-MCNC: 3.7 G/DL (ref 3.2–4.9)
BASOPHILS # BLD AUTO: 0.1 % (ref 0–1.8)
BASOPHILS # BLD: 0.01 K/UL (ref 0–0.12)
BUN SERPL-MCNC: 19 MG/DL (ref 8–22)
CALCIUM ALBUM COR SERPL-MCNC: 9.1 MG/DL (ref 8.5–10.5)
CALCIUM SERPL-MCNC: 8.9 MG/DL (ref 8.5–10.5)
CHLORIDE SERPL-SCNC: 99 MMOL/L (ref 96–112)
CO2 SERPL-SCNC: 26 MMOL/L (ref 20–33)
CREAT SERPL-MCNC: 0.49 MG/DL (ref 0.5–1.4)
EOSINOPHIL # BLD AUTO: 0.18 K/UL (ref 0–0.51)
EOSINOPHIL NFR BLD: 2.4 % (ref 0–6.9)
ERYTHROCYTE [DISTWIDTH] IN BLOOD BY AUTOMATED COUNT: 46.4 FL (ref 35.9–50)
GFR SERPLBLD CREATININE-BSD FMLA CKD-EPI: 100 ML/MIN/1.73 M 2
GLUCOSE SERPL-MCNC: 113 MG/DL (ref 65–99)
HCT VFR BLD AUTO: 37.1 % (ref 37–47)
HGB BLD-MCNC: 12.4 G/DL (ref 12–16)
IMM GRANULOCYTES # BLD AUTO: 0.03 K/UL (ref 0–0.11)
IMM GRANULOCYTES NFR BLD AUTO: 0.4 % (ref 0–0.9)
LV EJECT FRACT  99904: 65
LV EJECT FRACT MOD 2C 99903: 61.4
LV EJECT FRACT MOD 4C 99902: 73.31
LV EJECT FRACT MOD BP 99901: 67.17
LYMPHOCYTES # BLD AUTO: 1.03 K/UL (ref 1–4.8)
LYMPHOCYTES NFR BLD: 13.9 % (ref 22–41)
MAGNESIUM SERPL-MCNC: 1.9 MG/DL (ref 1.5–2.5)
MCH RBC QN AUTO: 29.6 PG (ref 27–33)
MCHC RBC AUTO-ENTMCNC: 33.4 G/DL (ref 32.2–35.5)
MCV RBC AUTO: 88.5 FL (ref 81.4–97.8)
MONOCYTES # BLD AUTO: 0.48 K/UL (ref 0–0.85)
MONOCYTES NFR BLD AUTO: 6.5 % (ref 0–13.4)
NEUTROPHILS # BLD AUTO: 5.66 K/UL (ref 1.82–7.42)
NEUTROPHILS NFR BLD: 76.7 % (ref 44–72)
NRBC # BLD AUTO: 0 K/UL
NRBC BLD-RTO: 0 /100 WBC (ref 0–0.2)
PHOSPHATE SERPL-MCNC: 3.9 MG/DL (ref 2.5–4.5)
PLATELET # BLD AUTO: 252 K/UL (ref 164–446)
PMV BLD AUTO: 9.4 FL (ref 9–12.9)
POTASSIUM SERPL-SCNC: 3.6 MMOL/L (ref 3.6–5.5)
RBC # BLD AUTO: 4.19 M/UL (ref 4.2–5.4)
SODIUM SERPL-SCNC: 138 MMOL/L (ref 135–145)
WBC # BLD AUTO: 7.4 K/UL (ref 4.8–10.8)

## 2024-05-12 PROCEDURE — 93306 TTE W/DOPPLER COMPLETE: CPT | Mod: 26 | Performed by: INTERNAL MEDICINE

## 2024-05-12 PROCEDURE — 99233 SBSQ HOSP IP/OBS HIGH 50: CPT | Performed by: INTERNAL MEDICINE

## 2024-05-12 RX ADMIN — FUROSEMIDE 40 MG: 10 INJECTION INTRAMUSCULAR; INTRAVENOUS at 04:36

## 2024-05-12 RX ADMIN — OXYBUTYNIN CHLORIDE 15 MG: 5 TABLET ORAL at 04:36

## 2024-05-12 RX ADMIN — RIVAROXABAN 10 MG: 10 TABLET, FILM COATED ORAL at 20:20

## 2024-05-12 RX ADMIN — NYSTATIN: 100000 POWDER TOPICAL at 04:36

## 2024-05-12 RX ADMIN — PRAMIPEXOLE DIHYDROCHLORIDE 3 MG: 0.5 TABLET ORAL at 20:20

## 2024-05-12 RX ADMIN — NYSTATIN: 100000 POWDER TOPICAL at 18:34

## 2024-05-12 RX ADMIN — NYSTATIN: 100000 POWDER TOPICAL at 13:35

## 2024-05-12 RX ADMIN — FUROSEMIDE 40 MG: 10 INJECTION INTRAMUSCULAR; INTRAVENOUS at 16:27

## 2024-05-12 RX ADMIN — OXYBUTYNIN CHLORIDE 15 MG: 5 TABLET ORAL at 16:24

## 2024-05-12 RX ADMIN — ATORVASTATIN CALCIUM 40 MG: 40 TABLET, FILM COATED ORAL at 16:24

## 2024-05-12 RX ADMIN — SENNOSIDES AND DOCUSATE SODIUM 2 TABLET: 50; 8.6 TABLET ORAL at 16:23

## 2024-05-12 RX ADMIN — ASPIRIN 81 MG: 81 TABLET, COATED ORAL at 04:36

## 2024-05-12 ASSESSMENT — ENCOUNTER SYMPTOMS
HEARTBURN: 0
FEVER: 0
VOMITING: 0
COUGH: 0
NERVOUS/ANXIOUS: 0
HEADACHES: 0
CHILLS: 0
BLOOD IN STOOL: 0
DIZZINESS: 0
SHORTNESS OF BREATH: 0
NAUSEA: 0

## 2024-05-12 ASSESSMENT — PAIN DESCRIPTION - PAIN TYPE
TYPE: ACUTE PAIN
TYPE: ACUTE PAIN

## 2024-05-12 NOTE — PROGRESS NOTES
Patient was seen at bedside and chart was reviewed. Please see Dr. Rey's note for further details.  73-year-old female past medical history obesity, chronic lower extremity swelling and lymphedema, restless leg syndrome, DANIEL presented 5/11/2024 with lower extremity swelling, erythema and redness.  She admitted that she was having lower extremity swelling and erythema for the last 2 years but finally her roommate convinced her to come in the hospital for evaluation.  Her vitals are stable will, febrile, WBC is normal, U tox is normal C-reactive protein is slightly elevated. I did stop antibiotics.  I will continue aggressive diuresing.  Dilaudid one-time for patient to have lower extremity ultrasounds for evaluation  Monitor without antibiotics, and if no changes will send home just with diuretic  Repeat echocardiogram    Jc Emery M.D.

## 2024-05-12 NOTE — CARE PLAN
The patient is Stable - Low risk of patient condition declining or worsening    Shift Goals  Clinical Goals: pt safety during shift  Patient Goals: rest  Family Goals: dale    Progress made toward(s) clinical / shift goals: Pt had 1 episode of emesis overnight, refused antiemetic medications. Pt safety maintained at this time. Call light and personal belongings within reach. Bed locked in low position.     Problem: Fall Risk  Goal: Patient will remain free from falls  Outcome: Progressing     Problem: Skin Integrity  Goal: Skin integrity is maintained or improved  Outcome: Progressing     Patient is not progressing towards the following goals:

## 2024-05-12 NOTE — CARE PLAN
The patient is Stable - Low risk of patient condition declining or worsening    Shift Goals  Clinical Goals: safety, up for meals  Patient Goals: rest and comfort  Family Goals: dale    Axo4. Able to make needs known. VSS WNL. Denies pain. Remains free from injury or falls. Hourly rounding down. Due medications given as ordered. Call light and personal belongings within reach needs met at this time.    Progress made toward(s) clinical / shift goals:      Problem: Knowledge Deficit - Standard  Goal: Patient and family/care givers will demonstrate understanding of plan of care, disease process/condition, diagnostic tests and medications  Outcome: Progressing     Problem: Fall Risk  Goal: Patient will remain free from falls  Outcome: Progressing  Note: Uses the walker to turn to pivot.      Problem: Skin Integrity  Goal: Skin integrity is maintained or improved  Outcome: Progressing

## 2024-05-13 ENCOUNTER — PHARMACY VISIT (OUTPATIENT)
Dept: PHARMACY | Facility: MEDICAL CENTER | Age: 74
End: 2024-05-13
Payer: COMMERCIAL

## 2024-05-13 VITALS
SYSTOLIC BLOOD PRESSURE: 126 MMHG | HEART RATE: 62 BPM | BODY MASS INDEX: 43.19 KG/M2 | RESPIRATION RATE: 18 BRPM | WEIGHT: 220 LBS | HEIGHT: 60 IN | DIASTOLIC BLOOD PRESSURE: 53 MMHG | OXYGEN SATURATION: 96 % | TEMPERATURE: 96.9 F

## 2024-05-13 LAB
BACTERIA UR CULT: NORMAL
SIGNIFICANT IND 70042: NORMAL
SITE SITE: NORMAL
SOURCE SOURCE: NORMAL

## 2024-05-13 PROCEDURE — RXMED WILLOW AMBULATORY MEDICATION CHARGE: Performed by: INTERNAL MEDICINE

## 2024-05-13 PROCEDURE — 99239 HOSP IP/OBS DSCHRG MGMT >30: CPT | Performed by: INTERNAL MEDICINE

## 2024-05-13 RX ORDER — FUROSEMIDE 40 MG/1
40 TABLET ORAL DAILY
Qty: 30 TABLET | Refills: 0 | Status: SHIPPED | OUTPATIENT
Start: 2024-05-13

## 2024-05-13 RX ORDER — ASPIRIN 81 MG/1
81 TABLET ORAL DAILY
Qty: 100 TABLET | Refills: 0 | Status: SHIPPED | OUTPATIENT
Start: 2024-05-14 | End: 2024-08-22

## 2024-05-13 RX ORDER — POTASSIUM CHLORIDE 20 MEQ/1
20 TABLET, EXTENDED RELEASE ORAL DAILY
Qty: 30 TABLET | Refills: 0 | Status: SHIPPED | OUTPATIENT
Start: 2024-05-13 | End: 2024-06-12

## 2024-05-13 RX ORDER — ATORVASTATIN CALCIUM 40 MG/1
40 TABLET, FILM COATED ORAL EVERY EVENING
Qty: 30 TABLET | Refills: 0 | Status: SHIPPED | OUTPATIENT
Start: 2024-05-13 | End: 2024-06-12

## 2024-05-13 RX ADMIN — NYSTATIN: 100000 POWDER TOPICAL at 04:37

## 2024-05-13 RX ADMIN — FUROSEMIDE 40 MG: 10 INJECTION INTRAMUSCULAR; INTRAVENOUS at 04:36

## 2024-05-13 RX ADMIN — ASPIRIN 81 MG: 81 TABLET, COATED ORAL at 04:36

## 2024-05-13 RX ADMIN — OXYBUTYNIN CHLORIDE 15 MG: 5 TABLET ORAL at 04:36

## 2024-05-13 ASSESSMENT — PAIN DESCRIPTION - PAIN TYPE: TYPE: ACUTE PAIN

## 2024-05-13 NOTE — PROGRESS NOTES
Hospital Medicine Daily Progress Note    Date of Service  5/12/2024    Chief Complaint  Ariadna Sepulveda is a 73 y.o. female admitted 5/11/2024 with lower extremity swelling, redness    Hospital Course  73-year-old female past medical history obesity, chronic lower extremity swelling and lymphedema, restless leg syndrome, DANIEL presented 5/11/2024 with lower extremity swelling, erythema and redness.  She admitted that she was having lower extremity swelling and erythema for the last 2 years but finally her roommate convinced her to come in the hospital for evaluation.  Her vitals are stable will, febrile, WBC is normal, U tox is normal C-reactive protein is slightly elevated. I did stop antibiotics.  I will continue aggressive diuresing.  Dilaudid one-time for patient to have lower extremity ultrasounds for evaluation  Monitor without antibiotics, and if no changes will send home just with diuretic  Repeat echocardiogram    Interval Problem Update  She is doing better.  Vitals are stable. she reports the pain her swelling is significantly improved.  Lower extremity ultrasound was negative for DVT, ISABEL was negative for severe arterial stenosis  Continue IV Lasix twice daily.  Electrolytes, kidney function normal.  Echocardiogram is completed but report is still pending    I have discussed this patient's plan of care and discharge plan at IDT rounds today with Case Management, Nursing, Nursing leadership, and other members of the IDT team.    Consultants/Specialty  None    Code Status  Full Code    Disposition  The patient is not medically cleared for discharge to home or a post-acute facility.      I have placed the appropriate orders for post-discharge needs.    Review of Systems  Review of Systems   Constitutional:  Negative for chills, fever and malaise/fatigue.   Respiratory:  Negative for cough and shortness of breath.    Cardiovascular:  Positive for leg swelling. Negative for chest pain.   Gastrointestinal:   Negative for blood in stool, heartburn, nausea and vomiting.   Genitourinary:  Negative for dysuria and urgency.   Musculoskeletal:  Negative for joint pain.   Neurological:  Negative for dizziness and headaches.   Psychiatric/Behavioral:  The patient is not nervous/anxious.         Physical Exam  Temp:  [36.2 °C (97.1 °F)-36.6 °C (97.8 °F)] 36.6 °C (97.8 °F)  Pulse:  [61-72] 72  Resp:  [18] 18  BP: (116-138)/(51-61) 116/61  SpO2:  [94 %-97 %] 95 %    Physical Exam  Vitals and nursing note reviewed.   Constitutional:       Appearance: She is not ill-appearing.   HENT:      Head: Normocephalic and atraumatic.   Eyes:      General: No scleral icterus.  Cardiovascular:      Rate and Rhythm: Normal rate.      Heart sounds: No murmur heard.  Pulmonary:      Effort: Pulmonary effort is normal. No respiratory distress.      Breath sounds: No wheezing or rales.   Abdominal:      General: There is no distension.      Tenderness: There is no abdominal tenderness.   Musculoskeletal:      Right lower leg: Edema present.      Left lower leg: Edema present.      Comments: Bilateral erythema, warm   Skin:     Coloration: Skin is not jaundiced or pale.   Neurological:      Mental Status: She is alert and oriented to person, place, and time.   Psychiatric:         Mood and Affect: Mood normal.         Behavior: Behavior normal.         Thought Content: Thought content normal.         Judgment: Judgment normal.         Fluids    Intake/Output Summary (Last 24 hours) at 5/12/2024 1903  Last data filed at 5/12/2024 1400  Gross per 24 hour   Intake 598 ml   Output 3200 ml   Net -2602 ml       Laboratory  Recent Labs     05/11/24 0227 05/12/24  0729   WBC 8.0 7.4   RBC 3.77* 4.19*   HEMOGLOBIN 11.1* 12.4   HEMATOCRIT 33.0* 37.1   MCV 87.5 88.5   MCH 29.4 29.6   MCHC 33.6 33.4   RDW 46.9 46.4   PLATELETCT 252 252   MPV 9.1 9.4     Recent Labs     05/11/24 0227 05/12/24  0729   SODIUM 140 138   POTASSIUM 3.7 3.6   CHLORIDE 108 99   CO2  21 26   GLUCOSE 120* 113*   BUN 18 19   CREATININE 0.47* 0.49*   CALCIUM 8.4* 8.9     Recent Labs     05/11/24  0227   INR 1.13               Imaging  EC-ECHOCARDIOGRAM COMPLETE W/O CONT         US-EXTREMITY ARTERY LOWER BILAT   Final Result      US-EXTREMITY VENOUS LOWER BILAT   Final Result      DX-CHEST-PORTABLE (1 VIEW)   Final Result      No acute cardiopulmonary disease.           Assessment/Plan  * Cellulitis- (present on admission)  Assessment & Plan  Of b/l LE extremities  Possible superimposed venous stasis dermatitis    Check LE doppler  Supportive wound care  Supportive pain control  Antibiotic: Ancef  Follow cultures  8/12/24-cultures remain no growth to date.  Actually are improving with antibiotic.  Most likely venous stasis.  No need for antibiotics.  Continue IV Lasix and monitor daily labs for now    DANIEL (obstructive sleep apnea)  Assessment & Plan  CPAP if able to tolerate    Hyperlipidemia- (present on admission)  Assessment & Plan  Statin    Bilateral leg edema- (present on admission)  Assessment & Plan  Diuresis  Check Doppler  5/12 she has severe lower extremity lymphedema and swelling/2024-.  She needs IV Lasix in the 20 show remains inpatient for close monitoring of electrolytes, kidney function while she is on IV Lasix.  Also she has been monitor off IV antibiotic or any signs of worsening    Morbid obesity (HCC)- (present on admission)  Assessment & Plan  Diet and lifestyle modification  Body mass index is 42.97 kg/m².      Overactive bladder- (present on admission)  Assessment & Plan  Continue oxybutynin    RLS (restless legs syndrome)- (present on admission)  Assessment & Plan  Continue home Mirapex         VTE prophylaxis:   SCDs/TEDs   Xarelto 10mg daily as prophylaxis      I have performed a physical exam and reviewed and updated ROS and Plan today (5/12/2024). In review of yesterday's note (5/11/2024), there are no changes except as documented above.      Greater than 51 minutes  spent prepping to see patient (e.g. review of tests) obtaining and/or reviewing separately obtained history. Performing a medically appropriate examination and/ evaluation.  Counseling and educating the patient/family/caregiver.  Ordering medications, tests, or procedures.  Referring and communicating with other health care professionals.  Documenting clinical information in EPIC.  Independently interpreting results and communicating results to patient/family/caregiver.  Care coordination.

## 2024-05-13 NOTE — CARE PLAN
The patient is Stable - Low risk of patient condition declining or worsening    Shift Goals  Clinical Goals: Patient will remain free from injury for the nect 12 hours  Patient Goals: discharge update  Family Goals: dale    Progress made toward(s) clinical / shift goals:  Bed alarm in use, hourly rounding, reinforced use of call light      Problem: Respiratory  Goal: Patient will achieve/maintain optimum respiratory ventilation and gas exchange  Description: Target End Date:  Prior to discharge or change in level of care    Document on Assessment flowsheet    1.  Assess and monitor rate, rhythm, depth and effort of respiration  2.  Breath sounds assessed qshift and/or as needed  3.  Assess O2 saturation, administer/titrate oxygen as ordered  4.  Position patient for maximum ventilatory efficiency  5.  Turn, cough, and deep breath with splinting to improve effectiveness  6.  Collaborate with RT to administer medication/treatments per order  7.  Encourage use of incentive spirometer and encourage patient to cough after use and utilize splinting techniques if applicable  8.  Airway suctioning  9.  Monitor sputum production for changes in color, consistency and frequency  10. Perform frequent oral hygiene  11. Alternate physical activity with rest periods  Outcome: Progressing     Problem: Fall Risk  Goal: Patient will remain free from falls  Description: Target End Date:  Prior to discharge or change in level of care    Document interventions on the Nery Harrison Fall Risk Assessment    1.  Assess for fall risk factors  2.  Implement fall precautions  Outcome: Progressing       Patient is not progressing towards the following goals:

## 2024-05-13 NOTE — CARE PLAN
The patient is Stable - Low risk of patient condition declining or worsening    Shift Goals  Clinical Goals: pt will remain free of falls and injury throughout shift  Patient Goals: discharge update  Family Goals: dale    Progress made toward(s) clinical / shift goals: Patient remains free of falls/ injury throughout the shift. Bed alarm in place. Personal belongings and call light within reach. Patient educated to call for help  if they need help.     Patient is not progressing towards the following goals:

## 2024-05-13 NOTE — PROGRESS NOTES
Received report from day shift nurse. Patient is alert and oriented x4. Patient denies any pain at this time. Bed is locked and in the lowest position. Bed alarm is on.

## 2024-05-13 NOTE — DISCHARGE PLANNING
Case Management Discharge Planning    Admission Date: 5/11/2024  GMLOS: 3.2  ALOS: 2    6-Clicks ADL Score: 22  6-Clicks Mobility Score: 20      Anticipated Discharge Dispo: Discharge Disposition: D/T to home under HHA care in anticipation of covered skilled care (06)    DME Needed: Yes    DME Ordered: Yes    Action(s) Taken: Choice obtained    This RN CM was updated that patient is medically cleared for discharge this morning.     Escalations Completed: None    Medically Clear: Yes    Next Steps: Assist with any barriers for discharge home with no needs.    Barriers to Discharge: None    Is the patient up for discharge tomorrow: No

## 2024-05-13 NOTE — DISCHARGE PLANNING
Received Choice form at [id number]  Agency/Facility Name: [facility]  Referral sent per Choice form @ [phone]

## 2024-05-13 NOTE — PROGRESS NOTES
A&Ox4, declines pain intervention, pitting BLE edema, patient reports significant improvement in leg swelling, tolerating diet, voiding.  Updated on discharge plan.

## 2024-05-13 NOTE — DISCHARGE INSTRUCTIONS
Cellulitis, Adult    Cellulitis is a skin infection. The infected area is often warm, red, swollen, and sore. It occurs most often in the arms and lower legs. It is very important to get treated for this condition.  What are the causes?  This condition is caused by bacteria. The bacteria enter through a break in the skin, such as a cut, burn, insect bite, open sore, or crack.  What increases the risk?  This condition is more likely to occur in people who:  Have a weak body defense system (immune system).  Have open cuts, burns, bites, or scrapes on the skin.  Are older than 60 years of age.  Have a blood sugar problem (diabetes).  Have a long-lasting (chronic) liver disease (cirrhosis) or kidney disease.  Are very overweight (obese).  Have a skin problem, such as:  Itchy rash (eczema).  Slow movement of blood in the veins (venous stasis).  Fluid buildup below the skin (edema).  Have been treated with high-energy rays (radiation).  Use IV drugs.  What are the signs or symptoms?  Symptoms of this condition include:  Skin that is:  Red.  Streaking.  Spotting.  Swollen.  Sore or painful when you touch it.  Warm.  A fever.  Chills.  Blisters.  How is this diagnosed?  This condition is diagnosed based on:  Medical history.  Physical exam.  Blood tests.  Imaging tests.  How is this treated?  Treatment for this condition may include:  Medicines to treat infections or allergies.  Home care, such as:  Rest.  Placing cold or warm cloths (compresses) on the skin.  Hospital care, if the condition is very bad.  Follow these instructions at home:  Medicines  Take over-the-counter and prescription medicines only as told by your doctor.  If you were prescribed an antibiotic medicine, take it as told by your doctor. Do not stop taking it even if you start to feel better.  General instructions    Drink enough fluid to keep your pee (urine) pale yellow.  Do not touch or rub the infected area.  Raise (elevate) the infected area above  the level of your heart while you are sitting or lying down.  Place cold or warm cloths on the area as told by your doctor.  Keep all follow-up visits as told by your doctor. This is important.  Contact a doctor if:  You have a fever.  You do not start to get better after 1-2 days of treatment.  Your bone or joint under the infected area starts to hurt after the skin has healed.  Your infection comes back. This can happen in the same area or another area.  You have a swollen bump in the area.  You have new symptoms.  You feel ill and have muscle aches and pains.  Get help right away if:  Your symptoms get worse.  You feel very sleepy.  You throw up (vomit) or have watery poop (diarrhea) for a long time.  You see red streaks coming from the area.  Your red area gets larger.  Your red area turns dark in color.  These symptoms may represent a serious problem that is an emergency. Do not wait to see if the symptoms will go away. Get medical help right away. Call your local emergency services (911 in the [place] Do not drive yourself to the hospital.  Summary  Cellulitis is a skin infection. The area is often warm, red, swollen, and sore.  This condition is treated with medicines, rest, and cold and warm cloths.  Take all medicines only as told by your doctor.  Tell your doctor if symptoms do not start to get better after 1-2 days of treatment.  This information is not intended to replace advice given to you by your health care provider. Make sure you discuss any questions you have with your health care provider.  Document Revised: 09/29/2022 Document Reviewed: 09/29/2022  Elsevier Patient Education © 2023 [employer]

## 2024-05-13 NOTE — HOSPITAL COURSE
73-year-old female past medical history obesity, chronic lower extremity swelling and lymphedema, restless leg syndrome, DANIEL presented 5/11/2024 with lower extremity swelling, erythema and redness.  She admitted that she was having lower extremity swelling and erythema for the last 2 years but finally her roommate convinced her to come in the hospital for evaluation.  Her vitals are stable will, febrile, WBC is normal, U tox is normal C-reactive protein is slightly elevated. I did stop antibiotics.  I will continue aggressive diuresing.  Dilaudid one-time for patient to have lower extremity ultrasounds for evaluation  Monitor without antibiotics, and if no changes will send home just with diuretic  Repeat echocardiogram

## 2024-05-13 NOTE — DISCHARGE PLANNING
Care Transition Team Assessment    This RN CM completed assessment with Ariadna at bedside as patient is A/O x 4. She lives alone with friend that lives in [place]. Her sister Meme lives in [place] on the [facility]. Patient lives in a 1 story house with no steps into the house.   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   Karma is independent with all ADLs, and IADLs, she drives herself to follow up appointments and to get groceries. Patient has no needs except for cane for mobility on discharge.     Information Source  Orientation Level: Oriented X4  Information Given By: Patient  Informant's Name: Ariadna  Who is responsible for making decisions for patient? : Patient    Readmission Evaluation  Is this a readmission?: No    Elopement Risk  Legal Hold: No  Ambulatory or Self Mobile in Wheelchair: Yes  Disoriented: No  Psychiatric Symptoms: None  History of Wandering: No  Elopement this Admit: No  Vocalizing Wanting to Leave: No  Displays Behaviors, Body Language Wanting to Leave: No-Not at Risk for Elopement  Elopement Risk: Not at Risk for Elopement    Interdisciplinary Discharge Planning  Lives with - Patient's Self Care Capacity: Alone and Able to Care For Self  Patient or legal guardian wants to designate a caregiver: No  Support Systems: Friends / Neighbors, Family Member(s)  Housing / Facility: 2 Story Apartment / Condo  Durable Medical Equipment: Not Applicable    Discharge Preparedness  What is your plan after discharge?: Home with help  What are your discharge supports?: Other (comment)  Prior Functional Level: Ambulatory  Difficulity with ADLs: None  Difficulity with IADLs: None    Functional Assesment  Prior Functional Level: Ambulatory    Finances  Financial Barriers to Discharge: No  Prescription Coverage: Yes    Vision / Hearing Impairment  Vision Impairment : Yes  Right Eye Vision: Impaired, Wears  Glasses  Left Eye Vision: Impaired, Wears Glasses  Hearing Impairment : No         Advance Directive  Advance Directive?: None    Domestic Abuse  Have you ever been the victim of abuse or violence?: No  Physical Abuse or Sexual Abuse: No  Verbal Abuse or Emotional Abuse: No  Possible Abuse/Neglect Reported to:: Not Applicable    Psychological Assessment  History of Substance Abuse: None  History of Psychiatric Problems: No  Non-compliant with Treatment: No  Newly Diagnosed Illness: Yes    Discharge Risks or Barriers  Discharge risks or barriers?: Lives alone, no community support  Patient risk factors: Lack of outside supports, Lives alone and no community support    Anticipated Discharge Information  Discharge Disposition: Discharged to home/self care (01)  Discharge Address: [address] [place] [address]  Discharge Contact Phone Number: [phone]

## 2024-05-13 NOTE — DIETARY
"NUTRITION SERVICES: BMI - Pt with BMI >40 (=Body mass index is 42.36 kg/m².), morbid obesity. Weight is taken via \"other healthcare\" and pt is -1.5L fluids per I/O. Will ask RN/CNA for measured weight. Weight loss counseling not appropriate in acute care setting. RECOMMEND - Referral to outpatient nutrition services for weight management, or F/u with MD/RD after D/C.    "

## 2024-05-13 NOTE — DOCUMENTATION QUERY
"                                                                         [facility]                                                                       Query Response Note      PATIENT:               KARON STEVENS  ACCT #:                  [id number]  MRN:                     [id number]  :                      1950  ADMIT DATE:       2024 1:42 AM  DISCH DATE:        2024 11:47 AM  RESPONDING  PROVIDER #:        [id number]           QUERY TEXT:    Cellulitis is documented in the Medical Record. Please clarify the clinical/diagnostic relevance for the documented findings.    The patient's clinical indicators include:  72yo with dx of peripheral vascular disease, hyperlipidemia, bilateral leg edema, DANIEL     H&P \"Cellulitis Possible superimposed venous stasis dermatitis\"   PN \"...cultures remain no growth to date. Most likely venous stasis. No need for antibiotics\".    PN \"...severe lower extremity lymphedema and swelling...\"    Risk factors: advanced age, venous stasis, hyperlipidemia, lymphedema, HTN  Treatments: IV antibiotics, lab work, imaging, wound consult, monitoring, diuresis    Contact me with questions.     Thank you.  LIOR Álvarez, CDI  [email or web address]  Options provided:   -- Cellulitis is ruled out   -- Cellulitis is clinically significant and ruled in   -- Other explanation, (please specify the other explanation)   -- Unable to determine      Query created by: Yenny Nuñez on 2024 10:42 AM    RESPONSE TEXT:    Cellulitis is ruled out          Electronically signed by:  ROLDAN DENTON MD 2024 1:41 PM              "

## 2024-05-14 ENCOUNTER — PATIENT OUTREACH (OUTPATIENT)
Dept: MEDICAL GROUP | Facility: PHYSICIAN GROUP | Age: 74
End: 2024-05-14
Payer: COMMERCIAL

## 2024-05-16 ENCOUNTER — TELEPHONE (OUTPATIENT)
Dept: HEALTH INFORMATION MANAGEMENT | Facility: OTHER | Age: 74
End: 2024-05-16
Payer: COMMERCIAL

## 2024-05-17 ENCOUNTER — TELEPHONE (OUTPATIENT)
Dept: HEALTH INFORMATION MANAGEMENT | Facility: OTHER | Age: 74
End: 2024-05-17
Payer: COMMERCIAL

## 2024-05-20 NOTE — DISCHARGE SUMMARY
Discharge Summary    CHIEF COMPLAINT ON ADMISSION  Chief Complaint   Patient presents with    Leg Swelling    Leg Pain       Reason for Admission  Leg Pain     Admission Date  5/11/2024    CODE STATUS  Prior    HPI & HOSPITAL COURSE    73-year-old female past medical history obesity, chronic lower extremity swelling and lymphedema, restless leg syndrome, DANIEL presented 5/11/2024 with lower extremity swelling, erythema and redness.  She admitted that she was having lower extremity swelling and erythema for the last 2 years but finally her roommate convinced her to come in the hospital for evaluation.  Her vitals are stable will, febrile, WBC is normal, U tox is normal C-reactive protein is slightly elevated. I did stop antibiotics.  During his hospital stay we started IV diuresing.  She did have significant improvement on her swelling and pain.  DVT studies were negative.  Repeat echocardiogram echocardiogram shows ejection fraction 65%, normal right ventricular size and pressure.  She has moderate mitral annular calcification.  Patient will be discharged on 40 mg Lasix p.o. oral.  No need for antibiotic.  This was no cellulitis but just chronic venous insufficiency and edema    Therefore, she is discharged in good and stable condition to home with close outpatient follow-up.    The patient met 2-midnight criteria for an inpatient stay at the time of discharge.    Discharge Date  5/13/2024    FOLLOW UP ITEMS POST DISCHARGE  Follow-up with primary care in a week with repeat lab work BMP    DISCHARGE DIAGNOSES  Principal Problem:    Cellulitis (POA: Yes)  Active Problems:    RLS (restless legs syndrome) (Chronic) (POA: Yes)    Overactive bladder (POA: Yes)    Morbid obesity (HCC) (POA: Yes)    Bilateral leg edema (POA: Yes)    Hyperlipidemia (POA: Yes)      Overview: Formatting of this note might be different from the original.      HYPERLIPIDEMIA    DANIEL (obstructive sleep apnea) (POA: Unknown)  Resolved Problems:    *  No resolved hospital problems. *      FOLLOW UP  No future appointments.  Lj Marion III, M.D.  [address] [facility] [address] [phone]    Schedule an appointment as soon as possible for a visit in 1 week(s)        MEDICATIONS ON DISCHARGE     Medication List        START taking these medications        Instructions   Aspirin Low Dose 81 MG EC tablet  Generic drug: aspirin   Take 1 Tablet by mouth every day for 100 days.  Dose: 81 mg     atorvastatin 40 MG Tabs  Commonly known as: Lipitor   Take 1 Tablet by mouth every evening for 30 days.  Dose: 40 mg     furosemide 40 MG Tabs  Commonly known as: Lasix   Take 1 Tablet by mouth every day.  Dose: 40 mg     potassium chloride SA 20 MEQ Tbcr  Commonly known as: Kdur   Take 1 Tablet by mouth every day for 30 days.  Dose: 20 mEq            CHANGE how you take these medications        Instructions   oxybutynin 15 MG CR tablet  What changed:   how much to take  when to take this  Commonly known as: Ditropan-XL   Doctor's comments: Diagnoses:N32.81    Overactive bladder  Take 2 Tablets by mouth every day.  Dose: 30 mg            CONTINUE taking these medications        Instructions   pramipexole 1 MG Tabs  Commonly known as: Mirapex   Take 3 Tablets by mouth every evening.  Dose: 3 mg              Allergies  No Known Allergies    DIET  No orders of the defined types were placed in this encounter.      ACTIVITY  As tolerated.  Weight bearing as tolerated    CONSULTATIONS  None    PROCEDURES  None    LABORATORY  Lab Results   Component Value Date    SODIUM 138 05/12/2024    POTASSIUM 3.6 05/12/2024    CHLORIDE 99 05/12/2024    CO2 26 05/12/2024    GLUCOSE 113 (H) 05/12/2024    BUN 19 05/12/2024    CREATININE 0.49 (L) 05/12/2024        Lab Results   Component Value Date    WBC 7.4 05/12/2024    HEMOGLOBIN 12.4 05/12/2024    HEMATOCRIT 37.1 05/12/2024    PLATELETCT 252 05/12/2024        Total time of the discharge process exceeds 33 minutes.

## 2024-08-07 ENCOUNTER — HOSPITAL ENCOUNTER (EMERGENCY)
Facility: MEDICAL CENTER | Age: 74
End: 2024-08-07
Attending: EMERGENCY MEDICINE
Payer: COMMERCIAL

## 2024-08-07 VITALS
DIASTOLIC BLOOD PRESSURE: 87 MMHG | BODY MASS INDEX: 43.19 KG/M2 | TEMPERATURE: 97.4 F | RESPIRATION RATE: 26 BRPM | OXYGEN SATURATION: 94 % | HEIGHT: 60 IN | HEART RATE: 68 BPM | WEIGHT: 220 LBS | SYSTOLIC BLOOD PRESSURE: 184 MMHG

## 2024-08-07 DIAGNOSIS — G25.81 RESTLESS LEG SYNDROME: ICD-10-CM

## 2024-08-07 DIAGNOSIS — G47.33 OSA (OBSTRUCTIVE SLEEP APNEA): ICD-10-CM

## 2024-08-07 DIAGNOSIS — R60.0 BILATERAL LEG EDEMA: ICD-10-CM

## 2024-08-07 DIAGNOSIS — N32.81 OVERACTIVE BLADDER: ICD-10-CM

## 2024-08-07 DIAGNOSIS — G25.81 RLS (RESTLESS LEGS SYNDROME): Chronic | ICD-10-CM

## 2024-08-07 LAB
APPEARANCE UR: CLEAR
BILIRUB UR QL STRIP.AUTO: NEGATIVE
COLOR UR: YELLOW
GLUCOSE UR STRIP.AUTO-MCNC: NEGATIVE MG/DL
KETONES UR STRIP.AUTO-MCNC: NEGATIVE MG/DL
LEUKOCYTE ESTERASE UR QL STRIP.AUTO: NEGATIVE
MICRO URNS: NORMAL
NITRITE UR QL STRIP.AUTO: NEGATIVE
PH UR STRIP.AUTO: 5.5 [PH] (ref 5–8)
PROT UR QL STRIP: NEGATIVE MG/DL
RBC UR QL AUTO: NEGATIVE
SP GR UR STRIP.AUTO: 1.03
UROBILINOGEN UR STRIP.AUTO-MCNC: 0.2 MG/DL

## 2024-08-07 PROCEDURE — 81003 URINALYSIS AUTO W/O SCOPE: CPT

## 2024-08-07 PROCEDURE — 99283 EMERGENCY DEPT VISIT LOW MDM: CPT

## 2024-08-07 RX ORDER — PRAMIPEXOLE DIHYDROCHLORIDE 1 MG/1
3 TABLET ORAL NIGHTLY
Qty: 90 TABLET | Refills: 0 | Status: SHIPPED | OUTPATIENT
Start: 2024-08-07

## 2024-08-07 RX ORDER — ASPIRIN 81 MG/1
81 TABLET ORAL DAILY
Qty: 100 TABLET | Refills: 0 | Status: SHIPPED | OUTPATIENT
Start: 2024-08-07 | End: 2024-11-15

## 2024-08-07 RX ORDER — OXYBUTYNIN CHLORIDE 15 MG/1
30 TABLET, EXTENDED RELEASE ORAL DAILY
Qty: 60 TABLET | Refills: 0 | Status: SHIPPED | OUTPATIENT
Start: 2024-08-07

## 2024-08-07 ASSESSMENT — FIBROSIS 4 INDEX: FIB4 SCORE: 1.14

## 2024-08-07 NOTE — ED PROVIDER NOTES
ED Provider Note    CHIEF COMPLAINT  Chief Complaint   Patient presents with    UTI     [place] [facility] [place], pt states painful urination with odor x3-4 days       EXTERNAL RECORDS REVIEWED  Inpatient Notes most recent hospitalization 5/11/2024, patient admitted for lower extremity swelling, thought to be secondary to venous insufficiency, reassuring echocardiogram and lab work.  Patient also with prior urine culture positive for Proteus which was resistant to Macrobid and minocycline    HPI/ROS      Ariadna Sepulveda is a 73 y.o. female who presents with chief plaint of dysuria urgency and frequency for the last 4 days.  Patient reports her urine smells for this duration as well.  She denies any associated flank pain fevers or chills.  Patient states she has a longstanding history of restless leg syndrome, she ran out of her pramipexole as well as her oxybutynin around a week ago.  She is unsure if her urinary frequency may simply be secondary to her running out of her oxybutynin as she does have a longstanding history of overactive bladder.  Patient states that since she is ran out of her pramipexole she has not been able to sleep secondary to her restless leg syndrome.  She denies any significant shortness of breath or orthopnea.  Patient denies any nausea or diaphoresis.  She has no other complaints.  Patient denies any vaginal discharge or pelvic pain or vaginal pain.    PAST MEDICAL HISTORY   has a past medical history of Arthritis, At risk for sleep apnea, Hypertension, and RLS (restless legs syndrome) (10/5/2011).    SURGICAL HISTORY   has a past surgical history that includes tonsillectomy; knee replacement, total (left); other orthopedic surgery; and isaiah by laparoscopy (N/A, 12/4/2021).    FAMILY HISTORY  Family History   Problem Relation Age of Onset    Cancer Mother     Arthritis Father     Diabetes Father     Heart Disease Father     Hypertension Father     Hyperlipidemia Father      Psychiatric Illness Brother     Drug abuse Brother     Alcohol abuse Brother     Lung Disease Neg Hx        SOCIAL HISTORY  Social History     Tobacco Use    Smoking status: Never    Smokeless tobacco: Never   Vaping Use    Vaping status: Never Used   Substance and Sexual Activity    Alcohol use: Yes     Alcohol/week: 4.8 oz     Types: 8 Cans of beer per week     Comment: 1-2 every other day or when goes to casino    Drug use: No    Sexual activity: Not Currently       CURRENT MEDICATIONS  Home Medications       Reviewed by Annetta Bhat R.N. (Registered Nurse) on 08/07/24 at 0504  Med List Status: Partial     Medication Last Dose Status   aspirin 81 MG EC tablet  Active   furosemide (LASIX) 40 MG Tab  Active   oxybutynin (DITROPAN-XL) 15 MG CR tablet  Active   pramipexole (MIRAPEX) 1 MG Tab  Active                    ALLERGIES  No Known Allergies    PHYSICAL EXAM  VITAL SIGNS: BP (!) 182/84   Pulse 66   Temp 36.3 °C (97.4 °F) (Temporal)   Resp 16   Ht 1.524 m (5')   Wt 99.8 kg (220 lb)   LMP  (LMP Unknown)   SpO2 97%   BMI 42.97 kg/m²    Physical Exam  Constitutional:       Appearance: She is well-developed.   HENT:      Head: Normocephalic and atraumatic.   Eyes:      Pupils: Pupils are equal, round, and reactive to light.   Cardiovascular:      Rate and Rhythm: Normal rate and regular rhythm.   Pulmonary:      Effort: Pulmonary effort is normal. No accessory muscle usage or respiratory distress.      Breath sounds: Normal breath sounds.   Abdominal:      General: Bowel sounds are normal.      Palpations: Abdomen is soft. There is no mass.      Tenderness: There is no abdominal tenderness. There is no right CVA tenderness or left CVA tenderness.   Musculoskeletal:         General: Normal range of motion.   Skin:     General: Skin is warm.      Capillary Refill: Capillary refill takes less than 2 seconds.   Neurological:      General: No focal deficit present.      Mental Status: She is alert.    Psychiatric:         Mood and Affect: Mood normal. Mood is not anxious.           EKG/LABS  Results for orders placed or performed during the hospital encounter of 08/07/24   URINALYSIS    Specimen: Urine   Result Value Ref Range    Color Yellow     Character Clear     Specific Gravity 1.027 <1.035    Ph 5.5 5.0 - 8.0    Glucose Negative Negative mg/dL    Ketones Negative Negative mg/dL    Protein Negative Negative mg/dL    Bilirubin Negative Negative    Urobilinogen, Urine 0.2 Negative    Nitrite Negative Negative    Leukocyte Esterase Negative Negative    Occult Blood Negative Negative    Micro Urine Req see below        I have independently interpreted this EKG          COURSE & MEDICAL DECISION MAKING    ASSESSMENT, COURSE AND PLAN  Care Narrative: Very well-appearing patient here with possible urinary tract infection versus interstitial cystitis, overactive bladder that is simply not being treated as patient ran out of her oxybutynin.  Patient therefore will have urinalysis to check for possible urinary tract infection.  Will refill patient's oxybutynin and her pramipexole.  Patient urinalysis is unremarkable.  Likely more representative of patient running out of her oxybutynin then infection here.  Will refill patient's medications.  Return precautions reviewed.  Patient otherwise appears very well.              DISPOSITION AND DISCUSSIONS      Escalation of care considered, and ultimately not performed:diagnostic imaging in this well-appearing patient with entirely benign abdominal exam        FINAL DIAGNOSIS  1. Overactive bladder    2. RLS (restless legs syndrome)    3. Bilateral leg edema    4. DANIEL (obstructive sleep apnea)    5. Restless leg syndrome

## 2024-08-07 NOTE — ED NOTES
Pt ready for discharge. Discharge education was provided to pt by this RN. Pt verbalized understanding & all questions were answered. Pt AoX 4. Pt ambulated using her personal walker out of the ED with all belongings. Pt encouraged to come back if symptoms worsen.

## 2024-08-07 NOTE — ED TRIAGE NOTES
Ariadna Sepulveda   73 y.o.     Chief Complaint   Patient presents with    UTI     [facility] pt states painful urination with odor x3-4 days     Pt was walking to [facility] from home but started to get tired during the walk and called EMS.    Pt is alert, oriented, and follows commands. Pt speaking in full sentences and responds appropriately to questions. No acute distress noted. Respirations are even and unlabored.       Vitals:    08/07/24 0502   BP: (!) 182/84   Pulse: 66   Resp: 16   Temp: 36.3 °C (97.4 °F)   SpO2: 97%

## 2024-08-07 NOTE — ED NOTES
Pt ambulated to the bathroom with a steady gait using personal walker. Denies dizziness/lightheadedness. No balance issues observed. Urine sample sent to lab.

## 2024-08-28 ENCOUNTER — PHARMACY VISIT (OUTPATIENT)
Dept: PHARMACY | Facility: MEDICAL CENTER | Age: 74
End: 2024-08-28
Payer: COMMERCIAL

## 2024-08-28 PROCEDURE — RXMED WILLOW AMBULATORY MEDICATION CHARGE: Performed by: EMERGENCY MEDICINE

## 2024-09-06 ENCOUNTER — HOSPITAL ENCOUNTER (EMERGENCY)
Facility: MEDICAL CENTER | Age: 74
End: 2024-09-06
Attending: STUDENT IN AN ORGANIZED HEALTH CARE EDUCATION/TRAINING PROGRAM
Payer: COMMERCIAL

## 2024-09-06 VITALS
DIASTOLIC BLOOD PRESSURE: 74 MMHG | RESPIRATION RATE: 19 BRPM | BODY MASS INDEX: 43.7 KG/M2 | SYSTOLIC BLOOD PRESSURE: 148 MMHG | WEIGHT: 223.77 LBS | TEMPERATURE: 98.1 F | HEART RATE: 64 BPM | OXYGEN SATURATION: 99 %

## 2024-09-06 DIAGNOSIS — L03.116 CELLULITIS OF LEFT LOWER EXTREMITY: ICD-10-CM

## 2024-09-06 PROCEDURE — 99283 EMERGENCY DEPT VISIT LOW MDM: CPT

## 2024-09-06 PROCEDURE — 700102 HCHG RX REV CODE 250 W/ 637 OVERRIDE(OP): Performed by: STUDENT IN AN ORGANIZED HEALTH CARE EDUCATION/TRAINING PROGRAM

## 2024-09-06 PROCEDURE — A9270 NON-COVERED ITEM OR SERVICE: HCPCS | Performed by: STUDENT IN AN ORGANIZED HEALTH CARE EDUCATION/TRAINING PROGRAM

## 2024-09-06 RX ORDER — CEPHALEXIN 500 MG/1
500 CAPSULE ORAL ONCE
Status: COMPLETED | OUTPATIENT
Start: 2024-09-06 | End: 2024-09-06

## 2024-09-06 RX ORDER — CEPHALEXIN 500 MG/1
500 CAPSULE ORAL 4 TIMES DAILY
Qty: 20 CAPSULE | Refills: 0 | Status: ACTIVE | OUTPATIENT
Start: 2024-09-06 | End: 2024-09-11

## 2024-09-06 RX ADMIN — CEPHALEXIN 500 MG: 500 CAPSULE ORAL at 20:57

## 2024-09-06 ASSESSMENT — FIBROSIS 4 INDEX: FIB4 SCORE: 1.14

## 2024-09-07 NOTE — DISCHARGE INSTRUCTIONS
You were seen in the emergency room for evaluation of leg pain.  We will treat with a course of Keflex for cellulitis.  Please return if your swelling is worsening or if you develop any fever, worsening of the redness or any other concerns.

## 2024-09-07 NOTE — ED NOTES
Discharge instructions reviewed with patient and signed. They were instructed on how to  prescriptions. They verbalized understanding of follow up instructions. All belongings with patient. They ambulate with a steady gait using personal cane

## 2024-09-07 NOTE — ED PROVIDER NOTES
"ED Provider Note    CHIEF COMPLAINT  Chief Complaint   Patient presents with    Leg Swelling     Left lower leg swelling starting 3 days ago. Red, swollen, shiny, no weeping noted. H/o cellulitis pt reports it \"comes and goes\".       Tingling     Pt endorses hand tingling starting 1 month ago. No deficits noted.        EXTERNAL RECORDS REVIEWED  Inpatient Notes patient is admitted to the hospital May 2024 for lower extremity swelling and erythema and she was given IV diuresis with significant improvement on her swelling and pain.  She had negative DVT studies.  Her symptoms were attributed to chronic venous insufficiency and edema    HPI/ROS  LIMITATION TO HISTORY   Select: : None  OUTSIDE HISTORIAN(S):  None    Ariadna Sepulveda is a 73 y.o. female with history of venous insufficiency who presents for evaluation of left lower extremity erythema that started 3 days ago.  She notes that she has history of cellulitis and this feels similar to previous episodes of celluitis.  She is also having some swelling to the lower leg.  She denies any calf pain or swelling.  She denies any recent immobilization.  She has no fever, chest pain, difficulty breathing, nausea, vomiting.  She does not use any compression stockings that she states that there are none that fit her legs.  She denies being on any diuretics and states that she is on oxybutynin and diuretics make her pee more.    PAST MEDICAL HISTORY   has a past medical history of Arthritis, At risk for sleep apnea, Hypertension, and RLS (restless legs syndrome) (10/5/2011).    SURGICAL HISTORY   has a past surgical history that includes tonsillectomy; knee replacement, total (left); other orthopedic surgery; and isaiah by laparoscopy (N/A, 12/4/2021).    FAMILY HISTORY  Family History   Problem Relation Age of Onset    Cancer Mother     Arthritis Father     Diabetes Father     Heart Disease Father     Hypertension Father     Hyperlipidemia Father     Psychiatric Illness " Brother     Drug abuse Brother     Alcohol abuse Brother     Lung Disease Neg Hx        SOCIAL HISTORY  Social History     Tobacco Use    Smoking status: Never    Smokeless tobacco: Never   Vaping Use    Vaping status: Never Used   Substance and Sexual Activity    Alcohol use: Yes     Alcohol/week: 4.8 oz     Types: 8 Cans of beer per week     Comment: 1-2 every other day or when goes to casino    Drug use: No    Sexual activity: Not Currently       CURRENT MEDICATIONS  Home Medications    **Home medications have not yet been reviewed for this encounter**         ALLERGIES  No Known Allergies    PHYSICAL EXAM  VITAL SIGNS: BP (!) 153/65   Pulse 70   Temp 36.7 °C (98 °F) (Temporal)   Resp 20   Wt 101 kg (223 lb 12.3 oz)   LMP  (LMP Unknown)   SpO2 100%   BMI 43.70 kg/m²      Constitutional: Well developed, Well nourished, No acute respiratory distress, Non-toxic appearance.   HENT: Normocephalic, Atraumatic, Bilateral external ears normal, Oropharynx clear, mucous membranes are moist.  Cardiovascular: Normal heart rate, Normal rhythm, No murmurs, No rubs, No gallops.   Thorax & Lungs: Clear to auscultation bilaterally, No respiratory distress, No wheezing.  Abdomen: Soft nontender   Skin: Warm, Dry, Erythema and increased warmth to touch to the left leg on the inferior leg, non-circumferential   Extremities: Intact distal pulses, nonpitting edema noted to bilateral lower extremities, worse on the left, no calf tenderness bilaterally  Neurologic: Alert & oriented. No focal deficits noted.   Psych: Alert normal affect       COURSE & MEDICAL DECISION MAKING    ASSESSMENT, COURSE AND PLAN  Care Narrative:   This is a 73-year-old female with history as noted above here with redness, swelling to the left lower extremity onset 3 days.  She has history of previous cellulitis and states that this feels similar.  She has no systemic signs of illness.  There is no calf tenderness to palpation.  I do see in the chart that  she was admitted in May for something similar.  This was attributed to venous insufficiency and she was diuresed.  She is no longer on any diuretics.  On physical exam, her pulses are intact.  She has no calf tenderness.  I did consider DVT but think that this is less likely.  She has no difficulty breathing.  She does have some erythema to the left lower extremity to the inferior shin area and this is not rapidly progressing therefore I doubt NSTI.  Did discuss with patient that we could obtain ultrasound to evaluate for DVT as well as perform laboratory testing however patient states this feels exactly the same as previous episodes of cellulitis and she prefers to defer work up this point, monitor closely at home with PO antibiotics and return if her symptoms do not improve or worsen. I did discuss with the patient that her symptoms may most be related to her chronic venous insufficiency, patient states she is not interested being on diuretics due to her overactive bladder and she is unable to comfortably wear compression stockings. I recommend elevating her legs as much as possible and PCP follow up. Patient agreeable to dc plan with no further questions.               ADDITIONAL PROBLEMS MANAGED  None    DISPOSITION AND DISCUSSIONS  I have discussed management of the patient with the following physicians and JAMES's:  None    Discussion of management with other QHP or appropriate source(s): None     Escalation of care considered, and ultimately not performed:Laboratory analysis and diagnostic imaging    Barriers to care at this time, including but not limited to:  None .     Decision tools and prescription drugs considered including, but not limited to: Antibiotics keflex .    The patient will return for new or worsening symptoms and is stable at the time of discharge.    The patient is referred to a primary physician for blood pressure management, diabetic screening, and for all other preventative health  concerns.    DISPOSITION:  Patient will be discharged home in stable condition.    FOLLOW UP:  Your primary care doctor          [facility], Emergency Dept  [address] [facility] [address] [phone]          OUTPATIENT MEDICATIONS:  Discharge Medication List as of 9/6/2024  8:53 PM        START taking these medications    Details   cephALEXin (KEFLEX) 500 MG Cap Take 1 Capsule by mouth 4 times a day for 5 days., Disp-20 Capsule, R-0, Normal               FINAL DIAGNOSIS  1. Cellulitis of left lower extremity         Electronically signed by: Elif Yang M.D., 9/6/2024 8:20 PM

## 2024-09-07 NOTE — ED TRIAGE NOTES
"Chief Complaint   Patient presents with    Leg Swelling     Left lower leg swelling starting 3 days ago. Red, swollen, shiny, no weeping noted. H/o cellulitis pt reports it \"comes and goes\".       Tingling     Pt endorses hand tingling starting 1 month ago. No deficits noted.      Pt ambulatory with steady gait  to room.       Pt is GCS 15, speaking in full sentences, follows commands and responds appropriately to questions. Resp are even and unlabored.       Pt educated on triage process, updated/ agreeable to plan of care. Informed to let staff know of any changes in condition.     BP (!) 153/65   Pulse 70   Temp 36.7 °C (98 °F) (Temporal)   Resp 20   Wt 101 kg (223 lb 12.3 oz)   LMP  (LMP Unknown)   SpO2 100%   BMI 43.70 kg/m²    "

## 2024-09-30 ENCOUNTER — OFFICE VISIT (OUTPATIENT)
Dept: MEDICAL GROUP | Facility: CLINIC | Age: 74
End: 2024-09-30
Payer: COMMERCIAL

## 2024-09-30 VITALS
HEART RATE: 79 BPM | RESPIRATION RATE: 20 BRPM | HEIGHT: 60 IN | OXYGEN SATURATION: 96 % | SYSTOLIC BLOOD PRESSURE: 146 MMHG | BODY MASS INDEX: 42.6 KG/M2 | WEIGHT: 217 LBS | DIASTOLIC BLOOD PRESSURE: 81 MMHG | TEMPERATURE: 97.2 F

## 2024-09-30 DIAGNOSIS — R00.2 PALPITATIONS: ICD-10-CM

## 2024-09-30 DIAGNOSIS — R60.0 BILATERAL LEG EDEMA: ICD-10-CM

## 2024-09-30 DIAGNOSIS — D64.9 ANEMIA, UNSPECIFIED TYPE: ICD-10-CM

## 2024-09-30 DIAGNOSIS — Z12.11 COLON CANCER SCREENING: ICD-10-CM

## 2024-09-30 DIAGNOSIS — R29.6 FREQUENT FALLS: ICD-10-CM

## 2024-09-30 DIAGNOSIS — N32.81 OVERACTIVE BLADDER: ICD-10-CM

## 2024-09-30 DIAGNOSIS — R26.2 DIFFICULTY WALKING: ICD-10-CM

## 2024-09-30 DIAGNOSIS — E66.01 MORBID OBESITY (HCC): ICD-10-CM

## 2024-09-30 DIAGNOSIS — G25.81 RLS (RESTLESS LEGS SYNDROME): Chronic | ICD-10-CM

## 2024-09-30 DIAGNOSIS — R06.02 SOB (SHORTNESS OF BREATH): ICD-10-CM

## 2024-09-30 DIAGNOSIS — Z13.820 OSTEOPOROSIS SCREENING: ICD-10-CM

## 2024-09-30 DIAGNOSIS — I50.32 CHRONIC HEART FAILURE WITH PRESERVED EJECTION FRACTION (HCC): ICD-10-CM

## 2024-09-30 RX ORDER — OXYBUTYNIN CHLORIDE 15 MG/1
30 TABLET, EXTENDED RELEASE ORAL DAILY
Qty: 60 TABLET | Refills: 0 | Status: SHIPPED | OUTPATIENT
Start: 2024-09-30

## 2024-09-30 RX ORDER — PRAMIPEXOLE DIHYDROCHLORIDE 1 MG/1
3 TABLET ORAL NIGHTLY
Qty: 180 TABLET | Refills: 1 | Status: SHIPPED | OUTPATIENT
Start: 2024-09-30

## 2024-09-30 ASSESSMENT — ENCOUNTER SYMPTOMS
COUGH: 0
CONSTIPATION: 1
BLOOD IN STOOL: 0
ORTHOPNEA: 0
SHORTNESS OF BREATH: 1
FLANK PAIN: 0
NAUSEA: 0
WHEEZING: 0
FEVER: 0
ABDOMINAL PAIN: 0
SEIZURES: 0
PALPITATIONS: 1

## 2024-09-30 ASSESSMENT — FIBROSIS 4 INDEX: FIB4 SCORE: 1.14

## 2024-09-30 NOTE — ASSESSMENT & PLAN NOTE
Patient is on oxybutynin 30 mg daily but reports that problem has been worsening.  -Referral to urology

## 2024-09-30 NOTE — ASSESSMENT & PLAN NOTE
Symptoms have been gradually worsening over time, now spreading to arms. Taking mirapex at 3mg qhs. This is above max dose recommended for RLS but under max dose for other conditions. Discussed decreasing dosage but pt is hesitant. I am concerned that long term usage of this medication at this dose could be paradoxically worsening her sx, so I have referred to neuro and plan to discuss alternative regimens at follow up.  - Refill Mirapex, will consider augmenting or altering prescription at next visit  -Referral to neurology

## 2024-09-30 NOTE — ASSESSMENT & PLAN NOTE
Chronic bilat LE edema. Pt reports recurrent bouts of cellulitis. She was rx'ed furosemide but refuses to take it because she has problems with urinary incontinence. She was previously referred to lymphedema clinic but has not yet gone. Echo was normal, LVEF=65%.  Patient does report an episode of shortness of breath and palpitations couple weeks ago.  She reports that it resolved quickly.  Presentation is concerning for HFpEF.  Patient does not tolerate furosemide due to exacerbation of overactive bladder  - Lymphedema Clinic  - Referral to cardiology

## 2024-09-30 NOTE — LETTER
[facility]  Stone Chavez M.D.  [address]  [place] [address]  Fax: [phone]   Authorization for Release/Disclosure of   Protected Health Information   Name: ARIADNA STEVENS : 1950 SSN: [id number]   Address: [address] [place], [place] [address]    [facility] [address]  [facility] [address] Phone:    [phone] (home)    I authorize the entity listed below to release/disclose the PHI below to:   [facility]/Stone Chavez M.D. and Stone Chavez M.D.   Provider or Entity Name:     Address   City, State, Zip   Phone:      Fax:     Reason for request: continuity of care   Information to be released:    [  ] LAST COLONOSCOPY,  including any PATH REPORT and follow-up  [  ] LAST FIT/COLOGUARD RESULT [  ] LAST DEXA  [  ] LAST MAMMOGRAM  [  ] LAST PAP  [  ] LAST LABS [  ] RETINA EXAM REPORT  [  ] IMMUNIZATION RECORDS  [x] Release all info      [  ] Check here and initial the line next to each item to release ALL health information INCLUDING  _____ Care and treatment for drug and / or alcohol abuse  _____ HIV testing, infection status, or AIDS  _____ Genetic Testing    DATES OF SERVICE OR TIME PERIOD TO BE DISCLOSED: _____________  I understand and acknowledge that:  * This Authorization may be revoked at any time by you in writing, except if your health information has already been used or disclosed.  * Your health information that will be used or disclosed as a result of you signing this authorization could be re-disclosed by the recipient. If this occurs, your re-disclosed health information may no longer be protected by State or Federal laws.  * You may refuse to sign this Authorization. Your refusal will not affect your ability to obtain treatment.  * This Authorization becomes effective upon signing and will  on (date) __________.      If no date is indicated, this Authorization will  one (1) year from the signature date.    Name: Ariadna Stevens  Signature: Date:   2024     PLEASE FAX REQUESTED RECORDS  BACK TO: [phone]

## 2024-09-30 NOTE — PROGRESS NOTES
Subjective:     CC:  Diagnoses of RLS (restless legs syndrome), Overactive bladder, Difficulty walking, Bilateral leg edema, Anemia, unspecified type, Morbid obesity (HCC), Frequent falls, Colon cancer screening, Osteoporosis screening, SOB (shortness of breath), Palpitations, and Chronic heart failure with preserved ejection fraction (HCC) were pertinent to this visit.    HISTORY OF THE PRESENT ILLNESS: Patient is a 73 y.o. female. This pleasant patient is here today to establish care and discuss medication refills.    She takes mirapex for restless leg syndrome.  Taking oxybutynin for a couple years. She needs to wear depends. When she doesn't have the oxybutynin she cannot make it to the bathroom without having an episode of incontinence.    Had an episode of SOB a few weeks ago, accompanied by palpitations. Lasted 1/2 hour    Problem   Difficulty Walking   Anemia   Bilateral Leg Edema   Overactive Bladder   Rls (Restless Legs Syndrome)       ROS:   Review of Systems   Constitutional:  Negative for fever.   Respiratory:  Positive for shortness of breath (one episode for a few min a few weeks ago, self resolved). Negative for cough and wheezing.    Cardiovascular:  Positive for palpitations (happened once a few weeks ago, few min) and leg swelling. Negative for chest pain and orthopnea.   Gastrointestinal:  Positive for constipation (BM every three days). Negative for abdominal pain, blood in stool, melena and nausea.   Genitourinary:  Positive for frequency and urgency. Negative for dysuria, flank pain and hematuria.   Neurological:  Negative for seizures.   Psychiatric/Behavioral:  Negative for suicidal ideas.          Objective:     Exam: BP (!) 146/81 (BP Location: Left arm, Patient Position: Sitting, BP Cuff Size: Large adult)   Pulse 79   Temp 36.2 °C (97.2 °F) (Temporal)   Resp 20   Ht 1.524 m (5')   Wt 98.4 kg (217 lb)   SpO2 96%  Body mass index is 42.38 kg/m².    Physical Exam  Constitutional:        General: She is not in acute distress.     Appearance: She is obese. She is not toxic-appearing.   HENT:      Head: Normocephalic and atraumatic.      Nose: No rhinorrhea.      Mouth/Throat:      Mouth: Mucous membranes are moist.   Eyes:      General: No scleral icterus.     Extraocular Movements: Extraocular movements intact.   Cardiovascular:      Rate and Rhythm: Normal rate and regular rhythm.      Pulses: Normal pulses.      Heart sounds: Murmur (Systolic murmur best heard in left medial 2nd intercostal space) heard.      No friction rub. No gallop.   Pulmonary:      Effort: Pulmonary effort is normal. No respiratory distress.      Breath sounds: Normal breath sounds.   Abdominal:      Palpations: Abdomen is soft.      Tenderness: There is no abdominal tenderness.   Musculoskeletal:         General: Swelling (Bilat LE swelling (chronic & unchanged per pt)) present.      Cervical back: Normal range of motion.   Skin:     General: Skin is warm and dry.   Neurological:      General: No focal deficit present.      Mental Status: She is alert and oriented to person, place, and time.   Psychiatric:         Mood and Affect: Mood normal.         Behavior: Behavior normal.         Thought Content: Thought content normal.         Judgment: Judgment normal.       Labs:  No new labs    Assessment & Plan:   73 y.o. female with the following -    Problem List Items Addressed This Visit       Anemia     History of normocytic anemia  -Iron labs  -CBC         Relevant Orders    CBC WITHOUT DIFFERENTIAL    IRON/TOTAL IRON BIND    FERRITIN    Bilateral leg edema     Chronic bilat LE edema. Pt reports recurrent bouts of cellulitis. She was rx'ed furosemide but refuses to take it because she has problems with urinary incontinence. She was previously referred to lymphedema clinic but has not yet gone. Echo was normal, LVEF=65%.  Patient does report an episode of shortness of breath and palpitations couple weeks ago.  She reports  that it resolved quickly.  Presentation is concerning for HFpEF.  Patient does not tolerate furosemide due to exacerbation of overactive bladder  - Lymphedema Clinic  - Referral to cardiology           Relevant Orders    Lipid Profile    Comp Metabolic Panel    REFERRAL TO LYMPhEDEMA-PHYSICAL THERAPY    EKG - Clinic Performed    Difficulty walking     Had difficulty walking for 3 years. Previous hx of orthopedic surgery.   - Physical therapy         Relevant Orders    Referral to Physical Therapy    Morbid obesity (HCC)    Relevant Orders    Lipid Profile    Comp Metabolic Panel    HEMOGLOBIN A1C    TSH WITH REFLEX TO FT4    Overactive bladder     Patient is on oxybutynin 30 mg daily but reports that problem has been worsening.  -Referral to urology         Relevant Medications    oxybutynin (DITROPAN-XL) 15 MG CR tablet    Other Relevant Orders    Referral to Urology    RLS (restless legs syndrome) (Chronic)     Symptoms have been gradually worsening over time, now spreading to arms. Taking mirapex at 3mg qhs. This is above max dose recommended for RLS but under max dose for other conditions. Discussed decreasing dosage but pt is hesitant. I am concerned that long term usage of this medication at this dose could be paradoxically worsening her sx, so I have referred to neuro and plan to discuss alternative regimens at follow up.  - Refill Mirapex, will consider augmenting or altering prescription at next visit  -Referral to neurology         Relevant Medications    pramipexole (MIRAPEX) 1 MG Tab    Other Relevant Orders    Referral to Neurology     Other Visit Diagnoses       Frequent falls        Relevant Orders    Referral to Physical Therapy    Colon cancer screening        Relevant Orders    Referral to GI for Colonoscopy    Osteoporosis screening        Relevant Orders    DS-BONE DENSITY STUDY (DEXA)    SOB (shortness of breath)        Relevant Orders    EKG - Clinic Performed    Palpitations        Relevant  Orders    EKG - Clinic Performed    Chronic heart failure with preserved ejection fraction (HCC)        Relevant Orders    REFERRAL TO CARDIOLOGY            I spent a total of 64 minutes with record review, exam, communication with the patient, communication with other providers, and documentation of this encounter.    No follow-ups on file.

## 2024-10-15 ENCOUNTER — TELEPHONE (OUTPATIENT)
Dept: HEALTH INFORMATION MANAGEMENT | Facility: OTHER | Age: 74
End: 2024-10-15
Payer: COMMERCIAL

## 2024-10-24 ENCOUNTER — TELEPHONE (OUTPATIENT)
Dept: HEALTH INFORMATION MANAGEMENT | Facility: OTHER | Age: 74
End: 2024-10-24
Payer: COMMERCIAL

## 2024-10-31 ENCOUNTER — HOSPITAL ENCOUNTER (INPATIENT)
Facility: MEDICAL CENTER | Age: 74
LOS: 3 days | DRG: 372 | End: 2024-11-04
Attending: STUDENT IN AN ORGANIZED HEALTH CARE EDUCATION/TRAINING PROGRAM | Admitting: FAMILY MEDICINE
Payer: MEDICARE

## 2024-10-31 DIAGNOSIS — E86.0 DEHYDRATION: ICD-10-CM

## 2024-10-31 DIAGNOSIS — R26.2 DIFFICULTY WALKING: ICD-10-CM

## 2024-10-31 DIAGNOSIS — R19.7 DIARRHEA, UNSPECIFIED TYPE: ICD-10-CM

## 2024-10-31 DIAGNOSIS — R11.2 NAUSEA AND VOMITING, UNSPECIFIED VOMITING TYPE: ICD-10-CM

## 2024-10-31 DIAGNOSIS — R26.9 GAIT DISTURBANCE: ICD-10-CM

## 2024-10-31 DIAGNOSIS — E87.6 HYPOKALEMIA: ICD-10-CM

## 2024-10-31 DIAGNOSIS — E87.1 HYPONATREMIA: ICD-10-CM

## 2024-10-31 PROCEDURE — 99285 EMERGENCY DEPT VISIT HI MDM: CPT

## 2024-10-31 PROCEDURE — 96375 TX/PRO/DX INJ NEW DRUG ADDON: CPT

## 2024-10-31 PROCEDURE — 83690 ASSAY OF LIPASE: CPT

## 2024-10-31 PROCEDURE — 96374 THER/PROPH/DIAG INJ IV PUSH: CPT

## 2024-10-31 PROCEDURE — 0241U HCHG SARS-COV-2 COVID-19 NFCT DS RESP RNA 4 TRGT ED POC: CPT

## 2024-10-31 PROCEDURE — 85025 COMPLETE CBC W/AUTO DIFF WBC: CPT

## 2024-10-31 PROCEDURE — 36415 COLL VENOUS BLD VENIPUNCTURE: CPT

## 2024-10-31 PROCEDURE — 80053 COMPREHEN METABOLIC PANEL: CPT

## 2024-10-31 ASSESSMENT — FIBROSIS 4 INDEX: FIB4 SCORE: 1.14

## 2024-11-01 ENCOUNTER — APPOINTMENT (OUTPATIENT)
Dept: RADIOLOGY | Facility: MEDICAL CENTER | Age: 74
DRG: 372 | End: 2024-11-01
Payer: MEDICARE

## 2024-11-01 PROBLEM — I87.2 CHRONIC VENOUS INSUFFICIENCY: Status: ACTIVE | Noted: 2024-11-01

## 2024-11-01 PROBLEM — R10.84 GENERALIZED ABDOMINAL PAIN: Status: RESOLVED | Noted: 2024-11-01 | Resolved: 2024-11-01

## 2024-11-01 PROBLEM — E87.8 ELECTROLYTE IMBALANCE: Status: ACTIVE | Noted: 2024-11-01

## 2024-11-01 PROBLEM — R11.2 NAUSEA & VOMITING: Status: RESOLVED | Noted: 2024-11-01 | Resolved: 2024-11-01

## 2024-11-01 PROBLEM — R10.84 GENERALIZED ABDOMINAL PAIN: Status: ACTIVE | Noted: 2024-11-01

## 2024-11-01 PROBLEM — R19.7 NAUSEA, VOMITING, AND DIARRHEA: Status: ACTIVE | Noted: 2024-11-01

## 2024-11-01 PROBLEM — R11.2 NAUSEA & VOMITING: Status: ACTIVE | Noted: 2024-11-01

## 2024-11-01 LAB
ALBUMIN SERPL BCP-MCNC: 3.8 G/DL (ref 3.2–4.9)
ALBUMIN SERPL BCP-MCNC: 4.1 G/DL (ref 3.2–4.9)
ALBUMIN/GLOB SERPL: 1.1 G/DL
ALBUMIN/GLOB SERPL: 1.2 G/DL
ALP SERPL-CCNC: 81 U/L (ref 30–99)
ALP SERPL-CCNC: 93 U/L (ref 30–99)
ALT SERPL-CCNC: 15 U/L (ref 2–50)
ALT SERPL-CCNC: 20 U/L (ref 2–50)
ANION GAP SERPL CALC-SCNC: 15 MMOL/L (ref 7–16)
ANION GAP SERPL CALC-SCNC: 17 MMOL/L (ref 7–16)
APPEARANCE UR: CLEAR
AST SERPL-CCNC: 14 U/L (ref 12–45)
AST SERPL-CCNC: 32 U/L (ref 12–45)
BACTERIA #/AREA URNS HPF: NORMAL /HPF
BASOPHILS # BLD AUTO: 0 % (ref 0–1.8)
BASOPHILS # BLD AUTO: 0.1 % (ref 0–1.8)
BASOPHILS # BLD: 0 K/UL (ref 0–0.12)
BASOPHILS # BLD: 0.02 K/UL (ref 0–0.12)
BILIRUB SERPL-MCNC: 0.7 MG/DL (ref 0.1–1.5)
BILIRUB SERPL-MCNC: 1 MG/DL (ref 0.1–1.5)
BILIRUB UR QL STRIP.AUTO: NEGATIVE
BUN SERPL-MCNC: 14 MG/DL (ref 8–22)
BUN SERPL-MCNC: 21 MG/DL (ref 8–22)
CALCIUM ALBUM COR SERPL-MCNC: 8.4 MG/DL (ref 8.5–10.5)
CALCIUM ALBUM COR SERPL-MCNC: 8.8 MG/DL (ref 8.5–10.5)
CALCIUM SERPL-MCNC: 8.2 MG/DL (ref 8.5–10.5)
CALCIUM SERPL-MCNC: 8.9 MG/DL (ref 8.5–10.5)
CASTS URNS QL MICRO: NORMAL /LPF (ref 0–2)
CHLORIDE SERPL-SCNC: 92 MMOL/L (ref 96–112)
CHLORIDE SERPL-SCNC: 97 MMOL/L (ref 96–112)
CO2 SERPL-SCNC: 19 MMOL/L (ref 20–33)
CO2 SERPL-SCNC: 21 MMOL/L (ref 20–33)
COLOR UR: YELLOW
CREAT SERPL-MCNC: 0.67 MG/DL (ref 0.5–1.4)
CREAT SERPL-MCNC: 0.71 MG/DL (ref 0.5–1.4)
EOSINOPHIL # BLD AUTO: 0 K/UL (ref 0–0.51)
EOSINOPHIL # BLD AUTO: 0 K/UL (ref 0–0.51)
EOSINOPHIL NFR BLD: 0 % (ref 0–6.9)
EOSINOPHIL NFR BLD: 0 % (ref 0–6.9)
EPITHELIAL CELLS 1715: NORMAL /HPF (ref 0–5)
ERYTHROCYTE [DISTWIDTH] IN BLOOD BY AUTOMATED COUNT: 43.3 FL (ref 35.9–50)
ERYTHROCYTE [DISTWIDTH] IN BLOOD BY AUTOMATED COUNT: 43.7 FL (ref 35.9–50)
FLUAV RNA SPEC QL NAA+PROBE: NEGATIVE
FLUBV RNA SPEC QL NAA+PROBE: NEGATIVE
GFR SERPLBLD CREATININE-BSD FMLA CKD-EPI: 90 ML/MIN/1.73 M 2
GFR SERPLBLD CREATININE-BSD FMLA CKD-EPI: 93 ML/MIN/1.73 M 2
GLOBULIN SER CALC-MCNC: 3.2 G/DL (ref 1.9–3.5)
GLOBULIN SER CALC-MCNC: 3.6 G/DL (ref 1.9–3.5)
GLUCOSE SERPL-MCNC: 136 MG/DL (ref 65–99)
GLUCOSE SERPL-MCNC: 145 MG/DL (ref 65–99)
GLUCOSE UR STRIP.AUTO-MCNC: NEGATIVE MG/DL
HCT VFR BLD AUTO: 42.1 % (ref 37–47)
HCT VFR BLD AUTO: 42.9 % (ref 37–47)
HGB BLD-MCNC: 14.7 G/DL (ref 12–16)
HGB BLD-MCNC: 15.3 G/DL (ref 12–16)
IMM GRANULOCYTES # BLD AUTO: 0.07 K/UL (ref 0–0.11)
IMM GRANULOCYTES NFR BLD AUTO: 0.5 % (ref 0–0.9)
KETONES UR STRIP.AUTO-MCNC: NEGATIVE MG/DL
LACTATE SERPL-SCNC: 1.7 MMOL/L (ref 0.5–2)
LEUKOCYTE ESTERASE UR QL STRIP.AUTO: NEGATIVE
LIPASE SERPL-CCNC: 10 U/L (ref 11–82)
LYMPHOCYTES # BLD AUTO: 0.21 K/UL (ref 1–4.8)
LYMPHOCYTES # BLD AUTO: 0.55 K/UL (ref 1–4.8)
LYMPHOCYTES NFR BLD: 2.6 % (ref 22–41)
LYMPHOCYTES NFR BLD: 3.7 % (ref 22–41)
MAGNESIUM SERPL-MCNC: 1.6 MG/DL (ref 1.5–2.5)
MANUAL DIFF BLD: ABNORMAL
MCH RBC QN AUTO: 30.2 PG (ref 27–33)
MCH RBC QN AUTO: 30.7 PG (ref 27–33)
MCHC RBC AUTO-ENTMCNC: 34.9 G/DL (ref 32.2–35.5)
MCHC RBC AUTO-ENTMCNC: 35.7 G/DL (ref 32.2–35.5)
MCV RBC AUTO: 86.1 FL (ref 81.4–97.8)
MCV RBC AUTO: 86.6 FL (ref 81.4–97.8)
MICRO URNS: ABNORMAL
MONOCYTES # BLD AUTO: 1.09 K/UL (ref 0–0.85)
MONOCYTES # BLD AUTO: 1.8 K/UL (ref 0–0.85)
MONOCYTES NFR BLD AUTO: 22 % (ref 0–13.4)
MONOCYTES NFR BLD AUTO: 7.4 % (ref 0–13.4)
MORPHOLOGY BLD-IMP: NORMAL
NEUTROPHILS # BLD AUTO: 13 K/UL (ref 1.82–7.42)
NEUTROPHILS # BLD AUTO: 6.18 K/UL (ref 1.82–7.42)
NEUTROPHILS NFR BLD: 61 % (ref 44–72)
NEUTROPHILS NFR BLD: 88.3 % (ref 44–72)
NEUTS BAND NFR BLD MANUAL: 14.4 % (ref 0–10)
NITRITE UR QL STRIP.AUTO: NEGATIVE
NRBC # BLD AUTO: 0 K/UL
NRBC # BLD AUTO: 0 K/UL
NRBC BLD-RTO: 0 /100 WBC (ref 0–0.2)
NRBC BLD-RTO: 0 /100 WBC (ref 0–0.2)
PH UR STRIP.AUTO: 5.5 [PH] (ref 5–8)
PLATELET # BLD AUTO: 190 K/UL (ref 164–446)
PLATELET # BLD AUTO: 297 K/UL (ref 164–446)
PLATELET BLD QL SMEAR: NORMAL
PMV BLD AUTO: 9.8 FL (ref 9–12.9)
PMV BLD AUTO: 9.9 FL (ref 9–12.9)
POTASSIUM SERPL-SCNC: 2.8 MMOL/L (ref 3.6–5.5)
POTASSIUM SERPL-SCNC: 3.6 MMOL/L (ref 3.6–5.5)
PROT SERPL-MCNC: 7 G/DL (ref 6–8.2)
PROT SERPL-MCNC: 7.7 G/DL (ref 6–8.2)
PROT UR QL STRIP: 30 MG/DL
RBC # BLD AUTO: 4.86 M/UL (ref 4.2–5.4)
RBC # BLD AUTO: 4.98 M/UL (ref 4.2–5.4)
RBC # URNS HPF: NORMAL /HPF (ref 0–2)
RBC BLD AUTO: NORMAL
RBC UR QL AUTO: NEGATIVE
RSV RNA SPEC QL NAA+PROBE: NEGATIVE
SARS-COV-2 RNA RESP QL NAA+PROBE: NOTDETECTED
SODIUM SERPL-SCNC: 128 MMOL/L (ref 135–145)
SODIUM SERPL-SCNC: 133 MMOL/L (ref 135–145)
SP GR UR STRIP.AUTO: >1.045
UROBILINOGEN UR STRIP.AUTO-MCNC: 1 EU/DL
WBC # BLD AUTO: 14.7 K/UL (ref 4.8–10.8)
WBC # BLD AUTO: 8.2 K/UL (ref 4.8–10.8)
WBC #/AREA URNS HPF: NORMAL /HPF

## 2024-11-01 PROCEDURE — A9270 NON-COVERED ITEM OR SERVICE: HCPCS | Performed by: STUDENT IN AN ORGANIZED HEALTH CARE EDUCATION/TRAINING PROGRAM

## 2024-11-01 PROCEDURE — 81015 MICROSCOPIC EXAM OF URINE: CPT

## 2024-11-01 PROCEDURE — 700105 HCHG RX REV CODE 258: Performed by: STUDENT IN AN ORGANIZED HEALTH CARE EDUCATION/TRAINING PROGRAM

## 2024-11-01 PROCEDURE — 83605 ASSAY OF LACTIC ACID: CPT

## 2024-11-01 PROCEDURE — 700111 HCHG RX REV CODE 636 W/ 250 OVERRIDE (IP): Mod: JZ

## 2024-11-01 PROCEDURE — 700102 HCHG RX REV CODE 250 W/ 637 OVERRIDE(OP)

## 2024-11-01 PROCEDURE — 700117 HCHG RX CONTRAST REV CODE 255

## 2024-11-01 PROCEDURE — 700102 HCHG RX REV CODE 250 W/ 637 OVERRIDE(OP): Mod: JZ

## 2024-11-01 PROCEDURE — A9270 NON-COVERED ITEM OR SERVICE: HCPCS

## 2024-11-01 PROCEDURE — 80053 COMPREHEN METABOLIC PANEL: CPT

## 2024-11-01 PROCEDURE — 700102 HCHG RX REV CODE 250 W/ 637 OVERRIDE(OP): Performed by: STUDENT IN AN ORGANIZED HEALTH CARE EDUCATION/TRAINING PROGRAM

## 2024-11-01 PROCEDURE — 700105 HCHG RX REV CODE 258

## 2024-11-01 PROCEDURE — 97162 PT EVAL MOD COMPLEX 30 MIN: CPT

## 2024-11-01 PROCEDURE — 74177 CT ABD & PELVIS W/CONTRAST: CPT

## 2024-11-01 PROCEDURE — RXMED WILLOW AMBULATORY MEDICATION CHARGE

## 2024-11-01 PROCEDURE — 83735 ASSAY OF MAGNESIUM: CPT

## 2024-11-01 PROCEDURE — 85027 COMPLETE CBC AUTOMATED: CPT

## 2024-11-01 PROCEDURE — 700111 HCHG RX REV CODE 636 W/ 250 OVERRIDE (IP): Mod: JZ | Performed by: STUDENT IN AN ORGANIZED HEALTH CARE EDUCATION/TRAINING PROGRAM

## 2024-11-01 PROCEDURE — 85007 BL SMEAR W/DIFF WBC COUNT: CPT

## 2024-11-01 PROCEDURE — A9270 NON-COVERED ITEM OR SERVICE: HCPCS | Mod: JZ

## 2024-11-01 PROCEDURE — 36415 COLL VENOUS BLD VENIPUNCTURE: CPT

## 2024-11-01 PROCEDURE — 81003 URINALYSIS AUTO W/O SCOPE: CPT

## 2024-11-01 PROCEDURE — 99235 HOSP IP/OBS SAME DATE MOD 70: CPT | Mod: GC | Performed by: FAMILY MEDICINE

## 2024-11-01 PROCEDURE — 770006 HCHG ROOM/CARE - MED/SURG/GYN SEMI*

## 2024-11-01 RX ORDER — METOCLOPRAMIDE HYDROCHLORIDE 5 MG/ML
10 INJECTION INTRAMUSCULAR; INTRAVENOUS ONCE
Status: COMPLETED | OUTPATIENT
Start: 2024-11-01 | End: 2024-11-01

## 2024-11-01 RX ORDER — ACETAMINOPHEN 500 MG
1000 TABLET ORAL ONCE
Status: COMPLETED | OUTPATIENT
Start: 2024-11-01 | End: 2024-11-01

## 2024-11-01 RX ORDER — DIPHENHYDRAMINE HYDROCHLORIDE 50 MG/ML
25 INJECTION INTRAMUSCULAR; INTRAVENOUS ONCE
Status: COMPLETED | OUTPATIENT
Start: 2024-11-01 | End: 2024-11-01

## 2024-11-01 RX ORDER — ONDANSETRON 4 MG/1
4 TABLET, ORALLY DISINTEGRATING ORAL EVERY 6 HOURS PRN
Qty: 20 TABLET | Refills: 0 | Status: SHIPPED | OUTPATIENT
Start: 2024-11-01 | End: 2024-11-09

## 2024-11-01 RX ORDER — CALCIUM CARBONATE 500 MG/1
1000 TABLET, CHEWABLE ORAL ONCE
Status: COMPLETED | OUTPATIENT
Start: 2024-11-01 | End: 2024-11-01

## 2024-11-01 RX ORDER — ONDANSETRON 2 MG/ML
4 INJECTION INTRAMUSCULAR; INTRAVENOUS ONCE
Status: COMPLETED | OUTPATIENT
Start: 2024-11-01 | End: 2024-11-01

## 2024-11-01 RX ORDER — POTASSIUM CHLORIDE 1500 MG/1
40 TABLET, EXTENDED RELEASE ORAL ONCE
Status: COMPLETED | OUTPATIENT
Start: 2024-11-01 | End: 2024-11-01

## 2024-11-01 RX ORDER — POTASSIUM CHLORIDE 7.45 MG/ML
10 INJECTION INTRAVENOUS
Status: DISCONTINUED | OUTPATIENT
Start: 2024-11-01 | End: 2024-11-01

## 2024-11-01 RX ORDER — SODIUM CHLORIDE 9 MG/ML
INJECTION, SOLUTION INTRAVENOUS CONTINUOUS
Status: DISCONTINUED | OUTPATIENT
Start: 2024-11-01 | End: 2024-11-02

## 2024-11-01 RX ORDER — ACETAMINOPHEN 325 MG/1
650 TABLET ORAL EVERY 4 HOURS PRN
Status: DISCONTINUED | OUTPATIENT
Start: 2024-11-01 | End: 2024-11-04 | Stop reason: HOSPADM

## 2024-11-01 RX ORDER — PRAMIPEXOLE DIHYDROCHLORIDE 0.5 MG/1
3 TABLET ORAL NIGHTLY
Status: DISCONTINUED | OUTPATIENT
Start: 2024-11-01 | End: 2024-11-04 | Stop reason: HOSPADM

## 2024-11-01 RX ORDER — LANOLIN ALCOHOL/MO/W.PET/CERES
400 CREAM (GRAM) TOPICAL ONCE
Status: COMPLETED | OUTPATIENT
Start: 2024-11-01 | End: 2024-11-01

## 2024-11-01 RX ORDER — SODIUM CHLORIDE 9 MG/ML
INJECTION, SOLUTION INTRAVENOUS CONTINUOUS
Status: DISCONTINUED | OUTPATIENT
Start: 2024-11-01 | End: 2024-11-01

## 2024-11-01 RX ORDER — SODIUM CHLORIDE 9 MG/ML
1000 INJECTION, SOLUTION INTRAVENOUS ONCE
Status: COMPLETED | OUTPATIENT
Start: 2024-11-01 | End: 2024-11-01

## 2024-11-01 RX ORDER — ONDANSETRON 4 MG/1
4 TABLET, ORALLY DISINTEGRATING ORAL EVERY 4 HOURS PRN
Status: DISCONTINUED | OUTPATIENT
Start: 2024-11-01 | End: 2024-11-04 | Stop reason: HOSPADM

## 2024-11-01 RX ORDER — OXYBUTYNIN CHLORIDE 10 MG/1
30 TABLET, EXTENDED RELEASE ORAL DAILY
Status: DISCONTINUED | OUTPATIENT
Start: 2024-11-01 | End: 2024-11-04 | Stop reason: HOSPADM

## 2024-11-01 RX ORDER — POTASSIUM CHLORIDE 1500 MG/1
20 TABLET, EXTENDED RELEASE ORAL ONCE
Status: COMPLETED | OUTPATIENT
Start: 2024-11-01 | End: 2024-11-01

## 2024-11-01 RX ORDER — HEPARIN SODIUM 5000 [USP'U]/ML
5000 INJECTION, SOLUTION INTRAVENOUS; SUBCUTANEOUS EVERY 8 HOURS
Status: DISCONTINUED | OUTPATIENT
Start: 2024-11-01 | End: 2024-11-04 | Stop reason: HOSPADM

## 2024-11-01 RX ORDER — ONDANSETRON 2 MG/ML
4 INJECTION INTRAMUSCULAR; INTRAVENOUS EVERY 4 HOURS PRN
Status: DISCONTINUED | OUTPATIENT
Start: 2024-11-01 | End: 2024-11-04 | Stop reason: HOSPADM

## 2024-11-01 RX ADMIN — OXYBUTYNIN CHLORIDE 30 MG: 10 TABLET, EXTENDED RELEASE ORAL at 06:16

## 2024-11-01 RX ADMIN — SODIUM CHLORIDE: 9 INJECTION, SOLUTION INTRAVENOUS at 06:16

## 2024-11-01 RX ADMIN — METOCLOPRAMIDE 10 MG: 5 INJECTION, SOLUTION INTRAMUSCULAR; INTRAVENOUS at 01:16

## 2024-11-01 RX ADMIN — POTASSIUM CHLORIDE 10 MEQ: 7.46 INJECTION, SOLUTION INTRAVENOUS at 04:01

## 2024-11-01 RX ADMIN — ONDANSETRON 4 MG: 2 INJECTION INTRAMUSCULAR; INTRAVENOUS at 17:09

## 2024-11-01 RX ADMIN — ANTACID TABLETS 1000 MG: 500 TABLET, CHEWABLE ORAL at 14:06

## 2024-11-01 RX ADMIN — HEPARIN SODIUM 5000 UNITS: 5000 INJECTION, SOLUTION INTRAVENOUS; SUBCUTANEOUS at 06:16

## 2024-11-01 RX ADMIN — ONDANSETRON 4 MG: 2 INJECTION INTRAMUSCULAR; INTRAVENOUS at 08:26

## 2024-11-01 RX ADMIN — IOHEXOL 100 ML: 350 INJECTION, SOLUTION INTRAVENOUS at 19:30

## 2024-11-01 RX ADMIN — ACETAMINOPHEN 1000 MG: 500 TABLET ORAL at 01:14

## 2024-11-01 RX ADMIN — HEPARIN SODIUM 5000 UNITS: 5000 INJECTION, SOLUTION INTRAVENOUS; SUBCUTANEOUS at 20:37

## 2024-11-01 RX ADMIN — POTASSIUM CHLORIDE 20 MEQ: 1500 TABLET, EXTENDED RELEASE ORAL at 14:06

## 2024-11-01 RX ADMIN — HEPARIN SODIUM 5000 UNITS: 5000 INJECTION, SOLUTION INTRAVENOUS; SUBCUTANEOUS at 14:06

## 2024-11-01 RX ADMIN — ACETAMINOPHEN 650 MG: 325 TABLET ORAL at 14:36

## 2024-11-01 RX ADMIN — ONDANSETRON 4 MG: 2 INJECTION INTRAMUSCULAR; INTRAVENOUS at 02:21

## 2024-11-01 RX ADMIN — DIPHENHYDRAMINE HYDROCHLORIDE 25 MG: 50 INJECTION, SOLUTION INTRAMUSCULAR; INTRAVENOUS at 01:15

## 2024-11-01 RX ADMIN — POTASSIUM CHLORIDE 40 MEQ: 1500 TABLET, EXTENDED RELEASE ORAL at 01:14

## 2024-11-01 RX ADMIN — SODIUM CHLORIDE: 9 INJECTION, SOLUTION INTRAVENOUS at 03:38

## 2024-11-01 RX ADMIN — PRAMIPEXOLE DIHYDROCHLORIDE 3 MG: 0.5 TABLET ORAL at 03:58

## 2024-11-01 RX ADMIN — POTASSIUM CHLORIDE 40 MEQ: 1500 TABLET, EXTENDED RELEASE ORAL at 06:16

## 2024-11-01 RX ADMIN — SODIUM CHLORIDE 1000 ML: 9 INJECTION, SOLUTION INTRAVENOUS at 02:21

## 2024-11-01 RX ADMIN — Medication 400 MG: at 14:06

## 2024-11-01 RX ADMIN — PRAMIPEXOLE DIHYDROCHLORIDE 3 MG: 0.5 TABLET ORAL at 20:37

## 2024-11-01 SDOH — ECONOMIC STABILITY: TRANSPORTATION INSECURITY
IN THE PAST 12 MONTHS, HAS LACK OF RELIABLE TRANSPORTATION KEPT YOU FROM MEDICAL APPOINTMENTS, MEETINGS, WORK OR FROM GETTING THINGS NEEDED FOR DAILY LIVING?: NO

## 2024-11-01 ASSESSMENT — LIFESTYLE VARIABLES
DOES PATIENT WANT TO STOP DRINKING: NO
HOW MANY TIMES IN THE PAST YEAR HAVE YOU HAD 5 OR MORE DRINKS IN A DAY: 0
ALCOHOL_USE: NO
HAVE YOU EVER FELT YOU SHOULD CUT DOWN ON YOUR DRINKING: NO
EVER HAD A DRINK FIRST THING IN THE MORNING TO STEADY YOUR NERVES TO GET RID OF A HANGOVER: NO
HAVE PEOPLE ANNOYED YOU BY CRITICIZING YOUR DRINKING: NO
CONSUMPTION TOTAL: NEGATIVE
TOTAL SCORE: 0
AVERAGE NUMBER OF DAYS PER WEEK YOU HAVE A DRINK CONTAINING ALCOHOL: 0
ON A TYPICAL DAY WHEN YOU DRINK ALCOHOL HOW MANY DRINKS DO YOU HAVE: 0
EVER FELT BAD OR GUILTY ABOUT YOUR DRINKING: NO

## 2024-11-01 ASSESSMENT — PAIN DESCRIPTION - PAIN TYPE
TYPE: ACUTE PAIN

## 2024-11-01 ASSESSMENT — SOCIAL DETERMINANTS OF HEALTH (SDOH)
WITHIN THE LAST YEAR, HAVE YOU BEEN HUMILIATED OR EMOTIONALLY ABUSED IN OTHER WAYS BY YOUR PARTNER OR EX-PARTNER?: NO
IN THE PAST 12 MONTHS, HAS THE ELECTRIC, GAS, OIL, OR WATER COMPANY THREATENED TO SHUT OFF SERVICE IN YOUR HOME?: NO
WITHIN THE PAST 12 MONTHS, YOU WORRIED THAT YOUR FOOD WOULD RUN OUT BEFORE YOU GOT THE MONEY TO BUY MORE: NEVER TRUE
IN THE PAST 12 MONTHS, HAS THE ELECTRIC, GAS, OIL, OR WATER COMPANY THREATENED TO SHUT OFF SERVICE IN YOUR HOME?: NO
WITHIN THE PAST 12 MONTHS, YOU WORRIED THAT YOUR FOOD WOULD RUN OUT BEFORE YOU GOT THE MONEY TO BUY MORE: NEVER TRUE
WITHIN THE LAST YEAR, HAVE YOU BEEN KICKED, HIT, SLAPPED, OR OTHERWISE PHYSICALLY HURT BY YOUR PARTNER OR EX-PARTNER?: NO
WITHIN THE PAST 12 MONTHS, THE FOOD YOU BOUGHT JUST DIDN'T LAST AND YOU DIDN'T HAVE MONEY TO GET MORE: NEVER TRUE
WITHIN THE LAST YEAR, HAVE TO BEEN RAPED OR FORCED TO HAVE ANY KIND OF SEXUAL ACTIVITY BY YOUR PARTNER OR EX-PARTNER?: NO
WITHIN THE LAST YEAR, HAVE YOU BEEN AFRAID OF YOUR PARTNER OR EX-PARTNER?: NO

## 2024-11-01 ASSESSMENT — COGNITIVE AND FUNCTIONAL STATUS - GENERAL
DAILY ACTIVITIY SCORE: 19
HELP NEEDED FOR BATHING: A LITTLE
DRESSING REGULAR LOWER BODY CLOTHING: A LITTLE
MOBILITY SCORE: 22
MOBILITY SCORE: 18
CLIMB 3 TO 5 STEPS WITH RAILING: A LITTLE
WALKING IN HOSPITAL ROOM: A LITTLE
STANDING UP FROM CHAIR USING ARMS: A LITTLE
DRESSING REGULAR UPPER BODY CLOTHING: A LITTLE
SUGGESTED CMS G CODE MODIFIER MOBILITY: CJ
MOVING TO AND FROM BED TO CHAIR: A LITTLE
CLIMB 3 TO 5 STEPS WITH RAILING: A LITTLE
PERSONAL GROOMING: A LITTLE
SUGGESTED CMS G CODE MODIFIER MOBILITY: CK
SUGGESTED CMS G CODE MODIFIER DAILY ACTIVITY: CK
MOVING FROM LYING ON BACK TO SITTING ON SIDE OF FLAT BED: A LITTLE
TURNING FROM BACK TO SIDE WHILE IN FLAT BAD: A LITTLE
WALKING IN HOSPITAL ROOM: A LITTLE
TOILETING: A LITTLE

## 2024-11-01 ASSESSMENT — PATIENT HEALTH QUESTIONNAIRE - PHQ9
SUM OF ALL RESPONSES TO PHQ9 QUESTIONS 1 AND 2: 0
2. FEELING DOWN, DEPRESSED, IRRITABLE, OR HOPELESS: NOT AT ALL
1. LITTLE INTEREST OR PLEASURE IN DOING THINGS: NOT AT ALL

## 2024-11-01 ASSESSMENT — GAIT ASSESSMENTS
DEVIATION: DECREASED BASE OF SUPPORT
ASSISTIVE DEVICE: FRONT WHEEL WALKER
GAIT LEVEL OF ASSIST: SUPERVISED
DISTANCE (FEET): 20

## 2024-11-01 ASSESSMENT — FIBROSIS 4 INDEX
FIB4 SCORE: 1.71
FIB4 SCORE: 1.71

## 2024-11-01 NOTE — ED NOTES
Patient dog is in the [facility] hotel room. If she is keep in overnight she will need animal control to take custody of pet.

## 2024-11-01 NOTE — ED NOTES
Patient transferred to the floor accompanied by transport , patient Alert, respirations even and unlabored on room air. Patient in possession of personal belongings.

## 2024-11-01 NOTE — ED NOTES
Patient incontinent with stool, hygiene care given, patient placed in hospital gown, bed linen changed.

## 2024-11-01 NOTE — ED TRIAGE NOTES
Patient to ED with complaints of nausea vomiting diarrhea, fatigue, weakness. She has been having symptoms for 2 days. No fevers.   BIB EMS received 300 ml LR and 4 Zofran.   Pt educated on ED process and asked to wait in lobby. Patient educated on importance of alerting staff to new or worsening symptoms or concerns..

## 2024-11-01 NOTE — PROGRESS NOTES
[facility] PROGRESS NOTE        Attending:   Hayden Coleman M.d.     Resident:   Chhaya Rojas D.O.    Senior Resident:  Jessica Hunter M.D.    PATIENT:   Arianda Sepulveda; [id number]; 1950    ID:   73 y.o. female admitted with 2 day history of nausea, vomiting, diarrhea, generalized weakness.    SUBJECTIVE:   This morning, patient reports she has stopped vomiting.  She has had 2 episodes of diarrhea but overall that also seems improved from yesterday.  She denies any abdominal pain.  No dysuria.  She states she has never had anything like this happen in the past.  No fever associated, no recent travel or ingestion of unclean water or questionable foods.  No contact with people with similar symptoms.     OBJECTIVE:  Vitals:    11/01/24 0434 11/01/24 0743 11/01/24 1211 11/01/24 1419   BP: 114/68 117/58  (!) 204/79   Pulse: 80 73  77   Resp: 19 20  18   Temp: 36.4 °C (97.5 °F) 36.5 °C (97.7 °F)  35.9 °C (96.6 °F)   TempSrc: Temporal Temporal  Temporal   SpO2: 91% 96%  98%   Weight:   91.6 kg (202 lb)    Height:           Intake/Output Summary (Last 24 hours) at 11/1/2024 1526  Last data filed at 11/1/2024 0638  Gross per 24 hour   Intake 219.11 ml   Output --   Net 219.11 ml        PHYSICAL EXAM:  General: No acute distress, afebrile, resting comfortably  HEENT: NC/AT. EOMI.   Cardiovascular: RRR without murmurs. Normal capillary refill   Respiratory: CTAB, no distress  Abdomen: soft, nontender to palpation of any quadrant, epigastrium, suprapubic, nondistended, no masses  EXT:  HERNANDEZ, 1+ pitting edema bilateral lower extremities  Skin: Bilateral lower extremities with skin discoloration of the distal lower leg and ankle consistent with chronic venous stasis, surgical scar over left knee  Neuro: Non-focal    LABS:  Recent Labs     10/31/24  2346 11/01/24  1141   WBC 14.7* 8.2   RBC 4.98 4.86   HEMOGLOBIN 15.3 14.7   HEMATOCRIT 42.9 42.1   MCV 86.1 86.6   MCH 30.7 30.2   RDW 43.7 43.3  "  PLATELETCT 297 190   MPV 9.9 9.8   NEUTSPOLYS 88.30* 61.00   LYMPHOCYTES 3.70* 2.60*   MONOCYTES 7.40 22.00*   EOSINOPHILS 0.00 0.00   BASOPHILS 0.10 0.00   RBCMORPHOLO  --  Normal     Recent Labs     10/31/24  2346 11/01/24  1141   SODIUM 128* 133*   POTASSIUM 2.8* 3.6   CHLORIDE 92* 97   CO2 19* 21   BUN 14 21   CREATININE 0.67 0.71   CALCIUM 8.9 8.2*   MAGNESIUM  --  1.6   ALBUMIN 4.1 3.8     Estimated GFR/CRCL = Estimated Creatinine Clearance: 76.5 mL/min (by C-G formula based on SCr of 0.71 mg/dL).  Recent Labs     10/31/24  2346 11/01/24  1141   GLUCOSE 145* 136*     Recent Labs     10/31/24  2346 11/01/24  1141   ASTSGOT 32 14   ALTSGPT 20 15   TBILIRUBIN 1.0 0.7   ALKPHOSPHAT 93 81   GLOBULIN 3.6* 3.2             No results for input(s): \"INR\", \"APTT\", \"FIBRINOGEN\" in the last 72 hours.    Invalid input(s): \"DIMER\"      IMAGING:  CT-ABDOMEN-PELVIS WITH    (Results Pending)       MEDS:  Current Facility-Administered Medications   Medication Last Admin    heparin injection 5,000 Units 5,000 Units at 11/01/24 1406    ondansetron (Zofran) syringe/vial injection 4 mg 4 mg at 11/01/24 0826    ondansetron (Zofran ODT) dispertab 4 mg      pramipexole (Mirapex) tablet 3 mg 3 mg at 11/01/24 0358    oxybutynin SR (Ditropan-XL) tablet 30 mg 30 mg at 11/01/24 0616    NS infusion New Bag at 11/01/24 0616    acetaminophen (Tylenol) tablet 650 mg 650 mg at 11/01/24 1436       ASSESSMENT/PLAN:  73 y.o. female admitted with 2 day history of nausea, vomiting, diarrhea, generalized weakness now with abdominal pain.    * Nausea, vomiting, and diarrhea- (present on admission)  Assessment & Plan  #Generalized abdominal pain    N/V Resolved overnight; however, nausea and vomiting returned afternoon of 11/1 now with generalized abdominal pain. Still likely viral gastroenteritis, will obtain imaging to r/o intraabdominal pathology.  Basic viral panel negative.     Plan:  - Zofran PRN  -Monitor and replete electrolytes " prn  -Continue to advance diet as tolerated  -CTAP w/IV and PO contrast  -UA ordered, pending collection    Electrolyte imbalance- (present on admission)  Assessment & Plan  Improving. Given K-dur 20 mEq, Magnesium oxide 400 mg, Calcium carbonate 1000 mg replaced morning 11/1. Improved on recheck. Lactic acid wnl.     Plan:  -mIVF  mL/hr; will decrease when better tolerating PO intake  -Monitor daily chemistry, replace prn    Chronic venous insufficiency- (present on admission)  Assessment & Plan  Chronic, unchanged, stable.     Plan:  -PT and OT consults  -PT recommended home health PT and front wheeled walker, which were ordered 11/1    Overactive bladder- (present on admission)  Assessment & Plan  Continue home oxybutynin.    RLS (restless legs syndrome)- (present on admission)  Assessment & Plan  Continue home pramipexole         Core Measures  IV Fluids:  mL/hr  Diet: ADAT  Antbx: None  DVT ppx: Heparin, SCDs  Code status: Full Code  Dispo: Inpatient for management of abdominal pain, nausea, vomiting, diarrhea, electrolyte disturbance.    Chhaya Rojas D.O.  PGY-1  [facility] Resident

## 2024-11-01 NOTE — ASSESSMENT & PLAN NOTE
Chronic, unchanged, stable.     Plan:  -PT and OT consults  -PT recommended home health PT and front wheeled walker, which were ordered 11/1

## 2024-11-01 NOTE — DISCHARGE SUMMARY
"DISCHARGE SUMMARY     ADMIT DATE: 10/31/2024       DISCHARGE DATE: 11/4/2024    SERVICE: [facility]  ATTENDING PHYSICIAN: Bessie Vernon M.d.  SENIOR RESIDENT: Mario Calzada M.D.  JN RESIDENT: Chhaya Rojas D.O.    FINAL DIAGNOSES:  1. Shigella sonnei colitis Azithromycin        1. Dehydration        2. Nausea and vomiting, unspecified vomiting type  Referral to Home Health      3. Diarrhea, unspecified type  Referral to Home Health      4. Hypokalemia        5. Hyponatremia        6. Gait disturbance  DME Walker    Referral to Home Health    DME Cane      7. Difficulty walking  Referral to Home Health    DME Cane        HPI:  Ariadna Sepulveda is a 73 y.o. female who presented with a 2 day history of nausea, vomiting and diarrhea. She states following the onset of vomiting she felt weakness and \"difficulty walking\".  Patient states that when she experienced significant weakness she called EMS to be evaluated at [facility] ED.  She states she is fully vaccinated and denies any sick contacts. No recent history of travel.  States she has tried eating anything over the past two days, as she has no appetite.  Patient unable to clearly identify if she had a meal that could have caused her symptoms.  Denies fever, chest pain, palpitations, or syncope.     Past medical history is significant for overactive bladder (on oxybutynin), restless leg syndrome (on pramipexole), and chronic venous insufficiency.     ERCourse:  CBC, CMP, UA, lipase, mag and respiratory panel were drawn.  Patient was given Zofran by EMS prior to arrival.  Responded well to treatment.  Given Reglan and Benadryl in ED along with an additional dose of Zofran, however patient still unable to tolerate p.o.  [facility] family medicine was then contacted for admission.    HOSPITAL COURSE:   Patient had multiple days of nausea, vomiting, diarrhea, with stool that eventually became watery with greater than 8 episodes per day requiring repletion " of electrolytes daily.  She was started on azithromycin pending results of stool studies.  Stool PCR studies and culture were sent and did return positive for Shigella sonnei.  She had significant improvement of her symptoms after starting azithromycin but still was requiring electrolyte replacement.  Overnight the night prior to discharge, she continued to have improvement of her symptoms, was tolerating p.o. intake of regular food, and maintain electrolyte balance without supplementation.  She completed her 3-day course of azithromycin. She will be discharged with Zofran as needed.    CONSULTANTS:   None    PROCEDURES:   None    IMAGING/ LABS:   CT-ABDOMEN-PELVIS WITH   Final Result      1. Circumferential wall thickening involving the colon, with pericolonic fat stranding in the rectosigmoid region, most compatible with colitis.   2. No bowel obstruction or free air.   3. Absent gallbladder.   4. Chronic appearing superior endplate deformities of the L2 and L3 vertebral bodies.        DISCHARGE MEDICATIONS:     Medication Reconciliation Completed     Medication List        START taking these medications        Instructions   ondansetron 4 MG Tbdp  Commonly known as: Zofran ODT   Dissolve 1 Tablet by mouth every 6 hours as needed for Nausea/Vomiting for up to 5 days.  Dose: 4 mg            CONTINUE taking these medications        Instructions   oxybutynin 15 MG CR tablet  Commonly known as: Ditropan-XL   Doctor's comments: Diagnoses:N32.81    Overactive bladder  Take 2 Tablets by mouth every day.  Dose: 30 mg     pramipexole 1 MG Tabs  Commonly known as: Mirapex   Doctor's comments: Please substitute with an available or covered quantity or equivalent alternative if necessary.  Take 3 Tablets by mouth every evening.  Dose: 3 mg            DISPOSITION: DC to home in stable condition    DIET: Regular diet    ACTIVITY: As tolerated    DISCHARGE LABS:       Latest Reference Range & Units 11/04/24 01:51   WBC 4.8 -  10.8 K/uL 5.9   RBC 4.20 - 5.40 M/uL 4.15 (L)   Hemoglobin 12.0 - 16.0 g/dL 12.6   Hematocrit 37.0 - 47.0 % 37.2   MCV 81.4 - 97.8 fL 89.6   MCH 27.0 - 33.0 pg 30.4   MCHC 32.2 - 35.5 g/dL 33.9   RDW 35.9 - 50.0 fL 45.1   Platelet Count 164 - 446 K/uL 262   MPV 9.0 - 12.9 fL 9.6   Sodium 135 - 145 mmol/L 132 (L)   Potassium 3.6 - 5.5 mmol/L 3.8   Chloride 96 - 112 mmol/L 100   Co2 20 - 33 mmol/L 24   Anion Gap 7.0 - 16.0  8.0   Glucose 65 - 99 mg/dL 108 (H)   Bun 8 - 22 mg/dL 12   Creatinine 0.50 - 1.40 mg/dL 0.51   GFR (CKD-EPI) >60 mL/min/1.73 m 2 99   Calcium 8.5 - 10.5 mg/dL 8.6   Magnesium 1.5 - 2.5 mg/dL 1.8   (L): Data is abnormally low  (H): Data is abnormally high    INSTRUCTIONS:   The patient was instructed to return to the ER in the event of worsening symptoms. I have counseled the patient on the importance of compliance and the patient has agreed to proceed with all medical recommendations and follow up plan indicated above.   The patient understands that all medications come with benefits and risks. Risks may include permanent injury or death and these risks can be minimized with close reassessment and monitoring.        PCP:  Stone Chavez M.D.  Future Appointments   Date Time Provider Department [place]   11/6/2024  2:30 PM Stone Chavez M.D. [facility]     F/U APPOINTMENT:       Follow up with Primary Care Physician within 7 days of discharge for further evaluation and care.    PENDING STUDIES:    None    The patient met the criteria for greater than 2 night inpatient stay.      Chhaya Rojas D.O.  PGY-1  [facility] Resident

## 2024-11-01 NOTE — FACE TO FACE
Face to Face Note  -  Durable Medical Equipment    Chhaya Rojas D.O. - NPI: [id number]  I certify that this patient is under my care and that they have had a durable medical equipment(DME)face to face encounter by myself that meets the physician DME face-to-face encounter requirements with this patient on:    Date of encounter:   Patient:                    MRN:                       YOB: 2022  Ariadna Sepulveda  [id number]  1950     The encounter with the patient was in whole, or in part, for the following medical condition, which is the primary reason for durable medical equipment:  Other - patient below baseline level of functioning due to frequent diarrhea bowel movements    I certify that, based on my findings, the following durable medical equipment is medically necessary:  Walkers.        ------------------------------------------------------------------------------------------------------------------    Face to Face Supporting Documentation - Home Health    The encounter with this patient was in whole or in part the primary reason for home health admission.    Date of encounter:   Patient:                    MRN:                       YOB: 2022  Ariadna Sepulveda  [id number]  1950     Home health to see patient for:  Physical Therapy evaluation and treatment    Skilled need for:  Recent Deterioration of Health Status below baseline level of functioning due to frequent diarrhea bowel movements.    Skilled nursing interventions to include:  Comment: PT only    Homebound evidenced status by:  Needs the assistance of another person in order to leave the home. Leaving home must require a considerable and taxing effort. There must exist a normal inability to leave the home.    Community Physician to provide follow up care: Stone Chavez M.D.     Optional Interventions    Wound information & treatment:    Home Infusion Therapy orders:     Line/Drain/Airway:    I certify the face to face encounter for this home care referral meets the CMS requirements and the encounter/clinical assessment with the patient was, in whole, or in part, for the medical condition(s) listed above, which is the primary reason for home health care. Based on my clinical findings: the service(s) are medically necessary, support the need for home health care, and the homebound criteria are met.  I certify that this patient has had a face to face encounter by myself.  Chhaya Rojas D.O. - NPI: [id number]    *Debility, frailty and advanced age in the absence of an acute deterioration or exacerbation of a condition do not qualify a patient for home health.

## 2024-11-01 NOTE — THERAPY
Physical Therapy   Initial Evaluation     Patient Name: Ariadna Sepulveda  Age:  73 y.o., Sex:  female  Medical Record #: [id number]  Today's Date: 11/1/2024     Precautions  Precautions: Fall Risk    Assessment  Patient is 73 y.o. female with a diagnosis of nausea, vomiting, diarrhea, and electrolyte imbalance. PMH includes arthritis, sleep apnea, HT, and RLS. Pt presented alert and receptive to therapy session at time of eval. Pt was able to perform bed mobility w/SPV and HOB flat. Pt performed room ambulation and toilet transfer w/ FWW and SBA but was able to progress to no AD and SPV at end of session. Pt is progressing as expected and will no longer benefit from acute therapy needs. Pt currently still presents below baseline level of function due to frequent diarrhea bowel movements that are being managed. PT recommends pt use FWW at home if needed and to seek  for continued PT needs.     Plan    Physical Therapy Initial Treatment Plan   Duration: Discharge Needs Only    DC Equipment Recommendations: Front-Wheel Walker  Discharge Recommendations: Recommend home health for continued physical therapy services          11/01/24 1020    Services   Is patient using  services for this encounter? No   Initial Contact Note    Initial Contact Note Order Received and Verified, Evaluation Only - Patient Does Not Require Further Acute Physical Therapy at this Time.  However, May Benefit from Post Acute Therapy for Higher Level Functional Deficits.   Precautions   Precautions Fall Risk   Vitals   O2 (LPM) 0   O2 Delivery Device None - Room Air   Pain 0 - 10 Group   Therapist Pain Assessment Prior to Activity;During Activity;Post Activity;Nurse Notified;0   Prior Living Situation   Prior Services Home-Independent   Housing / Facility 1 Story Apartment / Condo   Steps Into Home 0   Steps In Home 0   Rail None   Equipment Owned Single Point Cane   Lives with - Patient's Self Care Capacity Alone and Able to  Care For Self   Prior Level of Functional Mobility   Bed Mobility Independent   Transfer Status Independent   Ambulation Independent   Ambulation Distance community   Assistive Devices Used Single Point Cane   Stairs Independent   Cognition    Cognition / Consciousness WDL   Active ROM Lower Body    Active ROM Lower Body  X   Comments bilateral kne flexion 100 deg   Strength Lower Body   Lower Body Strength  X   Comments bilateral 4+/5 LE strength   Sensation Lower Body   Lower Extremity Sensation   WDL   Coordination Lower Body    Coordination Lower Body  WDL   Balance Assessment   Sitting Balance (Static) Good   Sitting Balance (Dynamic) Good   Standing Balance (Static) Good   Standing Balance (Dynamic) Fair   Weight Shift Sitting Good   Weight Shift Standing Good   Comments FWW; able to progress to no AD   Bed Mobility    Supine to Sit Supervised   Sit to Supine Supervised   Scooting Supervised   Rolling Supervised   Comments HOB flat   Gait Analysis   Gait Level Of Assist Supervised   Assistive Device Front Wheel Walker   Distance (Feet) 20   # of Times Distance was Traveled 2   Deviation Decreased Base Of Support   # of Stairs Climbed 0   Comments Room ambulation for toliet transfer; pt deferred further gait training during session   Functional Mobility   Sit to Stand Supervised   Bed, Chair, Wheelchair Transfer Supervised   Toilet Transfers Supervised   Transfer Method Stand Step   Mobility bed mobility> room ambulation> toilet transfer   Comments pt able to progress from FWW to no AD;  pt able to progress from SBA to SPV   6 Clicks Assessment - How much HELP from from another person do you currently need... (If the patient hasn't done an activity recently, how much help from another person do you think he/she would need if he/she tried?)   Turning from your back to your side while in a flat bed without using bedrails? 4   Moving from lying on your back to sitting on the side of a flat bed without using  bedrails? 4   Moving to and from a bed to a chair (including a wheelchair)? 4   Standing up from a chair using your arms (e.g., wheelchair, or bedside chair)? 4   Walking in hospital room? 3   Climbing 3-5 steps with a railing? 3   6 clicks Mobility Score 22   Edema / Skin Assessment   Edema / Skin  Not Assessed   Physical Therapy Initial Treatment Plan    Duration Discharge Needs Only   Anticipated Discharge Equipment and Recommendations   DC Equipment Recommendations Front-Wheel Walker   Discharge Recommendations Recommend home health for continued physical therapy services   Interdisciplinary Plan of Care Collaboration   IDT Collaboration with  Nursing   Patient Position at End of Therapy In Bed;Seated;Bed Alarm On;Call Light within Reach;Tray Table within Reach;Phone within Reach   Collaboration Comments RN updated   Session Information   Date / Session Number  11/1-dc needs

## 2024-11-01 NOTE — ED PROVIDER NOTES
CHIEF COMPLAINT  Chief Complaint   Patient presents with    N/V    Diarrhea    Headache    Weakness       LIMITATION TO HISTORY   Select: None    HPI    Ariadna Sepulveda is a 73 y.o. female who presents to the Emergency Department evaluation of 2 days of nausea vomiting and diarrhea.  Patient states about 2 days ago she had several episodes of nonbloody nonbilious vomiting and then began to have a associated diarrhea.  This has persisted for the past 2 days she states that she has had poor oral intake and has not been tolerating oral fluids at home prompting the visit to the ER.  She denies any fevers, denies any abdominal pain, denies any dizziness lightheadedness or syncope.  She is complaining of generalized weakness     OUTSIDE HISTORIAN(S):  Select: EMS reports given 4 of Zofran prior to arrival    EXTERNAL RECORDS REVIEWED  Select: Other reviewed the patient's admission note does have a history of chronic venous insufficiency      PAST MEDICAL HISTORY  Past Medical History:   Diagnosis Date    Arthritis     At risk for sleep apnea     CPAP    Hypertension     RLS (restless legs syndrome) 10/5/2011     .    SURGICAL HISTORY  Past Surgical History:   Procedure Laterality Date    NAILA BY LAPAROSCOPY N/A 12/4/2021    Procedure: CHOLECYSTECTOMY, LAPAROSCOPIC;  Surgeon: Jos Rodriguez M.D.;  Location: SURGERY [facility];  Service: General    KNEE REPLACEMENT, TOTAL  left    OTHER ORTHOPEDIC SURGERY      TONSILLECTOMY           FAMILY HISTORY  Family History   Problem Relation Age of Onset    Cancer Mother     Arthritis Father     Diabetes Father     Heart Disease Father     Hypertension Father     Hyperlipidemia Father     Psychiatric Illness Brother     Drug abuse Brother     Alcohol abuse Brother     Lung Disease Neg Hx           SOCIAL HISTORY  Social History     Socioeconomic History    Marital status: Single     Spouse name: Not on file    Number of children: Not on file    Years of education: Not on file  "   Highest education level: Not on file   Occupational History    Not on file   Tobacco Use    Smoking status: Never    Smokeless tobacco: Never   Vaping Use    Vaping status: Never Used   Substance and Sexual Activity    Alcohol use: Yes     Alcohol/week: 4.8 oz     Types: 8 Cans of beer per week     Comment: 1-2 every other day or when goes to casino    Drug use: No    Sexual activity: Not Currently   Other Topics Concern    Not on file   Social History Narrative    Not on file     Social Determinants of Health     Financial Resource Strain: Not on file   Food Insecurity: No Food Insecurity (10/24/2020)    Hunger Vital Sign     Worried About Running Out of Food in the Last Year: Never true     Ran Out of Food in the Last Year: Never true   Transportation Needs: No Transportation Needs (10/24/2020)    PRAPARE - Transportation     Lack of Transportation (Medical): No     Lack of Transportation (Non-Medical): No   Physical Activity: Not on file   Stress: Not on file   Social Connections: Not on file   Intimate Partner Violence: Not on file   Housing Stability: Not on file         CURRENT MEDICATIONS  No current facility-administered medications on file prior to encounter.     Current Outpatient Medications on File Prior to Encounter   Medication Sig Dispense Refill    pramipexole (MIRAPEX) 1 MG Tab Take 3 Tablets by mouth every evening. 180 Tablet 1    oxybutynin (DITROPAN-XL) 15 MG CR tablet Take 2 Tablets by mouth every day. 60 Tablet 0           ALLERGIES  No Known Allergies    PHYSICAL EXAM  VITAL SIGNS:BP (!) 141/62   Pulse 90   Temp 35.9 °C (96.7 °F) (Temporal)   Resp 16   Ht 1.575 m (5' 2\")   Wt 99.8 kg (220 lb)   LMP  (LMP Unknown)   SpO2 94%   BMI 40.24 kg/m²       VITALS - vital signs documented prior to this note have been reviewed and noted,  GENERAL - awake, alert, oriented, GCS 15, no apparent distress, non-toxic  appearing  HEENT - normocephalic, atraumatic, pupils equal, sclera anicteric, " mucus  membranes moist  NECK - supple, no meningismus, full active range of motion, trachea midline  CARDIOVASCULAR - regular rate/rhythm, no murmurs/gallops/rubs  PULMONARY - no respiratory distress, speaking in full sentences, clear to  auscultation bilaterally, no wheezing/ronchi/rales, no accessory muscle use  GASTROINTESTINAL -bowel sounds present normal active negative Watson sign no McBurney's point tenderness no rebound guarding or peritonitis no CVA tenderness no palpable abdominal masses no overlying abrasions lesions or contusions  GENITOURINARY - Deferred  NEUROLOGIC - Awake alert, normal mental status, speech fluid, cognition  normal, moves all extremities  MUSCULOSKELETAL - no obvious asymmetry or deformities present  EXTREMITIES - warm, well-perfused, no cyanosis or significant edema  DERMATOLOGIC - warm, dry, no rashes, no jaundice  PSYCHIATRIC - normal affect, normal insight, normal concentration    DIAGNOSTIC STUDIES / PROCEDURES      LABS  Labs Reviewed   CBC WITH DIFFERENTIAL - Abnormal; Notable for the following components:       Result Value    WBC 14.7 (*)     MCHC 35.7 (*)     Neutrophils-Polys 88.30 (*)     Lymphocytes 3.70 (*)     Neutrophils (Absolute) 13.00 (*)     Lymphs (Absolute) 0.55 (*)     Monos (Absolute) 1.09 (*)     All other components within normal limits   COMP METABOLIC PANEL - Abnormal; Notable for the following components:    Sodium 128 (*)     Potassium 2.8 (*)     Chloride 92 (*)     Co2 19 (*)     Anion Gap 17.0 (*)     Glucose 145 (*)     Globulin 3.6 (*)     All other components within normal limits   LIPASE - Abnormal; Notable for the following components:    Lipase 10 (*)     All other components within normal limits   ESTIMATED GFR   URINALYSIS   CBC WITH DIFFERENTIAL   COMP METABOLIC PANEL   MAGNESIUM   POC COV-2, FLU A/B, RSV BY PCR       Labs are remarkable for a hyponatremia hypokalemia and hypochloremia likely secondary the patient's nausea vomiting  diarrhea        Radiologist interpretation:   No orders to display        COURSE & MEDICAL DECISION MAKING    ED COURSE:        INTERVENTIONS BY ME:  Medications   heparin injection 5,000 Units (has no administration in time range)   ondansetron (Zofran) syringe/vial injection 4 mg (has no administration in time range)   ondansetron (Zofran ODT) dispertab 4 mg (has no administration in time range)   acetaminophen (Tylenol) tablet 1,000 mg (1,000 mg Oral Given 11/1/24 0114)   metoclopramide (Reglan) injection 10 mg (10 mg Intravenous Given 11/1/24 0116)   diphenhydrAMINE (Benadryl) injection 25 mg (25 mg Intravenous Given 11/1/24 0115)   potassium chloride SA (Kdur) tablet 40 mEq (40 mEq Oral Given 11/1/24 0114)   ondansetron (Zofran) syringe/vial injection 4 mg (4 mg Intravenous Given 11/1/24 0221)   NS (Bolus) 0.9 % infusion 1,000 mL (1,000 mL Intravenous New Bag 11/1/24 0221)       Response on recheck: Reevaluation after Reglan Benadryl patient still vomiting, not tolerating fluids we will order IV fluids, as well as additional as a dose of Zofran and plan for admission.        HYDRATION: Based on the patient's presentation of Dehydration the patient was given IV fluids. IV Hydration was used because oral hydration was not adequate alone. Upon recheck following hydration, the patient was mildly improved.    INITIAL ASSESSMENT, COURSE AND PLAN  Care Narrative: Patient presented for evaluation nausea vomiting diarrhea which has been ongoing for the past 2 days.  She was also complaining of generalized weakness.  She denies any associate abdominal pain no abdominal tenderness.  Labs were obtained were remarkable for a leukocytosis, I believe is likely stress demargination from the patient's acute nausea vomiting, she has no other SIRS criteria do not believe her to be septic.  Her CMP did reveal a new hyponatremia, hypokalemia hyperchloremia and a high anion gap metabolic acidosis.  I believe this is likely  reflecting dehydration secondary to the patient's nausea vomiting and diarrhea.  She was given Reglan, Benadryl in the ER, and Zofran by EMS  and still was unable to tolerate p.o. fluids.  Given that she is a not tolerating p.o., and her metabolic derangements we will plan for admission.             ADDITIONAL PROBLEM LIST    DISPOSITION AND DISCUSSIONS  I have discussed management of the patient with the following physicians and JAMES's: Medicine residents    Escalation of care considered, and ultimately not performed:diagnostic imaging consider obtaining a CT abdomen pelvis of the patient's never had any associate abdominal pain, and has no reproducible abdominal tenderness on examination thus will defer at this time    Barriers to care at this time, including but not limited to: Patient lacks transportation .         FINAL DIAGNOSIS  1. Dehydration    2. Nausea and vomiting, unspecified vomiting type    3. Diarrhea, unspecified type    4. Hypokalemia    5. Hyponatremia             Electronically signed by: Rickey Bill DO ,2:36 AM 11/01/24

## 2024-11-01 NOTE — DISCHARGE SUMMARY
"DISCHARGE SUMMARY     ADMIT DATE: 10/31/2024       DISCHARGE DATE: 11/1/2024    SERVICE: [facility]  ATTENDING PHYSICIAN: Hayden Coleman M.d.   SENIOR RESIDENT: Jessica Hunter M.D.  JN RESIDENT: Chhaya Rojas D.O.    FINAL DIAGNOSES:   1. Dehydration        2. Nausea and vomiting, unspecified vomiting type        3. Diarrhea, unspecified type        4. Hypokalemia        5. Hyponatremia        6. Gait disturbance  DME Walker         HPI:  Ariadna Sepulveda is a 73 y.o. female who presented with a 2 day history of nausea, vomiting and diarrhea. She states following the onset of vomiting she felt weakness and \"difficulty walking\".  Patient states that when she experienced significant weakness she called EMS to be evaluated at [facility].  She states she is fully vaccinated and denies any sick contacts. No recent history of travel.  States she has tried eating anything over the past two days, as she has no appetite.  Patient unable to clearly identify if she had a meal that could have caused her symptoms.  Denies fever, chest pain, palpitations, or syncope.     Past medical history is significant for overactive bladder (on oxybutynin), restless leg syndrome (on pramipexole), and chronic venous insufficiency.     ERCourse:  CBC, CMP, UA, lipase, mag and respiratory panel were drawn.  Patient was given Zofran by EMS prior to arrival.  Responded well to treatment.  Given Reglan and Benadryl in ED along with an additional dose of Zofran, however patient still unable to tolerate p.o.  [facility] was then contacted for admission.    HOSPITAL COURSE:   Patient did not have any abdominal tenderness throughout her admission.  She had significant improvement in her lab abnormalities after starting maintenance IV fluids. She had no further episodes of vomiting prior to discharge. Patient was tolerating PO prior to discharge. She will be discharged with Zofran as needed.    CONSULTANTS: " "  None    PROCEDURES:   None    IMAGING/ LABS:   No orders to display         DISCHARGE MEDICATIONS:     Medication Reconciliation Completed     Medication List        ASK your doctor about these medications        Instructions   oxybutynin 15 MG CR tablet  Commonly known as: Ditropan-XL   Doctor's comments: Diagnoses:N32.81    Overactive bladder  Take 2 Tablets by mouth every day.  Dose: 30 mg     pramipexole 1 MG Tabs  Commonly known as: Mirapex   Doctor's comments: Please substitute with an available or covered quantity or equivalent alternative if necessary.  Take 3 Tablets by mouth every evening.  Dose: 3 mg               DISPOSITION: DC to home    DIET: Regular diet    ACTIVITY: As tolerated    DISCHARGE LABS:    Recent Labs     10/31/24  2346 11/01/24  1141   WBC 14.7* 8.2   RBC 4.98 4.86   HEMOGLOBIN 15.3 14.7   HEMATOCRIT 42.9 42.1   MCV 86.1 86.6   MCH 30.7 30.2   RDW 43.7 43.3   PLATELETCT 297 190   MPV 9.9 9.8   NEUTSPOLYS 88.30* 61.00   LYMPHOCYTES 3.70* 2.60*   MONOCYTES 7.40 22.00*   EOSINOPHILS 0.00 0.00   BASOPHILS 0.10 0.00   RBCMORPHOLO  --  Normal     Lab Results   Component Value Date    RKEFWCNQ16 180 (L) 10/07/2021    FERRITIN 45.3 05/25/2022    IRON 53 05/25/2022    IRON 48 10/07/2021    TOTIRONBC 275 05/25/2022    TOTIRONBC 289 10/07/2021       Estimated GFR/CRCL = Estimated Creatinine Clearance: 76.5 mL/min (by C-G formula based on SCr of 0.71 mg/dL).  Recent Labs     10/31/24  2346 11/01/24  1141   SODIUM 128* 133*   POTASSIUM 2.8* 3.6   CHLORIDE 92* 97   CO2 19* 21   BUN 14 21   CREATININE 0.67 0.71   CALCIUM 8.9 8.2*   MAGNESIUM  --  1.6   ALBUMIN 4.1 3.8       Recent Labs     10/31/24  2346 11/01/24  1141   ALTSGPT 20 15   ASTSGOT 32 14   ALKPHOSPHAT 93 81   TBILIRUBIN 1.0 0.7   LIPASE 10*  --    ALBUMIN 4.1 3.8   GLOBULIN 3.6* 3.2       No results for input(s): \"POCGLUCOSE\" in the last 72 hours.  Lab Results   Component Value Date    HBA1C 5.5 05/25/2022       INSTRUCTIONS:   The " patient was instructed to return to the ER in the event of worsening symptoms. I have counseled the patient on the importance of compliance and the patient has agreed to proceed with all medical recommendations and follow up plan indicated above.   The patient understands that all medications come with benefits and risks. Risks may include permanent injury or death and these risks can be minimized with close reassessment and monitoring.        PCP:  Stone Chavez M.D.  Copy of discharge summary given to the patient    F/U APPOINTMENT:       Follow up with Primary Care Physician within 7 days of discharge for further evaluation and care.    PENDING STUDIES:        The patient recovered much more quickly than anticipated on admission and is currently suitable for discharge.

## 2024-11-01 NOTE — PROGRESS NOTES
Report received from NOC RN and assumed patient care at 0700. Patient is A&Ox 4, on room air, and bed alarm on. Patient declines pain. Call light within reach and bed in lowest position. Reinforced the need to call for assistance. Plan of care discussed and patient does not have any further needs at this time.

## 2024-11-01 NOTE — ASSESSMENT & PLAN NOTE
#Generalized abdominal pain (resolved)  #Nausea, vomiting, diarrhea (resolved)    Continues to have multiple watery bowel movements per day. Nausea and vomiting improving. No identified source from patient, did not recently eat at [id number], where there is currently an E. Coli outbreak.    Plan:  - Zofran PRN  -Monitor and replete electrolytes prn, 40 mEq K today  -Regular diet  -Azithromycin 500 mg daily x3 days  -Stool cultures collected, follow results

## 2024-11-01 NOTE — PROGRESS NOTES
Received report at 0249 from [facility] ED RN. Pt arrived on floor with transport via wheelchair . Pt ambulated to bed 1 assist. Pt is A & O 4. Bed locked in the lowest position with 2 rails up, call light within reach, and belongings at bedside. Pt reports no pain at this time. Pt educated on plan of care and safety precautions are in place. Pt has no further questions at this time. Hourly rounding is in place.

## 2024-11-01 NOTE — PROGRESS NOTES
4 Eyes Skin Assessment Completed by YOANA Alcazar and Kelsy RN.    Head WDL  Ears WDL  Nose WDL  Mouth WDL  Neck WDL  Breast/Chest WDL  Shoulder Blades WDL  Spine WDL  (R) Arm/Elbow/Hand WDL  (L) Arm/Elbow/Hand WDL  Abdomen WDL  Groin WDL  Scrotum/Coccyx/Buttocks Redness and Excoriation  (R) Leg Edema  (L) Leg Edema  (R) Heel/Foot/Toe WDL  (L) Heel/Foot/Toe WDL          Devices In Places Blood Pressure Cuff and Pulse Ox      Interventions In Place Pillows and Barrier Cream    Possible Skin Injury No    Pictures Uploaded Into Epic N/A  Wound Consult Placed N/A  RN Wound Prevention Protocol Ordered No

## 2024-11-01 NOTE — ASSESSMENT & PLAN NOTE
Monitoring. K-dur 40 mEq given today. Hyponatremia is persisting but stable.    Plan:  -Tolerating p.o. intake of full liquid diet well this morning  -Monitor daily chemistry, replace prn

## 2024-11-01 NOTE — CARE PLAN
The patient is Stable - Low risk of patient condition declining or worsening    Shift Goals  Clinical Goals: Maintain adequate hydration; patient will report a reduction in nausea/vomiting after pharmacological intervention  Patient Goals: Rest  Family Goals: GINI    Progress made toward(s) clinical / shift goals:      Problem: Pain - Standard  Goal: Alleviation of pain or a reduction in pain to the patient’s comfort goal  Outcome: Progressing     Problem: Fall Risk  Goal: Patient will remain free from falls  Outcome: Progressing    Patient reports a reduction in nausea/vomiting after pharmacological intervention, see MAR. Patient has remained free from falls throughout the day (bed locked/lowest position, bed alarm on, and belongings placed within reach).      Patient is not progressing towards the following goals:

## 2024-11-01 NOTE — ED NOTES
Patient notified this nurse, animal control does not need to be contacted her family member will pick patient's dog from [facility].

## 2024-11-01 NOTE — H&P
"    FAMILY MEDICINE HISTORY AND PHYSICAL       PATIENT ID:  NAME:  Ariadna Sepulveda  MRN:               [id number]  YOB: 1950    Date of Admission: 10/31/2024     Attending: Hayden Coleman M.d.     Resident: Vannessa Whatley M.D.    Primary Care Physician:  Hayden Coleman MD     CC:    Chief Complaint   Patient presents with    N/V    Diarrhea    Headache    Weakness     HPI: Ariadna Sepulveda is a 73 y.o. female who presented with a 2 day history of nausea, vomiting and diarrhea. She states following the onset of vomiting she felt weakness and \"difficulty walking\".  Patient states that when she experienced significant weakness she called EMS to be evaluated at [facility] ED.  She states she is fully vaccinated and denies any sick contacts. No recent history of travel.  States she has tried eating anything over the past two days, as she has no appetite.  Patient unable to clearly identify if she had a meal that could have caused her symptoms.  Denies fever, chest pain, palpitations, or syncope.    Past medical history is significant for overactive bladder (on oxybutynin), restless leg syndrome (on pramipexole), and chronic venous insufficiency.    ERCourse:  CBC, CMP, UA, lipase, mag and respiratory panel were drawn.  Patient was given Zofran by EMS prior to arrival.  Responded well to treatment.  Given Reglan and Benadryl in ED along with an additional dose of Zofran, however patient still unable to tolerate p.o.  [facility] medicine was then contacted for admission.    REVIEW OF SYSTEMS:   Ten systems reviewed and were negative except as noted in the HPI.                PAST MEDICAL HISTORY:   has a past medical history of Arthritis, At risk for sleep apnea, Hypertension, and RLS (restless legs syndrome) (10/5/2011).     PAST SURGICAL HISTORY:   has a past surgical history that includes tonsillectomy; knee replacement, total (left); other orthopedic surgery; and isaiah by laparoscopy (N/A, 12/4/2021). " "    FAMILY HISTORY:  family history includes Alcohol abuse in her brother; Arthritis in her father; Cancer in her mother; Diabetes in her father; Drug abuse in her brother; Heart Disease in her father; Hyperlipidemia in her father; Hypertension in her father; Psychiatric Illness in her brother.     SOCIAL HISTORY:   Employment: unemployed. Previously worked at [employer].  Living Situation: Lives at home alone with her dog.  Smoking: Denies  Etoh: Denies  Recreational Drug: Denies    DIET:   No orders of the defined types were placed in this encounter.    ALLERGIES:  No Known Allergies    OUTPATIENT MEDICATIONS:    Current Facility-Administered Medications:     heparin injection 5,000 Units, 5,000 Units, Subcutaneous, Q8HRS, Vannessa Whatley M.D.    ondansetron (Zofran) syringe/vial injection 4 mg, 4 mg, Intravenous, Q4HRS PRN, Vannessa Whatley M.D.    ondansetron (Zofran ODT) dispertab 4 mg, 4 mg, Oral, Q4HRS PRN, Vannessa Whatley M.D.    pramipexole (Mirapex) tablet 3 mg, 3 mg, Oral, Nightly, Vannessa Whatley M.D., 3 mg at 24 0358    oxybutynin SR (Ditropan-XL) tablet 30 mg, 30 mg, Oral, DAILY, Vannessa Whatley M.D.    potassium chloride SA (Kdur) tablet 40 mEq, 40 mEq, Oral, Once, Vannessa Whatley M.D.    NS infusion, , Intravenous, Continuous, Vannessa Whatley M.D.    PHYSICAL EXAM:  Vitals:    24 0250 24 0300 24 0405 24 0434   BP: 118/70 103/55  114/68   Pulse: 90 82  80   Resp: 18 18  19   Temp: 36.8 °C (98.2 °F)   36.4 °C (97.5 °F)   TempSrc: Temporal   Temporal   SpO2: 89% 89%  91%   Weight:   91.9 kg (202 lb 9.6 oz)    Height:   1.575 m (5' 2.01\")    , Temp (24hrs), Av.4 °C (97.5 °F), Min:35.9 °C (96.7 °F), Max:36.8 °C (98.2 °F)  , Pulse Oximetry: 91 %, O2 (LPM): 0, O2 Delivery Device: None - Room Air    Physical Exam  Constitutional:       Appearance: Normal appearance. She is obese.      Comments: Non toxic in appearance. Alert but somnolent, had to wake several times for history taking.   HENT: " "     Head: Normocephalic and atraumatic.      Nose: Nose normal.      Mouth/Throat:      Mouth: Mucous membranes are dry.   Eyes:      Conjunctiva/sclera: Conjunctivae normal.   Cardiovascular:      Rate and Rhythm: Normal rate and regular rhythm.      Pulses: Normal pulses.      Heart sounds: Normal heart sounds.   Pulmonary:      Effort: Pulmonary effort is normal.      Breath sounds: Normal breath sounds.   Abdominal:      General: Abdomen is flat. Bowel sounds are normal.      Palpations: Abdomen is soft.   Musculoskeletal:         General: Normal range of motion.      Cervical back: Normal range of motion and neck supple.   Skin:     General: Skin is warm and dry.      Findings: Bruising and erythema present.      Comments: bilateral lower extremity chronic venous insufficiency.  Skin changes notable bilaterally such as: swelling, discoloration and thickening of skin.  2+ pitting edema bilaterally.   Neurological:      General: No focal deficit present.      Mental Status: She is alert and oriented to person, place, and time.   Psychiatric:         Mood and Affect: Mood normal.         Behavior: Behavior normal.         Thought Content: Thought content normal.         Judgment: Judgment normal.       LAB TESTS:   Recent Labs     10/31/24  2346   WBC 14.7*   RBC 4.98   HEMOGLOBIN 15.3   HEMATOCRIT 42.9   MCV 86.1   MCH 30.7   MCHC 35.7*   RDW 43.7   PLATELETCT 297   MPV 9.9     Recent Labs     10/31/24  2346   SODIUM 128*   POTASSIUM 2.8*   CHLORIDE 92*   CO2 19*   GLUCOSE 145*   BUN 14   CREATININE 0.67   CALCIUM 8.9     Recent Labs     10/31/24  2346   ALTSGPT 20   ASTSGOT 32   ALKPHOSPHAT 93   TBILIRUBIN 1.0   LIPASE 10*   GLUCOSE 145*         No results for input(s): \"NTPROBNP\" in the last 72 hours.      No results for input(s): \"TROPONINT\" in the last 72 hours.    CULTURES:   Results       Procedure Component Value Units Date/Time    Urinalysis [[id number]]     Order Status: Sent Specimen: Urine       "     IMAGES:  No orders to display     CONSULTS:   None    ASSESSMENT/PLAN:   73 y.o. female admitted for:    Nausea & vomiting  Likely viral gastroenteritis.  Basic viral panel negative, however likely etiology is still viral.  Plan:  - Zofran PRN; patient currently responding well, however consider Reglan or scopolamine patch if needed  -Monitor and replete electrolytes as indicated  -Continue to advance diet as tolerated    Overactive bladder  Continue home oxybutynin.    RLS (restless legs syndrome)  Continue home pramipexole    Chronic venous insufficiency  Longstanding issue.  Previously worked up for cellulitis, however determined not to be cellulitis as patient's lower extremities did not heal.  On physical exam bilateral lower extremity chronic venous insufficiency is noted with skin changes.  Bilateral swelling discoloration and thickening of skin.  2+ pitting edema bilaterally.  Patient could benefit from regular exercise (walking, cycling and leg strengthening exercises) to improve muscle tone to support venous function.  Recommend outpatient follow-up with PCP and regular PT/exercise program.  PT OT consults in place, pending recommendations.    Electrolyte imbalance  Patient's electrolyte abnormalities are consistent with acute episodes of vomiting.  Sodium 128, potassium 2.8, chloride 92, AG 17, bicarb 19.  While a metabolic alkalosis picture could be expected following vomiting, it is likely being offset by metabolic acidosis due to diarrhea and this can also indicate an underlying condition beyond simple vomiting and diarrhea.    -Will check lactic acid to cover basis, ordered and pending  -Replete all electrolytes as indicated  -MIVF  mL/hr; will continue to adjust and monitor as patient has history of peripheral edema related to chronic venous insufficiency.    Core Measures:  Fluids: Encourage PO intake  Lines: PIV  Abx: None  Diet: Regular; advance as tolerated.  PPX: SCDs/TEDs and heparin  ppx  CODE Status: Full Code    Dispo: Inpatient.    Vannessa Whatley MD  PGY3 Resident   [facility], Family Medicine

## 2024-11-01 NOTE — ED NOTES
Patient incontinent with urine, provide hygiene care, Bed linen and patient gown changed, puriwick placed.

## 2024-11-02 LAB
ADV 40+41 DNA STL QL NAA+NON-PROBE: NOT DETECTED
ALBUMIN SERPL BCP-MCNC: 3.3 G/DL (ref 3.2–4.9)
ALBUMIN/GLOB SERPL: 1.1 G/DL
ALP SERPL-CCNC: 67 U/L (ref 30–99)
ALT SERPL-CCNC: 12 U/L (ref 2–50)
ANION GAP SERPL CALC-SCNC: 10 MMOL/L (ref 7–16)
AST SERPL-CCNC: 14 U/L (ref 12–45)
ASTRO TYP 1-8 RNA STL QL NAA+NON-PROBE: NOT DETECTED
BASOPHILS # BLD AUTO: 0 % (ref 0–1.8)
BASOPHILS # BLD: 0 K/UL (ref 0–0.12)
BILIRUB SERPL-MCNC: 0.5 MG/DL (ref 0.1–1.5)
BUN SERPL-MCNC: 16 MG/DL (ref 8–22)
BURR CELLS BLD QL SMEAR: NORMAL
C CAYETANENSIS DNA STL QL NAA+NON-PROBE: NOT DETECTED
C COLI+JEJ+UPSA DNA STL QL NAA+NON-PROBE: NOT DETECTED
C DIFF DNA SPEC QL NAA+PROBE: NEGATIVE
C DIFF TOX GENS STL QL NAA+PROBE: NEGATIVE
CALCIUM ALBUM COR SERPL-MCNC: 8.9 MG/DL (ref 8.5–10.5)
CALCIUM SERPL-MCNC: 8.3 MG/DL (ref 8.5–10.5)
CHLORIDE SERPL-SCNC: 99 MMOL/L (ref 96–112)
CO2 SERPL-SCNC: 21 MMOL/L (ref 20–33)
CREAT SERPL-MCNC: 0.58 MG/DL (ref 0.5–1.4)
CRYPTOSP DNA STL QL NAA+NON-PROBE: NOT DETECTED
E HISTOLYT DNA STL QL NAA+NON-PROBE: NOT DETECTED
EAEC PAA PLAS AGGR+AATA ST NAA+NON-PRB: NOT DETECTED
EC STX1+STX2 GENES STL QL NAA+NON-PROBE: NOT DETECTED
EOSINOPHIL # BLD AUTO: 0 K/UL (ref 0–0.51)
EOSINOPHIL NFR BLD: 0 % (ref 0–6.9)
EPEC EAE GENE STL QL NAA+NON-PROBE: NOT DETECTED
ERYTHROCYTE [DISTWIDTH] IN BLOOD BY AUTOMATED COUNT: 45.2 FL (ref 35.9–50)
ETEC LTA+ST1A+ST1B TOX ST NAA+NON-PROBE: NOT DETECTED
G LAMBLIA DNA STL QL NAA+NON-PROBE: NOT DETECTED
GFR SERPLBLD CREATININE-BSD FMLA CKD-EPI: 96 ML/MIN/1.73 M 2
GLOBULIN SER CALC-MCNC: 3 G/DL (ref 1.9–3.5)
GLUCOSE SERPL-MCNC: 149 MG/DL (ref 65–99)
HCT VFR BLD AUTO: 38.9 % (ref 37–47)
HGB BLD-MCNC: 13.1 G/DL (ref 12–16)
LYMPHOCYTES # BLD AUTO: 0.22 K/UL (ref 1–4.8)
LYMPHOCYTES NFR BLD: 5.9 % (ref 22–41)
MAGNESIUM SERPL-MCNC: 2 MG/DL (ref 1.5–2.5)
MANUAL DIFF BLD: NORMAL
MCH RBC QN AUTO: 29.8 PG (ref 27–33)
MCHC RBC AUTO-ENTMCNC: 33.7 G/DL (ref 32.2–35.5)
MCV RBC AUTO: 88.6 FL (ref 81.4–97.8)
MICROCYTES BLD QL SMEAR: ABNORMAL
MONOCYTES # BLD AUTO: 0.54 K/UL (ref 0–0.85)
MONOCYTES NFR BLD AUTO: 14.3 % (ref 0–13.4)
MORPHOLOGY BLD-IMP: NORMAL
NEUTROPHILS # BLD AUTO: 3.03 K/UL (ref 1.82–7.42)
NEUTROPHILS NFR BLD: 75.6 % (ref 44–72)
NEUTS BAND NFR BLD MANUAL: 4.2 % (ref 0–10)
NOROVIRUS GI+II RNA STL QL NAA+NON-PROBE: NOT DETECTED
NRBC # BLD AUTO: 0 K/UL
NRBC BLD-RTO: 0 /100 WBC (ref 0–0.2)
P SHIGELLOIDES DNA STL QL NAA+NON-PROBE: NOT DETECTED
PLATELET # BLD AUTO: 210 K/UL (ref 164–446)
PLATELET BLD QL SMEAR: NORMAL
PMV BLD AUTO: 9.8 FL (ref 9–12.9)
POIKILOCYTOSIS BLD QL SMEAR: NORMAL
POTASSIUM SERPL-SCNC: 3.3 MMOL/L (ref 3.6–5.5)
PROT SERPL-MCNC: 6.3 G/DL (ref 6–8.2)
RBC # BLD AUTO: 4.39 M/UL (ref 4.2–5.4)
RBC BLD AUTO: PRESENT
RVA RNA STL QL NAA+NON-PROBE: NOT DETECTED
S ENT+BONG DNA STL QL NAA+NON-PROBE: NOT DETECTED
SAPO I+II+IV+V RNA STL QL NAA+NON-PROBE: NOT DETECTED
SHIGELLA SP+EIEC IPAH ST NAA+NON-PROBE: DETECTED
SODIUM SERPL-SCNC: 130 MMOL/L (ref 135–145)
V CHOL+PARA+VUL DNA STL QL NAA+NON-PROBE: NOT DETECTED
V CHOLERAE DNA STL QL NAA+NON-PROBE: NOT DETECTED
WBC # BLD AUTO: 3.8 K/UL (ref 4.8–10.8)
Y ENTEROCOL DNA STL QL NAA+NON-PROBE: NOT DETECTED

## 2024-11-02 PROCEDURE — 700111 HCHG RX REV CODE 636 W/ 250 OVERRIDE (IP): Mod: JZ

## 2024-11-02 PROCEDURE — 87493 C DIFF AMPLIFIED PROBE: CPT

## 2024-11-02 PROCEDURE — 700102 HCHG RX REV CODE 250 W/ 637 OVERRIDE(OP)

## 2024-11-02 PROCEDURE — 99232 SBSQ HOSP IP/OBS MODERATE 35: CPT | Performed by: FAMILY MEDICINE

## 2024-11-02 PROCEDURE — 87077 CULTURE AEROBIC IDENTIFY: CPT

## 2024-11-02 PROCEDURE — 80053 COMPREHEN METABOLIC PANEL: CPT

## 2024-11-02 PROCEDURE — 87045 FECES CULTURE AEROBIC BACT: CPT

## 2024-11-02 PROCEDURE — 85007 BL SMEAR W/DIFF WBC COUNT: CPT

## 2024-11-02 PROCEDURE — 87899 AGENT NOS ASSAY W/OPTIC: CPT

## 2024-11-02 PROCEDURE — A9270 NON-COVERED ITEM OR SERVICE: HCPCS

## 2024-11-02 PROCEDURE — 770006 HCHG ROOM/CARE - MED/SURG/GYN SEMI*

## 2024-11-02 PROCEDURE — 85027 COMPLETE CBC AUTOMATED: CPT

## 2024-11-02 PROCEDURE — 83735 ASSAY OF MAGNESIUM: CPT

## 2024-11-02 PROCEDURE — 87507 IADNA-DNA/RNA PROBE TQ 12-25: CPT

## 2024-11-02 PROCEDURE — 87046 STOOL CULTR AEROBIC BACT EA: CPT

## 2024-11-02 PROCEDURE — 36415 COLL VENOUS BLD VENIPUNCTURE: CPT

## 2024-11-02 RX ORDER — SODIUM CHLORIDE 9 MG/ML
500 INJECTION, SOLUTION INTRAVENOUS ONCE
Status: DISCONTINUED | OUTPATIENT
Start: 2024-11-02 | End: 2024-11-02

## 2024-11-02 RX ORDER — AZITHROMYCIN 250 MG/1
500 TABLET, FILM COATED ORAL DAILY
Status: COMPLETED | OUTPATIENT
Start: 2024-11-02 | End: 2024-11-04

## 2024-11-02 RX ORDER — SODIUM CHLORIDE 9 MG/ML
500 INJECTION, SOLUTION INTRAVENOUS ONCE
Status: ACTIVE | OUTPATIENT
Start: 2024-11-02 | End: 2024-11-03

## 2024-11-02 RX ORDER — POTASSIUM CHLORIDE 1500 MG/1
40 TABLET, EXTENDED RELEASE ORAL ONCE
Status: COMPLETED | OUTPATIENT
Start: 2024-11-02 | End: 2024-11-02

## 2024-11-02 RX ADMIN — HEPARIN SODIUM 5000 UNITS: 5000 INJECTION, SOLUTION INTRAVENOUS; SUBCUTANEOUS at 06:01

## 2024-11-02 RX ADMIN — PRAMIPEXOLE DIHYDROCHLORIDE 3 MG: 0.5 TABLET ORAL at 20:20

## 2024-11-02 RX ADMIN — OXYBUTYNIN CHLORIDE 30 MG: 10 TABLET, EXTENDED RELEASE ORAL at 06:01

## 2024-11-02 RX ADMIN — POTASSIUM CHLORIDE 40 MEQ: 1500 TABLET, EXTENDED RELEASE ORAL at 06:41

## 2024-11-02 RX ADMIN — HEPARIN SODIUM 5000 UNITS: 5000 INJECTION, SOLUTION INTRAVENOUS; SUBCUTANEOUS at 14:18

## 2024-11-02 RX ADMIN — ONDANSETRON 4 MG: 2 INJECTION INTRAMUSCULAR; INTRAVENOUS at 15:14

## 2024-11-02 RX ADMIN — ONDANSETRON 4 MG: 2 INJECTION INTRAMUSCULAR; INTRAVENOUS at 21:38

## 2024-11-02 RX ADMIN — HEPARIN SODIUM 5000 UNITS: 5000 INJECTION, SOLUTION INTRAVENOUS; SUBCUTANEOUS at 21:00

## 2024-11-02 RX ADMIN — AZITHROMYCIN DIHYDRATE 500 MG: 250 TABLET ORAL at 14:18

## 2024-11-02 ASSESSMENT — PAIN DESCRIPTION - PAIN TYPE
TYPE: ACUTE PAIN
TYPE: ACUTE PAIN

## 2024-11-02 ASSESSMENT — FIBROSIS 4 INDEX: FIB4 SCORE: 1.37

## 2024-11-02 NOTE — PROGRESS NOTES
[facility]INE PROGRESS NOTE        Attending:   Javon Washington M.d.     Resident:   Chhaya Rojas D.O.    Senior Resident:  Glory Bridges D.O.    PATIENT:   Ariadna Sepulveda; [id number]; 1950    ID:   73 y.o. female admitted with 2 day history of nausea, vomiting, diarrhea, generalized weakness.    SUBJECTIVE:   This morning, patient reports she does not feel like she is getting better and in fact things she overall feels worse.  She did not have any vomiting overnight but had at least 8 watery bowel movements yesterday.  She states she has no appetite and continues to have significant nausea that makes her not even want to drink fluids.  She denies any abdominal pain today.  She states she feels feverish, fatigued, and has myalgias.    OBJECTIVE:  Vitals:    11/01/24 1419 11/01/24 1930 11/02/24 0436 11/02/24 0800   BP: (!) 204/79 (!) 141/68 130/60 125/67   Pulse: 77 95 78 77   Resp: 18 16 16 19   Temp: 35.9 °C (96.6 °F) 36.4 °C (97.5 °F) 36.8 °C (98.2 °F) 37.3 °C (99.2 °F)   TempSrc: Temporal Temporal Temporal Oral   SpO2: 98% 96% 93% 93%   Weight:   93.1 kg (205 lb 4 oz)    Height:           Intake/Output Summary (Last 24 hours) at 11/2/2024 1531  Last data filed at 11/2/2024 0930  Gross per 24 hour   Intake 600 ml   Output --   Net 600 ml        PHYSICAL EXAM:  General: No acute distress, afebrile, resting comfortably, tired appearing  HEENT: NC/AT. EOMI. Tacky mucous membranes.  Cardiovascular: RRR without murmurs. Normal capillary refill   Respiratory: CTAB, no distress  Abdomen: soft, nontender to palpation of any quadrant, epigastrium, suprapubic, nondistended, no masses  EXT:  HERNANDEZ, 1+ pitting edema bilateral lower extremities, warm and well perfused  Skin: Bilateral lower extremities with skin discoloration of the distal lower leg and ankle consistent with chronic venous stasis, surgical scar over left knee  Neuro: Non-focal    LABS:  Recent Labs     10/31/24  2346 11/01/24  114  "11/02/24  0241   WBC 14.7* 8.2 3.8*   RBC 4.98 4.86 4.39   HEMOGLOBIN 15.3 14.7 13.1   HEMATOCRIT 42.9 42.1 38.9   MCV 86.1 86.6 88.6   MCH 30.7 30.2 29.8   RDW 43.7 43.3 45.2   PLATELETCT 297 190 210   MPV 9.9 9.8 9.8   NEUTSPOLYS 88.30* 61.00 75.60*   LYMPHOCYTES 3.70* 2.60* 5.90*   MONOCYTES 7.40 22.00* 14.30*   EOSINOPHILS 0.00 0.00 0.00   BASOPHILS 0.10 0.00 0.00   RBCMORPHOLO  --  Normal Present     Recent Labs     10/31/24  2346 11/01/24  1141 11/02/24  0241   SODIUM 128* 133* 130*   POTASSIUM 2.8* 3.6 3.3*   CHLORIDE 92* 97 99   CO2 19* 21 21   BUN 14 21 16   CREATININE 0.67 0.71 0.58   CALCIUM 8.9 8.2* 8.3*   MAGNESIUM  --  1.6 2.0   ALBUMIN 4.1 3.8 3.3     Estimated GFR/CRCL = Estimated Creatinine Clearance: 94.5 mL/min (by C-G formula based on SCr of 0.58 mg/dL).  Recent Labs     10/31/24  2346 11/01/24  1141 11/02/24  0241   GLUCOSE 145* 136* 149*     Recent Labs     10/31/24  2346 11/01/24  1141 11/02/24  0241   ASTSGOT 32 14 14   ALTSGPT 20 15 12   TBILIRUBIN 1.0 0.7 0.5   ALKPHOSPHAT 93 81 67   GLOBULIN 3.6* 3.2 3.0             No results for input(s): \"INR\", \"APTT\", \"FIBRINOGEN\" in the last 72 hours.    Invalid input(s): \"DIMER\"      IMAGING:  CT-ABDOMEN-PELVIS WITH   Final Result      1. Circumferential wall thickening involving the colon, with pericolonic fat stranding in the rectosigmoid region, most compatible with colitis.   2. No bowel obstruction or free air.   3. Absent gallbladder.   4. Chronic appearing superior endplate deformities of the L2 and L3 vertebral bodies.          MEDS:  Current Facility-Administered Medications   Medication Last Admin    NS (Bolus) 0.9 % infusion 500 mL Dose not Required at 11/02/24 1015    azithromycin (Zithromax) tablet 500 mg 500 mg at 11/02/24 1418    heparin injection 5,000 Units 5,000 Units at 11/02/24 1418    ondansetron (Zofran) syringe/vial injection 4 mg 4 mg at 11/02/24 1514    ondansetron (Zofran ODT) dispertab 4 mg      pramipexole (Mirapex) tablet " 3 mg 3 mg at 11/01/24 2037    oxybutynin SR (Ditropan-XL) tablet 30 mg 30 mg at 11/02/24 0601    acetaminophen (Tylenol) tablet 650 mg 650 mg at 11/01/24 1436       ASSESSMENT/PLAN:  73 y.o. female admitted with 2 day history of nausea, vomiting, diarrhea, generalized weakness now with abdominal pain.    * Nausea, vomiting, and diarrhea- (present on admission)  Assessment & Plan  #Generalized abdominal pain (resolved)  #Colitis    Continuing to have 8+ watery bowel movements daily.  CT abdomen pelvis with IV and p.o. contrast obtained yesterday demonstrated rectosigmoid colitis.  Diarrhea is nonbloody.  C. difficile testing was obtained this morning and was negative.  UA without signs of infection    Plan:  - Zofran PRN  -Monitor and replete electrolytes prn, 40 mEq K today  -Full liquid diet, advance as tolerated  -Given duration of symptoms, lack of improvement, number of diarrhea episodes, hypovolemia and electrolyte imbalances, will treat empirically with antibiotics for colitis.  -Azithromycin 500 mg daily x3 days  -Stool cultures collected, follow results    Electrolyte imbalance- (present on admission)  Assessment & Plan  Monitoring. K-dur 40 mEq given today.     Plan:  -Tolerating p.o. intake of full liquid diet well this morning  -Discontinue IV fluids  -Monitor daily chemistry, replace prn    Chronic venous insufficiency- (present on admission)  Assessment & Plan  Chronic, unchanged, stable.     Plan:  -PT and OT consults  -PT recommended home health PT and front wheeled walker, which were ordered 11/1    Overactive bladder- (present on admission)  Assessment & Plan  Continue home oxybutynin.    RLS (restless legs syndrome)- (present on admission)  Assessment & Plan  Continue home pramipexole        Core Measures  IV Fluids: None  Diet: Full liquid, ADAT  Antbx: None  DVT ppx: Heparin, SCDs  Code status: Full Code  Dispo: Inpatient for management of colitis and electrolyte disturbance.    Chhaya Rojas,  MOMO.  PGY-1  [facility] Resident

## 2024-11-02 NOTE — PROGRESS NOTES
Shani from Lab called with critical result of positive GI pannel of shigella/entero-invasive E-Coli at 1543. Critical lab result read back to Shani.   Dr. Rojas notified of critical lab result at 1544.  Critical lab result read back by Dr. Rojas.

## 2024-11-02 NOTE — CARE PLAN
The patient is Stable - Low risk of patient condition declining or worsening    Shift Goals  Clinical Goals: pt will increase PO intake food and fluids, pt will remain free from falls  Patient Goals: rest  Family Goals: GINI    Progress made toward(s) clinical / shift goals:    Problem: Knowledge Deficit - Standard  Goal: Patient and family/care givers will demonstrate understanding of plan of care, disease process/condition, diagnostic tests and medications  11/2/2024 1209 by Ileana Wright R.N.  Outcome: Progressing  11/2/2024 1208 by Ileana Wright R.N.  Outcome: Progressing     Problem: Pain - Standard  Goal: Alleviation of pain or a reduction in pain to the patient’s comfort goal  11/2/2024 1209 by Ileana Wright R.N.  Outcome: Progressing  11/2/2024 1208 by Ileana Wright R.N.  Outcome: Progressing     Problem: Fall Risk  Goal: Patient will remain free from falls  11/2/2024 1209 by Ileana Wright R.N.  Outcome: Progressing  11/2/2024 1208 by Ileana Wright R.N.  Outcome: Progressing     Problem: Gastrointestinal Irritability  Goal: Nausea and vomiting will be absent or improve  11/2/2024 1209 by Ileana Wright R.N.  Outcome: Progressing  11/2/2024 1208 by Ileana Wright R.N.  Outcome: Progressing  Goal: Diarrhea will be absent or improved  11/2/2024 1209 by MELVIN CheemaN.  Outcome: Progressing  11/2/2024 1208 by MELVIN CheemaN.  Outcome: Progressing     Problem: Skin Integrity  Goal: Risk for impaired skin integrity will decrease  Outcome: Progressing       Patient is not progressing towards the following goals:

## 2024-11-02 NOTE — PROGRESS NOTES
Report received from NOC RN and assumed patient care at 0700. Patient is A&Ox 4, on RA, and bed alarm is on at this time . Patient denies pain at this time although pt has pain in bilat lower extremities upon touch during assessment. Pt had one liquid stool this AM, foul smelling. Per night shift not BMs over night last one at 2300 11/1 charted. Pt reports all over body aches and feeling weak. Denies fever like symptoms, lightheadedness, or dizziness. Pt reports a little nausea.VS stable other than elevated temp at 99.4. Notified MD. Call light within reach and bed in lowest position. Reinforced the need to call for assistance. Plan of care discussed and patient does not have any further needs at this time.

## 2024-11-02 NOTE — CARE PLAN
The patient is Stable - Low risk of patient condition declining or worsening    Shift Goals  Clinical Goals: Maintain re-hydration, pt will drink more than 200ml of fluids by end of shift, pt will have less incontinence episodes by end of shift, pt will remain free from falls  Patient Goals: Rest  Family Goals: GINI    Progress made toward(s) clinical / shift goals:  Patient drank more than 200ml of fluids throughout shift   Problem: Knowledge Deficit - Standard  Goal: Patient and family/care givers will demonstrate understanding of plan of care, disease process/condition, diagnostic tests and medications  Outcome: Progressing     Problem: Pain - Standard  Goal: Alleviation of pain or a reduction in pain to the patient’s comfort goal  Outcome: Progressing  Note: Pin remained at a tolerable level throughout shift     Problem: Fall Risk  Goal: Patient will remain free from falls  Outcome: Progressing  Note: Patient remained free from falls      Problem: Gastrointestinal Irritability  Goal: Nausea and vomiting will be absent or improve  Outcome: Progressing  Note: Patient had one episode of emesis at the beginning of shift       Patient is not progressing towards the following goals:      Problem: Gastrointestinal Irritability  Goal: Diarrhea will be absent or improved  Outcome: Not Progressing  Note: Pt had multiple episodes of incontinent loose stools

## 2024-11-03 LAB
ALBUMIN SERPL BCP-MCNC: 3.1 G/DL (ref 3.2–4.9)
ALBUMIN/GLOB SERPL: 1 G/DL
ALP SERPL-CCNC: 62 U/L (ref 30–99)
ALT SERPL-CCNC: 8 U/L (ref 2–50)
ANION GAP SERPL CALC-SCNC: 11 MMOL/L (ref 7–16)
ANISOCYTOSIS BLD QL SMEAR: ABNORMAL
AST SERPL-CCNC: 9 U/L (ref 12–45)
BASOPHILS # BLD AUTO: 0.9 % (ref 0–1.8)
BASOPHILS # BLD: 0.04 K/UL (ref 0–0.12)
BILIRUB SERPL-MCNC: 0.3 MG/DL (ref 0.1–1.5)
BUN SERPL-MCNC: 12 MG/DL (ref 8–22)
BURR CELLS BLD QL SMEAR: NORMAL
CALCIUM ALBUM COR SERPL-MCNC: 9 MG/DL (ref 8.5–10.5)
CALCIUM SERPL-MCNC: 8.3 MG/DL (ref 8.5–10.5)
CHLORIDE SERPL-SCNC: 100 MMOL/L (ref 96–112)
CO2 SERPL-SCNC: 21 MMOL/L (ref 20–33)
CREAT SERPL-MCNC: 0.41 MG/DL (ref 0.5–1.4)
E COLI SXT1+2 STL IA: NORMAL
EOSINOPHIL # BLD AUTO: 0.04 K/UL (ref 0–0.51)
EOSINOPHIL NFR BLD: 0.9 % (ref 0–6.9)
ERYTHROCYTE [DISTWIDTH] IN BLOOD BY AUTOMATED COUNT: 46.4 FL (ref 35.9–50)
GFR SERPLBLD CREATININE-BSD FMLA CKD-EPI: 105 ML/MIN/1.73 M 2
GLOBULIN SER CALC-MCNC: 3 G/DL (ref 1.9–3.5)
GLUCOSE SERPL-MCNC: 108 MG/DL (ref 65–99)
HCT VFR BLD AUTO: 38.3 % (ref 37–47)
HGB BLD-MCNC: 12.4 G/DL (ref 12–16)
LYMPHOCYTES # BLD AUTO: 0.72 K/UL (ref 1–4.8)
LYMPHOCYTES NFR BLD: 16.7 % (ref 22–41)
MAGNESIUM SERPL-MCNC: 2 MG/DL (ref 1.5–2.5)
MANUAL DIFF BLD: NORMAL
MCH RBC QN AUTO: 29.5 PG (ref 27–33)
MCHC RBC AUTO-ENTMCNC: 32.4 G/DL (ref 32.2–35.5)
MCV RBC AUTO: 91.2 FL (ref 81.4–97.8)
MICROCYTES BLD QL SMEAR: ABNORMAL
MONOCYTES # BLD AUTO: 0.45 K/UL (ref 0–0.85)
MONOCYTES NFR BLD AUTO: 10.5 % (ref 0–13.4)
MORPHOLOGY BLD-IMP: NORMAL
NEUTROPHILS # BLD AUTO: 3.05 K/UL (ref 1.82–7.42)
NEUTROPHILS NFR BLD: 71 % (ref 44–72)
NRBC # BLD AUTO: 0 K/UL
NRBC BLD-RTO: 0 /100 WBC (ref 0–0.2)
PLATELET # BLD AUTO: 220 K/UL (ref 164–446)
PLATELET BLD QL SMEAR: NORMAL
PMV BLD AUTO: 9.9 FL (ref 9–12.9)
POIKILOCYTOSIS BLD QL SMEAR: NORMAL
POTASSIUM SERPL-SCNC: 3.5 MMOL/L (ref 3.6–5.5)
PROT SERPL-MCNC: 6.1 G/DL (ref 6–8.2)
RBC # BLD AUTO: 4.2 M/UL (ref 4.2–5.4)
RBC BLD AUTO: PRESENT
SIGNIFICANT IND 70042: NORMAL
SITE SITE: NORMAL
SODIUM SERPL-SCNC: 132 MMOL/L (ref 135–145)
SOURCE SOURCE: NORMAL
WBC # BLD AUTO: 4.3 K/UL (ref 4.8–10.8)

## 2024-11-03 PROCEDURE — A9270 NON-COVERED ITEM OR SERVICE: HCPCS

## 2024-11-03 PROCEDURE — 80053 COMPREHEN METABOLIC PANEL: CPT

## 2024-11-03 PROCEDURE — 770006 HCHG ROOM/CARE - MED/SURG/GYN SEMI*

## 2024-11-03 PROCEDURE — 36415 COLL VENOUS BLD VENIPUNCTURE: CPT

## 2024-11-03 PROCEDURE — 700102 HCHG RX REV CODE 250 W/ 637 OVERRIDE(OP)

## 2024-11-03 PROCEDURE — 83735 ASSAY OF MAGNESIUM: CPT

## 2024-11-03 PROCEDURE — 700111 HCHG RX REV CODE 636 W/ 250 OVERRIDE (IP): Mod: JZ

## 2024-11-03 PROCEDURE — 85007 BL SMEAR W/DIFF WBC COUNT: CPT

## 2024-11-03 PROCEDURE — 99232 SBSQ HOSP IP/OBS MODERATE 35: CPT | Performed by: FAMILY MEDICINE

## 2024-11-03 PROCEDURE — 85027 COMPLETE CBC AUTOMATED: CPT

## 2024-11-03 RX ORDER — POTASSIUM CHLORIDE 1500 MG/1
40 TABLET, EXTENDED RELEASE ORAL ONCE
Status: COMPLETED | OUTPATIENT
Start: 2024-11-03 | End: 2024-11-03

## 2024-11-03 RX ADMIN — PRAMIPEXOLE DIHYDROCHLORIDE 3 MG: 0.5 TABLET ORAL at 21:23

## 2024-11-03 RX ADMIN — HEPARIN SODIUM 5000 UNITS: 5000 INJECTION, SOLUTION INTRAVENOUS; SUBCUTANEOUS at 21:23

## 2024-11-03 RX ADMIN — ACETAMINOPHEN 650 MG: 325 TABLET ORAL at 14:23

## 2024-11-03 RX ADMIN — AZITHROMYCIN DIHYDRATE 500 MG: 250 TABLET ORAL at 04:57

## 2024-11-03 RX ADMIN — ONDANSETRON 4 MG: 2 INJECTION INTRAMUSCULAR; INTRAVENOUS at 04:57

## 2024-11-03 RX ADMIN — POTASSIUM CHLORIDE 40 MEQ: 1500 TABLET, EXTENDED RELEASE ORAL at 08:31

## 2024-11-03 RX ADMIN — OXYBUTYNIN CHLORIDE 30 MG: 10 TABLET, EXTENDED RELEASE ORAL at 04:56

## 2024-11-03 RX ADMIN — HEPARIN SODIUM 5000 UNITS: 5000 INJECTION, SOLUTION INTRAVENOUS; SUBCUTANEOUS at 04:57

## 2024-11-03 RX ADMIN — HEPARIN SODIUM 5000 UNITS: 5000 INJECTION, SOLUTION INTRAVENOUS; SUBCUTANEOUS at 14:20

## 2024-11-03 ASSESSMENT — PAIN DESCRIPTION - PAIN TYPE
TYPE: ACUTE PAIN

## 2024-11-03 NOTE — CARE PLAN
The patient is Stable - Low risk of patient condition declining or worsening    Shift Goals  Clinical Goals: Safety, increase oral intake and monitor bowel movement  Patient Goals: rest and sleep  Family Goals: GINI    Progress made toward(s) clinical / shift goals:  no fall the whole shift, offered oral fluids    Patient is not progressing towards the following goals:

## 2024-11-03 NOTE — PROGRESS NOTES
[facility] [place], [place] FAMILY MEDICINE PROGRESS NOTE        Attending:   Javon Washington M.d.     Resident:   Chhaya Rojas D.O.    Senior Resident:  Jessica Hunter M.D.    PATIENT:   Ariadna Sepulveda; [id number]; 1950    ID:   73 y.o. female admitted with 2 day history of nausea, vomiting, diarrhea, generalized weakness.    SUBJECTIVE:   This morning, patient reports she is starting to feel a little better. She had one small episode of vomiting this morning but has otherwise not vomited since yesterday morning. Her diarrhea is still watery and associated with diffuse abdominal cramping that is improved after bowel movements. She denies feeling feverish or chilled. No chest pain, dyspnea. She's not seen any blood in her stool. She wants to continue to try advancing her diet today to see if tolerated.    OBJECTIVE:  Vitals:    11/02/24 1633 11/02/24 1941 11/03/24 0448 11/03/24 0813   BP: 120/62 118/53 (!) 143/75 (P) 118/74   Pulse: 69 71 62 (P) 63   Resp: 16 16 16 (P) 16   Temp: 36.3 °C (97.4 °F) 36.4 °C (97.5 °F) 36.3 °C (97.4 °F) (P) 35.9 °C (96.6 °F)   TempSrc: Temporal Temporal Temporal (P) Temporal   SpO2: 94% 92% 95% (P) 95%   Weight:       Height:           Intake/Output Summary (Last 24 hours) at 11/3/2024 1441  Last data filed at 11/3/2024 0928  Gross per 24 hour   Intake 240 ml   Output 1 ml   Net 239 ml        PHYSICAL EXAM:  General: No acute distress, afebrile, resting comfortably, tired appearing  HEENT: NC/AT. EOMI. MMM.  Cardiovascular: RRR without murmurs. Normal capillary refill   Respiratory: CTAB, no distress  Abdomen: Soft, mild tenderness to palpation diffusely, no masses  EXT:  HERNANDEZ, 1+ pitting edema bilateral lower extremities, warm and well perfused  Skin: Bilateral lower extremities with skin discoloration of the distal lower leg and ankle consistent with chronic venous stasis, surgical scar over left knee  Neuro: Non-focal    LABS:  Recent Labs     11/01/24  1141 11/02/24  0241 11/03/24  0151   WBC  "8.2 3.8* 4.3*   RBC 4.86 4.39 4.20   HEMOGLOBIN 14.7 13.1 12.4   HEMATOCRIT 42.1 38.9 38.3   MCV 86.6 88.6 91.2   MCH 30.2 29.8 29.5   RDW 43.3 45.2 46.4   PLATELETCT 190 210 220   MPV 9.8 9.8 9.9   NEUTSPOLYS 61.00 75.60* 71.00   LYMPHOCYTES 2.60* 5.90* 16.70*   MONOCYTES 22.00* 14.30* 10.50   EOSINOPHILS 0.00 0.00 0.90   BASOPHILS 0.00 0.00 0.90   RBCMORPHOLO Normal Present Present     Recent Labs     11/01/24  1141 11/02/24  0241 11/03/24  0151   SODIUM 133* 130* 132*   POTASSIUM 3.6 3.3* 3.5*   CHLORIDE 97 99 100   CO2 21 21 21   BUN 21 16 12   CREATININE 0.71 0.58 0.41*   CALCIUM 8.2* 8.3* 8.3*   MAGNESIUM 1.6 2.0 2.0   ALBUMIN 3.8 3.3 3.1*     Estimated GFR/CRCL = Estimated Creatinine Clearance: 133.7 mL/min (A) (by C-G formula based on SCr of 0.41 mg/dL (L)).  Recent Labs     11/01/24  1141 11/02/24  0241 11/03/24  0151   GLUCOSE 136* 149* 108*     Recent Labs     11/01/24  1141 11/02/24  0241 11/03/24  0151   ASTSGOT 14 14 9*   ALTSGPT 15 12 8   TBILIRUBIN 0.7 0.5 0.3   ALKPHOSPHAT 81 67 62   GLOBULIN 3.2 3.0 3.0             No results for input(s): \"INR\", \"APTT\", \"FIBRINOGEN\" in the last 72 hours.    Invalid input(s): \"DIMER\"      IMAGING:  CT-ABDOMEN-PELVIS WITH   Final Result      1. Circumferential wall thickening involving the colon, with pericolonic fat stranding in the rectosigmoid region, most compatible with colitis.   2. No bowel obstruction or free air.   3. Absent gallbladder.   4. Chronic appearing superior endplate deformities of the L2 and L3 vertebral bodies.          MEDS:  Current Facility-Administered Medications   Medication Last Admin    azithromycin (Zithromax) tablet 500 mg 500 mg at 11/03/24 0457    heparin injection 5,000 Units 5,000 Units at 11/03/24 1420    ondansetron (Zofran) syringe/vial injection 4 mg 4 mg at 11/03/24 0457    ondansetron (Zofran ODT) dispertab 4 mg      pramipexole (Mirapex) tablet 3 mg 3 mg at 11/02/24 2020    oxybutynin SR (Ditropan-XL) tablet 30 mg 30 mg at " 11/03/24 0456    acetaminophen (Tylenol) tablet 650 mg 650 mg at 11/03/24 9626       ASSESSMENT/PLAN:  73 y.o. female admitted with 2 day history of nausea, vomiting, diarrhea, generalized weakness now with abdominal pain being treated for electrolyte imbalance secondary to Enteroinvasive E. Coli colitis.    * Nausea, vomiting, and diarrhea- (present on admission)  Assessment & Plan  #Generalized abdominal pain (resolved)  #EIEC Colitis    Continues to have multiple watery bowel movements per day. Nausea and vomiting improving. No identified source from patient, did not recently eat at [employer], where there is currently an E. Coli outbreak.    Plan:  - Zofran PRN  -Monitor and replete electrolytes prn, 40 mEq K today  -Regular diet  -Azithromycin 500 mg daily x3 days  -Stool cultures collected, follow results    Electrolyte imbalance- (present on admission)  Assessment & Plan  Monitoring. K-dur 40 mEq given today. Hyponatremia is persisting but stable.    Plan:  -Tolerating p.o. intake of full liquid diet well this morning  -Monitor daily chemistry, replace prn    Chronic venous insufficiency- (present on admission)  Assessment & Plan  Chronic, unchanged, stable.     Plan:  -PT and OT consults  -PT recommended home health PT and front wheeled walker, which were ordered 11/1    Overactive bladder- (present on admission)  Assessment & Plan  Continue home oxybutynin.    RLS (restless legs syndrome)- (present on admission)  Assessment & Plan  Continue home pramipexole      Core Measures  IV Fluids: None  Diet: Regular  Antbx: Azithromycin  DVT ppx: Heparin, SCDs  Code status: Full Code  Dispo: Inpatient for management of colitis and electrolyte disturbance.    Chhaya Rojas D.O.  PGY-1  [facility] Resident

## 2024-11-03 NOTE — PROGRESS NOTES
"Received alert and oriented x 4. Check vitals sign and recorded accordingly and due med given per MAR. Monitor sign and symptoms of respiratory distress and treatment given accordingly per MAR.Medicated per MAR and reassessed every 2 hours per protocol. Call light within reach. Bed alarm in placed. Needs attended. Continue to monitor./53   Pulse 71   Temp 36.4 °C (97.5 °F) (Temporal)   Resp 16   Ht 1.575 m (5' 2.01\")   Wt 93.1 kg (205 lb 4 oz)   LMP  (LMP Unknown)   SpO2 92%   BMI 37.53 kg/m² .  "

## 2024-11-03 NOTE — PROGRESS NOTES
Isolation Precautions:    Patient is on Soecial isolation for Shigella enteroinvasive E.coli.    Patient educated on reason for isolation, how the infection may be transmitted, and how to help prevent transmission to others.     Patient educated that special  precautions involves staff and visitors wearing PPE, follow  Standard Precautions and perform meticulous hand hygiene in order to prevent transmission of infection. (Contact Precautions: gown and gloves; Special Contact Precautions: gown and gloves, with the added requirement of soap and water for hand hygiene; Droplet Precautions: surgical mask worn by staff and visitors in the room, and worn by the patient when out of the room; Airborne Precautions: involves staff wearing PPE to include an N95 Respirator or Controlled Air Purifying Respirator (CAPR).  Visitors should be limited and may wear an N95 mask).         Patient transport and mobilization on unit. Patient educated that they may leave their room, but prior to exiting, the patient needs to have on a fresh patient gown, ensure the potentially infectious area is covered, perform appropriate hand hygiene immediately prior to exiting the room. (*For Airborne Precautions: Patient educated that time out of the room should be minimized and limited to transport to diagnostic procedures or other activities. Patient is to wear surgical mask when required to leave the patient room).

## 2024-11-03 NOTE — CARE PLAN
The patient is Stable - Low risk of patient condition declining or worsening    Shift Goals  Clinical Goals: pt nausea will improve, increase PO intake, trend labs  Patient Goals: increase activity  Family Goals: GINI    Progress made toward(s) clinical / shift goals:    Problem: Knowledge Deficit - Standard  Goal: Patient and family/care givers will demonstrate understanding of plan of care, disease process/condition, diagnostic tests and medications  Outcome: Progressing     Problem: Pain - Standard  Goal: Alleviation of pain or a reduction in pain to the patient’s comfort goal  Outcome: Progressing     Problem: Fall Risk  Goal: Patient will remain free from falls  Outcome: Progressing     Problem: Gastrointestinal Irritability  Goal: Nausea and vomiting will be absent or improve  Outcome: Progressing  Goal: Diarrhea will be absent or improved  Outcome: Progressing     Problem: Skin Integrity  Goal: Risk for impaired skin integrity will decrease  Outcome: Progressing       Patient is not progressing towards the following goals:

## 2024-11-04 ENCOUNTER — PHARMACY VISIT (OUTPATIENT)
Dept: PHARMACY | Facility: MEDICAL CENTER | Age: 74
End: 2024-11-04
Payer: COMMERCIAL

## 2024-11-04 VITALS
OXYGEN SATURATION: 98 % | RESPIRATION RATE: 16 BRPM | DIASTOLIC BLOOD PRESSURE: 62 MMHG | SYSTOLIC BLOOD PRESSURE: 163 MMHG | TEMPERATURE: 97.4 F | HEART RATE: 63 BPM | BODY MASS INDEX: 37.77 KG/M2 | HEIGHT: 62 IN | WEIGHT: 205.25 LBS

## 2024-11-04 PROBLEM — R19.7 NAUSEA, VOMITING, AND DIARRHEA: Status: RESOLVED | Noted: 2024-11-01 | Resolved: 2024-11-04

## 2024-11-04 PROBLEM — A03.3: Status: RESOLVED | Noted: 2024-11-01 | Resolved: 2024-11-04

## 2024-11-04 PROBLEM — R11.2 NAUSEA, VOMITING, AND DIARRHEA: Status: RESOLVED | Noted: 2024-11-01 | Resolved: 2024-11-04

## 2024-11-04 LAB
ANION GAP SERPL CALC-SCNC: 8 MMOL/L (ref 7–16)
BUN SERPL-MCNC: 12 MG/DL (ref 8–22)
CALCIUM SERPL-MCNC: 8.6 MG/DL (ref 8.5–10.5)
CHLORIDE SERPL-SCNC: 100 MMOL/L (ref 96–112)
CO2 SERPL-SCNC: 24 MMOL/L (ref 20–33)
CREAT SERPL-MCNC: 0.51 MG/DL (ref 0.5–1.4)
ERYTHROCYTE [DISTWIDTH] IN BLOOD BY AUTOMATED COUNT: 45.1 FL (ref 35.9–50)
GFR SERPLBLD CREATININE-BSD FMLA CKD-EPI: 99 ML/MIN/1.73 M 2
GLUCOSE SERPL-MCNC: 108 MG/DL (ref 65–99)
HCT VFR BLD AUTO: 37.2 % (ref 37–47)
HGB BLD-MCNC: 12.6 G/DL (ref 12–16)
MAGNESIUM SERPL-MCNC: 1.8 MG/DL (ref 1.5–2.5)
MCH RBC QN AUTO: 30.4 PG (ref 27–33)
MCHC RBC AUTO-ENTMCNC: 33.9 G/DL (ref 32.2–35.5)
MCV RBC AUTO: 89.6 FL (ref 81.4–97.8)
PLATELET # BLD AUTO: 262 K/UL (ref 164–446)
PMV BLD AUTO: 9.6 FL (ref 9–12.9)
POTASSIUM SERPL-SCNC: 3.8 MMOL/L (ref 3.6–5.5)
RBC # BLD AUTO: 4.15 M/UL (ref 4.2–5.4)
SODIUM SERPL-SCNC: 132 MMOL/L (ref 135–145)
WBC # BLD AUTO: 5.9 K/UL (ref 4.8–10.8)

## 2024-11-04 PROCEDURE — 36415 COLL VENOUS BLD VENIPUNCTURE: CPT

## 2024-11-04 PROCEDURE — 83735 ASSAY OF MAGNESIUM: CPT

## 2024-11-04 PROCEDURE — 700111 HCHG RX REV CODE 636 W/ 250 OVERRIDE (IP)

## 2024-11-04 PROCEDURE — A9270 NON-COVERED ITEM OR SERVICE: HCPCS

## 2024-11-04 PROCEDURE — 97535 SELF CARE MNGMENT TRAINING: CPT

## 2024-11-04 PROCEDURE — 700102 HCHG RX REV CODE 250 W/ 637 OVERRIDE(OP)

## 2024-11-04 PROCEDURE — 85027 COMPLETE CBC AUTOMATED: CPT

## 2024-11-04 PROCEDURE — 80048 BASIC METABOLIC PNL TOTAL CA: CPT

## 2024-11-04 RX ADMIN — HEPARIN SODIUM 5000 UNITS: 5000 INJECTION, SOLUTION INTRAVENOUS; SUBCUTANEOUS at 05:22

## 2024-11-04 RX ADMIN — AZITHROMYCIN DIHYDRATE 500 MG: 250 TABLET ORAL at 05:22

## 2024-11-04 RX ADMIN — OXYBUTYNIN CHLORIDE 30 MG: 10 TABLET, EXTENDED RELEASE ORAL at 05:22

## 2024-11-04 ASSESSMENT — ACTIVITIES OF DAILY LIVING (ADL): TOILETING: INDEPENDENT

## 2024-11-04 NOTE — DISCHARGE INSTRUCTIONS
You have a follow up appointment scheduled with your primary care provider as noted below:    Future Appointments   Date Time Provider Department Center   11/6/2024  2:30 PM Stone Chavez M.D. [facility]   Special Equipment    You are being discharged with the following special equipment:  Cane

## 2024-11-04 NOTE — DISCHARGE PLANNING
Case Management Discharge Planning    Admission Date: 10/31/2024  GMLOS: 3.7  ALOS: 3    6-Clicks ADL Score: 19  6-Clicks Mobility Score: 18      Anticipated Discharge Dispo:      DME Needed: No    Action(s) Taken: OTHER    SW met with patient at bedside and issued choice for [facility].    Patient's choice: [facility], [facility], and [facility].    SW faxed [facility] choice to [facility]. Per DPA, [facility] ACCEPTED.    Patient indicates she is discharging to [facility] # [id number].    Patient reports her goal is to move into a Assisted Living Facility in  by the end of November.     Escalations Completed: None    Medically Clear: No    Next Steps: Home    Barriers to Discharge: Medical clearance. Acceptance from [facility].    Is the patient up for discharge tomorrow:

## 2024-11-04 NOTE — THERAPY
Occupational Therapy Contact Note    Patient Name: Ariadna Sepulveda  Age:  73 y.o., Sex:  female  Medical Record #: [id number]  Today's Date: 11/4/2024    OT orders received. Pt is a 73 y.o female admitted with nausea, vomiting, diarrhea, and generalized weakness. PMHx of arthritis, sleep apnea, HT, and RLS. Per RN, pt has been up completing OOB ADLs and functional mobility with staff and has no acute OT needs. RN reported that recently pt has begun using BSC due to urgency realted issues and not due to deconditioning. Spoke with pt in her room who reported no concerns regarding ability to safely complete ADLs, functional mobility, or transfers after DC. Pt was provided education on role of acute OT, AE/DME recommendations, home safety edu to assist pt in aging in place, and importance of OOB ADLs to reduce risk of deconditioning. Pt receptive to all education. Pt has no acute OT needs. Will complete OT orders at this time, please re-consult should the pt have a change in status.       Prior Living Situation   Prior Services Home-Independent   Housing / Facility 1 Story Apartment / Condo   Steps Into Home 0   Steps In Home 0   Bathroom Set up Walk In Shower;Grab Bars   Equipment Owned Single Point Cane;Grab Bar(s) In Tub / Shower;Long Handled Shoehorn   Lives with - Patient's Self Care Capacity Alone and Able to Care For Self   Comments Pt currently resides alone, but reports that she has supportive friends who can provide assist as needed.   Prior Level of ADL Function   Self Feeding Independent   Grooming / Hygiene Independent   Bathing Independent   Dressing Independent   Toileting Independent   Prior Level of IADL Function   Medication Management Independent   Laundry Independent   Kitchen Mobility Independent   Finances Independent   Home Management Independent   Shopping Independent   Prior Level Of Mobility Independent With Device in Community;Independent With Device in Home   Driving / Transportation Driving  Independent   Education Group   Education Provided Home Safety;Activities of Daily Living;Role of Occupational Therapist;Adaptive Equipment;Pathology of bedrest   Role of Occupational Therapist Patient Response Patient;Acceptance;Explanation;Verbal Demonstration   Home Safety Patient Response Patient;Acceptance;Explanation;Verbal Demonstration  (Recommend shower chair and informing friends when she gets in/out of shower or having phone close in the event that she falls in the shower in order to receive immediate care.)   ADL Patient Response Patient;Acceptance;Explanation;Verbal Demonstration  (Compensatory stratgies to safely complete ADLs)   Adaptive Equipment Patient Response Patient;Acceptance;Explanation;Verbal Demonstration  (Edu on AE/DME recommendations such as shower chair and reacher (to reduce risk of having to bend over to  items))   Pathology of Bedrest Patient Response Patient;Acceptance;Explanation;Verbal Demonstration  (Improtance of frequent OOB ADLs to reduce risk of deconditioning)

## 2024-11-04 NOTE — CARE PLAN
The patient is Stable - Low risk of patient condition declining or worsening    Shift Goals  Clinical Goals: pts level of nausea will be absent or improve, hemodynamic stability  Patient Goals: rest, go home  Family Goals: GINI    Progress made toward(s) clinical / shift goals:        Problem: Knowledge Deficit - Standard  Goal: Patient and family/care givers will demonstrate understanding of plan of care, disease process/condition, diagnostic tests and medications  Outcome: Progressing     Problem: Gastrointestinal Irritability  Goal: Nausea and vomiting will be absent or improve  Outcome: Progressing       Patient is not progressing towards the following goals:

## 2024-11-04 NOTE — DISCHARGE PLANNING
Care Transition Team Assessment    SW met with patient at bedside. Patient verified accuracy of demographic information in the Facesheet.    Patient denies any friends or family. Patient indicates she resides at the [facility] # [id number].    Patient reports she will move to  by the end of November.    Based on information provided by patient, the anticipated discharge disposition is Home with HHC VS Home with O/P.     Information Source  Orientation Level: Oriented X4  Information Given By: Patient  Informant's Name: Ariadna  Who is responsible for making decisions for patient? : Patient    Readmission Evaluation  Is this a readmission?: No    Elopement Risk  Legal Hold: No  Ambulatory or Self Mobile in Wheelchair: Yes  Disoriented: No  Psychiatric Symptoms: None  History of Wandering: No  Elopement this Admit: No  Vocalizing Wanting to Leave: No  Displays Behaviors, Body Language Wanting to Leave: No-Not at Risk for Elopement  Elopement Risk: Not at Risk for Elopement    Interdisciplinary Discharge Planning  Lives with - Patient's Self Care Capacity: Alone and Able to Care For Self  Patient or legal guardian wants to designate a caregiver: No  Support Systems: Friends / Neighbors  Housing / Facility: 1 Story Apartment / Condo  Name of Care Facility: N/A  Prior Services: Home-Independent    Discharge Preparedness  What is your plan after discharge?: Home health care  What are your discharge supports?: Other (comment)  Prior Functional Level: Independent with Activities of Daily Living, Independent with Medication Management    Functional Assesment  Prior Functional Level: Independent with Activities of Daily Living, Independent with Medication Management    Finances  Financial Barriers to Discharge: No  Prescription Coverage: Yes    Vision / Hearing Impairment  Vision Impairment : Yes  Right Eye Vision: Impaired, Wears Glasses  Left Eye Vision: Impaired, Wears Glasses  Hearing Impairment : No          Advance Directive  Advance Directive?: None  Advance Directive offered?: AD Booklet refused    Domestic Abuse  Have you ever been the victim of abuse or violence?: No  Possible Abuse/Neglect Reported to:: Not Applicable    Psychological Assessment  History of Substance Abuse: None  History of Psychiatric Problems: No  Non-compliant with Treatment: No  Newly Diagnosed Illness: No    Discharge Risks or Barriers  Discharge risks or barriers?: No  Patient risk factors: Other (comment), Vulnerable adult    Anticipated Discharge Information  Discharge Disposition: D/T to home under A care in anticipation of covered skilled care (06)

## 2024-11-04 NOTE — CARE PLAN
Problem: Knowledge Deficit - Standard  Goal: Patient and family/care givers will demonstrate understanding of plan of care, disease process/condition, diagnostic tests and medications  Outcome: Progressing     Problem: Pain - Standard  Goal: Alleviation of pain or a reduction in pain to the patient’s comfort goal  Outcome: Progressing     Problem: Fall Risk  Goal: Patient will remain free from falls  Outcome: Progressing     Problem: Gastrointestinal Irritability  Goal: Nausea and vomiting will be absent or improve  Outcome: Progressing  Goal: Diarrhea will be absent or improved  Outcome: Progressing   The patient is Watcher - Medium risk of patient condition declining or worsening    Shift Goals  Clinical Goals: monitor PO intake  Patient Goals: rest, go home  Family Goals: GINI    Progress made toward(s) clinical / shift goals:      Pt denies any pain this morning, moderate fall risk - precautions in place. Denies any diarrhea in the last 12 hours.      Patient is not progressing towards the following goals:

## 2024-11-04 NOTE — PROGRESS NOTES
Assumed pt care with RN. Pt is A&OX4 and states she is in 0/10 pain but declines interventions at this time. Special contact precautions in place. Plan of care discussed, no further questions. Personal belongings and call light within reach, bed alarm on.

## 2024-11-05 LAB
BACTERIA STL CULT: ABNORMAL
BACTERIA STL CULT: ABNORMAL
E COLI SXT1+2 STL IA: ABNORMAL
SIGNIFICANT IND 70042: ABNORMAL
SITE SITE: ABNORMAL
SOURCE SOURCE: ABNORMAL

## 2024-11-05 NOTE — PROGRESS NOTES
Discharge instructions given to and reviewed with patient, patient verbalizes understanding. IV removed. All home belongings with patient at time of discharge. DME cane at bedside, prescription at bedside. Awaiting pickup from friend for discharge off unit.

## 2024-11-06 ENCOUNTER — OFFICE VISIT (OUTPATIENT)
Dept: MEDICAL GROUP | Facility: CLINIC | Age: 74
End: 2024-11-06
Payer: COMMERCIAL

## 2024-11-06 VITALS
OXYGEN SATURATION: 94 % | BODY MASS INDEX: 37.73 KG/M2 | WEIGHT: 205 LBS | SYSTOLIC BLOOD PRESSURE: 129 MMHG | HEART RATE: 91 BPM | DIASTOLIC BLOOD PRESSURE: 74 MMHG | HEIGHT: 62 IN

## 2024-11-06 DIAGNOSIS — R11.2 NAUSEA AND VOMITING, UNSPECIFIED VOMITING TYPE: ICD-10-CM

## 2024-11-06 DIAGNOSIS — G25.81 RLS (RESTLESS LEGS SYNDROME): Chronic | ICD-10-CM

## 2024-11-06 DIAGNOSIS — E87.8 ELECTROLYTE IMBALANCE: ICD-10-CM

## 2024-11-06 DIAGNOSIS — N32.81 OVERACTIVE BLADDER: ICD-10-CM

## 2024-11-06 DIAGNOSIS — R73.9 HYPERGLYCEMIA: ICD-10-CM

## 2024-11-06 DIAGNOSIS — E66.9 OBESITY (BMI 30-39.9): ICD-10-CM

## 2024-11-06 DIAGNOSIS — E78.5 HYPERLIPIDEMIA, UNSPECIFIED HYPERLIPIDEMIA TYPE: ICD-10-CM

## 2024-11-06 PROBLEM — R11.10 VOMITING: Status: ACTIVE | Noted: 2024-11-06

## 2024-11-06 PROCEDURE — 3074F SYST BP LT 130 MM HG: CPT

## 2024-11-06 PROCEDURE — 99214 OFFICE O/P EST MOD 30 MIN: CPT

## 2024-11-06 PROCEDURE — 3078F DIAST BP <80 MM HG: CPT

## 2024-11-06 RX ORDER — OXYBUTYNIN CHLORIDE 15 MG/1
15 TABLET, EXTENDED RELEASE ORAL 2 TIMES DAILY
Qty: 180 TABLET | Refills: 1 | Status: SHIPPED | OUTPATIENT
Start: 2024-11-06

## 2024-11-06 RX ORDER — PRAMIPEXOLE DIHYDROCHLORIDE 1 MG/1
3 TABLET ORAL NIGHTLY
Qty: 270 TABLET | Refills: 1 | Status: SHIPPED | OUTPATIENT
Start: 2024-11-06

## 2024-11-06 ASSESSMENT — ENCOUNTER SYMPTOMS
DIARRHEA: 0
WEAKNESS: 0
CONSTIPATION: 0
COUGH: 0
VOMITING: 0
NAUSEA: 0
ABDOMINAL PAIN: 0
DIZZINESS: 0
BLOOD IN STOOL: 0
CHILLS: 0
FEVER: 0

## 2024-11-06 ASSESSMENT — FIBROSIS 4 INDEX: FIB4 SCORE: 0.86

## 2024-11-06 NOTE — ASSESSMENT & PLAN NOTE
Most of the electrolytes improved, but sodium was still a bit low on discharge day.  - Recheck chem panel

## 2024-11-06 NOTE — PROGRESS NOTES
This note is formatted in an APSO format, for additional subjective and objective evaluation please scroll to the bottom of the note.    CC: hospital f/u    Assessment/Plan:  Problem List Items Addressed This Visit       Electrolyte imbalance     Most of the electrolytes improved, but sodium was still a bit low on discharge day.  - Recheck chem panel         Hyperlipidemia    Relevant Orders    Lipid Profile    Overactive bladder     Patient takes oxybutynin 15 mg twice daily.  She reports that is not working as well as it used to.  I am hesitant to add an anticholinergic given her age.  She was previously referred to urology, phone number given now.         Relevant Medications    oxybutynin (DITROPAN-XL) 15 MG CR tablet    RLS (restless legs syndrome) (Chronic)    Relevant Medications    pramipexole (MIRAPEX) 1 MG Tab    Vomiting     Food poisoning, recently discharged from hospital. Now doing much better. Not needing zofran.          Other Visit Diagnoses       Obesity (BMI 30-39.9)        Relevant Orders    Lipid Profile    TSH WITH REFLEX TO FT4    Comp Metabolic Panel    HEMOGLOBIN A1C    Hyperglycemia        Relevant Orders    HEMOGLOBIN A1C           Orders Placed This Encounter    Lipid Profile    TSH WITH REFLEX TO FT4    Comp Metabolic Panel    HEMOGLOBIN A1C    oxybutynin (DITROPAN-XL) 15 MG CR tablet    pramipexole (MIRAPEX) 1 MG Tab       HISTORY OF PRESENT ILLNESS:   Pt reports she is feeling much better since her hospitalization. No n/v since hospitalization, has not needed the zofran.    Review of Systems   Constitutional:  Negative for chills and fever.   Respiratory:  Negative for cough.    Cardiovascular:  Negative for chest pain.   Gastrointestinal:  Negative for abdominal pain, blood in stool, constipation, diarrhea, nausea and vomiting.   Genitourinary:  Negative for dysuria.   Neurological:  Negative for dizziness and weakness.       Problem   Vomiting   Electrolyte Imbalance   Overactive  "Bladder   Hyperlipidemia         Exam:    /74   Pulse 91   Ht 1.575 m (5' 2\")   Wt 93 kg (205 lb)   LMP  (LMP Unknown)   SpO2 94%   BMI 37.49 kg/m²  Body mass index is 37.49 kg/m².    Gen: Well appearing. No apparent distress. Well developed. Sitting comfortably on chair.  Cane next to her.  HEENT: NCAT, MMM  Neck: Supple, FROM  Chest: No deformities, Equal chest expansion  Lungs: Normal effort, CTA bilaterally.  CV: Regular rate and rhythm. Pulse palpable.  Heart sounds diminished due to body habitus.  Abd: Nontender to palpation  Ext: No cyanosis. No edema.  Skin: Warm/dry. No visible rashes.  Neuro: Non-focal. A&Ox4.  Psych: Normal behavior, normal affect    I spent a total of 37 minutes with record review, exam, communication with the patient, communication with other providers, and documentation of this encounter.      Return in about 1 month (around 12/6/2024) for FU Labs.    Stone Chavez MD  [facility]    "

## 2024-11-06 NOTE — ASSESSMENT & PLAN NOTE
Patient takes oxybutynin 15 mg twice daily.  She reports that is not working as well as it used to.  I am hesitant to add an anticholinergic given her age.  She was previously referred to urology, phone number given now.

## 2024-11-13 ENCOUNTER — HOSPITAL ENCOUNTER (OUTPATIENT)
Dept: LAB | Facility: MEDICAL CENTER | Age: 74
End: 2024-11-13
Payer: COMMERCIAL

## 2024-11-13 DIAGNOSIS — E78.5 HYPERLIPIDEMIA, UNSPECIFIED HYPERLIPIDEMIA TYPE: ICD-10-CM

## 2024-11-13 DIAGNOSIS — E66.9 OBESITY (BMI 30-39.9): ICD-10-CM

## 2024-11-13 DIAGNOSIS — R73.9 HYPERGLYCEMIA: ICD-10-CM

## 2024-11-13 LAB
ALBUMIN SERPL BCP-MCNC: 4.1 G/DL (ref 3.2–4.9)
ALBUMIN/GLOB SERPL: 1.2 G/DL
ALP SERPL-CCNC: 86 U/L (ref 30–99)
ALT SERPL-CCNC: 15 U/L (ref 2–50)
ANION GAP SERPL CALC-SCNC: 9 MMOL/L (ref 7–16)
AST SERPL-CCNC: 20 U/L (ref 12–45)
BILIRUB SERPL-MCNC: 0.5 MG/DL (ref 0.1–1.5)
BUN SERPL-MCNC: 12 MG/DL (ref 8–22)
CALCIUM ALBUM COR SERPL-MCNC: 9.6 MG/DL (ref 8.5–10.5)
CALCIUM SERPL-MCNC: 9.7 MG/DL (ref 8.5–10.5)
CHLORIDE SERPL-SCNC: 102 MMOL/L (ref 96–112)
CHOLEST SERPL-MCNC: 177 MG/DL (ref 100–199)
CO2 SERPL-SCNC: 25 MMOL/L (ref 20–33)
CREAT SERPL-MCNC: 0.45 MG/DL (ref 0.5–1.4)
EST. AVERAGE GLUCOSE BLD GHB EST-MCNC: 126 MG/DL
GFR SERPLBLD CREATININE-BSD FMLA CKD-EPI: 102 ML/MIN/1.73 M 2
GLOBULIN SER CALC-MCNC: 3.4 G/DL (ref 1.9–3.5)
GLUCOSE SERPL-MCNC: 91 MG/DL (ref 65–99)
HBA1C MFR BLD: 6 % (ref 4–5.6)
HDLC SERPL-MCNC: 89 MG/DL
LDLC SERPL CALC-MCNC: 73 MG/DL
POTASSIUM SERPL-SCNC: 4.2 MMOL/L (ref 3.6–5.5)
PROT SERPL-MCNC: 7.5 G/DL (ref 6–8.2)
SODIUM SERPL-SCNC: 136 MMOL/L (ref 135–145)
TRIGL SERPL-MCNC: 77 MG/DL (ref 0–149)
TSH SERPL DL<=0.005 MIU/L-ACNC: 1.15 UIU/ML (ref 0.38–5.33)

## 2024-11-13 PROCEDURE — 80053 COMPREHEN METABOLIC PANEL: CPT

## 2024-11-13 PROCEDURE — 83036 HEMOGLOBIN GLYCOSYLATED A1C: CPT

## 2024-11-13 PROCEDURE — 36415 COLL VENOUS BLD VENIPUNCTURE: CPT

## 2024-11-13 PROCEDURE — 80061 LIPID PANEL: CPT

## 2024-11-13 PROCEDURE — 84443 ASSAY THYROID STIM HORMONE: CPT

## 2024-11-19 ENCOUNTER — OFFICE VISIT (OUTPATIENT)
Dept: MEDICAL GROUP | Facility: CLINIC | Age: 74
End: 2024-11-19
Payer: COMMERCIAL

## 2024-11-19 ENCOUNTER — PATIENT MESSAGE (OUTPATIENT)
Dept: MEDICAL GROUP | Facility: CLINIC | Age: 74
End: 2024-11-19

## 2024-11-19 VITALS
RESPIRATION RATE: 20 BRPM | TEMPERATURE: 97.4 F | HEIGHT: 62 IN | HEART RATE: 69 BPM | WEIGHT: 229 LBS | BODY MASS INDEX: 42.14 KG/M2 | SYSTOLIC BLOOD PRESSURE: 182 MMHG | DIASTOLIC BLOOD PRESSURE: 94 MMHG

## 2024-11-19 DIAGNOSIS — R03.0 ELEVATED BP WITHOUT DIAGNOSIS OF HYPERTENSION: ICD-10-CM

## 2024-11-19 DIAGNOSIS — R80.9 PROTEINURIA, UNSPECIFIED TYPE: ICD-10-CM

## 2024-11-19 DIAGNOSIS — R73.03 PREDIABETES: ICD-10-CM

## 2024-11-19 PROCEDURE — 3080F DIAST BP >= 90 MM HG: CPT

## 2024-11-19 PROCEDURE — 99213 OFFICE O/P EST LOW 20 MIN: CPT

## 2024-11-19 PROCEDURE — 3077F SYST BP >= 140 MM HG: CPT

## 2024-11-19 ASSESSMENT — FIBROSIS 4 INDEX: FIB4 SCORE: 1.4

## 2024-11-19 NOTE — PROGRESS NOTES
This note is formatted in an APSO format, for additional subjective and objective evaluation please scroll to the bottom of the note.    CC:   Chief Complaint   Patient presents with    Lab Results     Assessment/Plan:  Problem List Items Addressed This Visit       Elevated BP without diagnosis of hypertension     Patient had elevated blood pressure of 182/94 today.  She was not complaining of symptoms, however I did not have a chance to discuss this extensively with her as she had to leave the clinic because she was concerned for her dog.  I attempted to contact her via telephone but there was no answer and her voicemail was not set up, so I scheduled a virtual visit with a colleague tomorrow afternoon to discuss her blood pressure.         Prediabetes     A1c=6.0.  This is increased from previous value of 5.5, however patient reports that she has told she had prediabetes in the past.  Patient reports that she is currently in a temporary living situation and plans to move to a permanent situation soon, at which point she thinks that her lifestyle/eating habits will improve significantly.  She would like to recheck A1c 3 months after that move.         Proteinuria     Proteinuria noted on patient's most recent UA.  She has had proteinuria in the past, which seems to have resolved spontaneously each time.  This is most likely transient proteinuria, but we will confirm with urinalysis.  Point-of-care urinalysis ordered at this visit, unfortunately patient left prior to us being able to obtain the sample.  We will try again at next visit.         Relevant Orders    POCT Urinalysis      Orders Placed This Encounter    POCT Urinalysis       HISTORY OF PRESENT ILLNESS:   Here for lab follow-up, no new complaints.  She does have appointment scheduled with cardiology and urology coming up.    Problem   Proteinuria   Prediabetes   Elevated Bp Without Diagnosis of Hypertension    Formatting of this note might be different  "from the original.  BORDERLINE HYPERTENSION           Exam:    BP (!) 182/94 (BP Location: Left arm, Patient Position: Sitting, BP Cuff Size: Large adult)   Pulse 69   Temp 36.3 °C (97.4 °F) (Temporal)   Resp 20   Ht 1.575 m (5' 2\")   Wt 104 kg (229 lb)   LMP  (LMP Unknown)   BMI 41.88 kg/m²  Body mass index is 41.88 kg/m².    Gen: Well appearing. No apparent distress. Well developed. Sitting comfortably on seat.  Patient ambulates with a cane.  HEENT: NCAT, MMM  Neck: Supple, FROM  Chest: No deformities, Equal chest expansion  Lungs: Normal effort.  Ext: No cyanosis.  Skin: Warm/dry. No visible rashes.  Neuro: Non-focal. A&Ox4.  Psych: Normal behavior, normal affect    Return in 1 day (on 11/20/2024) for With Dr. Grant.    Stone Chavez MD  [facility]    "

## 2024-11-20 NOTE — ASSESSMENT & PLAN NOTE
A1c=6.0.  This is increased from previous value of 5.5, however patient reports that she has told she had prediabetes in the past.  Patient reports that she is currently in a temporary living situation and plans to move to a permanent situation soon, at which point she thinks that her lifestyle/eating habits will improve significantly.  She would like to recheck A1c 3 months after that move.

## 2024-11-20 NOTE — ASSESSMENT & PLAN NOTE
Proteinuria noted on patient's most recent UA.  She has had proteinuria in the past, which seems to have resolved spontaneously each time.  This is most likely transient proteinuria, but we will confirm with urinalysis.  Point-of-care urinalysis ordered at this visit, unfortunately patient left prior to us being able to obtain the sample.  We will try again at next visit.

## 2024-11-20 NOTE — ASSESSMENT & PLAN NOTE
Patient had elevated blood pressure of 182/94 today.  She was not complaining of symptoms, however I did not have a chance to discuss this extensively with her as she had to leave the clinic because she was concerned for her dog.  I attempted to contact her via telephone but there was no answer and her voicemail was not set up, so I scheduled a virtual visit with a colleague tomorrow afternoon to discuss her blood pressure.

## 2024-12-09 NOTE — FLOWSHEET NOTE
05/11/24 2343   Events/Summary/Plan   Events/Summary/Plan pt. refused the use of CPAP pt. educated not in respiratory distress       
Simple: Patient demonstrates quick and easy understanding

## 2024-12-10 ENCOUNTER — APPOINTMENT (OUTPATIENT)
Dept: RADIOLOGY | Facility: CLINIC | Age: 74
End: 2024-12-10
Payer: COMMERCIAL

## 2024-12-10 ENCOUNTER — HOSPITAL ENCOUNTER (OUTPATIENT)
Facility: MEDICAL CENTER | Age: 74
End: 2024-12-10
Payer: COMMERCIAL

## 2024-12-10 ENCOUNTER — OFFICE VISIT (OUTPATIENT)
Dept: MEDICAL GROUP | Facility: CLINIC | Age: 74
End: 2024-12-10
Payer: COMMERCIAL

## 2024-12-10 VITALS
RESPIRATION RATE: 16 BRPM | SYSTOLIC BLOOD PRESSURE: 160 MMHG | HEIGHT: 62 IN | OXYGEN SATURATION: 97 % | HEART RATE: 82 BPM | DIASTOLIC BLOOD PRESSURE: 82 MMHG | BODY MASS INDEX: 40.67 KG/M2 | WEIGHT: 221 LBS

## 2024-12-10 DIAGNOSIS — R09.89 LABILE BLOOD PRESSURE: ICD-10-CM

## 2024-12-10 DIAGNOSIS — R80.9 PROTEINURIA, UNSPECIFIED TYPE: ICD-10-CM

## 2024-12-10 DIAGNOSIS — G25.81 RLS (RESTLESS LEGS SYNDROME): Chronic | ICD-10-CM

## 2024-12-10 DIAGNOSIS — R26.2 DIFFICULTY WALKING: ICD-10-CM

## 2024-12-10 DIAGNOSIS — I10 HYPERTENSION, UNSPECIFIED TYPE: ICD-10-CM

## 2024-12-10 DIAGNOSIS — J06.9 UPPER RESPIRATORY TRACT INFECTION, UNSPECIFIED TYPE: ICD-10-CM

## 2024-12-10 PROBLEM — R05.9 COUGH: Status: ACTIVE | Noted: 2024-12-10

## 2024-12-10 LAB
APPEARANCE UR: CLEAR
BILIRUB UR STRIP-MCNC: NEGATIVE MG/DL
COLOR UR AUTO: YELLOW
CREAT UR-MCNC: 74.57 MG/DL
FLUAV RNA SPEC QL NAA+PROBE: NEGATIVE
FLUBV RNA SPEC QL NAA+PROBE: NEGATIVE
GLUCOSE UR STRIP.AUTO-MCNC: NEGATIVE MG/DL
KETONES UR STRIP.AUTO-MCNC: NEGATIVE MG/DL
LEUKOCYTE ESTERASE UR QL STRIP.AUTO: NEGATIVE
NITRITE UR QL STRIP.AUTO: NEGATIVE
PH UR STRIP.AUTO: 7 [PH] (ref 5–8)
PROT UR QL STRIP: NEGATIVE MG/DL
PROT UR-MCNC: 16 MG/DL (ref 0–15)
PROT/CREAT UR: 215 MG/G (ref 10–107)
RBC UR QL AUTO: NEGATIVE
RSV RNA SPEC QL NAA+PROBE: NEGATIVE
SARS-COV-2 RNA RESP QL NAA+PROBE: NEGATIVE
SP GR UR STRIP.AUTO: 1.02
UROBILINOGEN UR STRIP-MCNC: NORMAL MG/DL

## 2024-12-10 PROCEDURE — 84156 ASSAY OF PROTEIN URINE: CPT

## 2024-12-10 PROCEDURE — 71046 X-RAY EXAM CHEST 2 VIEWS: CPT | Mod: TC | Performed by: RADIOLOGY

## 2024-12-10 PROCEDURE — 3077F SYST BP >= 140 MM HG: CPT

## 2024-12-10 PROCEDURE — 99215 OFFICE O/P EST HI 40 MIN: CPT

## 2024-12-10 PROCEDURE — 81002 URINALYSIS NONAUTO W/O SCOPE: CPT

## 2024-12-10 PROCEDURE — 82570 ASSAY OF URINE CREATININE: CPT

## 2024-12-10 PROCEDURE — 3079F DIAST BP 80-89 MM HG: CPT

## 2024-12-10 PROCEDURE — 0241U POCT CEPHEID COV-2, FLU A/B, RSV - PCR: CPT

## 2024-12-10 RX ORDER — LOSARTAN POTASSIUM 25 MG/1
25 TABLET ORAL DAILY
Qty: 90 TABLET | Refills: 1 | Status: SHIPPED | OUTPATIENT
Start: 2024-12-10

## 2024-12-10 RX ORDER — BENZONATATE 100 MG/1
100 CAPSULE ORAL 3 TIMES DAILY PRN
Qty: 60 CAPSULE | Refills: 0 | Status: SHIPPED | OUTPATIENT
Start: 2024-12-10

## 2024-12-10 RX ORDER — ROPINIROLE 0.25 MG/1
0.25 TABLET, FILM COATED ORAL
Qty: 90 TABLET | Refills: 1 | Status: SHIPPED | OUTPATIENT
Start: 2024-12-10

## 2024-12-10 ASSESSMENT — FIBROSIS 4 INDEX: FIB4 SCORE: 1.4

## 2024-12-10 NOTE — ASSESSMENT & PLAN NOTE
Patient has history of RLS, previously saw neurology at [facility].  There, she was started on pramipexole and ramped up to 3 mg nightly.  She has had issues where she suddenly fell asleep while taking the medication.  Recently, patient reports that she started to vomit after taking the pramipexole.  These are documented side effects.  Also notes that the medication was not working as well as it used to.  I have previously put in a referral to neurology, however she has not scheduled that appointment yet.  Patient is 73 years old and has some difficulty walking so she is elevated fall risk, we will avoid gabapentin due to this.  -Provided phone number to call for neurology  -Discontinue pramipexole  -Start ropinirole, low-dose at 0.25 mg nightly.  Okay to increase to 0.5 mg daily, then to 1 mg daily if needed.  Dose may be further increased in 0.5 mg increments until reaching 3 or 4 mg daily.  -Discussed danger of driving while taking this medication, patient understands and agrees not to drive while taking the medication.  -Follow-up neurology   Radiology:    CT ABDOMEN PELVIS WO CONTRAST Additional Contrast? None    Result Date: 10/21/2021  EXAMINATION: CT OF THE ABDOMEN AND PELVIS WITHOUT CONTRAST 10/21/2021 3:42 pm TECHNIQUE: CT of the abdomen and pelvis was performed without the administration of intravenous contrast. Multiplanar reformatted images are provided for review. Dose modulation, iterative reconstruction, and/or weight based adjustment of the mA/kV was utilized to reduce the radiation dose to as low as reasonably achievable. COMPARISON: None. HISTORY: ORDERING SYSTEM PROVIDED HISTORY: flank pain TECHNOLOGIST PROVIDED HISTORY: flank pain Decision Support Exception - unselect if not a suspected or confirmed emergency medical condition->Emergency Medical Condition (MA) Reason for Exam: left flank pain Acuity: Acute Type of Exam: Initial FINDINGS: Lower Chest: The visualized heart and lungs show no acute abnormalities. Organs: Limited evaluation the absence of IV contrast.  Liver, spleen, pancreas, adrenal glands show no significant abnormalities. There is other gallbladder wall calcification or a few tiny gallstones. No nephrolithiasis. Renal vascular calcifications. The left kidney appears somewhat amorphous with loss of the hilar fat. Absence of IV contrast limits evaluation. This may be further assessed with MRI. GI/Bowel: There is limited evaluation due to absence of oral contrast.  No bowel obstruction. Normal appendix. Pelvis: Urinary bladder shows no calculi. Hysterectomy noted. Peritoneum/Retroperitoneum: No ureteric calculus. Amorphous left para-aortic soft tissue density with some stranding not optimally assessed in the absence of IV contrast.  Possibilities include scarring, conglomerate of adenopathy and or periaortic hematoma. IVC filter in place. Bones/Soft Tissues: No acute abnormality of the bones. The superficial soft tissues show no significant abnormalities.      1. Somewhat amorphous appearance of the left kidney with loss of hilar fat representing a change from prior. Lack of IV contrast limits evaluation. Contrast enhanced CT or MRI may be helpful for further evaluation. 2. Left periaortic amorphous soft tissue extending along the length of the infrarenal aorta about 2.0 x 2.2 cm in maximal AP by transverse dimension. This has developed since 2012. Not well assessed in the absence of IV contrast.Possibilities include scarring, conglomerate of adenopathy and or periaortic hematoma. Further evaluation contrast-enhanced CT may be considered. CT ABDOMEN PELVIS W IV CONTRAST Additional Contrast? None    Addendum Date: 10/21/2021    ADDENDUM: Differential considerations would include primary urothelial carcinoma, involving the left renal pelvis and ureter, with metastatic adenopathy to the retroperitoneum. Result Date: 10/21/2021  EXAMINATION: CT OF THE ABDOMEN AND PELVIS WITH CONTRAST 10/21/2021 10:55 am TECHNIQUE: CT of the abdomen and pelvis was performed with the administration of intravenous contrast. Multiplanar reformatted images are provided for review. Dose modulation, iterative reconstruction, and/or weight based adjustment of the mA/kV was utilized to reduce the radiation dose to as low as reasonably achievable. COMPARISON: CT scan dated October 21, 2021 and 25 October 2012 HISTORY: ORDERING SYSTEM PROVIDED HISTORY: pain TECHNOLOGIST PROVIDED HISTORY: pain Decision Support Exception - unselect if not a suspected or confirmed emergency medical condition->Emergency Medical Condition (MA) Reason for Exam: pain Acuity: Unknown Type of Exam: Unknown Additional signs and symptoms: PT STATES LEFT SIDED PAIN FINDINGS: Lower Chest: Dependent changes are present within the lung bases. Extensive coronary arterial calcifications are present. Organs: The liver, spleen, pancreas, and gallbladder demonstrate no acute abnormality. Cholelithiasis is present without evidence of cholecystitis.  Cortical cyst formation is present on the left kidney. Abnormal soft tissue attenuation is present within the left renal pelvis, surrounding the pelvocaliceal system, extending along the course of the left ureter. .  No hydronephrosis is present. GI/Bowel: Small hiatal hernia is present. Small bowel appears normal without evidence of obstruction. The appendix is normal.  Scattered colonic diverticula are present without evidence of diverticulitis. Pelvis: Urinary bladder is incompletely distended. The uterus is surgically absent. Peritoneum/Retroperitoneum: Inferior vena cava filter is in place. Many of the legs of the filter project beyond the lumen of the inferior vena cava. The inferior vena cava caudal to the filter is small in caliber, with heterogeneous attenuation, suggesting chronic thrombosis. Atherosclerotic changes are present throughout the course of the abdominal aorta. Abnormal soft tissue attenuation is present within the left para-aortic region with loss of the normal fat plane adjacent to the abdominal aorta. Soft tissue measures approximately 2.3 x 2 cm in greatest AP and transverse dimension. The soft tissue attenuation extends caudally within the retroperitoneum, along the course of the left ureter, into the pelvis. Bones/Soft Tissues: No acute osseous abnormality is present. 1. Abnormal soft tissue attenuation encompassing the left pelvocaliceal system, within the left renal pelvis, extending caudally along the course of the ureter, and most prominent within the left para-aortic region. Differential considerations would include lymphoma, retroperitoneal fibrosis, or potentially metastatic adenopathy within the periaortic region. Correlation with urine cytology recommended. If this is negative, CT-guided percutaneous biopsy of the percutaneous soft tissue mass should be considered.          Consultations:    Consults:     Final Specialist Recommendations/Findings:   IP CONSULT TO UROLOGY      The patient was seen and examined on day of discharge and this discharge summary is in conjunction with any daily progress note from day of discharge. Discharge plan:     Disposition: Home     Physician Follow Up:     Deangelo Davis MD  [address] [facility] [address]  [phone]    Go on 10/27/2021  [facility] Follow Up @ [address] [id number]  Arelis Herrera [address] - They will call you to schedule a good time to visit    Deangelo Davis MD  [address] [facility] [address]  [phone]    Go on 10/27/2021  [facility] Follow Up @ [address] [facility], MD  [address] [place] [address] [phone]    Call  FOLLOW UP RENAL MASS    Faith Juan MD  [address] [place]ardo [address] [place] [id number] [phone]    Today         Requiring Further Evaluation/Follow Up POST HOSPITALIZATION/Incidental Findings:    Diet: cardiac diet    Activity: As tolerated    Instructions to Patient:     Discharge Medications:      Medication List      START taking these medications    oxyCODONE-acetaminophen 5-325 MG per tablet  Commonly known as: PERCOCET  Take 1 tablet by mouth every 6 hours as needed for Pain for up to 3 days. CHANGE how you take these medications    amLODIPine 10 MG tablet  Commonly known as: NORVASC  Take 1 tablet by mouth daily  What changed:   · medication strength  · how much to take     * insulin lispro protamine & lispro (75-25) 100 UNIT per ML Susp injection vial  Commonly known as: HUMALOG MIX  What changed: Another medication with the same name was changed. Make sure you understand how and when to take each.      * insulin lispro protamine & lispro (75-25) 100 UNIT per ML Supn injection pen  Commonly known as: HumaLOG Mix 75/25 KwikPen  Inject 45 units under the skin in the morning and 35 units at night  What changed:   · how much to take  · how to take this  · when to take this         * This list has 2 medication(s) that are the same as other medications prescribed for you. Read the directions carefully, and ask your doctor or other care provider to review them with you. CONTINUE taking these medications    acetaminophen 650 MG extended release tablet  Commonly known as: Tylenol 8 Hour  Take 1 tablet by mouth every 8 hours as needed for Pain. atorvastatin 40 MG tablet  Commonly known as: Lipitor  Take 1 tablet by mouth daily. B-D SINGLE USE SWABS REGULAR Pads  use as directed     B-D UF III MINI PEN NEEDLES 31G X 5 MM Misc  Generic drug: Insulin Pen Needle  use daily     Blood Glucose Meter Kit  Test 2-3 times daily Dx: 250.00  Diabetes Mellitus 2 Insulin Dependent     metoprolol succinate 100 MG extended release tablet  Commonly known as: TOPROL XL     nitroGLYCERIN 0.3 MG SL tablet  Commonly known as: NITROSTAT  Place 1 tablet under the tongue as needed.      ondansetron 4 MG disintegrating tablet  Commonly known as: Zofran ODT  Take 1 tablet by mouth every 8 hours as needed for Nausea or Vomiting     pregabalin 75 MG capsule  Commonly known as: LYRICA     TRUEplus Lancets 33G Misc  use two to three times a day     TrueTrack Test strip  Generic drug: blood glucose test strips  TEST two to three times a day        STOP taking these medications    aspirin 81 MG EC tablet  Commonly known as: ASPIRIN LOW DOSE     hydroCHLOROthiazide 12.5 MG capsule  Commonly known as: MICROZIDE     valsartan 160 MG tablet  Commonly known as: DIOVAN        ASK your doctor about these medications    Combigan 0.2-0.5 % ophthalmic solution  Generic drug: brimonidine-timolol     latanoprost 0.005 % ophthalmic solution  Commonly known as: XALATAN           Where to Get Your Medications      These medications were sent to [place], [place] - [address] [facility] [address], [address] [facility] [address]    Phone: [phone]   · amLODIPine 10 MG tablet  · ondansetron 4 MG disintegrating tablet     You can get these medications from any pharmacy    Bring a paper prescription for each of these medications  · oxyCODONE-acetaminophen 5-325 MG per tablet         Time Spent on discharge is  35 mins in patient examination, evaluation, counseling as well as medication reconciliation, prescriptions for required medications, discharge plan and follow up. Electronically signed by   Alison Stern MD  10/25/2021  12:37 PM      Thank you Dr. Evelyn Moeller primary care provider on file. for the opportunity to be involved in this patient's care.

## 2024-12-10 NOTE — ASSESSMENT & PLAN NOTE
Blood pressure a few months ago was recorded in the 110's, more recently in the 190s systolic, but on recheck today was 160/82.   -ER precautions  -Advised patient to purchase a blood pressure cuff at the pharmacy and check BP 2-3x/day.  Bring in log to next appointment  -Starting losartan 25 daily.  -Referral to vascular medicine for labile blood pressure

## 2024-12-10 NOTE — ASSESSMENT & PLAN NOTE
Patient reports congestion/rhinorrhea as well as cough, which has been worsening for a week.  -COVID/flu/RSV negative today  -CXR negative in-clinic  -Tessalon for cough  -Augmentin due to nonimprovement/worsening after a week of sx  -f/u in 5-7 days if sx fail to improve

## 2024-12-10 NOTE — PROGRESS NOTES
This note is formatted in an APSO format, for additional subjective and objective evaluation please scroll to the bottom of the note.    CC: URI, RLS    Assessment/Plan:  Problem List Items Addressed This Visit       Difficulty walking     Recommended walker, pt declines.  - DMV form for disability placard filled out  - PT for gait training         Relevant Orders    Referral to Physical Therapy    Hypertension     Blood pressure a few months ago was recorded in the 110's, more recently in the 190s systolic, but on recheck today was 160/82.   -ER precautions  -Advised patient to purchase a blood pressure cuff at the pharmacy and check BP 2-3x/day.  Bring in log to next appointment  -Starting losartan 25 daily.  -Referral to vascular medicine for labile blood pressure         Relevant Medications    losartan (COZAAR) 25 MG Tab    Other Relevant Orders    Referral to Vascular Medicine    Labile blood pressure    Relevant Orders    Referral to Vascular Medicine    Proteinuria     Proteinuria noted on UA occasionally, seems to resolve. Todays POCT UA was negative for protein but Prot/creat ratio is elevated at 215. Notably her GFR is around 100. She has very labile BP which can range from 110's to 190's - today it was in the 190's at first then dropped to 160/82 after she rested. Possibly the proteinuria may be 2/2 elevated BP readings. She will follow up with vascular medicine for further workup and/or treatment.         Relevant Orders    POCT Urinalysis (Completed)    PROTEIN/CREAT RATIO URINE (Completed)    Referral to Vascular Medicine    RLS (restless legs syndrome) (Chronic)     Patient has history of RLS, previously saw neurology at [facility].  There, she was started on pramipexole and ramped up to 3 mg nightly.  She has had issues where she suddenly fell asleep while taking the medication.  Recently, patient reports that she started to vomit after taking the pramipexole.  These are documented side  effects.  Also notes that the medication was not working as well as it used to.  I have previously put in a referral to neurology, however she has not scheduled that appointment yet.  Patient is 73 years old and has some difficulty walking so she is elevated fall risk, we will avoid gabapentin due to this.  -Provided phone number to call for neurology  -Discontinue pramipexole  -Start ropinirole, low-dose at 0.25 mg nightly.  Okay to increase to 0.5 mg daily, then to 1 mg daily if needed.  Dose may be further increased in 0.5 mg increments until reaching 3 or 4 mg daily.  -Discussed danger of driving while taking this medication, patient understands and agrees not to drive while taking the medication.  -Follow-up neurology         Upper respiratory tract infection     Patient reports congestion/rhinorrhea as well as cough, which has been worsening for a week.  -COVID/flu/RSV negative today  -CXR negative in-clinic  -Tessalon for cough  -Augmentin due to nonimprovement/worsening after a week of sx  -f/u in 5-7 days if sx fail to improve         Relevant Orders    DX-CHEST-2 VIEWS (Completed)    POCT CoV-2, Flu A/B, RSV by PCR (Completed)      Orders Placed This Encounter    DX-CHEST-2 VIEWS    PROTEIN/CREAT RATIO URINE    Referral to Vascular Medicine    Referral to Physical Therapy    POCT Urinalysis    POCT CoV-2, Flu A/B, RSV by PCR    losartan (COZAAR) 25 MG Tab    ROPINIRole (REQUIP) 0.25 MG Tab    benzonatate (TESSALON) 100 MG Cap    amoxicillin-clavulanate (AUGMENTIN) 875-125 MG Tab       HISTORY OF PRESENT ILLNESS:   Pt has been vomiting after taking pramipexole. She was taking 2 tabs nightly, but had to stop taking them 3 days ago. She woke up vomiting 3 times.    She has had a cough for about a week.    She is having urinary leakage when she coughs.     Pt has an appt with urology tomorrow. Cardiology next week.    Pt has very labile blood pressure.     Runny nose, cough. Started a week ago. Lozenges.  "    First BP was 197/100.    Problem   Cough   Labile Blood Pressure   Upper Respiratory Tract Infection   Proteinuria   Difficulty Walking   Rls (Restless Legs Syndrome)   Hypertension    SBP > 190 recently. Asyx. A month ago, BP was 129/74 in the clinic.           Exam:    BP (!) 160/82 (BP Location: Left arm, Patient Position: Sitting, BP Cuff Size: Adult)   Pulse 82   Resp 16   Ht 1.575 m (5' 2\")   Wt 100 kg (221 lb)   LMP  (LMP Unknown)   SpO2 97%   BMI 40.42 kg/m²  Body mass index is 40.42 kg/m².    Gen: Well appearing. No apparent distress. Well developed. Sitting comfortably on chair.  HEENT: NCAT, MMM  Neck: Supple, FROM  Chest: No deformities, Equal chest expansion  Lungs: Normal effort, CTA bilaterally.  CV: Regular rate and rhythm. Pulse palpable.  Abd: Non-distended.  Ext: No cyanosis. No edema.  Skin: Warm/dry. No visible rashes.  Neuro: Non-focal. A&Ox4.  Psych: Normal behavior, normal affect    I spent a total of 47 minutes with record review, exam, communication with the patient, communication with other providers, and documentation of this encounter.      Return in about 4 weeks (around 1/7/2025) for review BP log.    Stone Chavez MD  [facility]    "

## 2024-12-10 NOTE — ASSESSMENT & PLAN NOTE
Proteinuria noted on UA occasionally, seems to resolve. Todays POCT UA was negative for protein but Prot/creat ratio is elevated at 215. Notably her GFR is around 100. She has very labile BP which can range from 110's to 190's - today it was in the 190's at first then dropped to 160/82 after she rested. Possibly the proteinuria may be 2/2 elevated BP readings. She will follow up with vascular medicine for further workup and/or treatment.

## 2024-12-11 ENCOUNTER — TELEPHONE (OUTPATIENT)
Dept: CARDIOLOGY | Facility: MEDICAL CENTER | Age: 74
End: 2024-12-11
Payer: COMMERCIAL

## 2024-12-11 ENCOUNTER — TELEPHONE (OUTPATIENT)
Dept: VASCULAR LAB | Facility: MEDICAL CENTER | Age: 74
End: 2024-12-11
Payer: COMMERCIAL

## 2024-12-11 NOTE — ASSESSMENT & PLAN NOTE
Recommended walker, pt declines.  - DMV form for disability placard filled out  - PT for gait training

## 2024-12-11 NOTE — TELEPHONE ENCOUNTER
Phone Number Called: [phone]    Call outcome: Spoke to patient regarding message below.    Message: was able to get patient scheduled. Opted for January due to no active insurance until then.      Rosita ROWE , Medical Ass't  [facility]   Phone: [phone]   Fax: [phone]

## 2024-12-11 NOTE — TELEPHONE ENCOUNTER
Called patient in regards to her appointment with  needing to confirmed if she had seen a cardiologist prior to us patient requested to have her appt to be rescheduled transfer call to our scheduling dept.

## 2024-12-18 ENCOUNTER — APPOINTMENT (OUTPATIENT)
Dept: CARDIOLOGY | Facility: MEDICAL CENTER | Age: 74
End: 2024-12-18
Payer: MEDICARE

## 2024-12-20 ENCOUNTER — TELEPHONE (OUTPATIENT)
Dept: MEDICAL GROUP | Facility: CLINIC | Age: 74
End: 2024-12-20
Payer: COMMERCIAL

## 2025-01-03 ENCOUNTER — TELEPHONE (OUTPATIENT)
Dept: VASCULAR LAB | Facility: MEDICAL CENTER | Age: 75
End: 2025-01-03
Payer: COMMERCIAL

## 2025-01-03 NOTE — TELEPHONE ENCOUNTER
Called to confirm if this will be first time patient is Vascular Med provider and review if any recent testing completed or hospital admissions. No answer, left voicemail to call back.    Correct appointment type? YES   Referral attached to appointment? YES   New patient packet sent via [employer]?  No [employer] Not Active since 2022

## 2025-01-06 ENCOUNTER — TELEPHONE (OUTPATIENT)
Dept: VASCULAR LAB | Facility: MEDICAL CENTER | Age: 75
End: 2025-01-06
Payer: COMMERCIAL

## 2025-01-06 ENCOUNTER — DOCUMENTATION (OUTPATIENT)
Dept: VASCULAR LAB | Facility: MEDICAL CENTER | Age: 75
End: 2025-01-06
Payer: COMMERCIAL

## 2025-01-06 NOTE — PROGRESS NOTES
Ariadna Coco has been referred for evaluation and management in the vascular care clinic.     Unfortunately patient missed appointment for initial visit in our office today.  We will be unable to take part in care until or unless patient makes an appointment for a face-to-face visit in our center  We will ask our  or medical assistant to call and reschedule     Pending further patient contact, we will defer all vascular care, including management of cardiovascular risk factors, to PCP and other members of the care team    Vascular Medicine Clinic   [facility]   [phone]

## 2025-01-06 NOTE — TELEPHONE ENCOUNTER
Phone Number Called: [phone]    Call outcome: Left detailed message for patient. Informed to call back with any additional questions.    Message: called patient no response left a voicemail in regards to missed appointment. Also called emergency contact (Edvin, Sister) also no response left a voicemail as well.    Rosita ROWE , Medical Ass't  [facility]   Phone: [phone]   Fax: [phone]

## 2025-01-16 ENCOUNTER — TELEPHONE (OUTPATIENT)
Dept: HEALTH INFORMATION MANAGEMENT | Facility: OTHER | Age: 75
End: 2025-01-16
Payer: COMMERCIAL

## 2025-01-23 ENCOUNTER — APPOINTMENT (OUTPATIENT)
Dept: NEUROLOGY | Facility: MEDICAL CENTER | Age: 75
End: 2025-01-23
Attending: INTERNAL MEDICINE
Payer: MEDICARE

## 2025-02-05 ENCOUNTER — DOCUMENTATION (OUTPATIENT)
Dept: VASCULAR LAB | Facility: MEDICAL CENTER | Age: 75
End: 2025-02-05
Payer: COMMERCIAL

## 2025-02-05 NOTE — PROGRESS NOTES
Patient referred to vascular care  Unfortunately patient no showed for initial visit and has not returned calls from our schedulers to reschedule  Unless patient establishes with a face-to-face visit in our office will be unable to take part in care  Await further patient contact.  Pending further contact, we will defer all further management of vascular disease and its risk factors to PCP and/or referring MD.    Michael J. Bloch, MD  [facility]    cc:   LATRELL Chavez

## 2025-02-26 ENCOUNTER — TELEPHONE (OUTPATIENT)
Dept: MEDICAL GROUP | Facility: OTHER | Age: 75
End: 2025-02-26
Payer: COMMERCIAL

## 2025-05-12 NOTE — TELEPHONE ENCOUNTER
1. Caller Name: Ariadna                                         Call Back Number: [phone]      Patient approves a detailed voicemail message: N\A    Patient called and she is stopping her clonazpem per the last office note. Patient thought that her mirapex would be increased since she is stopping it. She is taking 2 a day and was wondering if it was suppose to be increased. Please advise.  
Have given an extra pill per day.  Either 1.5mg bid or 1mg tid.   
LVM for patient letting her know the change to medication.  
Patient states her normal dose is 2 a day and she thought it was going to be increased from the that. Please advise  
i sent rx to pharmacy at 2 pills per day on 4/12. Does she want to go up from 2x per day?  
Spontaneous, unlabored and symmetrical

## 2025-06-04 DIAGNOSIS — G25.81 RLS (RESTLESS LEGS SYNDROME): Chronic | ICD-10-CM

## 2025-06-04 DIAGNOSIS — N32.81 OVERACTIVE BLADDER: ICD-10-CM

## 2025-06-04 NOTE — TELEPHONE ENCOUNTER
Received request via: Pharmacy    Was the patient seen in the last year in this department? Yes    Does the patient have an active prescription (recently filled or refills available) for medication(s) requested? No    Pharmacy Name: [facility]/pharmacy #[id number] - [place] - [address] in [place]     Does the patient have long-term Plus and need 100-day supply? (This applies to ALL medications) Patient does not have SCP

## 2025-06-11 RX ORDER — OXYBUTYNIN CHLORIDE 15 MG/1
15 TABLET, EXTENDED RELEASE ORAL DAILY
Qty: 90 TABLET | Refills: 0 | Status: SHIPPED | OUTPATIENT
Start: 2025-06-11

## 2025-06-11 RX ORDER — PRAMIPEXOLE DIHYDROCHLORIDE 1 MG/1
3 TABLET ORAL NIGHTLY
Qty: 270 TABLET | Refills: 0 | OUTPATIENT
Start: 2025-06-11

## 2025-08-03 ENCOUNTER — APPOINTMENT (OUTPATIENT)
Dept: RADIOLOGY | Facility: MEDICAL CENTER | Age: 75
DRG: 392 | End: 2025-08-03
Attending: STUDENT IN AN ORGANIZED HEALTH CARE EDUCATION/TRAINING PROGRAM
Payer: MEDICARE

## 2025-08-03 ENCOUNTER — HOSPITAL ENCOUNTER (INPATIENT)
Facility: MEDICAL CENTER | Age: 75
LOS: 1 days | DRG: 392 | End: 2025-08-04
Attending: STUDENT IN AN ORGANIZED HEALTH CARE EDUCATION/TRAINING PROGRAM | Admitting: STUDENT IN AN ORGANIZED HEALTH CARE EDUCATION/TRAINING PROGRAM
Payer: MEDICARE

## 2025-08-03 DIAGNOSIS — I87.2 CHRONIC VENOUS INSUFFICIENCY: ICD-10-CM

## 2025-08-03 DIAGNOSIS — R11.2 NAUSEA VOMITING AND DIARRHEA: Primary | ICD-10-CM

## 2025-08-03 DIAGNOSIS — R09.02 HYPOXIA: ICD-10-CM

## 2025-08-03 DIAGNOSIS — R10.9 ABDOMINAL PAIN, UNSPECIFIED ABDOMINAL LOCATION: ICD-10-CM

## 2025-08-03 DIAGNOSIS — R51.9 ACUTE NONINTRACTABLE HEADACHE, UNSPECIFIED HEADACHE TYPE: ICD-10-CM

## 2025-08-03 DIAGNOSIS — R26.2 DIFFICULTY WALKING: ICD-10-CM

## 2025-08-03 DIAGNOSIS — R19.7 NAUSEA VOMITING AND DIARRHEA: Primary | ICD-10-CM

## 2025-08-03 LAB
ALBUMIN SERPL BCP-MCNC: 4.3 G/DL (ref 3.2–4.9)
ALBUMIN/GLOB SERPL: 1.3 G/DL
ALP SERPL-CCNC: 84 U/L (ref 30–99)
ALT SERPL-CCNC: 15 U/L (ref 2–50)
ANION GAP SERPL CALC-SCNC: 12 MMOL/L (ref 7–16)
AST SERPL-CCNC: 18 U/L (ref 12–45)
BASOPHILS # BLD AUTO: 0.4 % (ref 0–1.8)
BASOPHILS # BLD: 0.04 K/UL (ref 0–0.12)
BILIRUB SERPL-MCNC: 0.3 MG/DL (ref 0.1–1.5)
BUN SERPL-MCNC: 20 MG/DL (ref 8–22)
CALCIUM ALBUM COR SERPL-MCNC: 9.2 MG/DL (ref 8.5–10.5)
CALCIUM SERPL-MCNC: 9.4 MG/DL (ref 8.5–10.5)
CHLORIDE SERPL-SCNC: 99 MMOL/L (ref 96–112)
CO2 SERPL-SCNC: 21 MMOL/L (ref 20–33)
CREAT SERPL-MCNC: 0.56 MG/DL (ref 0.5–1.4)
EOSINOPHIL # BLD AUTO: 0.24 K/UL (ref 0–0.51)
EOSINOPHIL NFR BLD: 2.3 % (ref 0–6.9)
ERYTHROCYTE [DISTWIDTH] IN BLOOD BY AUTOMATED COUNT: 42.5 FL (ref 35.9–50)
FLUAV RNA SPEC QL NAA+PROBE: NEGATIVE
FLUBV RNA SPEC QL NAA+PROBE: NEGATIVE
GFR SERPLBLD CREATININE-BSD FMLA CKD-EPI: 97 ML/MIN/1.73 M 2
GLOBULIN SER CALC-MCNC: 3.3 G/DL (ref 1.9–3.5)
GLUCOSE SERPL-MCNC: 113 MG/DL (ref 65–99)
HCT VFR BLD AUTO: 38.1 % (ref 37–47)
HGB BLD-MCNC: 13 G/DL (ref 12–16)
IMM GRANULOCYTES # BLD AUTO: 0.04 K/UL (ref 0–0.11)
IMM GRANULOCYTES NFR BLD AUTO: 0.4 % (ref 0–0.9)
LACTATE SERPL-SCNC: 1.3 MMOL/L (ref 0.5–2)
LIPASE SERPL-CCNC: 19 U/L (ref 11–82)
LYMPHOCYTES # BLD AUTO: 0.9 K/UL (ref 1–4.8)
LYMPHOCYTES NFR BLD: 8.6 % (ref 22–41)
MAGNESIUM SERPL-MCNC: 1.7 MG/DL (ref 1.5–2.5)
MCH RBC QN AUTO: 30.4 PG (ref 27–33)
MCHC RBC AUTO-ENTMCNC: 34.1 G/DL (ref 32.2–35.5)
MCV RBC AUTO: 89 FL (ref 81.4–97.8)
MONOCYTES # BLD AUTO: 0.67 K/UL (ref 0–0.85)
MONOCYTES NFR BLD AUTO: 6.4 % (ref 0–13.4)
NEUTROPHILS # BLD AUTO: 8.61 K/UL (ref 1.82–7.42)
NEUTROPHILS NFR BLD: 81.9 % (ref 44–72)
NRBC # BLD AUTO: 0 K/UL
NRBC BLD-RTO: 0 /100 WBC (ref 0–0.2)
PLATELET # BLD AUTO: 326 K/UL (ref 164–446)
PMV BLD AUTO: 9.2 FL (ref 9–12.9)
POTASSIUM SERPL-SCNC: 4.1 MMOL/L (ref 3.6–5.5)
PROT SERPL-MCNC: 7.6 G/DL (ref 6–8.2)
RBC # BLD AUTO: 4.28 M/UL (ref 4.2–5.4)
RSV RNA SPEC QL NAA+PROBE: NEGATIVE
SARS-COV-2 RNA RESP QL NAA+PROBE: NEGATIVE
SODIUM SERPL-SCNC: 132 MMOL/L (ref 135–145)
WBC # BLD AUTO: 10.5 K/UL (ref 4.8–10.8)

## 2025-08-03 PROCEDURE — 70496 CT ANGIOGRAPHY HEAD: CPT

## 2025-08-03 PROCEDURE — 74177 CT ABD & PELVIS W/CONTRAST: CPT

## 2025-08-03 PROCEDURE — 96361 HYDRATE IV INFUSION ADD-ON: CPT

## 2025-08-03 PROCEDURE — 0241U POC COV-2, FLU A/B, RSV BY PCR: CPT | Performed by: STUDENT IN AN ORGANIZED HEALTH CARE EDUCATION/TRAINING PROGRAM

## 2025-08-03 PROCEDURE — 96374 THER/PROPH/DIAG INJ IV PUSH: CPT

## 2025-08-03 PROCEDURE — 93005 ELECTROCARDIOGRAM TRACING: CPT | Mod: TC | Performed by: STUDENT IN AN ORGANIZED HEALTH CARE EDUCATION/TRAINING PROGRAM

## 2025-08-03 PROCEDURE — 71045 X-RAY EXAM CHEST 1 VIEW: CPT

## 2025-08-03 PROCEDURE — 83605 ASSAY OF LACTIC ACID: CPT

## 2025-08-03 PROCEDURE — 99285 EMERGENCY DEPT VISIT HI MDM: CPT

## 2025-08-03 PROCEDURE — 700117 HCHG RX CONTRAST REV CODE 255: Performed by: STUDENT IN AN ORGANIZED HEALTH CARE EDUCATION/TRAINING PROGRAM

## 2025-08-03 PROCEDURE — 83690 ASSAY OF LIPASE: CPT

## 2025-08-03 PROCEDURE — 700111 HCHG RX REV CODE 636 W/ 250 OVERRIDE (IP): Performed by: STUDENT IN AN ORGANIZED HEALTH CARE EDUCATION/TRAINING PROGRAM

## 2025-08-03 PROCEDURE — 96375 TX/PRO/DX INJ NEW DRUG ADDON: CPT

## 2025-08-03 PROCEDURE — 83735 ASSAY OF MAGNESIUM: CPT

## 2025-08-03 PROCEDURE — 36415 COLL VENOUS BLD VENIPUNCTURE: CPT

## 2025-08-03 PROCEDURE — 85025 COMPLETE CBC W/AUTO DIFF WBC: CPT

## 2025-08-03 PROCEDURE — 87040 BLOOD CULTURE FOR BACTERIA: CPT

## 2025-08-03 PROCEDURE — 80053 COMPREHEN METABOLIC PANEL: CPT

## 2025-08-03 PROCEDURE — 700105 HCHG RX REV CODE 258: Performed by: STUDENT IN AN ORGANIZED HEALTH CARE EDUCATION/TRAINING PROGRAM

## 2025-08-03 RX ORDER — MORPHINE SULFATE 4 MG/ML
4 INJECTION INTRAVENOUS ONCE
Status: COMPLETED | OUTPATIENT
Start: 2025-08-03 | End: 2025-08-03

## 2025-08-03 RX ORDER — SODIUM CHLORIDE, SODIUM LACTATE, POTASSIUM CHLORIDE, AND CALCIUM CHLORIDE .6; .31; .03; .02 G/100ML; G/100ML; G/100ML; G/100ML
30 INJECTION, SOLUTION INTRAVENOUS ONCE
Status: COMPLETED | OUTPATIENT
Start: 2025-08-03 | End: 2025-08-03

## 2025-08-03 RX ORDER — ONDANSETRON 2 MG/ML
4 INJECTION INTRAMUSCULAR; INTRAVENOUS ONCE
Status: COMPLETED | OUTPATIENT
Start: 2025-08-03 | End: 2025-08-03

## 2025-08-03 RX ADMIN — SODIUM CHLORIDE, POTASSIUM CHLORIDE, SODIUM LACTATE AND CALCIUM CHLORIDE 1503 ML: 600; 310; 30; 20 INJECTION, SOLUTION INTRAVENOUS at 21:51

## 2025-08-03 RX ADMIN — IOHEXOL 97 ML: 350 INJECTION, SOLUTION INTRAVENOUS at 23:30

## 2025-08-03 RX ADMIN — MORPHINE SULFATE 4 MG: 4 INJECTION, SOLUTION INTRAMUSCULAR; INTRAVENOUS at 21:50

## 2025-08-03 RX ADMIN — ONDANSETRON 4 MG: 2 INJECTION INTRAMUSCULAR; INTRAVENOUS at 21:45

## 2025-08-03 ASSESSMENT — FIBROSIS 4 INDEX: FIB4 SCORE: 1.42

## 2025-08-03 ASSESSMENT — PAIN DESCRIPTION - PAIN TYPE: TYPE: ACUTE PAIN

## 2025-08-04 ENCOUNTER — PHARMACY VISIT (OUTPATIENT)
Dept: PHARMACY | Facility: MEDICAL CENTER | Age: 75
End: 2025-08-04
Payer: COMMERCIAL

## 2025-08-04 VITALS
DIASTOLIC BLOOD PRESSURE: 76 MMHG | WEIGHT: 230 LBS | OXYGEN SATURATION: 94 % | HEART RATE: 61 BPM | TEMPERATURE: 97.8 F | SYSTOLIC BLOOD PRESSURE: 158 MMHG | RESPIRATION RATE: 18 BRPM | BODY MASS INDEX: 42.33 KG/M2 | HEIGHT: 62 IN

## 2025-08-04 PROBLEM — R11.2 INTRACTABLE NAUSEA AND VOMITING: Status: RESOLVED | Noted: 2025-08-04 | Resolved: 2025-08-04

## 2025-08-04 PROBLEM — G44.89 OTHER HEADACHE SYNDROME: Status: ACTIVE | Noted: 2025-08-04

## 2025-08-04 PROBLEM — R11.2 INTRACTABLE NAUSEA AND VOMITING: Status: ACTIVE | Noted: 2025-08-04

## 2025-08-04 PROBLEM — R09.02 HYPOXIA: Status: RESOLVED | Noted: 2021-11-08 | Resolved: 2025-08-04

## 2025-08-04 PROBLEM — G44.89 OTHER HEADACHE SYNDROME: Status: RESOLVED | Noted: 2025-08-04 | Resolved: 2025-08-04

## 2025-08-04 LAB
ANION GAP SERPL CALC-SCNC: 10 MMOL/L (ref 7–16)
APPEARANCE UR: CLEAR
BILIRUB UR QL STRIP.AUTO: NEGATIVE
BUN SERPL-MCNC: 13 MG/DL (ref 8–22)
CALCIUM SERPL-MCNC: 8.2 MG/DL (ref 8.5–10.5)
CHLORIDE SERPL-SCNC: 102 MMOL/L (ref 96–112)
CO2 SERPL-SCNC: 20 MMOL/L (ref 20–33)
COLOR UR: YELLOW
CREAT SERPL-MCNC: 0.46 MG/DL (ref 0.5–1.4)
ERYTHROCYTE [DISTWIDTH] IN BLOOD BY AUTOMATED COUNT: 42.8 FL (ref 35.9–50)
GFR SERPLBLD CREATININE-BSD FMLA CKD-EPI: 101 ML/MIN/1.73 M 2
GLUCOSE SERPL-MCNC: 98 MG/DL (ref 65–99)
GLUCOSE UR STRIP.AUTO-MCNC: NEGATIVE MG/DL
HCT VFR BLD AUTO: 34.3 % (ref 37–47)
HGB BLD-MCNC: 11.6 G/DL (ref 12–16)
KETONES UR STRIP.AUTO-MCNC: NEGATIVE MG/DL
LEUKOCYTE ESTERASE UR QL STRIP.AUTO: NEGATIVE
MCH RBC QN AUTO: 30.2 PG (ref 27–33)
MCHC RBC AUTO-ENTMCNC: 33.8 G/DL (ref 32.2–35.5)
MCV RBC AUTO: 89.3 FL (ref 81.4–97.8)
MICRO URNS: NORMAL
NITRITE UR QL STRIP.AUTO: NEGATIVE
PH UR STRIP.AUTO: 7 [PH] (ref 5–8)
PLATELET # BLD AUTO: 291 K/UL (ref 164–446)
PMV BLD AUTO: 9.2 FL (ref 9–12.9)
POTASSIUM SERPL-SCNC: 3.9 MMOL/L (ref 3.6–5.5)
PROT UR QL STRIP: NEGATIVE MG/DL
RBC # BLD AUTO: 3.84 M/UL (ref 4.2–5.4)
RBC UR QL AUTO: NEGATIVE
SODIUM SERPL-SCNC: 132 MMOL/L (ref 135–145)
SP GR UR STRIP.AUTO: 1.04
UROBILINOGEN UR STRIP.AUTO-MCNC: 0.2 EU/DL
WBC # BLD AUTO: 7.5 K/UL (ref 4.8–10.8)

## 2025-08-04 PROCEDURE — 87086 URINE CULTURE/COLONY COUNT: CPT

## 2025-08-04 PROCEDURE — 700102 HCHG RX REV CODE 250 W/ 637 OVERRIDE(OP)

## 2025-08-04 PROCEDURE — 700105 HCHG RX REV CODE 258

## 2025-08-04 PROCEDURE — RXMED WILLOW AMBULATORY MEDICATION CHARGE

## 2025-08-04 PROCEDURE — 36415 COLL VENOUS BLD VENIPUNCTURE: CPT

## 2025-08-04 PROCEDURE — 99235 HOSP IP/OBS SAME DATE MOD 70: CPT | Mod: GC | Performed by: STUDENT IN AN ORGANIZED HEALTH CARE EDUCATION/TRAINING PROGRAM

## 2025-08-04 PROCEDURE — 80048 BASIC METABOLIC PNL TOTAL CA: CPT

## 2025-08-04 PROCEDURE — 87186 SC STD MICRODIL/AGAR DIL: CPT

## 2025-08-04 PROCEDURE — A9270 NON-COVERED ITEM OR SERVICE: HCPCS | Performed by: FAMILY MEDICINE

## 2025-08-04 PROCEDURE — 96375 TX/PRO/DX INJ NEW DRUG ADDON: CPT

## 2025-08-04 PROCEDURE — A9270 NON-COVERED ITEM OR SERVICE: HCPCS

## 2025-08-04 PROCEDURE — 85027 COMPLETE CBC AUTOMATED: CPT

## 2025-08-04 PROCEDURE — 700111 HCHG RX REV CODE 636 W/ 250 OVERRIDE (IP): Mod: JZ | Performed by: STUDENT IN AN ORGANIZED HEALTH CARE EDUCATION/TRAINING PROGRAM

## 2025-08-04 PROCEDURE — 87077 CULTURE AEROBIC IDENTIFY: CPT

## 2025-08-04 PROCEDURE — 97162 PT EVAL MOD COMPLEX 30 MIN: CPT

## 2025-08-04 PROCEDURE — 700102 HCHG RX REV CODE 250 W/ 637 OVERRIDE(OP): Performed by: FAMILY MEDICINE

## 2025-08-04 PROCEDURE — 81003 URINALYSIS AUTO W/O SCOPE: CPT

## 2025-08-04 PROCEDURE — 770006 HCHG ROOM/CARE - MED/SURG/GYN SEMI*

## 2025-08-04 PROCEDURE — 97535 SELF CARE MNGMENT TRAINING: CPT

## 2025-08-04 RX ORDER — ACETAMINOPHEN 325 MG/1
650 TABLET ORAL EVERY 6 HOURS PRN
Status: DISCONTINUED | OUTPATIENT
Start: 2025-08-04 | End: 2025-08-04 | Stop reason: HOSPADM

## 2025-08-04 RX ORDER — SODIUM CHLORIDE 9 MG/ML
INJECTION, SOLUTION INTRAVENOUS CONTINUOUS
Status: DISCONTINUED | OUTPATIENT
Start: 2025-08-04 | End: 2025-08-04

## 2025-08-04 RX ORDER — METOCLOPRAMIDE HYDROCHLORIDE 5 MG/ML
5 INJECTION INTRAMUSCULAR; INTRAVENOUS ONCE
Status: COMPLETED | OUTPATIENT
Start: 2025-08-04 | End: 2025-08-04

## 2025-08-04 RX ORDER — POLYETHYLENE GLYCOL 3350 17 G/17G
1 POWDER, FOR SOLUTION ORAL
Status: DISCONTINUED | OUTPATIENT
Start: 2025-08-04 | End: 2025-08-04 | Stop reason: HOSPADM

## 2025-08-04 RX ORDER — LOSARTAN POTASSIUM 25 MG/1
25 TABLET ORAL DAILY
Status: DISCONTINUED | OUTPATIENT
Start: 2025-08-04 | End: 2025-08-04 | Stop reason: HOSPADM

## 2025-08-04 RX ORDER — LABETALOL HYDROCHLORIDE 5 MG/ML
10 INJECTION, SOLUTION INTRAVENOUS
Status: DISCONTINUED | OUTPATIENT
Start: 2025-08-04 | End: 2025-08-04

## 2025-08-04 RX ORDER — ROPINIROLE 0.25 MG/1
0.25 TABLET, FILM COATED ORAL
Qty: 90 TABLET | Refills: 1 | Status: SHIPPED | OUTPATIENT
Start: 2025-08-04

## 2025-08-04 RX ORDER — ONDANSETRON 2 MG/ML
4 INJECTION INTRAMUSCULAR; INTRAVENOUS EVERY 4 HOURS PRN
Status: DISCONTINUED | OUTPATIENT
Start: 2025-08-04 | End: 2025-08-04 | Stop reason: HOSPADM

## 2025-08-04 RX ORDER — ONDANSETRON 4 MG/1
4 TABLET, ORALLY DISINTEGRATING ORAL EVERY 4 HOURS PRN
Status: DISCONTINUED | OUTPATIENT
Start: 2025-08-04 | End: 2025-08-04 | Stop reason: HOSPADM

## 2025-08-04 RX ORDER — OXYBUTYNIN CHLORIDE 15 MG/1
15 TABLET, EXTENDED RELEASE ORAL DAILY
Status: DISCONTINUED | OUTPATIENT
Start: 2025-08-04 | End: 2025-08-04

## 2025-08-04 RX ORDER — ROPINIROLE 0.5 MG/1
0.25 TABLET, FILM COATED ORAL ONCE
Status: COMPLETED | OUTPATIENT
Start: 2025-08-04 | End: 2025-08-04

## 2025-08-04 RX ORDER — ENOXAPARIN SODIUM 100 MG/ML
40 INJECTION SUBCUTANEOUS DAILY
Status: DISCONTINUED | OUTPATIENT
Start: 2025-08-04 | End: 2025-08-04 | Stop reason: HOSPADM

## 2025-08-04 RX ORDER — DIPHENHYDRAMINE HYDROCHLORIDE 50 MG/ML
25 INJECTION, SOLUTION INTRAMUSCULAR; INTRAVENOUS ONCE
Status: COMPLETED | OUTPATIENT
Start: 2025-08-04 | End: 2025-08-04

## 2025-08-04 RX ORDER — LABETALOL HYDROCHLORIDE 5 MG/ML
10 INJECTION, SOLUTION INTRAVENOUS EVERY 4 HOURS PRN
Status: DISCONTINUED | OUTPATIENT
Start: 2025-08-04 | End: 2025-08-04 | Stop reason: HOSPADM

## 2025-08-04 RX ORDER — OXYBUTYNIN CHLORIDE 5 MG/1
15 TABLET, EXTENDED RELEASE ORAL 2 TIMES DAILY
Status: DISCONTINUED | OUTPATIENT
Start: 2025-08-04 | End: 2025-08-04 | Stop reason: HOSPADM

## 2025-08-04 RX ORDER — ROPINIROLE 0.5 MG/1
0.25 TABLET, FILM COATED ORAL
Status: DISCONTINUED | OUTPATIENT
Start: 2025-08-04 | End: 2025-08-04 | Stop reason: HOSPADM

## 2025-08-04 RX ORDER — AMOXICILLIN 250 MG
2 CAPSULE ORAL EVERY EVENING
Status: DISCONTINUED | OUTPATIENT
Start: 2025-08-04 | End: 2025-08-04 | Stop reason: HOSPADM

## 2025-08-04 RX ORDER — IBUPROFEN 200 MG
800 TABLET ORAL
COMMUNITY

## 2025-08-04 RX ORDER — ROPINIROLE 0.25 MG/1
0.25 TABLET, FILM COATED ORAL 3 TIMES DAILY
Qty: 90 TABLET | Refills: 11 | Status: SHIPPED | OUTPATIENT
Start: 2025-08-04

## 2025-08-04 RX ADMIN — OXYBUTYNIN CHLORIDE 15 MG: 5 TABLET, EXTENDED RELEASE ORAL at 17:29

## 2025-08-04 RX ADMIN — DIPHENHYDRAMINE HYDROCHLORIDE 25 MG: 50 INJECTION, SOLUTION INTRAMUSCULAR; INTRAVENOUS at 01:16

## 2025-08-04 RX ADMIN — OXYBUTYNIN CHLORIDE 15 MG: 5 TABLET, EXTENDED RELEASE ORAL at 05:16

## 2025-08-04 RX ADMIN — ROPINIROLE HYDROCHLORIDE 0.25 MG: 0.5 TABLET, FILM COATED ORAL at 08:12

## 2025-08-04 RX ADMIN — LOSARTAN POTASSIUM 25 MG: 25 TABLET, FILM COATED ORAL at 05:16

## 2025-08-04 RX ADMIN — ACETAMINOPHEN 650 MG: 325 TABLET ORAL at 17:30

## 2025-08-04 RX ADMIN — SODIUM CHLORIDE: 9 INJECTION, SOLUTION INTRAVENOUS at 03:27

## 2025-08-04 RX ADMIN — METOCLOPRAMIDE HYDROCHLORIDE 5 MG: 5 INJECTION INTRAMUSCULAR; INTRAVENOUS at 01:16

## 2025-08-04 ASSESSMENT — COGNITIVE AND FUNCTIONAL STATUS - GENERAL
MOVING TO AND FROM BED TO CHAIR: A LITTLE
MOBILITY SCORE: 18
MOVING TO AND FROM BED TO CHAIR: A LITTLE
SUGGESTED CMS G CODE MODIFIER DAILY ACTIVITY: CI
DAILY ACTIVITIY SCORE: 23
MOBILITY SCORE: 21
CLIMB 3 TO 5 STEPS WITH RAILING: A LITTLE
STANDING UP FROM CHAIR USING ARMS: A LITTLE
MOVING FROM LYING ON BACK TO SITTING ON SIDE OF FLAT BED: A LITTLE
DRESSING REGULAR LOWER BODY CLOTHING: A LITTLE
WALKING IN HOSPITAL ROOM: A LITTLE
SUGGESTED CMS G CODE MODIFIER MOBILITY: CK
CLIMB 3 TO 5 STEPS WITH RAILING: A LITTLE
WALKING IN HOSPITAL ROOM: A LITTLE
SUGGESTED CMS G CODE MODIFIER MOBILITY: CJ
TURNING FROM BACK TO SIDE WHILE IN FLAT BAD: A LITTLE

## 2025-08-04 ASSESSMENT — LIFESTYLE VARIABLES
ON A TYPICAL DAY WHEN YOU DRINK ALCOHOL HOW MANY DRINKS DO YOU HAVE: 1
HOW MANY TIMES IN THE PAST YEAR HAVE YOU HAD 5 OR MORE DRINKS IN A DAY: 0
ALCOHOL_USE: YES
HAVE YOU EVER FELT YOU SHOULD CUT DOWN ON YOUR DRINKING: NO
TOTAL SCORE: 0
TOTAL SCORE: 0
EVER HAD A DRINK FIRST THING IN THE MORNING TO STEADY YOUR NERVES TO GET RID OF A HANGOVER: NO
HAVE PEOPLE ANNOYED YOU BY CRITICIZING YOUR DRINKING: NO
TOTAL SCORE: 0
AVERAGE NUMBER OF DAYS PER WEEK YOU HAVE A DRINK CONTAINING ALCOHOL: 1
EVER FELT BAD OR GUILTY ABOUT YOUR DRINKING: NO
CONSUMPTION TOTAL: NEGATIVE

## 2025-08-04 ASSESSMENT — PAIN DESCRIPTION - PAIN TYPE
TYPE: ACUTE PAIN

## 2025-08-04 ASSESSMENT — GAIT ASSESSMENTS
DEVIATION: INCREASED BASE OF SUPPORT;BRADYKINETIC
DISTANCE (FEET): 300
GAIT LEVEL OF ASSIST: CONTACT GUARD ASSIST
ASSISTIVE DEVICE: FRONT WHEEL WALKER

## 2025-08-05 LAB — EKG IMPRESSION: NORMAL

## 2025-08-08 ENCOUNTER — APPOINTMENT (OUTPATIENT)
Dept: MEDICAL GROUP | Facility: CLINIC | Age: 75
End: 2025-08-08
Payer: MEDICARE

## 2025-08-08 LAB
BACTERIA BLD CULT: NORMAL
BACTERIA BLD CULT: NORMAL
SIGNIFICANT IND 70042: NORMAL
SIGNIFICANT IND 70042: NORMAL
SITE SITE: NORMAL
SITE SITE: NORMAL
SOURCE SOURCE: NORMAL
SOURCE SOURCE: NORMAL

## (undated) DEVICE — CANISTER SUCTION 3000ML MECHANICAL FILTER AUTO SHUTOFF MEDI-VAC NONSTERILE LF DISP  (40EA/CA)

## (undated) DEVICE — NEPTUNE 4 PORT MANIFOLD - (20/PK)

## (undated) DEVICE — TROCAR 5X100 NON BLADED Z-TH - READ KII (6/BX)

## (undated) DEVICE — SLEEVE, VASO, THIGH, MED

## (undated) DEVICE — ELECTRODE 850 FOAM ADHESIVE - HYDROGEL RADIOTRNSPRNT (50/PK)

## (undated) DEVICE — SUTURE 4-0 MONOCRYL PLUS PS-2 - 27 INCH (36/BX)

## (undated) DEVICE — TUBE CONNECT SUCTION CLEAR 120 X 1/4" (50EA/CA)"

## (undated) DEVICE — LACTATED RINGERS INJ 1000 ML - (14EA/CA 60CA/PF)

## (undated) DEVICE — SENSOR SPO2 NEO LNCS ADHESIVE (20/BX) SEE USER NOTES

## (undated) DEVICE — CLIP HEMOLOCK PURPLE - (14/BX)

## (undated) DEVICE — SET LEADWIRE 5 LEAD BEDSIDE DISPOSABLE ECG (1SET OF 5/EA)

## (undated) DEVICE — SYRINGE SAFETY 3 ML 18 GA X 1 1/2 BLUNT LL (100/BX 8BX/CA)

## (undated) DEVICE — SUTURE 0 VICRYL PLUS UR-6 - 27 INCH (36/BX)

## (undated) DEVICE — KIT ANESTHESIA W/CIRCUIT & 3/LT BAG W/FILTER (20EA/CA)

## (undated) DEVICE — SYRINGE 30 ML LL (56/BX)

## (undated) DEVICE — CLIP MED LG INTNL HRZN TI ESCP - (20/BX)

## (undated) DEVICE — TROCAR Z THREAD12MM OPTICAL - NON BLADED (6/BX)

## (undated) DEVICE — MASK ANESTHESIA ADULT  - (100/CA)

## (undated) DEVICE — GLOVE BIOGEL SZ 6.5 SURGICAL PF LTX (50PR/BX 4BX/CA)

## (undated) DEVICE — DERMABOND ADVANCED - (12EA/BX)

## (undated) DEVICE — SCISSORS 5MM CVD (6EA/BX)

## (undated) DEVICE — SET TUBING PNEUMOCLEAR HIGH FLOW SMOKE EVACUATION (10EA/BX)

## (undated) DEVICE — TUBING CLEARLINK DUO-VENT - C-FLO (48EA/CA)

## (undated) DEVICE — GLOVE BIOGEL ECLIPSE PF LATEX SIZE 7.5

## (undated) DEVICE — CANNULA W/SEAL 5X100 Z-THRE - ADED KII (12/BX)

## (undated) DEVICE — GOWN WARMING STANDARD FLEX - (30/CA)

## (undated) DEVICE — TOWEL STOP TIMEOUT SAFETY FLAG (40EA/CA)

## (undated) DEVICE — ELECTRODE DUAL RETURN W/ CORD - (50/PK)

## (undated) DEVICE — BAG RETRIEVAL 10ML (10EA/BX)

## (undated) DEVICE — SUTURE GENERAL

## (undated) DEVICE — PACK LAP CHOLE OR - (2EA/CA)

## (undated) DEVICE — SODIUM CHL IRRIGATION 0.9% 1000ML (12EA/CA)

## (undated) DEVICE — SUCTION INSTRUMENT YANKAUER BULBOUS TIP W/O VENT (50EA/CA)

## (undated) DEVICE — SET EXTENSION WITH 2 PORTS (48EA/CA) ***PART #2C8610 IS A SUBSTITUTE*****

## (undated) DEVICE — NEEDLE INSFL 120MM 14GA VRRS - (20/BX)

## (undated) DEVICE — AGENT HEMOSTATIC BELLOW HEMOBLAST

## (undated) DEVICE — HEAD HOLDER JUNIOR/ADULT

## (undated) DEVICE — GLOVE BIOGEL SZ 7.5 SURGICAL PF LTX - (50PR/BX 4BX/CA)

## (undated) DEVICE — SET SUCTION/IRRIGATION WITH DISPOSABLE TIP (6/CA )PART #0250-070-520 IS A SUB

## (undated) DEVICE — CHLORAPREP 26 ML APPLICATOR - ORANGE TINT(25/CA)

## (undated) DEVICE — PROTECTOR ULNA NERVE - (36PR/CA)